# Patient Record
Sex: MALE | Race: WHITE | Employment: FULL TIME | URBAN - METROPOLITAN AREA
[De-identification: names, ages, dates, MRNs, and addresses within clinical notes are randomized per-mention and may not be internally consistent; named-entity substitution may affect disease eponyms.]

---

## 2017-03-13 RX ORDER — MONTELUKAST SODIUM 10 MG/1
TABLET ORAL
Qty: 90 TABLET | Refills: 5 | Status: SHIPPED | OUTPATIENT
Start: 2017-03-13 | End: 2018-09-06 | Stop reason: SDUPTHER

## 2017-05-15 RX ORDER — ATORVASTATIN CALCIUM 80 MG/1
TABLET, FILM COATED ORAL
Qty: 90 TABLET | Refills: 0 | Status: SHIPPED | OUTPATIENT
Start: 2017-05-15 | End: 2017-11-09 | Stop reason: SDUPTHER

## 2017-05-26 ENCOUNTER — OFFICE VISIT (OUTPATIENT)
Dept: INTERNAL MEDICINE CLINIC | Age: 47
End: 2017-05-26

## 2017-05-26 VITALS
DIASTOLIC BLOOD PRESSURE: 82 MMHG | HEART RATE: 86 BPM | HEIGHT: 72 IN | RESPIRATION RATE: 16 BRPM | WEIGHT: 208 LBS | BODY MASS INDEX: 28.17 KG/M2 | SYSTOLIC BLOOD PRESSURE: 120 MMHG

## 2017-05-26 DIAGNOSIS — Z23 NEED FOR PNEUMOCOCCAL VACCINE: ICD-10-CM

## 2017-05-26 DIAGNOSIS — I10 ESSENTIAL HYPERTENSION: Primary | ICD-10-CM

## 2017-05-26 DIAGNOSIS — J45.30 MILD PERSISTENT ASTHMA WITHOUT COMPLICATION: ICD-10-CM

## 2017-05-26 DIAGNOSIS — E78.00 PURE HYPERCHOLESTEROLEMIA: ICD-10-CM

## 2017-05-26 LAB
A/G RATIO: 2.1 (ref 1.1–2.2)
ALBUMIN SERPL-MCNC: 4.9 G/DL (ref 3.4–5)
ALP BLD-CCNC: 69 U/L (ref 40–129)
ALT SERPL-CCNC: 16 U/L (ref 10–40)
ANION GAP SERPL CALCULATED.3IONS-SCNC: 16 MMOL/L (ref 3–16)
AST SERPL-CCNC: 14 U/L (ref 15–37)
BASOPHILS ABSOLUTE: 0 K/UL (ref 0–0.2)
BASOPHILS RELATIVE PERCENT: 0.5 %
BILIRUB SERPL-MCNC: 0.5 MG/DL (ref 0–1)
BUN BLDV-MCNC: 15 MG/DL (ref 7–20)
CALCIUM SERPL-MCNC: 9.2 MG/DL (ref 8.3–10.6)
CHLORIDE BLD-SCNC: 102 MMOL/L (ref 99–110)
CHOLESTEROL, TOTAL: 147 MG/DL (ref 0–199)
CO2: 24 MMOL/L (ref 21–32)
CREAT SERPL-MCNC: 0.8 MG/DL (ref 0.9–1.3)
EOSINOPHILS ABSOLUTE: 0 K/UL (ref 0–0.6)
EOSINOPHILS RELATIVE PERCENT: 0.5 %
GFR AFRICAN AMERICAN: >60
GFR NON-AFRICAN AMERICAN: >60
GLOBULIN: 2.3 G/DL
GLUCOSE BLD-MCNC: 100 MG/DL (ref 70–99)
HCT VFR BLD CALC: 46.6 % (ref 40.5–52.5)
HDLC SERPL-MCNC: 35 MG/DL (ref 40–60)
HEMOGLOBIN: 15.3 G/DL (ref 13.5–17.5)
LDL CHOLESTEROL CALCULATED: 93 MG/DL
LYMPHOCYTES ABSOLUTE: 1.9 K/UL (ref 1–5.1)
LYMPHOCYTES RELATIVE PERCENT: 24.6 %
MCH RBC QN AUTO: 32.4 PG (ref 26–34)
MCHC RBC AUTO-ENTMCNC: 32.8 G/DL (ref 31–36)
MCV RBC AUTO: 98.8 FL (ref 80–100)
MONOCYTES ABSOLUTE: 0.3 K/UL (ref 0–1.3)
MONOCYTES RELATIVE PERCENT: 4.4 %
NEUTROPHILS ABSOLUTE: 5.4 K/UL (ref 1.7–7.7)
NEUTROPHILS RELATIVE PERCENT: 70 %
PDW BLD-RTO: 12.6 % (ref 12.4–15.4)
PLATELET # BLD: 283 K/UL (ref 135–450)
PMV BLD AUTO: 8.2 FL (ref 5–10.5)
POTASSIUM SERPL-SCNC: 4.3 MMOL/L (ref 3.5–5.1)
RBC # BLD: 4.71 M/UL (ref 4.2–5.9)
SODIUM BLD-SCNC: 142 MMOL/L (ref 136–145)
TOTAL PROTEIN: 7.2 G/DL (ref 6.4–8.2)
TRIGL SERPL-MCNC: 96 MG/DL (ref 0–150)
VLDLC SERPL CALC-MCNC: 19 MG/DL
WBC # BLD: 7.8 K/UL (ref 4–11)

## 2017-05-26 PROCEDURE — 90471 IMMUNIZATION ADMIN: CPT | Performed by: INTERNAL MEDICINE

## 2017-05-26 PROCEDURE — 90732 PPSV23 VACC 2 YRS+ SUBQ/IM: CPT | Performed by: INTERNAL MEDICINE

## 2017-05-26 PROCEDURE — 99214 OFFICE O/P EST MOD 30 MIN: CPT | Performed by: INTERNAL MEDICINE

## 2017-05-26 ASSESSMENT — ENCOUNTER SYMPTOMS
SHORTNESS OF BREATH: 0
RESPIRATORY NEGATIVE: 1
COUGH: 0
WHEEZING: 0
STRIDOR: 0

## 2017-05-26 ASSESSMENT — PATIENT HEALTH QUESTIONNAIRE - PHQ9
SUM OF ALL RESPONSES TO PHQ QUESTIONS 1-9: 0
SUM OF ALL RESPONSES TO PHQ9 QUESTIONS 1 & 2: 0
2. FEELING DOWN, DEPRESSED OR HOPELESS: 0
1. LITTLE INTEREST OR PLEASURE IN DOING THINGS: 0

## 2017-11-17 ENCOUNTER — OFFICE VISIT (OUTPATIENT)
Dept: INTERNAL MEDICINE CLINIC | Age: 47
End: 2017-11-17

## 2017-11-17 VITALS
HEART RATE: 70 BPM | SYSTOLIC BLOOD PRESSURE: 126 MMHG | HEIGHT: 72 IN | DIASTOLIC BLOOD PRESSURE: 80 MMHG | BODY MASS INDEX: 26.79 KG/M2 | WEIGHT: 197.8 LBS | RESPIRATION RATE: 16 BRPM

## 2017-11-17 DIAGNOSIS — J45.30 MILD PERSISTENT ASTHMA WITHOUT COMPLICATION: ICD-10-CM

## 2017-11-17 DIAGNOSIS — E78.00 PURE HYPERCHOLESTEROLEMIA: ICD-10-CM

## 2017-11-17 DIAGNOSIS — I10 ESSENTIAL HYPERTENSION: Primary | ICD-10-CM

## 2017-11-17 LAB
A/G RATIO: 2 (ref 1.1–2.2)
ALBUMIN SERPL-MCNC: 4.8 G/DL (ref 3.4–5)
ALP BLD-CCNC: 81 U/L (ref 40–129)
ALT SERPL-CCNC: 18 U/L (ref 10–40)
ANION GAP SERPL CALCULATED.3IONS-SCNC: 14 MMOL/L (ref 3–16)
AST SERPL-CCNC: 15 U/L (ref 15–37)
BILIRUB SERPL-MCNC: 0.4 MG/DL (ref 0–1)
BUN BLDV-MCNC: 15 MG/DL (ref 7–20)
CALCIUM SERPL-MCNC: 9.8 MG/DL (ref 8.3–10.6)
CHLORIDE BLD-SCNC: 99 MMOL/L (ref 99–110)
CHOLESTEROL, TOTAL: 154 MG/DL (ref 0–199)
CO2: 28 MMOL/L (ref 21–32)
CREAT SERPL-MCNC: 0.8 MG/DL (ref 0.9–1.3)
GFR AFRICAN AMERICAN: >60
GFR NON-AFRICAN AMERICAN: >60
GLOBULIN: 2.4 G/DL
GLUCOSE BLD-MCNC: 93 MG/DL (ref 70–99)
HDLC SERPL-MCNC: 42 MG/DL (ref 40–60)
LDL CHOLESTEROL CALCULATED: 99 MG/DL
POTASSIUM SERPL-SCNC: 4.2 MMOL/L (ref 3.5–5.1)
SODIUM BLD-SCNC: 141 MMOL/L (ref 136–145)
TOTAL PROTEIN: 7.2 G/DL (ref 6.4–8.2)
TRIGL SERPL-MCNC: 65 MG/DL (ref 0–150)
VLDLC SERPL CALC-MCNC: 13 MG/DL

## 2017-11-17 PROCEDURE — 99214 OFFICE O/P EST MOD 30 MIN: CPT | Performed by: INTERNAL MEDICINE

## 2017-11-17 NOTE — PROGRESS NOTES
Brother     COPD Maternal Grandmother          Vitals:    11/17/17 1054 11/17/17 1107   BP: 120/80 126/80   Site: Right Arm    Position: Sitting    Cuff Size: Medium Adult    Pulse: 70    Resp: 16    Weight: 197 lb 12.8 oz (89.7 kg)    Height: 6' (1.829 m)         Objective:   Physical Exam   Constitutional: He appears well-developed and well-nourished. Neck: Normal range of motion. Neck supple. No thyromegaly present. Cardiovascular: Normal rate, regular rhythm and normal heart sounds. Pulmonary/Chest: Effort normal and breath sounds normal. He has no wheezes. He has no rales. Musculoskeletal: He exhibits no edema. Lymphadenopathy:     He has no cervical adenopathy. Vitals reviewed. Assessment:      1. Essential hypertension  - well controlled, no changes needed. follow   2. Pure hypercholesterolemia  - fasting labs today, on a good statin dose, follow   3. Mild persistent asthma without complication  -continues to use albuterol, but still smokes actively.  Encouraged smoking cessation          Plan:      F/u in 6 months

## 2018-02-27 RX ORDER — BUDESONIDE AND FORMOTEROL FUMARATE DIHYDRATE 160; 4.5 UG/1; UG/1
AEROSOL RESPIRATORY (INHALATION)
Qty: 10.2 G | Refills: 5 | Status: SHIPPED | OUTPATIENT
Start: 2018-02-27 | End: 2018-12-13 | Stop reason: SDUPTHER

## 2018-05-25 ENCOUNTER — OFFICE VISIT (OUTPATIENT)
Dept: INTERNAL MEDICINE CLINIC | Age: 48
End: 2018-05-25

## 2018-05-25 VITALS
SYSTOLIC BLOOD PRESSURE: 132 MMHG | WEIGHT: 217.2 LBS | HEIGHT: 72 IN | RESPIRATION RATE: 16 BRPM | BODY MASS INDEX: 29.42 KG/M2 | HEART RATE: 74 BPM | DIASTOLIC BLOOD PRESSURE: 82 MMHG

## 2018-05-25 DIAGNOSIS — J45.30 MILD PERSISTENT ASTHMA WITHOUT COMPLICATION: ICD-10-CM

## 2018-05-25 DIAGNOSIS — E78.00 PURE HYPERCHOLESTEROLEMIA: ICD-10-CM

## 2018-05-25 DIAGNOSIS — I10 ESSENTIAL HYPERTENSION: Primary | ICD-10-CM

## 2018-05-25 LAB
A/G RATIO: 2 (ref 1.1–2.2)
ALBUMIN SERPL-MCNC: 4.6 G/DL (ref 3.4–5)
ALP BLD-CCNC: 78 U/L (ref 40–129)
ALT SERPL-CCNC: 16 U/L (ref 10–40)
ANION GAP SERPL CALCULATED.3IONS-SCNC: 13 MMOL/L (ref 3–16)
AST SERPL-CCNC: 16 U/L (ref 15–37)
BASOPHILS ABSOLUTE: 0 K/UL (ref 0–0.2)
BASOPHILS RELATIVE PERCENT: 0.4 %
BILIRUB SERPL-MCNC: 0.6 MG/DL (ref 0–1)
BUN BLDV-MCNC: 15 MG/DL (ref 7–20)
CALCIUM SERPL-MCNC: 9 MG/DL (ref 8.3–10.6)
CHLORIDE BLD-SCNC: 101 MMOL/L (ref 99–110)
CHOLESTEROL, TOTAL: 148 MG/DL (ref 0–199)
CO2: 26 MMOL/L (ref 21–32)
CREAT SERPL-MCNC: 0.8 MG/DL (ref 0.9–1.3)
EOSINOPHILS ABSOLUTE: 0.1 K/UL (ref 0–0.6)
EOSINOPHILS RELATIVE PERCENT: 1.3 %
GFR AFRICAN AMERICAN: >60
GFR NON-AFRICAN AMERICAN: >60
GLOBULIN: 2.3 G/DL
GLUCOSE BLD-MCNC: 105 MG/DL (ref 70–99)
HCT VFR BLD CALC: 42.8 % (ref 40.5–52.5)
HDLC SERPL-MCNC: 35 MG/DL (ref 40–60)
HEMOGLOBIN: 14.8 G/DL (ref 13.5–17.5)
LDL CHOLESTEROL CALCULATED: 97 MG/DL
LYMPHOCYTES ABSOLUTE: 1.8 K/UL (ref 1–5.1)
LYMPHOCYTES RELATIVE PERCENT: 28.3 %
MCH RBC QN AUTO: 33.5 PG (ref 26–34)
MCHC RBC AUTO-ENTMCNC: 34.6 G/DL (ref 31–36)
MCV RBC AUTO: 96.8 FL (ref 80–100)
MONOCYTES ABSOLUTE: 0.4 K/UL (ref 0–1.3)
MONOCYTES RELATIVE PERCENT: 5.7 %
NEUTROPHILS ABSOLUTE: 4.1 K/UL (ref 1.7–7.7)
NEUTROPHILS RELATIVE PERCENT: 64.3 %
PDW BLD-RTO: 12.8 % (ref 12.4–15.4)
PLATELET # BLD: 260 K/UL (ref 135–450)
PMV BLD AUTO: 7.8 FL (ref 5–10.5)
POTASSIUM SERPL-SCNC: 4.2 MMOL/L (ref 3.5–5.1)
RBC # BLD: 4.42 M/UL (ref 4.2–5.9)
SODIUM BLD-SCNC: 140 MMOL/L (ref 136–145)
TOTAL PROTEIN: 6.9 G/DL (ref 6.4–8.2)
TRIGL SERPL-MCNC: 80 MG/DL (ref 0–150)
TSH SERPL DL<=0.05 MIU/L-ACNC: 1.24 UIU/ML (ref 0.27–4.2)
VLDLC SERPL CALC-MCNC: 16 MG/DL
WBC # BLD: 6.3 K/UL (ref 4–11)

## 2018-05-25 PROCEDURE — 99214 OFFICE O/P EST MOD 30 MIN: CPT | Performed by: INTERNAL MEDICINE

## 2018-05-25 ASSESSMENT — ENCOUNTER SYMPTOMS
BLOOD IN STOOL: 0
WHEEZING: 0
RESPIRATORY NEGATIVE: 1
SHORTNESS OF BREATH: 0
COUGH: 0

## 2018-09-06 RX ORDER — MONTELUKAST SODIUM 10 MG/1
TABLET ORAL
Qty: 90 TABLET | Refills: 0 | Status: SHIPPED | OUTPATIENT
Start: 2018-09-06 | End: 2019-02-28 | Stop reason: SDUPTHER

## 2018-09-06 RX ORDER — AMLODIPINE BESYLATE 10 MG/1
TABLET ORAL
Qty: 90 TABLET | Refills: 0 | Status: SHIPPED | OUTPATIENT
Start: 2018-09-06 | End: 2019-02-28 | Stop reason: SDUPTHER

## 2018-12-13 RX ORDER — BUDESONIDE AND FORMOTEROL FUMARATE DIHYDRATE 160; 4.5 UG/1; UG/1
AEROSOL RESPIRATORY (INHALATION)
Qty: 10.2 G | Refills: 5 | Status: SHIPPED | OUTPATIENT
Start: 2018-12-13 | End: 2019-10-31 | Stop reason: SDUPTHER

## 2019-02-28 RX ORDER — AMLODIPINE BESYLATE 10 MG/1
TABLET ORAL
Qty: 90 TABLET | Refills: 0 | Status: SHIPPED | OUTPATIENT
Start: 2019-02-28 | End: 2019-08-29 | Stop reason: SDUPTHER

## 2019-02-28 RX ORDER — MONTELUKAST SODIUM 10 MG/1
TABLET ORAL
Qty: 90 TABLET | Refills: 0 | Status: SHIPPED | OUTPATIENT
Start: 2019-02-28 | End: 2019-08-29 | Stop reason: SDUPTHER

## 2019-04-04 RX ORDER — ALBUTEROL SULFATE 90 UG/1
AEROSOL, METERED RESPIRATORY (INHALATION)
Qty: 90 G | Refills: 5 | Status: SHIPPED | OUTPATIENT
Start: 2019-04-04 | End: 2019-12-19

## 2019-04-29 RX ORDER — ATORVASTATIN CALCIUM 80 MG/1
TABLET, FILM COATED ORAL
Qty: 90 TABLET | Refills: 3 | Status: SHIPPED | OUTPATIENT
Start: 2019-04-29 | End: 2020-10-13 | Stop reason: SDUPTHER

## 2019-12-19 RX ORDER — ALBUTEROL SULFATE 90 UG/1
AEROSOL, METERED RESPIRATORY (INHALATION)
Qty: 90 G | Refills: 3 | Status: SHIPPED | OUTPATIENT
Start: 2019-12-19 | End: 2021-02-01

## 2021-01-01 ENCOUNTER — HOSPITAL ENCOUNTER (OUTPATIENT)
Dept: CT IMAGING | Age: 51
Discharge: HOME | End: 2021-12-06
Payer: COMMERCIAL

## 2021-01-01 ENCOUNTER — HOSPITAL ENCOUNTER (OUTPATIENT)
Dept: NUCLEAR MEDICINE | Age: 51
Discharge: HOME | End: 2021-12-06
Payer: COMMERCIAL

## 2021-01-01 ENCOUNTER — TELEPHONE (OUTPATIENT)
Dept: FAMILY MEDICINE CLINIC | Age: 51
End: 2021-01-01

## 2021-01-01 ENCOUNTER — APPOINTMENT (OUTPATIENT)
Dept: CT IMAGING | Age: 51
DRG: 136 | End: 2021-01-01
Payer: MEDICAID

## 2021-01-01 ENCOUNTER — HOSPITAL ENCOUNTER (OUTPATIENT)
Age: 51
Discharge: HOME | DRG: 136 | End: 2021-09-02
Payer: MEDICAID

## 2021-01-01 ENCOUNTER — HOSPITAL ENCOUNTER (INPATIENT)
Age: 51
LOS: 20 days | Discharge: HOME | DRG: 136 | End: 2021-09-22
Attending: EMERGENCY MEDICINE | Admitting: INTERNAL MEDICINE
Payer: MEDICAID

## 2021-01-01 ENCOUNTER — APPOINTMENT (OUTPATIENT)
Dept: MRI IMAGING | Age: 51
DRG: 136 | End: 2021-01-01
Payer: MEDICAID

## 2021-01-01 ENCOUNTER — APPOINTMENT (OUTPATIENT)
Dept: NUCLEAR MEDICINE | Age: 51
DRG: 136 | End: 2021-01-01
Payer: MEDICAID

## 2021-01-01 ENCOUNTER — APPOINTMENT (OUTPATIENT)
Dept: GENERAL RADIOLOGY | Age: 51
DRG: 136 | End: 2021-01-01
Payer: MEDICAID

## 2021-01-01 ENCOUNTER — HOSPITAL ENCOUNTER (OUTPATIENT)
Dept: GENERAL RADIOLOGY | Age: 51
Discharge: HOME | DRG: 136 | End: 2021-09-02
Payer: MEDICAID

## 2021-01-01 VITALS
BODY MASS INDEX: 27.74 KG/M2 | RESPIRATION RATE: 16 BRPM | WEIGHT: 204.81 LBS | HEIGHT: 72 IN | DIASTOLIC BLOOD PRESSURE: 74 MMHG | OXYGEN SATURATION: 97 % | HEART RATE: 98 BPM | TEMPERATURE: 98 F | SYSTOLIC BLOOD PRESSURE: 119 MMHG

## 2021-01-01 DIAGNOSIS — J20.9 ACUTE BRONCHITIS, UNSPECIFIED ORGANISM: ICD-10-CM

## 2021-01-01 DIAGNOSIS — C34.01 MALIGNANT NEOPLASM OF RIGHT MAIN BRONCHUS (HCC): ICD-10-CM

## 2021-01-01 DIAGNOSIS — R06.09 DOE (DYSPNEA ON EXERTION): ICD-10-CM

## 2021-01-01 DIAGNOSIS — R05.9 COUGH: Primary | ICD-10-CM

## 2021-01-01 DIAGNOSIS — R60.0 EDEMA OF BOTH LEGS: ICD-10-CM

## 2021-01-01 DIAGNOSIS — R91.8 LUNG NODULES: ICD-10-CM

## 2021-01-01 DIAGNOSIS — K76.89 LIVER NODULE: ICD-10-CM

## 2021-01-01 DIAGNOSIS — E87.6 HYPOKALEMIA: ICD-10-CM

## 2021-01-01 DIAGNOSIS — J45.41 MODERATE PERSISTENT ASTHMA WITH EXACERBATION: ICD-10-CM

## 2021-01-01 DIAGNOSIS — I10 ELEVATED BLOOD PRESSURE READING IN OFFICE WITH DIAGNOSIS OF HYPERTENSION: ICD-10-CM

## 2021-01-01 LAB
A/G RATIO: 1.6 (ref 1.1–2.2)
ADRENOCORTICOTROPIC HORMONE: 726 PG/ML (ref 7–69)
ALBUMIN SERPL-MCNC: 2.9 G/DL (ref 3.4–5)
ALBUMIN SERPL-MCNC: 3 G/DL (ref 3.4–5)
ALBUMIN SERPL-MCNC: 3.4 G/DL (ref 3.4–5)
ALBUMIN SERPL-MCNC: 3.4 G/DL (ref 3.4–5)
ALBUMIN SERPL-MCNC: 3.5 G/DL (ref 3.4–5)
ALBUMIN SERPL-MCNC: 3.6 G/DL (ref 3.4–5)
ALBUMIN SERPL-MCNC: 3.9 G/DL (ref 3.4–5)
ALDOSTERONE: 3.5 NG/DL
ALP BLD-CCNC: 170 U/L (ref 40–129)
ALP BLD-CCNC: 177 U/L (ref 40–129)
ALP BLD-CCNC: 202 U/L (ref 40–129)
ALP BLD-CCNC: 213 U/L (ref 40–129)
ALP BLD-CCNC: 219 U/L (ref 40–129)
ALP BLD-CCNC: 223 U/L (ref 40–129)
ALT SERPL-CCNC: 193 U/L (ref 10–40)
ALT SERPL-CCNC: 193 U/L (ref 10–40)
ALT SERPL-CCNC: 209 U/L (ref 10–40)
ALT SERPL-CCNC: 218 U/L (ref 10–40)
ALT SERPL-CCNC: 240 U/L (ref 10–40)
ALT SERPL-CCNC: 266 U/L (ref 10–40)
ANION GAP SERPL CALCULATED.3IONS-SCNC: 10 MMOL/L (ref 3–16)
ANION GAP SERPL CALCULATED.3IONS-SCNC: 11 MMOL/L (ref 3–16)
ANION GAP SERPL CALCULATED.3IONS-SCNC: 12 MMOL/L (ref 3–16)
ANION GAP SERPL CALCULATED.3IONS-SCNC: 13 MMOL/L (ref 3–16)
ANION GAP SERPL CALCULATED.3IONS-SCNC: 6 MMOL/L (ref 3–16)
ANION GAP SERPL CALCULATED.3IONS-SCNC: 6 MMOL/L (ref 3–16)
ANION GAP SERPL CALCULATED.3IONS-SCNC: 7 MMOL/L (ref 3–16)
ANION GAP SERPL CALCULATED.3IONS-SCNC: 8 MMOL/L (ref 3–16)
ANION GAP SERPL CALCULATED.3IONS-SCNC: 9 MMOL/L (ref 3–16)
APTT: 25.8 SEC (ref 26.2–38.6)
AST SERPL-CCNC: 50 U/L (ref 15–37)
AST SERPL-CCNC: 53 U/L (ref 15–37)
AST SERPL-CCNC: 54 U/L (ref 15–37)
AST SERPL-CCNC: 64 U/L (ref 15–37)
AST SERPL-CCNC: 79 U/L (ref 15–37)
AST SERPL-CCNC: 80 U/L (ref 15–37)
BANDED NEUTROPHILS RELATIVE PERCENT: 22 % (ref 0–7)
BASE EXCESS ARTERIAL: 21.4 MMOL/L (ref -3–3)
BASOPHILS ABSOLUTE: 0 K/UL (ref 0–0.2)
BASOPHILS ABSOLUTE: 0.1 K/UL (ref 0–0.2)
BASOPHILS ABSOLUTE: 0.1 K/UL (ref 0–0.2)
BASOPHILS RELATIVE PERCENT: 0 %
BASOPHILS RELATIVE PERCENT: 0.1 %
BASOPHILS RELATIVE PERCENT: 0.1 %
BASOPHILS RELATIVE PERCENT: 0.2 %
BASOPHILS RELATIVE PERCENT: 0.3 %
BASOPHILS RELATIVE PERCENT: 0.6 %
BASOPHILS RELATIVE PERCENT: 0.7 %
BILIRUB SERPL-MCNC: 0.7 MG/DL (ref 0–1)
BILIRUB SERPL-MCNC: 0.7 MG/DL (ref 0–1)
BILIRUB SERPL-MCNC: 0.8 MG/DL (ref 0–1)
BILIRUB SERPL-MCNC: 0.8 MG/DL (ref 0–1)
BILIRUB SERPL-MCNC: 0.9 MG/DL (ref 0–1)
BILIRUB SERPL-MCNC: 1 MG/DL (ref 0–1)
BILIRUBIN DIRECT: 0.3 MG/DL (ref 0–0.3)
BILIRUBIN DIRECT: 0.3 MG/DL (ref 0–0.3)
BILIRUBIN DIRECT: 0.4 MG/DL (ref 0–0.3)
BILIRUBIN DIRECT: <0.2 MG/DL (ref 0–0.3)
BILIRUBIN DIRECT: <0.2 MG/DL (ref 0–0.3)
BILIRUBIN URINE: NEGATIVE
BILIRUBIN, INDIRECT: 0.5 MG/DL (ref 0–1)
BILIRUBIN, INDIRECT: 0.6 MG/DL (ref 0–1)
BILIRUBIN, INDIRECT: 0.6 MG/DL (ref 0–1)
BILIRUBIN, INDIRECT: ABNORMAL MG/DL (ref 0–1)
BILIRUBIN, INDIRECT: ABNORMAL MG/DL (ref 0–1)
BLOOD, URINE: ABNORMAL
BUN BLDV-MCNC: 12 MG/DL (ref 7–20)
BUN BLDV-MCNC: 13 MG/DL (ref 7–20)
BUN BLDV-MCNC: 14 MG/DL (ref 7–20)
BUN BLDV-MCNC: 15 MG/DL (ref 7–20)
BUN BLDV-MCNC: 16 MG/DL (ref 7–20)
BUN BLDV-MCNC: 17 MG/DL (ref 7–20)
BUN BLDV-MCNC: 17 MG/DL (ref 7–20)
BUN BLDV-MCNC: 18 MG/DL (ref 7–20)
BUN BLDV-MCNC: 18 MG/DL (ref 7–20)
BUN BLDV-MCNC: 19 MG/DL (ref 7–20)
BUN BLDV-MCNC: 21 MG/DL (ref 7–20)
BUN BLDV-MCNC: 23 MG/DL (ref 7–20)
BUN BLDV-MCNC: 23 MG/DL (ref 7–20)
BUN BLDV-MCNC: 24 MG/DL (ref 7–20)
BUN BLDV-MCNC: 25 MG/DL (ref 7–20)
BUN BLDV-MCNC: 25 MG/DL (ref 7–20)
BUN BLDV-MCNC: 26 MG/DL (ref 7–20)
BUN BLDV-MCNC: 28 MG/DL (ref 7–20)
BUN BLDV-MCNC: 30 MG/DL (ref 7–20)
BUN BLDV-MCNC: 30 MG/DL (ref 7–20)
CA 19-9: 128 U/ML (ref 0–35)
CALCIUM SERPL-MCNC: 7.9 MG/DL (ref 8.3–10.6)
CALCIUM SERPL-MCNC: 8.1 MG/DL (ref 8.3–10.6)
CALCIUM SERPL-MCNC: 8.2 MG/DL (ref 8.3–10.6)
CALCIUM SERPL-MCNC: 8.3 MG/DL (ref 8.3–10.6)
CALCIUM SERPL-MCNC: 8.4 MG/DL (ref 8.3–10.6)
CALCIUM SERPL-MCNC: 8.5 MG/DL (ref 8.3–10.6)
CALCIUM SERPL-MCNC: 8.6 MG/DL (ref 8.3–10.6)
CALCIUM SERPL-MCNC: 8.6 MG/DL (ref 8.3–10.6)
CALCIUM SERPL-MCNC: 8.7 MG/DL (ref 8.3–10.6)
CALCIUM SERPL-MCNC: 8.7 MG/DL (ref 8.3–10.6)
CALCIUM SERPL-MCNC: 8.8 MG/DL (ref 8.3–10.6)
CALCIUM SERPL-MCNC: 8.9 MG/DL (ref 8.3–10.6)
CALCIUM SERPL-MCNC: 9 MG/DL (ref 8.3–10.6)
CARBOXYHEMOGLOBIN ARTERIAL: 4.3 % (ref 0–1.5)
CEA: 278.1 NG/ML (ref 0–5)
CHLORIDE BLD-SCNC: 101 MMOL/L (ref 99–110)
CHLORIDE BLD-SCNC: 101 MMOL/L (ref 99–110)
CHLORIDE BLD-SCNC: 102 MMOL/L (ref 99–110)
CHLORIDE BLD-SCNC: 103 MMOL/L (ref 99–110)
CHLORIDE BLD-SCNC: 103 MMOL/L (ref 99–110)
CHLORIDE BLD-SCNC: 105 MMOL/L (ref 99–110)
CHLORIDE BLD-SCNC: 106 MMOL/L (ref 99–110)
CHLORIDE BLD-SCNC: 108 MMOL/L (ref 99–110)
CHLORIDE BLD-SCNC: 92 MMOL/L (ref 99–110)
CHLORIDE BLD-SCNC: 92 MMOL/L (ref 99–110)
CHLORIDE BLD-SCNC: 93 MMOL/L (ref 99–110)
CHLORIDE BLD-SCNC: 94 MMOL/L (ref 99–110)
CHLORIDE BLD-SCNC: 95 MMOL/L (ref 99–110)
CHLORIDE BLD-SCNC: 96 MMOL/L (ref 99–110)
CHLORIDE BLD-SCNC: 97 MMOL/L (ref 99–110)
CHLORIDE BLD-SCNC: 98 MMOL/L (ref 99–110)
CHLORIDE BLD-SCNC: 99 MMOL/L (ref 99–110)
CHLORIDE BLD-SCNC: 99 MMOL/L (ref 99–110)
CLARITY: CLEAR
CO2: 21 MMOL/L (ref 21–32)
CO2: 24 MMOL/L (ref 21–32)
CO2: 25 MMOL/L (ref 21–32)
CO2: 27 MMOL/L (ref 21–32)
CO2: 27 MMOL/L (ref 21–32)
CO2: 30 MMOL/L (ref 21–32)
CO2: 30 MMOL/L (ref 21–32)
CO2: 31 MMOL/L (ref 21–32)
CO2: 31 MMOL/L (ref 21–32)
CO2: 32 MMOL/L (ref 21–32)
CO2: 34 MMOL/L (ref 21–32)
CO2: 35 MMOL/L (ref 21–32)
CO2: 35 MMOL/L (ref 21–32)
CO2: 36 MMOL/L (ref 21–32)
CO2: 37 MMOL/L (ref 21–32)
CO2: 37 MMOL/L (ref 21–32)
CO2: 38 MMOL/L (ref 21–32)
CO2: 38 MMOL/L (ref 21–32)
CO2: 40 MMOL/L (ref 21–32)
CO2: 40 MMOL/L (ref 21–32)
COLOR: YELLOW
CORTISOL (UR), FREE: ABNORMAL UG/D
CORTISOL - AM: 90 UG/DL (ref 4.3–22.4)
CORTISOL SALIVARY: >15 UG/DL
CORTISOL URINE, FREE (/G CRT): 7203.7 UG/G CRT
CORTISOL,F,UG/L,U: 3890 UG/L
CREAT SERPL-MCNC: 0.5 MG/DL (ref 0.9–1.3)
CREAT SERPL-MCNC: 0.6 MG/DL (ref 0.9–1.3)
CREAT SERPL-MCNC: 0.7 MG/DL (ref 0.9–1.3)
CREAT SERPL-MCNC: 0.8 MG/DL (ref 0.9–1.3)
CREAT SERPL-MCNC: 0.9 MG/DL (ref 0.9–1.3)
CREAT SERPL-MCNC: <0.5 MG/DL (ref 0.9–1.3)
CREATININE 24 HOUR URINE: 1620 MG/D (ref 800–2100)
CREATININE URINE: 54 MG/DL
CULTURE, RESPIRATORY: NORMAL
DOHLE BODIES: PRESENT
EKG ATRIAL RATE: 59 BPM
EKG DIAGNOSIS: NORMAL
EKG P AXIS: 78 DEGREES
EKG P-R INTERVAL: 168 MS
EKG Q-T INTERVAL: 424 MS
EKG QRS DURATION: 94 MS
EKG QTC CALCULATION (BAZETT): 419 MS
EKG R AXIS: 53 DEGREES
EKG T AXIS: 53 DEGREES
EKG VENTRICULAR RATE: 59 BPM
EOSINOPHILS ABSOLUTE: 0 K/UL (ref 0–0.6)
EOSINOPHILS RELATIVE PERCENT: 0 %
EOSINOPHILS RELATIVE PERCENT: 0.1 %
EOSINOPHILS RELATIVE PERCENT: 0.2 %
EPITHELIAL CELLS, UA: 1 /HPF (ref 0–5)
GFR AFRICAN AMERICAN: >60
GFR NON-AFRICAN AMERICAN: >60
GLOBULIN: 2.4 G/DL
GLUCOSE BLD-MCNC: 101 MG/DL (ref 70–99)
GLUCOSE BLD-MCNC: 103 MG/DL (ref 70–99)
GLUCOSE BLD-MCNC: 104 MG/DL (ref 70–99)
GLUCOSE BLD-MCNC: 107 MG/DL (ref 70–99)
GLUCOSE BLD-MCNC: 109 MG/DL (ref 70–99)
GLUCOSE BLD-MCNC: 110 MG/DL (ref 70–99)
GLUCOSE BLD-MCNC: 111 MG/DL (ref 70–99)
GLUCOSE BLD-MCNC: 112 MG/DL (ref 70–99)
GLUCOSE BLD-MCNC: 115 MG/DL (ref 70–99)
GLUCOSE BLD-MCNC: 115 MG/DL (ref 70–99)
GLUCOSE BLD-MCNC: 117 MG/DL (ref 70–99)
GLUCOSE BLD-MCNC: 118 MG/DL (ref 70–99)
GLUCOSE BLD-MCNC: 120 MG/DL (ref 70–99)
GLUCOSE BLD-MCNC: 121 MG/DL (ref 70–99)
GLUCOSE BLD-MCNC: 121 MG/DL (ref 70–99)
GLUCOSE BLD-MCNC: 123 MG/DL (ref 70–99)
GLUCOSE BLD-MCNC: 123 MG/DL (ref 70–99)
GLUCOSE BLD-MCNC: 124 MG/DL (ref 70–99)
GLUCOSE BLD-MCNC: 125 MG/DL (ref 70–99)
GLUCOSE BLD-MCNC: 127 MG/DL (ref 70–99)
GLUCOSE BLD-MCNC: 128 MG/DL (ref 70–99)
GLUCOSE BLD-MCNC: 128 MG/DL (ref 70–99)
GLUCOSE BLD-MCNC: 129 MG/DL (ref 70–99)
GLUCOSE BLD-MCNC: 131 MG/DL (ref 70–99)
GLUCOSE BLD-MCNC: 135 MG/DL (ref 70–99)
GLUCOSE BLD-MCNC: 144 MG/DL (ref 70–99)
GLUCOSE BLD-MCNC: 147 MG/DL (ref 70–99)
GLUCOSE BLD-MCNC: 150 MG/DL (ref 70–99)
GLUCOSE BLD-MCNC: 153 MG/DL (ref 70–99)
GLUCOSE BLD-MCNC: 154 MG/DL (ref 70–99)
GLUCOSE BLD-MCNC: 167 MG/DL (ref 70–99)
GLUCOSE BLD-MCNC: 167 MG/DL (ref 70–99)
GLUCOSE BLD-MCNC: 171 MG/DL (ref 70–99)
GLUCOSE BLD-MCNC: 174 MG/DL (ref 70–99)
GLUCOSE BLD-MCNC: 176 MG/DL (ref 70–99)
GLUCOSE BLD-MCNC: 177 MG/DL (ref 70–99)
GLUCOSE BLD-MCNC: 183 MG/DL (ref 70–99)
GLUCOSE BLD-MCNC: 188 MG/DL (ref 70–99)
GLUCOSE BLD-MCNC: 188 MG/DL (ref 70–99)
GLUCOSE BLD-MCNC: 190 MG/DL (ref 70–99)
GLUCOSE BLD-MCNC: 190 MG/DL (ref 70–99)
GLUCOSE BLD-MCNC: 192 MG/DL (ref 70–99)
GLUCOSE BLD-MCNC: 193 MG/DL (ref 70–99)
GLUCOSE BLD-MCNC: 196 MG/DL (ref 70–99)
GLUCOSE BLD-MCNC: 199 MG/DL (ref 70–99)
GLUCOSE BLD-MCNC: 202 MG/DL (ref 70–99)
GLUCOSE BLD-MCNC: 205 MG/DL (ref 70–99)
GLUCOSE BLD-MCNC: 205 MG/DL (ref 70–99)
GLUCOSE BLD-MCNC: 207 MG/DL (ref 70–99)
GLUCOSE BLD-MCNC: 211 MG/DL (ref 70–99)
GLUCOSE BLD-MCNC: 211 MG/DL (ref 70–99)
GLUCOSE BLD-MCNC: 213 MG/DL (ref 70–99)
GLUCOSE BLD-MCNC: 221 MG/DL (ref 70–99)
GLUCOSE BLD-MCNC: 222 MG/DL (ref 70–99)
GLUCOSE BLD-MCNC: 222 MG/DL (ref 70–99)
GLUCOSE BLD-MCNC: 223 MG/DL (ref 70–99)
GLUCOSE BLD-MCNC: 224 MG/DL (ref 70–99)
GLUCOSE BLD-MCNC: 226 MG/DL (ref 70–99)
GLUCOSE BLD-MCNC: 231 MG/DL (ref 70–99)
GLUCOSE BLD-MCNC: 233 MG/DL (ref 70–99)
GLUCOSE BLD-MCNC: 234 MG/DL (ref 70–99)
GLUCOSE BLD-MCNC: 234 MG/DL (ref 70–99)
GLUCOSE BLD-MCNC: 235 MG/DL (ref 70–99)
GLUCOSE BLD-MCNC: 238 MG/DL (ref 70–99)
GLUCOSE BLD-MCNC: 238 MG/DL (ref 70–99)
GLUCOSE BLD-MCNC: 239 MG/DL (ref 70–99)
GLUCOSE BLD-MCNC: 249 MG/DL (ref 70–99)
GLUCOSE BLD-MCNC: 254 MG/DL (ref 70–99)
GLUCOSE BLD-MCNC: 256 MG/DL (ref 70–99)
GLUCOSE BLD-MCNC: 263 MG/DL (ref 70–99)
GLUCOSE BLD-MCNC: 264 MG/DL (ref 70–99)
GLUCOSE BLD-MCNC: 277 MG/DL (ref 70–99)
GLUCOSE BLD-MCNC: 278 MG/DL (ref 70–99)
GLUCOSE BLD-MCNC: 281 MG/DL (ref 70–99)
GLUCOSE BLD-MCNC: 288 MG/DL (ref 70–99)
GLUCOSE BLD-MCNC: 292 MG/DL (ref 70–99)
GLUCOSE BLD-MCNC: 294 MG/DL (ref 70–99)
GLUCOSE BLD-MCNC: 295 MG/DL (ref 70–99)
GLUCOSE BLD-MCNC: 322 MG/DL (ref 70–99)
GLUCOSE BLD-MCNC: 329 MG/DL (ref 70–99)
GLUCOSE BLD-MCNC: 345 MG/DL (ref 70–99)
GLUCOSE BLD-MCNC: 364 MG/DL (ref 70–99)
GLUCOSE BLD-MCNC: 370 MG/DL (ref 70–99)
GLUCOSE BLD-MCNC: 411 MG/DL (ref 70–99)
GLUCOSE BLD-MCNC: 75 MG/DL (ref 70–99)
GLUCOSE BLD-MCNC: 83 MG/DL (ref 70–99)
GLUCOSE BLD-MCNC: 93 MG/DL (ref 70–99)
GLUCOSE BLD-MCNC: 93 MG/DL (ref 70–99)
GLUCOSE BLD-MCNC: 97 MG/DL (ref 70–99)
GLUCOSE BLD-MCNC: 98 MG/DL (ref 70–99)
GLUCOSE BLD-MCNC: 99 MG/DL (ref 70–99)
GLUCOSE BLD-MCNC: 99 MG/DL (ref 70–99)
GLUCOSE URINE: 100 MG/DL
GRAM STAIN RESULT: NORMAL
HCO3 ARTERIAL: 47.5 MMOL/L (ref 21–29)
HCT VFR BLD CALC: 33.8 % (ref 40.5–52.5)
HCT VFR BLD CALC: 34.2 % (ref 40.5–52.5)
HCT VFR BLD CALC: 36.5 % (ref 40.5–52.5)
HCT VFR BLD CALC: 36.9 % (ref 40.5–52.5)
HCT VFR BLD CALC: 39 % (ref 40.5–52.5)
HCT VFR BLD CALC: 41.4 % (ref 40.5–52.5)
HCT VFR BLD CALC: 43.6 % (ref 40.5–52.5)
HCT VFR BLD CALC: 44.3 % (ref 40.5–52.5)
HCT VFR BLD CALC: 45.2 % (ref 40.5–52.5)
HEMATOLOGY PATH CONSULT: NORMAL
HEMATOLOGY PATH CONSULT: YES
HEMOGLOBIN, ART, EXTENDED: 15.3 G/DL (ref 13.5–17.5)
HEMOGLOBIN: 11.4 G/DL (ref 13.5–17.5)
HEMOGLOBIN: 11.7 G/DL (ref 13.5–17.5)
HEMOGLOBIN: 12.1 G/DL (ref 13.5–17.5)
HEMOGLOBIN: 12.3 G/DL (ref 13.5–17.5)
HEMOGLOBIN: 13.3 G/DL (ref 13.5–17.5)
HEMOGLOBIN: 14.1 G/DL (ref 13.5–17.5)
HEMOGLOBIN: 14.6 G/DL (ref 13.5–17.5)
HEMOGLOBIN: 15.2 G/DL (ref 13.5–17.5)
HEMOGLOBIN: 15.4 G/DL (ref 13.5–17.5)
HOURS COLLECTED: 24
HYALINE CASTS: 0 /LPF (ref 0–8)
INR BLD: 1.02 (ref 0.88–1.12)
INR BLD: 1.03 (ref 0.88–1.12)
INTERPRETATION: ABNORMAL
KETONES, URINE: NEGATIVE MG/DL
LACTATE DEHYDROGENASE: 369 U/L (ref 100–190)
LACTATE DEHYDROGENASE: 420 U/L (ref 100–190)
LACTATE DEHYDROGENASE: 517 U/L (ref 100–190)
LACTATE DEHYDROGENASE: 584 U/L (ref 100–190)
LACTATE DEHYDROGENASE: 665 U/L (ref 100–190)
LACTATE DEHYDROGENASE: 699 U/L (ref 100–190)
LACTATE DEHYDROGENASE: 701 U/L (ref 100–190)
LACTATE DEHYDROGENASE: 709 U/L (ref 100–190)
LACTATE DEHYDROGENASE: 728 U/L (ref 100–190)
LACTATE DEHYDROGENASE: 738 U/L (ref 100–190)
LACTATE DEHYDROGENASE: 777 U/L (ref 100–190)
LACTATE DEHYDROGENASE: 777 U/L (ref 100–190)
LACTATE DEHYDROGENASE: 796 U/L (ref 100–190)
LACTATE DEHYDROGENASE: 813 U/L (ref 100–190)
LEUKOCYTE ESTERASE, URINE: NEGATIVE
LV EF: 63 %
LVEF MODALITY: NORMAL
LYMPHOCYTES ABSOLUTE: 0.3 K/UL (ref 1–5.1)
LYMPHOCYTES ABSOLUTE: 0.4 K/UL (ref 1–5.1)
LYMPHOCYTES ABSOLUTE: 0.5 K/UL (ref 1–5.1)
LYMPHOCYTES ABSOLUTE: 0.5 K/UL (ref 1–5.1)
LYMPHOCYTES ABSOLUTE: 0.6 K/UL (ref 1–5.1)
LYMPHOCYTES RELATIVE PERCENT: 10 %
LYMPHOCYTES RELATIVE PERCENT: 3.3 %
LYMPHOCYTES RELATIVE PERCENT: 32 %
LYMPHOCYTES RELATIVE PERCENT: 4.1 %
LYMPHOCYTES RELATIVE PERCENT: 4.4 %
LYMPHOCYTES RELATIVE PERCENT: 4.5 %
LYMPHOCYTES RELATIVE PERCENT: 4.7 %
LYMPHOCYTES RELATIVE PERCENT: 5.3 %
LYMPHOCYTES RELATIVE PERCENT: 6 %
MAGNESIUM: 1.7 MG/DL (ref 1.8–2.4)
MAGNESIUM: 1.8 MG/DL (ref 1.8–2.4)
MAGNESIUM: 1.9 MG/DL (ref 1.8–2.4)
MAGNESIUM: 2 MG/DL (ref 1.8–2.4)
MAGNESIUM: 2.1 MG/DL (ref 1.8–2.4)
MAGNESIUM: 2.1 MG/DL (ref 1.8–2.4)
MAGNESIUM: 2.3 MG/DL (ref 1.8–2.4)
MAGNESIUM: 2.3 MG/DL (ref 1.8–2.4)
MCH RBC QN AUTO: 32.9 PG (ref 26–34)
MCH RBC QN AUTO: 33.2 PG (ref 26–34)
MCH RBC QN AUTO: 33.2 PG (ref 26–34)
MCH RBC QN AUTO: 33.3 PG (ref 26–34)
MCH RBC QN AUTO: 33.3 PG (ref 26–34)
MCH RBC QN AUTO: 33.4 PG (ref 26–34)
MCH RBC QN AUTO: 33.6 PG (ref 26–34)
MCH RBC QN AUTO: 33.7 PG (ref 26–34)
MCH RBC QN AUTO: 34 PG (ref 26–34)
MCHC RBC AUTO-ENTMCNC: 33.2 G/DL (ref 31–36)
MCHC RBC AUTO-ENTMCNC: 33.4 G/DL (ref 31–36)
MCHC RBC AUTO-ENTMCNC: 33.5 G/DL (ref 31–36)
MCHC RBC AUTO-ENTMCNC: 33.9 G/DL (ref 31–36)
MCHC RBC AUTO-ENTMCNC: 34 G/DL (ref 31–36)
MCHC RBC AUTO-ENTMCNC: 34.1 G/DL (ref 31–36)
MCHC RBC AUTO-ENTMCNC: 34.1 G/DL (ref 31–36)
MCHC RBC AUTO-ENTMCNC: 34.2 G/DL (ref 31–36)
MCHC RBC AUTO-ENTMCNC: 34.2 G/DL (ref 31–36)
MCV RBC AUTO: 100 FL (ref 80–100)
MCV RBC AUTO: 97 FL (ref 80–100)
MCV RBC AUTO: 97.5 FL (ref 80–100)
MCV RBC AUTO: 98.2 FL (ref 80–100)
MCV RBC AUTO: 98.3 FL (ref 80–100)
MCV RBC AUTO: 98.5 FL (ref 80–100)
MCV RBC AUTO: 99 FL (ref 80–100)
MCV RBC AUTO: 99.3 FL (ref 80–100)
MCV RBC AUTO: 99.7 FL (ref 80–100)
METHEMOGLOBIN ARTERIAL: 0.2 %
MICROSCOPIC EXAMINATION: YES
MONOCYTES ABSOLUTE: 0 K/UL (ref 0–1.3)
MONOCYTES ABSOLUTE: 0 K/UL (ref 0–1.3)
MONOCYTES ABSOLUTE: 0.1 K/UL (ref 0–1.3)
MONOCYTES ABSOLUTE: 0.2 K/UL (ref 0–1.3)
MONOCYTES ABSOLUTE: 0.3 K/UL (ref 0–1.3)
MONOCYTES ABSOLUTE: 0.4 K/UL (ref 0–1.3)
MONOCYTES ABSOLUTE: 0.4 K/UL (ref 0–1.3)
MONOCYTES RELATIVE PERCENT: 0.3 %
MONOCYTES RELATIVE PERCENT: 0.6 %
MONOCYTES RELATIVE PERCENT: 1 %
MONOCYTES RELATIVE PERCENT: 3.2 %
MONOCYTES RELATIVE PERCENT: 3.9 %
MONOCYTES RELATIVE PERCENT: 3.9 %
MONOCYTES RELATIVE PERCENT: 4.8 %
MONOCYTES RELATIVE PERCENT: 5.3 %
MONOCYTES RELATIVE PERCENT: 9 %
NEUTROPHILS ABSOLUTE: 1 K/UL (ref 1.7–7.7)
NEUTROPHILS ABSOLUTE: 11.1 K/UL (ref 1.7–7.7)
NEUTROPHILS ABSOLUTE: 5.2 K/UL (ref 1.7–7.7)
NEUTROPHILS ABSOLUTE: 6.2 K/UL (ref 1.7–7.7)
NEUTROPHILS ABSOLUTE: 6.7 K/UL (ref 1.7–7.7)
NEUTROPHILS ABSOLUTE: 7.1 K/UL (ref 1.7–7.7)
NEUTROPHILS ABSOLUTE: 7.3 K/UL (ref 1.7–7.7)
NEUTROPHILS ABSOLUTE: 7.3 K/UL (ref 1.7–7.7)
NEUTROPHILS ABSOLUTE: 7.9 K/UL (ref 1.7–7.7)
NEUTROPHILS RELATIVE PERCENT: 37 %
NEUTROPHILS RELATIVE PERCENT: 88.1 %
NEUTROPHILS RELATIVE PERCENT: 89.1 %
NEUTROPHILS RELATIVE PERCENT: 90.4 %
NEUTROPHILS RELATIVE PERCENT: 90.5 %
NEUTROPHILS RELATIVE PERCENT: 91.4 %
NEUTROPHILS RELATIVE PERCENT: 92.2 %
NEUTROPHILS RELATIVE PERCENT: 94.3 %
NEUTROPHILS RELATIVE PERCENT: 96.2 %
NITRITE, URINE: NEGATIVE
NUCLEATED RED BLOOD CELLS: 1 /100 WBC
O2 SAT, ARTERIAL: 93.4 %
O2 THERAPY: ABNORMAL
PCO2 ARTERIAL: 53.7 MMHG (ref 35–45)
PDW BLD-RTO: 13.6 % (ref 12.4–15.4)
PDW BLD-RTO: 13.7 % (ref 12.4–15.4)
PDW BLD-RTO: 13.8 % (ref 12.4–15.4)
PDW BLD-RTO: 13.8 % (ref 12.4–15.4)
PDW BLD-RTO: 13.9 % (ref 12.4–15.4)
PDW BLD-RTO: 13.9 % (ref 12.4–15.4)
PDW BLD-RTO: 14 % (ref 12.4–15.4)
PDW BLD-RTO: 14.1 % (ref 12.4–15.4)
PDW BLD-RTO: 14.2 % (ref 12.4–15.4)
PERFORMED ON: ABNORMAL
PERFORMED ON: NORMAL
PH ARTERIAL: 7.55 (ref 7.35–7.45)
PH UA: 7.5 (ref 5–8)
PHOSPHORUS: 2.2 MG/DL (ref 2.5–4.9)
PHOSPHORUS: 2.2 MG/DL (ref 2.5–4.9)
PHOSPHORUS: 2.3 MG/DL (ref 2.5–4.9)
PHOSPHORUS: 2.3 MG/DL (ref 2.5–4.9)
PHOSPHORUS: 2.5 MG/DL (ref 2.5–4.9)
PHOSPHORUS: 2.6 MG/DL (ref 2.5–4.9)
PHOSPHORUS: 2.7 MG/DL (ref 2.5–4.9)
PHOSPHORUS: 2.8 MG/DL (ref 2.5–4.9)
PHOSPHORUS: 3 MG/DL (ref 2.5–4.9)
PHOSPHORUS: 3.1 MG/DL (ref 2.5–4.9)
PLATELET # BLD: 101 K/UL (ref 135–450)
PLATELET # BLD: 102 K/UL (ref 135–450)
PLATELET # BLD: 104 K/UL (ref 135–450)
PLATELET # BLD: 132 K/UL (ref 135–450)
PLATELET # BLD: 148 K/UL (ref 135–450)
PLATELET # BLD: 156 K/UL (ref 135–450)
PLATELET # BLD: 62 K/UL (ref 135–450)
PLATELET # BLD: 75 K/UL (ref 135–450)
PLATELET # BLD: 98 K/UL (ref 135–450)
PLATELET SLIDE REVIEW: ABNORMAL
PMV BLD AUTO: 7.2 FL (ref 5–10.5)
PMV BLD AUTO: 7.3 FL (ref 5–10.5)
PMV BLD AUTO: 7.5 FL (ref 5–10.5)
PMV BLD AUTO: 7.7 FL (ref 5–10.5)
PMV BLD AUTO: 7.8 FL (ref 5–10.5)
PMV BLD AUTO: 7.9 FL (ref 5–10.5)
PMV BLD AUTO: 7.9 FL (ref 5–10.5)
PMV BLD AUTO: 8.2 FL (ref 5–10.5)
PMV BLD AUTO: 8.3 FL (ref 5–10.5)
PO2 ARTERIAL: 60.7 MMHG (ref 75–108)
POTASSIUM REFLEX MAGNESIUM: 2.5 MMOL/L (ref 3.5–5.1)
POTASSIUM REFLEX MAGNESIUM: 2.6 MMOL/L (ref 3.5–5.1)
POTASSIUM REFLEX MAGNESIUM: 2.8 MMOL/L (ref 3.5–5.1)
POTASSIUM REFLEX MAGNESIUM: 3.5 MMOL/L (ref 3.5–5.1)
POTASSIUM REFLEX MAGNESIUM: 3.7 MMOL/L (ref 3.5–5.1)
POTASSIUM REFLEX MAGNESIUM: 4.2 MMOL/L (ref 3.5–5.1)
POTASSIUM SERPL-SCNC: 2.2 MMOL/L (ref 3.5–5.1)
POTASSIUM SERPL-SCNC: 2.3 MMOL/L (ref 3.5–5.1)
POTASSIUM SERPL-SCNC: 2.4 MMOL/L (ref 3.5–5.1)
POTASSIUM SERPL-SCNC: 3.1 MMOL/L (ref 3.5–5.1)
POTASSIUM SERPL-SCNC: 3.3 MMOL/L (ref 3.5–5.1)
POTASSIUM SERPL-SCNC: 3.4 MMOL/L (ref 3.5–5.1)
POTASSIUM SERPL-SCNC: 3.4 MMOL/L (ref 3.5–5.1)
POTASSIUM SERPL-SCNC: 3.5 MMOL/L (ref 3.5–5.1)
POTASSIUM SERPL-SCNC: 3.6 MMOL/L (ref 3.5–5.1)
POTASSIUM SERPL-SCNC: 3.7 MMOL/L (ref 3.5–5.1)
POTASSIUM SERPL-SCNC: 3.8 MMOL/L (ref 3.5–5.1)
POTASSIUM SERPL-SCNC: 3.9 MMOL/L (ref 3.5–5.1)
POTASSIUM SERPL-SCNC: 3.9 MMOL/L (ref 3.5–5.1)
POTASSIUM SERPL-SCNC: 4 MMOL/L (ref 3.5–5.1)
POTASSIUM SERPL-SCNC: 4.2 MMOL/L (ref 3.5–5.1)
POTASSIUM SERPL-SCNC: 4.3 MMOL/L (ref 3.5–5.1)
POTASSIUM SERPL-SCNC: 4.4 MMOL/L (ref 3.5–5.1)
POTASSIUM SERPL-SCNC: 4.5 MMOL/L (ref 3.5–5.1)
PRO-BNP: 18 PG/ML (ref 0–124)
PRO-BNP: 383 PG/ML (ref 0–124)
PRO-BNP: 388 PG/ML (ref 0–124)
PROCALCITONIN: 36.48 NG/ML (ref 0–0.15)
PROTEIN UA: 30 MG/DL
PROTHROMBIN TIME: 11.5 SEC (ref 9.9–12.7)
PROTHROMBIN TIME: 11.7 SEC (ref 9.9–12.7)
RBC # BLD: 3.43 M/UL (ref 4.2–5.9)
RBC # BLD: 3.47 M/UL (ref 4.2–5.9)
RBC # BLD: 3.65 M/UL (ref 4.2–5.9)
RBC # BLD: 3.76 M/UL (ref 4.2–5.9)
RBC # BLD: 3.93 M/UL (ref 4.2–5.9)
RBC # BLD: 4.18 M/UL (ref 4.2–5.9)
RBC # BLD: 4.38 M/UL (ref 4.2–5.9)
RBC # BLD: 4.57 M/UL (ref 4.2–5.9)
RBC # BLD: 4.64 M/UL (ref 4.2–5.9)
RBC UA: 1 /HPF (ref 0–4)
SARS-COV-2, NAAT: NOT DETECTED
SLIDE REVIEW: ABNORMAL
SODIUM BLD-SCNC: 129 MMOL/L (ref 136–145)
SODIUM BLD-SCNC: 132 MMOL/L (ref 136–145)
SODIUM BLD-SCNC: 134 MMOL/L (ref 136–145)
SODIUM BLD-SCNC: 135 MMOL/L (ref 136–145)
SODIUM BLD-SCNC: 136 MMOL/L (ref 136–145)
SODIUM BLD-SCNC: 138 MMOL/L (ref 136–145)
SODIUM BLD-SCNC: 139 MMOL/L (ref 136–145)
SODIUM BLD-SCNC: 139 MMOL/L (ref 136–145)
SODIUM BLD-SCNC: 140 MMOL/L (ref 136–145)
SODIUM BLD-SCNC: 141 MMOL/L (ref 136–145)
SODIUM BLD-SCNC: 142 MMOL/L (ref 136–145)
SODIUM BLD-SCNC: 143 MMOL/L (ref 136–145)
SODIUM BLD-SCNC: 144 MMOL/L (ref 136–145)
SODIUM BLD-SCNC: 145 MMOL/L (ref 136–145)
SODIUM BLD-SCNC: 145 MMOL/L (ref 136–145)
SODIUM BLD-SCNC: 146 MMOL/L (ref 136–145)
SODIUM BLD-SCNC: 147 MMOL/L (ref 136–145)
SPECIFIC GRAVITY UA: 1.01 (ref 1–1.03)
TCO2 ARTERIAL: 49.1 MMOL/L
TOTAL PROTEIN: 4.9 G/DL (ref 6.4–8.2)
TOTAL PROTEIN: 5.4 G/DL (ref 6.4–8.2)
TOTAL PROTEIN: 5.5 G/DL (ref 6.4–8.2)
TOTAL PROTEIN: 5.7 G/DL (ref 6.4–8.2)
TOTAL PROTEIN: 5.9 G/DL (ref 6.4–8.2)
TOTAL PROTEIN: 6.3 G/DL (ref 6.4–8.2)
TOXIC GRANULATION: PRESENT
TROPONIN: <0.01 NG/ML
URIC ACID, SERUM: 1.7 MG/DL (ref 3.5–7.2)
URIC ACID, SERUM: 1.9 MG/DL (ref 3.5–7.2)
URIC ACID, SERUM: 2.1 MG/DL (ref 3.5–7.2)
URIC ACID, SERUM: 2.2 MG/DL (ref 3.5–7.2)
URIC ACID, SERUM: 2.2 MG/DL (ref 3.5–7.2)
URIC ACID, SERUM: 2.4 MG/DL (ref 3.5–7.2)
URIC ACID, SERUM: 2.6 MG/DL (ref 3.5–7.2)
URIC ACID, SERUM: 3.1 MG/DL (ref 3.5–7.2)
URINE REFLEX TO CULTURE: ABNORMAL
URINE TOTAL VOLUME: 3000
URINE TYPE: ABNORMAL
UROBILINOGEN, URINE: 1 E.U./DL
WBC # BLD: 1.7 K/UL (ref 4–11)
WBC # BLD: 11.6 K/UL (ref 4–11)
WBC # BLD: 5.9 K/UL (ref 4–11)
WBC # BLD: 6.6 K/UL (ref 4–11)
WBC # BLD: 7.3 K/UL (ref 4–11)
WBC # BLD: 7.9 K/UL (ref 4–11)
WBC # BLD: 8.1 K/UL (ref 4–11)
WBC # BLD: 8.3 K/UL (ref 4–11)
WBC # BLD: 8.5 K/UL (ref 4–11)
WBC UA: 1 /HPF (ref 0–5)

## 2021-01-01 PROCEDURE — 80048 BASIC METABOLIC PNL TOTAL CA: CPT

## 2021-01-01 PROCEDURE — 94669 MECHANICAL CHEST WALL OSCILL: CPT

## 2021-01-01 PROCEDURE — 6360000002 HC RX W HCPCS

## 2021-01-01 PROCEDURE — 6360000002 HC RX W HCPCS: Performed by: INTERNAL MEDICINE

## 2021-01-01 PROCEDURE — 36592 COLLECT BLOOD FROM PICC: CPT

## 2021-01-01 PROCEDURE — 99233 SBSQ HOSP IP/OBS HIGH 50: CPT | Performed by: INTERNAL MEDICINE

## 2021-01-01 PROCEDURE — 6370000000 HC RX 637 (ALT 250 FOR IP): Performed by: INTERNAL MEDICINE

## 2021-01-01 PROCEDURE — 2580000003 HC RX 258: Performed by: INTERNAL MEDICINE

## 2021-01-01 PROCEDURE — 36415 COLL VENOUS BLD VENIPUNCTURE: CPT

## 2021-01-01 PROCEDURE — 85610 PROTHROMBIN TIME: CPT

## 2021-01-01 PROCEDURE — 70553 MRI BRAIN STEM W/O & W/DYE: CPT

## 2021-01-01 PROCEDURE — 88342 IMHCHEM/IMCYTCHM 1ST ANTB: CPT

## 2021-01-01 PROCEDURE — 94640 AIRWAY INHALATION TREATMENT: CPT

## 2021-01-01 PROCEDURE — 88341 IMHCHEM/IMCYTCHM EA ADD ANTB: CPT

## 2021-01-01 PROCEDURE — 2500000003 HC RX 250 WO HCPCS: Performed by: RADIOLOGY

## 2021-01-01 PROCEDURE — 84550 ASSAY OF BLOOD/URIC ACID: CPT

## 2021-01-01 PROCEDURE — 84100 ASSAY OF PHOSPHORUS: CPT

## 2021-01-01 PROCEDURE — 83615 LACTATE (LD) (LDH) ENZYME: CPT

## 2021-01-01 PROCEDURE — 71046 X-RAY EXAM CHEST 2 VIEWS: CPT

## 2021-01-01 PROCEDURE — 6370000000 HC RX 637 (ALT 250 FOR IP): Performed by: STUDENT IN AN ORGANIZED HEALTH CARE EDUCATION/TRAINING PROGRAM

## 2021-01-01 PROCEDURE — 82533 TOTAL CORTISOL: CPT

## 2021-01-01 PROCEDURE — 88360 TUMOR IMMUNOHISTOCHEM/MANUAL: CPT

## 2021-01-01 PROCEDURE — 94761 N-INVAS EAR/PLS OXIMETRY MLT: CPT

## 2021-01-01 PROCEDURE — 83735 ASSAY OF MAGNESIUM: CPT

## 2021-01-01 PROCEDURE — 83880 ASSAY OF NATRIURETIC PEPTIDE: CPT

## 2021-01-01 PROCEDURE — 93005 ELECTROCARDIOGRAM TRACING: CPT | Performed by: FAMILY MEDICINE

## 2021-01-01 PROCEDURE — 99232 SBSQ HOSP IP/OBS MODERATE 35: CPT | Performed by: INTERNAL MEDICINE

## 2021-01-01 PROCEDURE — 1200000000 HC SEMI PRIVATE

## 2021-01-01 PROCEDURE — 99223 1ST HOSP IP/OBS HIGH 75: CPT | Performed by: INTERNAL MEDICINE

## 2021-01-01 PROCEDURE — 6360000004 HC RX CONTRAST MEDICATION: Performed by: INTERNAL MEDICINE

## 2021-01-01 PROCEDURE — 99232 SBSQ HOSP IP/OBS MODERATE 35: CPT | Performed by: NURSE PRACTITIONER

## 2021-01-01 PROCEDURE — 2060000000 HC ICU INTERMEDIATE R&B

## 2021-01-01 PROCEDURE — 82024 ASSAY OF ACTH: CPT

## 2021-01-01 PROCEDURE — 88333 PATH CONSLTJ SURG CYTO XM 1: CPT

## 2021-01-01 PROCEDURE — 2700000000 HC OXYGEN THERAPY PER DAY

## 2021-01-01 PROCEDURE — 76937 US GUIDE VASCULAR ACCESS: CPT

## 2021-01-01 PROCEDURE — 80076 HEPATIC FUNCTION PANEL: CPT

## 2021-01-01 PROCEDURE — 87205 SMEAR GRAM STAIN: CPT

## 2021-01-01 PROCEDURE — 99232 SBSQ HOSP IP/OBS MODERATE 35: CPT | Performed by: STUDENT IN AN ORGANIZED HEALTH CARE EDUCATION/TRAINING PROGRAM

## 2021-01-01 PROCEDURE — 71260 CT THORAX DX C+: CPT

## 2021-01-01 PROCEDURE — A9577 INJ MULTIHANCE: HCPCS | Performed by: INTERNAL MEDICINE

## 2021-01-01 PROCEDURE — 94680 O2 UPTK RST&XERS DIR SIMPLE: CPT

## 2021-01-01 PROCEDURE — 85730 THROMBOPLASTIN TIME PARTIAL: CPT

## 2021-01-01 PROCEDURE — 6370000000 HC RX 637 (ALT 250 FOR IP): Performed by: PHYSICIAN ASSISTANT

## 2021-01-01 PROCEDURE — 82530 CORTISOL FREE: CPT

## 2021-01-01 PROCEDURE — 77012 CT SCAN FOR NEEDLE BIOPSY: CPT

## 2021-01-01 PROCEDURE — 3430000000 HC RX DIAGNOSTIC RADIOPHARMACEUTICAL: Performed by: INTERNAL MEDICINE

## 2021-01-01 PROCEDURE — 81001 URINALYSIS AUTO W/SCOPE: CPT

## 2021-01-01 PROCEDURE — 99285 EMERGENCY DEPT VISIT HI MDM: CPT

## 2021-01-01 PROCEDURE — 78306 BONE IMAGING WHOLE BODY: CPT

## 2021-01-01 PROCEDURE — 82378 CARCINOEMBRYONIC ANTIGEN: CPT

## 2021-01-01 PROCEDURE — 85025 COMPLETE CBC W/AUTO DIFF WBC: CPT

## 2021-01-01 PROCEDURE — 86301 IMMUNOASSAY TUMOR CA 19-9: CPT

## 2021-01-01 PROCEDURE — 84132 ASSAY OF SERUM POTASSIUM: CPT

## 2021-01-01 PROCEDURE — 96417 CHEMO IV INFUS EACH ADDL SEQ: CPT

## 2021-01-01 PROCEDURE — 6360000002 HC RX W HCPCS: Performed by: STUDENT IN AN ORGANIZED HEALTH CARE EDUCATION/TRAINING PROGRAM

## 2021-01-01 PROCEDURE — 2500000003 HC RX 250 WO HCPCS: Performed by: INTERNAL MEDICINE

## 2021-01-01 PROCEDURE — 84145 PROCALCITONIN (PCT): CPT

## 2021-01-01 PROCEDURE — 82803 BLOOD GASES ANY COMBINATION: CPT

## 2021-01-01 PROCEDURE — 96374 THER/PROPH/DIAG INJ IV PUSH: CPT

## 2021-01-01 PROCEDURE — 6360000002 HC RX W HCPCS: Performed by: RADIOLOGY

## 2021-01-01 PROCEDURE — 02HV33Z INSERTION OF INFUSION DEVICE INTO SUPERIOR VENA CAVA, PERCUTANEOUS APPROACH: ICD-10-PCS | Performed by: INTERNAL MEDICINE

## 2021-01-01 PROCEDURE — 6360000004 HC RX CONTRAST MEDICATION: Performed by: PHYSICIAN ASSISTANT

## 2021-01-01 PROCEDURE — 87070 CULTURE OTHR SPECIMN AEROBIC: CPT

## 2021-01-01 PROCEDURE — 74177 CT ABD & PELVIS W/CONTRAST: CPT

## 2021-01-01 PROCEDURE — 71045 X-RAY EXAM CHEST 1 VIEW: CPT

## 2021-01-01 PROCEDURE — 6360000004 HC RX CONTRAST MEDICATION

## 2021-01-01 PROCEDURE — 96413 CHEMO IV INFUSION 1 HR: CPT

## 2021-01-01 PROCEDURE — 80069 RENAL FUNCTION PANEL: CPT

## 2021-01-01 PROCEDURE — 87635 SARS-COV-2 COVID-19 AMP PRB: CPT

## 2021-01-01 PROCEDURE — 93010 ELECTROCARDIOGRAM REPORT: CPT | Performed by: INTERNAL MEDICINE

## 2021-01-01 PROCEDURE — 82088 ASSAY OF ALDOSTERONE: CPT

## 2021-01-01 PROCEDURE — 93306 TTE W/DOPPLER COMPLETE: CPT

## 2021-01-01 PROCEDURE — 84484 ASSAY OF TROPONIN QUANT: CPT

## 2021-01-01 PROCEDURE — 6360000002 HC RX W HCPCS: Performed by: PHYSICIAN ASSISTANT

## 2021-01-01 PROCEDURE — 2709999900 CT NEEDLE BIOPSY LIVER PERCUTANEOUS

## 2021-01-01 PROCEDURE — A9503 TC99M MEDRONATE: HCPCS | Performed by: INTERNAL MEDICINE

## 2021-01-01 PROCEDURE — 99239 HOSP IP/OBS DSCHRG MGMT >30: CPT | Performed by: INTERNAL MEDICINE

## 2021-01-01 PROCEDURE — 80053 COMPREHEN METABOLIC PANEL: CPT

## 2021-01-01 PROCEDURE — 93970 EXTREMITY STUDY: CPT

## 2021-01-01 PROCEDURE — 88307 TISSUE EXAM BY PATHOLOGIST: CPT

## 2021-01-01 PROCEDURE — 36600 WITHDRAWAL OF ARTERIAL BLOOD: CPT

## 2021-01-01 PROCEDURE — 0FB23ZX EXCISION OF LEFT LOBE LIVER, PERCUTANEOUS APPROACH, DIAGNOSTIC: ICD-10-PCS | Performed by: RADIOLOGY

## 2021-01-01 PROCEDURE — 96415 CHEMO IV INFUSION ADDL HR: CPT

## 2021-01-01 PROCEDURE — C1751 CATH, INF, PER/CENT/MIDLINE: HCPCS

## 2021-01-01 PROCEDURE — 6370000000 HC RX 637 (ALT 250 FOR IP): Performed by: RADIOLOGY

## 2021-01-01 PROCEDURE — 36569 INSJ PICC 5 YR+ W/O IMAGING: CPT

## 2021-01-01 RX ORDER — DEXTROSE MONOHYDRATE 25 G/50ML
12.5 INJECTION, SOLUTION INTRAVENOUS PRN
Status: DISCONTINUED | OUTPATIENT
Start: 2021-01-01 | End: 2021-01-01 | Stop reason: HOSPADM

## 2021-01-01 RX ORDER — NIFEDIPINE 60 MG/1
60 TABLET, EXTENDED RELEASE ORAL 2 TIMES DAILY
Status: DISCONTINUED | OUTPATIENT
Start: 2021-01-01 | End: 2021-01-01

## 2021-01-01 RX ORDER — PROCHLORPERAZINE MALEATE 5 MG/1
10 TABLET ORAL EVERY 6 HOURS PRN
Status: DISCONTINUED | OUTPATIENT
Start: 2021-01-01 | End: 2021-01-01 | Stop reason: HOSPADM

## 2021-01-01 RX ORDER — INSULIN GLARGINE 100 [IU]/ML
5 INJECTION, SOLUTION SUBCUTANEOUS DAILY
Status: DISCONTINUED | OUTPATIENT
Start: 2021-01-01 | End: 2021-01-01

## 2021-01-01 RX ORDER — INSULIN GLARGINE 100 [IU]/ML
34 INJECTION, SOLUTION SUBCUTANEOUS DAILY
Status: DISCONTINUED | OUTPATIENT
Start: 2021-01-01 | End: 2021-01-01

## 2021-01-01 RX ORDER — SODIUM CHLORIDE 9 MG/ML
25 INJECTION, SOLUTION INTRAVENOUS PRN
Status: DISCONTINUED | OUTPATIENT
Start: 2021-01-01 | End: 2021-01-01 | Stop reason: HOSPADM

## 2021-01-01 RX ORDER — NIFEDIPINE 30 MG/1
30 TABLET, EXTENDED RELEASE ORAL 2 TIMES DAILY
Status: DISCONTINUED | OUTPATIENT
Start: 2021-01-01 | End: 2021-01-01

## 2021-01-01 RX ORDER — LORAZEPAM 2 MG/ML
INJECTION INTRAMUSCULAR DAILY PRN
Status: COMPLETED | OUTPATIENT
Start: 2021-01-01 | End: 2021-01-01

## 2021-01-01 RX ORDER — ALBUTEROL SULFATE 90 UG/1
2 AEROSOL, METERED RESPIRATORY (INHALATION) EVERY 4 HOURS PRN
Qty: 3 EACH | Refills: 3 | Status: SHIPPED | OUTPATIENT
Start: 2021-01-01

## 2021-01-01 RX ORDER — ACETAMINOPHEN 325 MG/1
650 TABLET ORAL EVERY 6 HOURS PRN
Status: DISCONTINUED | OUTPATIENT
Start: 2021-01-01 | End: 2021-01-01

## 2021-01-01 RX ORDER — NIFEDIPINE 30 MG/1
30 TABLET, FILM COATED, EXTENDED RELEASE ORAL 2 TIMES DAILY
Qty: 60 TABLET | Refills: 3 | Status: SHIPPED | OUTPATIENT
Start: 2021-01-01 | End: 2021-01-01 | Stop reason: SDUPTHER

## 2021-01-01 RX ORDER — DOXAZOSIN 2 MG/1
2 TABLET ORAL DAILY
Status: DISCONTINUED | OUTPATIENT
Start: 2021-01-01 | End: 2021-01-01

## 2021-01-01 RX ORDER — DEXTROSE MONOHYDRATE 50 MG/ML
100 INJECTION, SOLUTION INTRAVENOUS PRN
Status: DISCONTINUED | OUTPATIENT
Start: 2021-01-01 | End: 2021-01-01 | Stop reason: HOSPADM

## 2021-01-01 RX ORDER — MAGNESIUM SULFATE 1 G/100ML
1000 INJECTION INTRAVENOUS ONCE
Status: COMPLETED | OUTPATIENT
Start: 2021-01-01 | End: 2021-01-01

## 2021-01-01 RX ORDER — NICOTINE 21 MG/24HR
1 PATCH, TRANSDERMAL 24 HOURS TRANSDERMAL DAILY
Qty: 14 PATCH | Refills: 0 | Status: SHIPPED | OUTPATIENT
Start: 2021-01-01 | End: 2021-01-01

## 2021-01-01 RX ORDER — POTASSIUM CHLORIDE 20 MEQ/1
40 TABLET, EXTENDED RELEASE ORAL
Status: DISCONTINUED | OUTPATIENT
Start: 2021-01-01 | End: 2021-01-01

## 2021-01-01 RX ORDER — NICOTINE 21 MG/24HR
1 PATCH, TRANSDERMAL 24 HOURS TRANSDERMAL DAILY
Status: DISCONTINUED | OUTPATIENT
Start: 2021-01-01 | End: 2021-01-01 | Stop reason: HOSPADM

## 2021-01-01 RX ORDER — POTASSIUM CHLORIDE 750 MG/1
40 TABLET, FILM COATED, EXTENDED RELEASE ORAL 2 TIMES DAILY WITH MEALS
Status: DISCONTINUED | OUTPATIENT
Start: 2021-01-01 | End: 2021-01-01

## 2021-01-01 RX ORDER — TC 99M MEDRONATE 20 MG/10ML
25 INJECTION, POWDER, LYOPHILIZED, FOR SOLUTION INTRAVENOUS
Status: COMPLETED | OUTPATIENT
Start: 2021-01-01 | End: 2021-01-01

## 2021-01-01 RX ORDER — INSULIN GLARGINE 100 [IU]/ML
10 INJECTION, SOLUTION SUBCUTANEOUS DAILY
Status: DISCONTINUED | OUTPATIENT
Start: 2021-01-01 | End: 2021-01-01

## 2021-01-01 RX ORDER — FUROSEMIDE 10 MG/ML
20 INJECTION INTRAMUSCULAR; INTRAVENOUS 2 TIMES DAILY
Status: DISCONTINUED | OUTPATIENT
Start: 2021-01-01 | End: 2021-01-01

## 2021-01-01 RX ORDER — FUROSEMIDE 10 MG/ML
40 INJECTION INTRAMUSCULAR; INTRAVENOUS DAILY
Status: DISCONTINUED | OUTPATIENT
Start: 2021-01-01 | End: 2021-01-01

## 2021-01-01 RX ORDER — POTASSIUM CHLORIDE 7.45 MG/ML
INJECTION INTRAVENOUS
Status: COMPLETED
Start: 2021-01-01 | End: 2021-01-01

## 2021-01-01 RX ORDER — CLONIDINE HYDROCHLORIDE 0.1 MG/1
0.2 TABLET ORAL EVERY 4 HOURS PRN
Status: DISCONTINUED | OUTPATIENT
Start: 2021-01-01 | End: 2021-01-01

## 2021-01-01 RX ORDER — BUDESONIDE AND FORMOTEROL FUMARATE DIHYDRATE 160; 4.5 UG/1; UG/1
2 AEROSOL RESPIRATORY (INHALATION) 2 TIMES DAILY
Status: DISCONTINUED | OUTPATIENT
Start: 2021-01-01 | End: 2021-01-01 | Stop reason: HOSPADM

## 2021-01-01 RX ORDER — CLONIDINE HYDROCHLORIDE 0.1 MG/1
0.2 TABLET ORAL ONCE
Status: COMPLETED | OUTPATIENT
Start: 2021-01-01 | End: 2021-01-01

## 2021-01-01 RX ORDER — DOXAZOSIN 2 MG/1
2 TABLET ORAL EVERY 12 HOURS SCHEDULED
Status: DISCONTINUED | OUTPATIENT
Start: 2021-01-01 | End: 2021-01-01

## 2021-01-01 RX ORDER — POTASSIUM CHLORIDE 20 MEQ/1
40 TABLET, EXTENDED RELEASE ORAL 2 TIMES DAILY WITH MEALS
Status: DISCONTINUED | OUTPATIENT
Start: 2021-01-01 | End: 2021-01-01

## 2021-01-01 RX ORDER — POTASSIUM CHLORIDE 20 MEQ/1
20 TABLET, EXTENDED RELEASE ORAL ONCE
Status: COMPLETED | OUTPATIENT
Start: 2021-01-01 | End: 2021-01-01

## 2021-01-01 RX ORDER — POTASSIUM CHLORIDE 7.45 MG/ML
10 INJECTION INTRAVENOUS ONCE
Status: COMPLETED | OUTPATIENT
Start: 2021-01-01 | End: 2021-01-01

## 2021-01-01 RX ORDER — SODIUM CHLORIDE 0.9 % (FLUSH) 0.9 %
5-40 SYRINGE (ML) INJECTION EVERY 12 HOURS SCHEDULED
Status: DISCONTINUED | OUTPATIENT
Start: 2021-01-01 | End: 2021-01-01 | Stop reason: SDUPTHER

## 2021-01-01 RX ORDER — POTASSIUM CHLORIDE 20 MEQ/1
40 TABLET, EXTENDED RELEASE ORAL ONCE
Status: COMPLETED | OUTPATIENT
Start: 2021-01-01 | End: 2021-01-01

## 2021-01-01 RX ORDER — SODIUM CHLORIDE FOR INHALATION 3 %
15 VIAL, NEBULIZER (ML) INHALATION
Status: DISCONTINUED | OUTPATIENT
Start: 2021-01-01 | End: 2021-01-01

## 2021-01-01 RX ORDER — LISINOPRIL 10 MG/1
10 TABLET ORAL DAILY
Status: DISCONTINUED | OUTPATIENT
Start: 2021-01-01 | End: 2021-01-01

## 2021-01-01 RX ORDER — FUROSEMIDE 10 MG/ML
40 INJECTION INTRAMUSCULAR; INTRAVENOUS 2 TIMES DAILY
Status: DISCONTINUED | OUTPATIENT
Start: 2021-01-01 | End: 2021-01-01

## 2021-01-01 RX ORDER — DEXAMETHASONE SODIUM PHOSPHATE 10 MG/ML
10 INJECTION INTRAMUSCULAR; INTRAVENOUS EVERY 24 HOURS
Status: DISCONTINUED | OUTPATIENT
Start: 2021-01-01 | End: 2021-01-01

## 2021-01-01 RX ORDER — METHYLPREDNISOLONE SODIUM SUCCINATE 40 MG/ML
40 INJECTION, POWDER, LYOPHILIZED, FOR SOLUTION INTRAMUSCULAR; INTRAVENOUS EVERY 8 HOURS
Status: DISCONTINUED | OUTPATIENT
Start: 2021-01-01 | End: 2021-01-01

## 2021-01-01 RX ORDER — HYDRALAZINE HYDROCHLORIDE 20 MG/ML
10 INJECTION INTRAMUSCULAR; INTRAVENOUS ONCE
Status: COMPLETED | OUTPATIENT
Start: 2021-01-01 | End: 2021-01-01

## 2021-01-01 RX ORDER — MAGNESIUM SULFATE IN WATER 40 MG/ML
2000 INJECTION, SOLUTION INTRAVENOUS ONCE
Status: COMPLETED | OUTPATIENT
Start: 2021-01-01 | End: 2021-01-01

## 2021-01-01 RX ORDER — SPIRONOLACTONE 25 MG/1
50 TABLET ORAL 2 TIMES DAILY
Status: DISCONTINUED | OUTPATIENT
Start: 2021-01-01 | End: 2021-01-01

## 2021-01-01 RX ORDER — PREDNISONE 20 MG/1
20 TABLET ORAL 2 TIMES DAILY
Status: DISCONTINUED | OUTPATIENT
Start: 2021-01-01 | End: 2021-01-01

## 2021-01-01 RX ORDER — HYDRALAZINE HYDROCHLORIDE 20 MG/ML
INJECTION INTRAMUSCULAR; INTRAVENOUS DAILY PRN
Status: COMPLETED | OUTPATIENT
Start: 2021-01-01 | End: 2021-01-01

## 2021-01-01 RX ORDER — POTASSIUM CHLORIDE 20 MEQ/1
20 TABLET, EXTENDED RELEASE ORAL 2 TIMES DAILY WITH MEALS
Status: DISCONTINUED | OUTPATIENT
Start: 2021-01-01 | End: 2021-01-01

## 2021-01-01 RX ORDER — SODIUM CHLORIDE 9 MG/ML
INJECTION, SOLUTION INTRAVENOUS CONTINUOUS
Status: DISCONTINUED | OUTPATIENT
Start: 2021-01-01 | End: 2021-01-01

## 2021-01-01 RX ORDER — POTASSIUM CHLORIDE 20 MEQ/1
40 TABLET, EXTENDED RELEASE ORAL EVERY 4 HOURS
Status: COMPLETED | OUTPATIENT
Start: 2021-01-01 | End: 2021-01-01

## 2021-01-01 RX ORDER — PREDNISONE 10 MG/1
10 TABLET ORAL DAILY
Status: COMPLETED | OUTPATIENT
Start: 2021-01-01 | End: 2021-01-01

## 2021-01-01 RX ORDER — ALBUTEROL SULFATE 2.5 MG/3ML
2.5 SOLUTION RESPIRATORY (INHALATION) EVERY 6 HOURS PRN
Status: DISCONTINUED | OUTPATIENT
Start: 2021-01-01 | End: 2021-01-01 | Stop reason: HOSPADM

## 2021-01-01 RX ORDER — INSULIN GLARGINE 100 [IU]/ML
30 INJECTION, SOLUTION SUBCUTANEOUS DAILY
Status: DISCONTINUED | OUTPATIENT
Start: 2021-01-01 | End: 2021-01-01

## 2021-01-01 RX ORDER — INSULIN GLARGINE 100 [IU]/ML
20 INJECTION, SOLUTION SUBCUTANEOUS DAILY
Status: DISCONTINUED | OUTPATIENT
Start: 2021-01-01 | End: 2021-01-01

## 2021-01-01 RX ORDER — HYDRALAZINE HYDROCHLORIDE 20 MG/ML
10 INJECTION INTRAMUSCULAR; INTRAVENOUS EVERY 4 HOURS PRN
Status: DISCONTINUED | OUTPATIENT
Start: 2021-01-01 | End: 2021-01-01 | Stop reason: HOSPADM

## 2021-01-01 RX ORDER — SODIUM CHLORIDE FOR INHALATION 3 %
15 VIAL, NEBULIZER (ML) INHALATION EVERY 4 HOURS
Status: DISCONTINUED | OUTPATIENT
Start: 2021-01-01 | End: 2021-01-01

## 2021-01-01 RX ORDER — INSULIN GLARGINE 100 [IU]/ML
15 INJECTION, SOLUTION SUBCUTANEOUS DAILY
Status: DISCONTINUED | OUTPATIENT
Start: 2021-01-01 | End: 2021-01-01

## 2021-01-01 RX ORDER — SODIUM CHLORIDE FOR INHALATION 3 %
4 VIAL, NEBULIZER (ML) INHALATION 2 TIMES DAILY
Status: DISCONTINUED | OUTPATIENT
Start: 2021-01-01 | End: 2021-01-01

## 2021-01-01 RX ORDER — SODIUM CHLORIDE 9 MG/ML
25 INJECTION, SOLUTION INTRAVENOUS PRN
Status: DISCONTINUED | OUTPATIENT
Start: 2021-01-01 | End: 2021-01-01

## 2021-01-01 RX ORDER — DOXAZOSIN MESYLATE 4 MG/1
4 TABLET ORAL DAILY
Status: DISCONTINUED | OUTPATIENT
Start: 2021-01-01 | End: 2021-01-01

## 2021-01-01 RX ORDER — ALLOPURINOL 300 MG/1
300 TABLET ORAL DAILY
Status: DISCONTINUED | OUTPATIENT
Start: 2021-01-01 | End: 2021-01-01

## 2021-01-01 RX ORDER — DOXYCYCLINE HYCLATE 100 MG
100 TABLET ORAL EVERY 12 HOURS SCHEDULED
Status: DISCONTINUED | OUTPATIENT
Start: 2021-01-01 | End: 2021-01-01

## 2021-01-01 RX ORDER — MONTELUKAST SODIUM 10 MG/1
10 TABLET ORAL NIGHTLY
Qty: 90 TABLET | Refills: 3 | Status: SHIPPED | OUTPATIENT
Start: 2021-01-01

## 2021-01-01 RX ORDER — DOXAZOSIN MESYLATE 4 MG/1
4 TABLET ORAL EVERY 12 HOURS SCHEDULED
Status: DISCONTINUED | OUTPATIENT
Start: 2021-01-01 | End: 2021-01-01

## 2021-01-01 RX ORDER — POTASSIUM CHLORIDE 7.45 MG/ML
10 INJECTION INTRAVENOUS PRN
Status: DISCONTINUED | OUTPATIENT
Start: 2021-01-01 | End: 2021-01-01

## 2021-01-01 RX ORDER — SODIUM CHLORIDE 0.9 % (FLUSH) 0.9 %
5-40 SYRINGE (ML) INJECTION EVERY 12 HOURS SCHEDULED
Status: DISCONTINUED | OUTPATIENT
Start: 2021-01-01 | End: 2021-01-01 | Stop reason: HOSPADM

## 2021-01-01 RX ORDER — POTASSIUM CHLORIDE 750 MG/1
40 TABLET, FILM COATED, EXTENDED RELEASE ORAL DAILY
Status: DISCONTINUED | OUTPATIENT
Start: 2021-01-01 | End: 2021-01-01

## 2021-01-01 RX ORDER — SODIUM CHLORIDE 0.9 % (FLUSH) 0.9 %
5-40 SYRINGE (ML) INJECTION PRN
Status: DISCONTINUED | OUTPATIENT
Start: 2021-01-01 | End: 2021-01-01 | Stop reason: HOSPADM

## 2021-01-01 RX ORDER — FUROSEMIDE 40 MG/1
40 TABLET ORAL DAILY
Qty: 30 TABLET | Refills: 5 | Status: SHIPPED | OUTPATIENT
Start: 2021-01-01 | End: 2021-01-01 | Stop reason: SDUPTHER

## 2021-01-01 RX ORDER — ALBUTEROL SULFATE 2.5 MG/3ML
SOLUTION RESPIRATORY (INHALATION)
Status: COMPLETED
Start: 2021-01-01 | End: 2021-01-01

## 2021-01-01 RX ORDER — IPRATROPIUM BROMIDE AND ALBUTEROL SULFATE 2.5; .5 MG/3ML; MG/3ML
1 SOLUTION RESPIRATORY (INHALATION) 2 TIMES DAILY
Status: DISCONTINUED | OUTPATIENT
Start: 2021-01-01 | End: 2021-01-01 | Stop reason: HOSPADM

## 2021-01-01 RX ORDER — LISINOPRIL 40 MG/1
40 TABLET ORAL DAILY
Status: DISCONTINUED | OUTPATIENT
Start: 2021-01-01 | End: 2021-01-01

## 2021-01-01 RX ORDER — POLYETHYLENE GLYCOL 3350 17 G/17G
17 POWDER, FOR SOLUTION ORAL DAILY PRN
Status: DISCONTINUED | OUTPATIENT
Start: 2021-01-01 | End: 2021-01-01 | Stop reason: HOSPADM

## 2021-01-01 RX ORDER — FUROSEMIDE 10 MG/ML
40 INJECTION INTRAMUSCULAR; INTRAVENOUS ONCE
Status: COMPLETED | OUTPATIENT
Start: 2021-01-01 | End: 2021-01-01

## 2021-01-01 RX ORDER — ACETAMINOPHEN 325 MG/1
650 TABLET ORAL EVERY 4 HOURS PRN
Status: DISCONTINUED | OUTPATIENT
Start: 2021-01-01 | End: 2021-01-01 | Stop reason: HOSPADM

## 2021-01-01 RX ORDER — LABETALOL HYDROCHLORIDE 5 MG/ML
10 INJECTION, SOLUTION INTRAVENOUS ONCE
Status: COMPLETED | OUTPATIENT
Start: 2021-01-01 | End: 2021-01-01

## 2021-01-01 RX ORDER — MONTELUKAST SODIUM 10 MG/1
10 TABLET ORAL NIGHTLY
Status: DISCONTINUED | OUTPATIENT
Start: 2021-01-01 | End: 2021-01-01 | Stop reason: HOSPADM

## 2021-01-01 RX ORDER — GLUCOSAMINE HCL/CHONDROITIN SU 500-400 MG
CAPSULE ORAL
Qty: 100 STRIP | Refills: 2 | Status: SHIPPED | OUTPATIENT
Start: 2021-01-01

## 2021-01-01 RX ORDER — POTASSIUM CHLORIDE 750 MG/1
40 TABLET, FILM COATED, EXTENDED RELEASE ORAL 3 TIMES DAILY
Status: DISCONTINUED | OUTPATIENT
Start: 2021-01-01 | End: 2021-01-01

## 2021-01-01 RX ORDER — SPIRONOLACTONE 25 MG/1
50 TABLET ORAL DAILY
Status: DISCONTINUED | OUTPATIENT
Start: 2021-01-01 | End: 2021-01-01

## 2021-01-01 RX ORDER — SPIRONOLACTONE 100 MG/1
100 TABLET, FILM COATED ORAL 2 TIMES DAILY
Qty: 60 TABLET | Refills: 3 | Status: SHIPPED | OUTPATIENT
Start: 2021-01-01 | End: 2021-01-01 | Stop reason: SDUPTHER

## 2021-01-01 RX ORDER — CLONIDINE HYDROCHLORIDE 0.1 MG/1
0.1 TABLET ORAL EVERY 4 HOURS PRN
Status: DISCONTINUED | OUTPATIENT
Start: 2021-01-01 | End: 2021-01-01

## 2021-01-01 RX ORDER — INSULIN GLARGINE 100 [IU]/ML
25 INJECTION, SOLUTION SUBCUTANEOUS DAILY
Status: DISCONTINUED | OUTPATIENT
Start: 2021-01-01 | End: 2021-01-01

## 2021-01-01 RX ORDER — FENTANYL CITRATE 50 UG/ML
INJECTION, SOLUTION INTRAMUSCULAR; INTRAVENOUS DAILY PRN
Status: COMPLETED | OUTPATIENT
Start: 2021-01-01 | End: 2021-01-01

## 2021-01-01 RX ORDER — METHYLPREDNISOLONE SODIUM SUCCINATE 40 MG/ML
40 INJECTION, POWDER, LYOPHILIZED, FOR SOLUTION INTRAMUSCULAR; INTRAVENOUS EVERY 12 HOURS
Status: DISCONTINUED | OUTPATIENT
Start: 2021-01-01 | End: 2021-01-01

## 2021-01-01 RX ORDER — PREDNISONE 10 MG/1
10 TABLET ORAL 2 TIMES DAILY
Status: DISCONTINUED | OUTPATIENT
Start: 2021-01-01 | End: 2021-01-01

## 2021-01-01 RX ORDER — METHYLPREDNISOLONE SODIUM SUCCINATE 125 MG/2ML
125 INJECTION, POWDER, LYOPHILIZED, FOR SOLUTION INTRAMUSCULAR; INTRAVENOUS ONCE
Status: COMPLETED | OUTPATIENT
Start: 2021-01-01 | End: 2021-01-01

## 2021-01-01 RX ORDER — SPIRONOLACTONE 100 MG/1
100 TABLET, FILM COATED ORAL 2 TIMES DAILY
Status: DISCONTINUED | OUTPATIENT
Start: 2021-01-01 | End: 2021-01-01 | Stop reason: HOSPADM

## 2021-01-01 RX ORDER — POTASSIUM CHLORIDE 7.45 MG/ML
10 INJECTION INTRAVENOUS
Status: COMPLETED | OUTPATIENT
Start: 2021-01-01 | End: 2021-01-01

## 2021-01-01 RX ORDER — IPRATROPIUM BROMIDE AND ALBUTEROL SULFATE 2.5; .5 MG/3ML; MG/3ML
1 SOLUTION RESPIRATORY (INHALATION)
Status: DISCONTINUED | OUTPATIENT
Start: 2021-01-01 | End: 2021-01-01

## 2021-01-01 RX ORDER — LIDOCAINE HYDROCHLORIDE 10 MG/ML
5 INJECTION, SOLUTION EPIDURAL; INFILTRATION; INTRACAUDAL; PERINEURAL ONCE
Status: COMPLETED | OUTPATIENT
Start: 2021-01-01 | End: 2021-01-01

## 2021-01-01 RX ORDER — INSULIN GLARGINE 100 [IU]/ML
38 INJECTION, SOLUTION SUBCUTANEOUS DAILY
Status: DISCONTINUED | OUTPATIENT
Start: 2021-01-01 | End: 2021-01-01

## 2021-01-01 RX ORDER — NIFEDIPINE 30 MG/1
30 TABLET, EXTENDED RELEASE ORAL 2 TIMES DAILY
Status: DISCONTINUED | OUTPATIENT
Start: 2021-01-01 | End: 2021-01-01 | Stop reason: HOSPADM

## 2021-01-01 RX ORDER — ACETAMINOPHEN 650 MG/1
650 SUPPOSITORY RECTAL EVERY 6 HOURS PRN
Status: DISCONTINUED | OUTPATIENT
Start: 2021-01-01 | End: 2021-01-01 | Stop reason: HOSPADM

## 2021-01-01 RX ORDER — DOXYCYCLINE HYCLATE 100 MG
100 TABLET ORAL ONCE
Status: COMPLETED | OUTPATIENT
Start: 2021-01-01 | End: 2021-01-01

## 2021-01-01 RX ORDER — POTASSIUM CHLORIDE 750 MG/1
20 TABLET, FILM COATED, EXTENDED RELEASE ORAL DAILY
Status: DISCONTINUED | OUTPATIENT
Start: 2021-01-01 | End: 2021-01-01 | Stop reason: HOSPADM

## 2021-01-01 RX ORDER — SODIUM CHLORIDE 450 MG/100ML
INJECTION, SOLUTION INTRAVENOUS CONTINUOUS
Status: DISCONTINUED | OUTPATIENT
Start: 2021-01-01 | End: 2021-01-01

## 2021-01-01 RX ORDER — FUROSEMIDE 10 MG/ML
20 INJECTION INTRAMUSCULAR; INTRAVENOUS ONCE
Status: COMPLETED | OUTPATIENT
Start: 2021-01-01 | End: 2021-01-01

## 2021-01-01 RX ORDER — POTASSIUM CHLORIDE 1500 MG/1
20 TABLET, FILM COATED, EXTENDED RELEASE ORAL DAILY
Qty: 60 TABLET | Refills: 3 | Status: SHIPPED | OUTPATIENT
Start: 2021-01-01 | End: 2021-01-01 | Stop reason: SDUPTHER

## 2021-01-01 RX ORDER — ONDANSETRON 4 MG/1
4 TABLET, ORALLY DISINTEGRATING ORAL EVERY 8 HOURS PRN
Status: DISCONTINUED | OUTPATIENT
Start: 2021-01-01 | End: 2021-01-01 | Stop reason: HOSPADM

## 2021-01-01 RX ORDER — ONDANSETRON 2 MG/ML
4 INJECTION INTRAMUSCULAR; INTRAVENOUS EVERY 6 HOURS PRN
Status: DISCONTINUED | OUTPATIENT
Start: 2021-01-01 | End: 2021-01-01 | Stop reason: HOSPADM

## 2021-01-01 RX ORDER — ATORVASTATIN CALCIUM 80 MG/1
80 TABLET, FILM COATED ORAL DAILY
Status: DISCONTINUED | OUTPATIENT
Start: 2021-01-01 | End: 2021-01-01

## 2021-01-01 RX ORDER — MIDAZOLAM HYDROCHLORIDE 1 MG/ML
INJECTION INTRAMUSCULAR; INTRAVENOUS DAILY PRN
Status: COMPLETED | OUTPATIENT
Start: 2021-01-01 | End: 2021-01-01

## 2021-01-01 RX ORDER — AMLODIPINE BESYLATE 10 MG/1
10 TABLET ORAL DAILY
Status: DISCONTINUED | OUTPATIENT
Start: 2021-01-01 | End: 2021-01-01

## 2021-01-01 RX ORDER — NICOTINE POLACRILEX 4 MG
15 LOZENGE BUCCAL PRN
Status: DISCONTINUED | OUTPATIENT
Start: 2021-01-01 | End: 2021-01-01 | Stop reason: HOSPADM

## 2021-01-01 RX ORDER — SODIUM CHLORIDE 0.9 % (FLUSH) 0.9 %
5-40 SYRINGE (ML) INJECTION PRN
Status: DISCONTINUED | OUTPATIENT
Start: 2021-01-01 | End: 2021-01-01

## 2021-01-01 RX ORDER — LISINOPRIL 20 MG/1
20 TABLET ORAL DAILY
Status: DISCONTINUED | OUTPATIENT
Start: 2021-01-01 | End: 2021-01-01

## 2021-01-01 RX ORDER — PREDNISONE 20 MG/1
20 TABLET ORAL DAILY
Status: DISCONTINUED | OUTPATIENT
Start: 2021-01-01 | End: 2021-01-01

## 2021-01-01 RX ORDER — CLONIDINE HYDROCHLORIDE 0.1 MG/1
0.2 TABLET ORAL EVERY 4 HOURS PRN
Status: DISCONTINUED | OUTPATIENT
Start: 2021-01-01 | End: 2021-01-01 | Stop reason: HOSPADM

## 2021-01-01 RX ORDER — FUROSEMIDE 40 MG/1
40 TABLET ORAL DAILY
Status: DISCONTINUED | OUTPATIENT
Start: 2021-01-01 | End: 2021-01-01 | Stop reason: HOSPADM

## 2021-01-01 RX ADMIN — BUDESONIDE AND FORMOTEROL FUMARATE DIHYDRATE 2 PUFF: 160; 4.5 AEROSOL RESPIRATORY (INHALATION) at 20:18

## 2021-01-01 RX ADMIN — SODIUM CHLORIDE, PRESERVATIVE FREE 10 ML: 5 INJECTION INTRAVENOUS at 09:08

## 2021-01-01 RX ADMIN — IPRATROPIUM BROMIDE AND ALBUTEROL SULFATE 1 AMPULE: .5; 3 SOLUTION RESPIRATORY (INHALATION) at 07:57

## 2021-01-01 RX ADMIN — SPIRONOLACTONE 50 MG: 25 TABLET ORAL at 15:20

## 2021-01-01 RX ADMIN — BUDESONIDE AND FORMOTEROL FUMARATE DIHYDRATE 2 PUFF: 160; 4.5 AEROSOL RESPIRATORY (INHALATION) at 19:54

## 2021-01-01 RX ADMIN — INSULIN LISPRO 3 UNITS: 100 INJECTION, SOLUTION INTRAVENOUS; SUBCUTANEOUS at 11:09

## 2021-01-01 RX ADMIN — SPIRONOLACTONE 50 MG: 25 TABLET ORAL at 08:16

## 2021-01-01 RX ADMIN — NIFEDIPINE 30 MG: 30 TABLET, FILM COATED, EXTENDED RELEASE ORAL at 10:53

## 2021-01-01 RX ADMIN — MONTELUKAST 10 MG: 10 TABLET, FILM COATED ORAL at 20:14

## 2021-01-01 RX ADMIN — DOXYCYCLINE HYCLATE 100 MG: 100 TABLET, COATED ORAL at 21:36

## 2021-01-01 RX ADMIN — SODIUM CHLORIDE SOLN NEBU 3% 4 ML: 3 NEBU SOLN at 08:05

## 2021-01-01 RX ADMIN — SODIUM CHLORIDE SOLN NEBU 3% 4 ML: 3 NEBU SOLN at 20:46

## 2021-01-01 RX ADMIN — MIDAZOLAM 0.5 MG: 1 INJECTION INTRAMUSCULAR; INTRAVENOUS at 12:14

## 2021-01-01 RX ADMIN — LISINOPRIL 40 MG: 40 TABLET ORAL at 08:18

## 2021-01-01 RX ADMIN — INSULIN LISPRO 3 UNITS: 100 INJECTION, SOLUTION INTRAVENOUS; SUBCUTANEOUS at 17:58

## 2021-01-01 RX ADMIN — SODIUM CHLORIDE, PRESERVATIVE FREE 10 ML: 5 INJECTION INTRAVENOUS at 08:08

## 2021-01-01 RX ADMIN — NIFEDIPINE 60 MG: 60 TABLET, EXTENDED RELEASE ORAL at 18:09

## 2021-01-01 RX ADMIN — IPRATROPIUM BROMIDE AND ALBUTEROL SULFATE 1 AMPULE: .5; 3 SOLUTION RESPIRATORY (INHALATION) at 15:28

## 2021-01-01 RX ADMIN — NIFEDIPINE 60 MG: 60 TABLET, EXTENDED RELEASE ORAL at 17:35

## 2021-01-01 RX ADMIN — Medication 10 MEQ: at 17:31

## 2021-01-01 RX ADMIN — POTASSIUM CHLORIDE 40 MEQ: 20 TABLET, EXTENDED RELEASE ORAL at 08:29

## 2021-01-01 RX ADMIN — PIPERACILLIN AND TAZOBACTAM 3375 MG: 3; .375 INJECTION, POWDER, LYOPHILIZED, FOR SOLUTION INTRAVENOUS at 15:20

## 2021-01-01 RX ADMIN — FUROSEMIDE 40 MG: 10 INJECTION, SOLUTION INTRAMUSCULAR; INTRAVENOUS at 17:45

## 2021-01-01 RX ADMIN — POTASSIUM CHLORIDE 10 MEQ: 7.46 INJECTION, SOLUTION INTRAVENOUS at 06:05

## 2021-01-01 RX ADMIN — DOXYCYCLINE HYCLATE 100 MG: 100 TABLET, COATED ORAL at 20:14

## 2021-01-01 RX ADMIN — SODIUM CHLORIDE SOLN NEBU 3% 4 ML: 3 NEBU SOLN at 08:12

## 2021-01-01 RX ADMIN — DOXYCYCLINE HYCLATE 100 MG: 100 TABLET, COATED ORAL at 08:07

## 2021-01-01 RX ADMIN — MONTELUKAST 10 MG: 10 TABLET, FILM COATED ORAL at 20:54

## 2021-01-01 RX ADMIN — SODIUM CHLORIDE, PRESERVATIVE FREE 10 ML: 5 INJECTION INTRAVENOUS at 08:55

## 2021-01-01 RX ADMIN — ENOXAPARIN SODIUM 40 MG: 40 INJECTION SUBCUTANEOUS at 09:04

## 2021-01-01 RX ADMIN — BUDESONIDE AND FORMOTEROL FUMARATE DIHYDRATE 2 PUFF: 160; 4.5 AEROSOL RESPIRATORY (INHALATION) at 09:05

## 2021-01-01 RX ADMIN — ENOXAPARIN SODIUM 40 MG: 40 INJECTION SUBCUTANEOUS at 08:25

## 2021-01-01 RX ADMIN — DOXYCYCLINE HYCLATE 100 MG: 100 TABLET, COATED ORAL at 21:04

## 2021-01-01 RX ADMIN — SODIUM CHLORIDE, PRESERVATIVE FREE 10 ML: 5 INJECTION INTRAVENOUS at 20:04

## 2021-01-01 RX ADMIN — INSULIN GLARGINE 38 UNITS: 100 INJECTION, SOLUTION SUBCUTANEOUS at 08:20

## 2021-01-01 RX ADMIN — IPRATROPIUM BROMIDE AND ALBUTEROL SULFATE 1 AMPULE: .5; 3 SOLUTION RESPIRATORY (INHALATION) at 12:46

## 2021-01-01 RX ADMIN — IPRATROPIUM BROMIDE AND ALBUTEROL SULFATE 1 AMPULE: 2.5; .5 SOLUTION RESPIRATORY (INHALATION) at 20:45

## 2021-01-01 RX ADMIN — INSULIN GLARGINE 20 UNITS: 100 INJECTION, SOLUTION SUBCUTANEOUS at 09:05

## 2021-01-01 RX ADMIN — LISINOPRIL 40 MG: 40 TABLET ORAL at 08:07

## 2021-01-01 RX ADMIN — SPIRONOLACTONE 100 MG: 100 TABLET ORAL at 18:09

## 2021-01-01 RX ADMIN — CLONIDINE HYDROCHLORIDE 0.1 MG: 0.1 TABLET ORAL at 22:19

## 2021-01-01 RX ADMIN — BUDESONIDE AND FORMOTEROL FUMARATE DIHYDRATE 2 PUFF: 160; 4.5 AEROSOL RESPIRATORY (INHALATION) at 08:13

## 2021-01-01 RX ADMIN — NIFEDIPINE 60 MG: 60 TABLET, EXTENDED RELEASE ORAL at 07:57

## 2021-01-01 RX ADMIN — POTASSIUM BICARBONATE 40 MEQ: 782 TABLET, EFFERVESCENT ORAL at 13:21

## 2021-01-01 RX ADMIN — IPRATROPIUM BROMIDE AND ALBUTEROL SULFATE 1 AMPULE: .5; 3 SOLUTION RESPIRATORY (INHALATION) at 12:11

## 2021-01-01 RX ADMIN — SODIUM CHLORIDE SOLN NEBU 3% 15 ML: 3 NEBU SOLN at 15:50

## 2021-01-01 RX ADMIN — INSULIN LISPRO 5 UNITS: 100 INJECTION, SOLUTION INTRAVENOUS; SUBCUTANEOUS at 21:14

## 2021-01-01 RX ADMIN — LIDOCAINE HYDROCHLORIDE 5 ML: 10 INJECTION, SOLUTION EPIDURAL; INFILTRATION; INTRACAUDAL; PERINEURAL at 13:10

## 2021-01-01 RX ADMIN — MONTELUKAST 10 MG: 10 TABLET, FILM COATED ORAL at 20:59

## 2021-01-01 RX ADMIN — SODIUM CHLORIDE, PRESERVATIVE FREE 10 ML: 5 INJECTION INTRAVENOUS at 08:19

## 2021-01-01 RX ADMIN — SODIUM CHLORIDE: 9 INJECTION, SOLUTION INTRAVENOUS at 04:14

## 2021-01-01 RX ADMIN — SPIRONOLACTONE 100 MG: 100 TABLET ORAL at 08:13

## 2021-01-01 RX ADMIN — LISINOPRIL 10 MG: 10 TABLET ORAL at 08:03

## 2021-01-01 RX ADMIN — ENOXAPARIN SODIUM 40 MG: 40 INJECTION SUBCUTANEOUS at 08:39

## 2021-01-01 RX ADMIN — BUDESONIDE AND FORMOTEROL FUMARATE DIHYDRATE 2 PUFF: 160; 4.5 AEROSOL RESPIRATORY (INHALATION) at 07:50

## 2021-01-01 RX ADMIN — INSULIN LISPRO 4 UNITS: 100 INJECTION, SOLUTION INTRAVENOUS; SUBCUTANEOUS at 08:09

## 2021-01-01 RX ADMIN — POTASSIUM CHLORIDE 10 MEQ: 7.46 INJECTION, SOLUTION INTRAVENOUS at 08:50

## 2021-01-01 RX ADMIN — SODIUM CHLORIDE, PRESERVATIVE FREE 10 ML: 5 INJECTION INTRAVENOUS at 07:58

## 2021-01-01 RX ADMIN — INSULIN LISPRO 1 UNITS: 100 INJECTION, SOLUTION INTRAVENOUS; SUBCUTANEOUS at 20:22

## 2021-01-01 RX ADMIN — BUDESONIDE AND FORMOTEROL FUMARATE DIHYDRATE 2 PUFF: 160; 4.5 AEROSOL RESPIRATORY (INHALATION) at 20:17

## 2021-01-01 RX ADMIN — MAGNESIUM SULFATE HEPTAHYDRATE 2000 MG: 40 INJECTION, SOLUTION INTRAVENOUS at 09:17

## 2021-01-01 RX ADMIN — IPRATROPIUM BROMIDE AND ALBUTEROL SULFATE 1 AMPULE: .5; 3 SOLUTION RESPIRATORY (INHALATION) at 17:21

## 2021-01-01 RX ADMIN — IOPAMIDOL 75 ML: 755 INJECTION, SOLUTION INTRAVENOUS at 07:39

## 2021-01-01 RX ADMIN — SPIRONOLACTONE 100 MG: 100 TABLET ORAL at 08:46

## 2021-01-01 RX ADMIN — BUDESONIDE AND FORMOTEROL FUMARATE DIHYDRATE 2 PUFF: 160; 4.5 AEROSOL RESPIRATORY (INHALATION) at 20:29

## 2021-01-01 RX ADMIN — DOXAZOSIN 2 MG: 2 TABLET ORAL at 08:46

## 2021-01-01 RX ADMIN — DOXYCYCLINE HYCLATE 100 MG: 100 TABLET, COATED ORAL at 20:54

## 2021-01-01 RX ADMIN — CLONIDINE HYDROCHLORIDE 0.1 MG: 0.1 TABLET ORAL at 15:16

## 2021-01-01 RX ADMIN — MAGNESIUM SULFATE HEPTAHYDRATE 1000 MG: 1 INJECTION, SOLUTION INTRAVENOUS at 04:13

## 2021-01-01 RX ADMIN — IPRATROPIUM BROMIDE AND ALBUTEROL SULFATE 1 AMPULE: .5; 3 SOLUTION RESPIRATORY (INHALATION) at 20:27

## 2021-01-01 RX ADMIN — POTASSIUM CHLORIDE 10 MEQ: 7.46 INJECTION, SOLUTION INTRAVENOUS at 13:29

## 2021-01-01 RX ADMIN — SPIRONOLACTONE 50 MG: 25 TABLET ORAL at 17:08

## 2021-01-01 RX ADMIN — SODIUM CHLORIDE SOLN NEBU 3% 15 ML: 3 NEBU SOLN at 08:03

## 2021-01-01 RX ADMIN — ENOXAPARIN SODIUM 40 MG: 40 INJECTION SUBCUTANEOUS at 11:05

## 2021-01-01 RX ADMIN — SODIUM CHLORIDE: 9 INJECTION, SOLUTION INTRAVENOUS at 13:39

## 2021-01-01 RX ADMIN — POTASSIUM CHLORIDE 40 MEQ: 750 TABLET, FILM COATED, EXTENDED RELEASE ORAL at 08:25

## 2021-01-01 RX ADMIN — LABETALOL HYDROCHLORIDE 10 MG: 5 INJECTION INTRAVENOUS at 13:35

## 2021-01-01 RX ADMIN — TC 99M MEDRONATE 25 MILLICURIE: 20 INJECTION, POWDER, LYOPHILIZED, FOR SOLUTION INTRAVENOUS at 07:49

## 2021-01-01 RX ADMIN — POTASSIUM CHLORIDE 40 MEQ: 20 TABLET, EXTENDED RELEASE ORAL at 08:17

## 2021-01-01 RX ADMIN — SODIUM CHLORIDE, PRESERVATIVE FREE 10 ML: 5 INJECTION INTRAVENOUS at 20:17

## 2021-01-01 RX ADMIN — INSULIN GLARGINE 20 UNITS: 100 INJECTION, SOLUTION SUBCUTANEOUS at 07:59

## 2021-01-01 RX ADMIN — DOXAZOSIN 4 MG: 4 TABLET ORAL at 08:39

## 2021-01-01 RX ADMIN — IPRATROPIUM BROMIDE AND ALBUTEROL SULFATE 1 AMPULE: 2.5; .5 SOLUTION RESPIRATORY (INHALATION) at 08:09

## 2021-01-01 RX ADMIN — NIFEDIPINE 60 MG: 60 TABLET, EXTENDED RELEASE ORAL at 18:27

## 2021-01-01 RX ADMIN — PIPERACILLIN AND TAZOBACTAM 3375 MG: 3; .375 INJECTION, POWDER, LYOPHILIZED, FOR SOLUTION INTRAVENOUS at 21:53

## 2021-01-01 RX ADMIN — HYDRALAZINE HYDROCHLORIDE 10 MG: 20 INJECTION INTRAMUSCULAR; INTRAVENOUS at 11:55

## 2021-01-01 RX ADMIN — BUDESONIDE AND FORMOTEROL FUMARATE DIHYDRATE 2 PUFF: 160; 4.5 AEROSOL RESPIRATORY (INHALATION) at 20:14

## 2021-01-01 RX ADMIN — SPIRONOLACTONE 100 MG: 100 TABLET ORAL at 08:29

## 2021-01-01 RX ADMIN — FUROSEMIDE 20 MG: 10 INJECTION, SOLUTION INTRAMUSCULAR; INTRAVENOUS at 09:49

## 2021-01-01 RX ADMIN — SODIUM CHLORIDE, PRESERVATIVE FREE 10 ML: 5 INJECTION INTRAVENOUS at 21:39

## 2021-01-01 RX ADMIN — AMLODIPINE BESYLATE 10 MG: 10 TABLET ORAL at 08:02

## 2021-01-01 RX ADMIN — METHYLPREDNISOLONE SODIUM SUCCINATE 40 MG: 40 INJECTION, POWDER, FOR SOLUTION INTRAMUSCULAR; INTRAVENOUS at 22:19

## 2021-01-01 RX ADMIN — INSULIN LISPRO 2 UNITS: 100 INJECTION, SOLUTION INTRAVENOUS; SUBCUTANEOUS at 21:10

## 2021-01-01 RX ADMIN — IPRATROPIUM BROMIDE AND ALBUTEROL SULFATE 1 AMPULE: .5; 3 SOLUTION RESPIRATORY (INHALATION) at 16:05

## 2021-01-01 RX ADMIN — SODIUM CHLORIDE 25 ML: 9 INJECTION, SOLUTION INTRAVENOUS at 21:53

## 2021-01-01 RX ADMIN — SODIUM CHLORIDE, PRESERVATIVE FREE 10 ML: 5 INJECTION INTRAVENOUS at 08:40

## 2021-01-01 RX ADMIN — NIFEDIPINE 60 MG: 60 TABLET, EXTENDED RELEASE ORAL at 08:55

## 2021-01-01 RX ADMIN — MONTELUKAST 10 MG: 10 TABLET, FILM COATED ORAL at 22:15

## 2021-01-01 RX ADMIN — NIFEDIPINE 60 MG: 60 TABLET, EXTENDED RELEASE ORAL at 17:32

## 2021-01-01 RX ADMIN — AMLODIPINE BESYLATE 10 MG: 10 TABLET ORAL at 08:07

## 2021-01-01 RX ADMIN — ENOXAPARIN SODIUM 40 MG: 40 INJECTION SUBCUTANEOUS at 08:45

## 2021-01-01 RX ADMIN — Medication 10 MEQ: at 13:21

## 2021-01-01 RX ADMIN — FUROSEMIDE 40 MG: 10 INJECTION, SOLUTION INTRAMUSCULAR; INTRAVENOUS at 17:18

## 2021-01-01 RX ADMIN — BUDESONIDE AND FORMOTEROL FUMARATE DIHYDRATE 2 PUFF: 160; 4.5 AEROSOL RESPIRATORY (INHALATION) at 08:04

## 2021-01-01 RX ADMIN — SODIUM CHLORIDE SOLN NEBU 3% 15 ML: 3 NEBU SOLN at 15:39

## 2021-01-01 RX ADMIN — SODIUM CHLORIDE SOLN NEBU 3% 15 ML: 3 NEBU SOLN at 20:18

## 2021-01-01 RX ADMIN — METHYLPREDNISOLONE SODIUM SUCCINATE 40 MG: 40 INJECTION, POWDER, FOR SOLUTION INTRAMUSCULAR; INTRAVENOUS at 05:32

## 2021-01-01 RX ADMIN — IPRATROPIUM BROMIDE AND ALBUTEROL SULFATE 1 AMPULE: .5; 3 SOLUTION RESPIRATORY (INHALATION) at 20:21

## 2021-01-01 RX ADMIN — SODIUM CHLORIDE, PRESERVATIVE FREE 10 ML: 5 INJECTION INTRAVENOUS at 22:07

## 2021-01-01 RX ADMIN — AMLODIPINE BESYLATE 10 MG: 10 TABLET ORAL at 09:03

## 2021-01-01 RX ADMIN — IPRATROPIUM BROMIDE AND ALBUTEROL SULFATE 1 AMPULE: .5; 3 SOLUTION RESPIRATORY (INHALATION) at 16:10

## 2021-01-01 RX ADMIN — INSULIN LISPRO 4 UNITS: 100 INJECTION, SOLUTION INTRAVENOUS; SUBCUTANEOUS at 08:11

## 2021-01-01 RX ADMIN — INSULIN GLARGINE 10 UNITS: 100 INJECTION, SOLUTION SUBCUTANEOUS at 08:26

## 2021-01-01 RX ADMIN — HYDRALAZINE HYDROCHLORIDE 10 MG: 20 INJECTION INTRAMUSCULAR; INTRAVENOUS at 13:46

## 2021-01-01 RX ADMIN — PREDNISONE 20 MG: 20 TABLET ORAL at 08:10

## 2021-01-01 RX ADMIN — IPRATROPIUM BROMIDE AND ALBUTEROL SULFATE 1 AMPULE: .5; 3 SOLUTION RESPIRATORY (INHALATION) at 20:13

## 2021-01-01 RX ADMIN — INSULIN LISPRO 1 UNITS: 100 INJECTION, SOLUTION INTRAVENOUS; SUBCUTANEOUS at 20:13

## 2021-01-01 RX ADMIN — BUDESONIDE AND FORMOTEROL FUMARATE DIHYDRATE 2 PUFF: 160; 4.5 AEROSOL RESPIRATORY (INHALATION) at 20:22

## 2021-01-01 RX ADMIN — DOXYCYCLINE HYCLATE 100 MG: 100 TABLET, COATED ORAL at 08:40

## 2021-01-01 RX ADMIN — INSULIN LISPRO 6 UNITS: 100 INJECTION, SOLUTION INTRAVENOUS; SUBCUTANEOUS at 08:27

## 2021-01-01 RX ADMIN — POTASSIUM & SODIUM PHOSPHATES POWDER PACK 280-160-250 MG 250 MG: 280-160-250 PACK at 17:32

## 2021-01-01 RX ADMIN — INSULIN LISPRO 6 UNITS: 100 INJECTION, SOLUTION INTRAVENOUS; SUBCUTANEOUS at 17:12

## 2021-01-01 RX ADMIN — MAGNESIUM SULFATE HEPTAHYDRATE 1000 MG: 1 INJECTION, SOLUTION INTRAVENOUS at 17:58

## 2021-01-01 RX ADMIN — MONTELUKAST 10 MG: 10 TABLET, FILM COATED ORAL at 20:12

## 2021-01-01 RX ADMIN — TBO-FILGRASTIM 300 MCG: 300 INJECTION, SOLUTION SUBCUTANEOUS at 18:27

## 2021-01-01 RX ADMIN — POTASSIUM CHLORIDE 40 MEQ: 750 TABLET, FILM COATED, EXTENDED RELEASE ORAL at 15:10

## 2021-01-01 RX ADMIN — SODIUM CHLORIDE SOLN NEBU 3% 15 ML: 3 NEBU SOLN at 12:11

## 2021-01-01 RX ADMIN — NIFEDIPINE 60 MG: 60 TABLET, EXTENDED RELEASE ORAL at 11:05

## 2021-01-01 RX ADMIN — SODIUM CHLORIDE SOLN NEBU 3% 15 ML: 3 NEBU SOLN at 08:56

## 2021-01-01 RX ADMIN — IPRATROPIUM BROMIDE AND ALBUTEROL SULFATE 1 AMPULE: .5; 3 SOLUTION RESPIRATORY (INHALATION) at 13:16

## 2021-01-01 RX ADMIN — FUROSEMIDE 40 MG: 10 INJECTION, SOLUTION INTRAMUSCULAR; INTRAVENOUS at 17:12

## 2021-01-01 RX ADMIN — SODIUM CHLORIDE SOLN NEBU 3% 15 ML: 3 NEBU SOLN at 16:24

## 2021-01-01 RX ADMIN — INSULIN LISPRO 4 UNITS: 100 INJECTION, SOLUTION INTRAVENOUS; SUBCUTANEOUS at 21:37

## 2021-01-01 RX ADMIN — PIPERACILLIN AND TAZOBACTAM 3375 MG: 3; .375 INJECTION, POWDER, LYOPHILIZED, FOR SOLUTION INTRAVENOUS at 13:55

## 2021-01-01 RX ADMIN — INSULIN GLARGINE 38 UNITS: 100 INJECTION, SOLUTION SUBCUTANEOUS at 08:28

## 2021-01-01 RX ADMIN — METHYLPREDNISOLONE SODIUM SUCCINATE 40 MG: 40 INJECTION, POWDER, FOR SOLUTION INTRAMUSCULAR; INTRAVENOUS at 05:29

## 2021-01-01 RX ADMIN — INSULIN GLARGINE 15 UNITS: 100 INJECTION, SOLUTION SUBCUTANEOUS at 08:11

## 2021-01-01 RX ADMIN — IPRATROPIUM BROMIDE AND ALBUTEROL SULFATE 1 AMPULE: .5; 3 SOLUTION RESPIRATORY (INHALATION) at 08:09

## 2021-01-01 RX ADMIN — FUROSEMIDE 40 MG: 10 INJECTION, SOLUTION INTRAMUSCULAR; INTRAVENOUS at 08:10

## 2021-01-01 RX ADMIN — SODIUM CHLORIDE SOLN NEBU 3% 15 ML: 3 NEBU SOLN at 11:43

## 2021-01-01 RX ADMIN — METHYLPREDNISOLONE SODIUM SUCCINATE 40 MG: 40 INJECTION, POWDER, FOR SOLUTION INTRAMUSCULAR; INTRAVENOUS at 06:13

## 2021-01-01 RX ADMIN — SPIRONOLACTONE 100 MG: 100 TABLET ORAL at 18:06

## 2021-01-01 RX ADMIN — INSULIN LISPRO 4 UNITS: 100 INJECTION, SOLUTION INTRAVENOUS; SUBCUTANEOUS at 12:10

## 2021-01-01 RX ADMIN — INSULIN LISPRO 3 UNITS: 100 INJECTION, SOLUTION INTRAVENOUS; SUBCUTANEOUS at 21:48

## 2021-01-01 RX ADMIN — PREDNISONE 10 MG: 10 TABLET ORAL at 08:07

## 2021-01-01 RX ADMIN — LORAZEPAM 1 MG: 2 INJECTION INTRAMUSCULAR; INTRAVENOUS at 13:11

## 2021-01-01 RX ADMIN — POTASSIUM & SODIUM PHOSPHATES POWDER PACK 280-160-250 MG 250 MG: 280-160-250 PACK at 08:25

## 2021-01-01 RX ADMIN — LISINOPRIL 40 MG: 40 TABLET ORAL at 09:19

## 2021-01-01 RX ADMIN — PIPERACILLIN AND TAZOBACTAM 3375 MG: 3; .375 INJECTION, POWDER, LYOPHILIZED, FOR SOLUTION INTRAVENOUS at 05:36

## 2021-01-01 RX ADMIN — SODIUM CHLORIDE, PRESERVATIVE FREE 10 ML: 5 INJECTION INTRAVENOUS at 21:55

## 2021-01-01 RX ADMIN — INSULIN GLARGINE 34 UNITS: 100 INJECTION, SOLUTION SUBCUTANEOUS at 11:10

## 2021-01-01 RX ADMIN — INSULIN LISPRO 4 UNITS: 100 INJECTION, SOLUTION INTRAVENOUS; SUBCUTANEOUS at 17:32

## 2021-01-01 RX ADMIN — BUDESONIDE AND FORMOTEROL FUMARATE DIHYDRATE 2 PUFF: 160; 4.5 AEROSOL RESPIRATORY (INHALATION) at 08:17

## 2021-01-01 RX ADMIN — FUROSEMIDE 40 MG: 40 TABLET ORAL at 09:26

## 2021-01-01 RX ADMIN — POTASSIUM CHLORIDE 40 MEQ: 20 TABLET, EXTENDED RELEASE ORAL at 09:03

## 2021-01-01 RX ADMIN — SPIRONOLACTONE 50 MG: 25 TABLET ORAL at 17:05

## 2021-01-01 RX ADMIN — SODIUM CHLORIDE 25 ML: 9 INJECTION, SOLUTION INTRAVENOUS at 15:29

## 2021-01-01 RX ADMIN — BUDESONIDE AND FORMOTEROL FUMARATE DIHYDRATE 2 PUFF: 160; 4.5 AEROSOL RESPIRATORY (INHALATION) at 08:27

## 2021-01-01 RX ADMIN — INSULIN GLARGINE 30 UNITS: 100 INJECTION, SOLUTION SUBCUTANEOUS at 08:18

## 2021-01-01 RX ADMIN — CLONIDINE HYDROCHLORIDE 0.1 MG: 0.1 TABLET ORAL at 03:04

## 2021-01-01 RX ADMIN — SODIUM CHLORIDE 25 ML: 9 INJECTION, SOLUTION INTRAVENOUS at 05:35

## 2021-01-01 RX ADMIN — NIFEDIPINE 60 MG: 60 TABLET, EXTENDED RELEASE ORAL at 17:18

## 2021-01-01 RX ADMIN — IPRATROPIUM BROMIDE AND ALBUTEROL SULFATE 1 AMPULE: .5; 3 SOLUTION RESPIRATORY (INHALATION) at 15:50

## 2021-01-01 RX ADMIN — MONTELUKAST 10 MG: 10 TABLET, FILM COATED ORAL at 20:49

## 2021-01-01 RX ADMIN — IPRATROPIUM BROMIDE AND ALBUTEROL SULFATE 1 AMPULE: .5; 3 SOLUTION RESPIRATORY (INHALATION) at 20:26

## 2021-01-01 RX ADMIN — ALBUTEROL SULFATE 2.5 MG: 2.5 SOLUTION RESPIRATORY (INHALATION) at 16:34

## 2021-01-01 RX ADMIN — POTASSIUM CHLORIDE 40 MEQ: 20 TABLET, EXTENDED RELEASE ORAL at 17:01

## 2021-01-01 RX ADMIN — SODIUM CHLORIDE, PRESERVATIVE FREE 10 ML: 5 INJECTION INTRAVENOUS at 21:12

## 2021-01-01 RX ADMIN — DOXAZOSIN 4 MG: 4 TABLET ORAL at 09:19

## 2021-01-01 RX ADMIN — LISINOPRIL 40 MG: 40 TABLET ORAL at 08:10

## 2021-01-01 RX ADMIN — SODIUM CHLORIDE SOLN NEBU 3% 4 ML: 3 NEBU SOLN at 07:57

## 2021-01-01 RX ADMIN — SODIUM CHLORIDE SOLN NEBU 3% 15 ML: 3 NEBU SOLN at 08:27

## 2021-01-01 RX ADMIN — ENOXAPARIN SODIUM 40 MG: 40 INJECTION SUBCUTANEOUS at 08:54

## 2021-01-01 RX ADMIN — INSULIN LISPRO 2 UNITS: 100 INJECTION, SOLUTION INTRAVENOUS; SUBCUTANEOUS at 20:13

## 2021-01-01 RX ADMIN — SODIUM CHLORIDE, PRESERVATIVE FREE 10 ML: 5 INJECTION INTRAVENOUS at 08:28

## 2021-01-01 RX ADMIN — BUDESONIDE AND FORMOTEROL FUMARATE DIHYDRATE 2 PUFF: 160; 4.5 AEROSOL RESPIRATORY (INHALATION) at 20:51

## 2021-01-01 RX ADMIN — METHYLPREDNISOLONE SODIUM SUCCINATE 40 MG: 40 INJECTION, POWDER, FOR SOLUTION INTRAMUSCULAR; INTRAVENOUS at 13:06

## 2021-01-01 RX ADMIN — LORAZEPAM 1 MG: 2 INJECTION INTRAMUSCULAR; INTRAVENOUS at 11:54

## 2021-01-01 RX ADMIN — SODIUM CHLORIDE, PRESERVATIVE FREE 10 ML: 5 INJECTION INTRAVENOUS at 20:14

## 2021-01-01 RX ADMIN — SODIUM CHLORIDE SOLN NEBU 3% 4 ML: 3 NEBU SOLN at 20:29

## 2021-01-01 RX ADMIN — CLONIDINE HYDROCHLORIDE 0.1 MG: 0.1 TABLET ORAL at 03:46

## 2021-01-01 RX ADMIN — IPRATROPIUM BROMIDE AND ALBUTEROL SULFATE 1 AMPULE: .5; 3 SOLUTION RESPIRATORY (INHALATION) at 16:38

## 2021-01-01 RX ADMIN — IPRATROPIUM BROMIDE AND ALBUTEROL SULFATE 1 AMPULE: .5; 3 SOLUTION RESPIRATORY (INHALATION) at 20:17

## 2021-01-01 RX ADMIN — IPRATROPIUM BROMIDE AND ALBUTEROL SULFATE 1 AMPULE: .5; 3 SOLUTION RESPIRATORY (INHALATION) at 12:38

## 2021-01-01 RX ADMIN — SODIUM CHLORIDE, PRESERVATIVE FREE 10 ML: 5 INJECTION INTRAVENOUS at 21:48

## 2021-01-01 RX ADMIN — POTASSIUM CHLORIDE 40 MEQ: 750 TABLET, FILM COATED, EXTENDED RELEASE ORAL at 13:25

## 2021-01-01 RX ADMIN — SPIRONOLACTONE 100 MG: 100 TABLET ORAL at 09:25

## 2021-01-01 RX ADMIN — IPRATROPIUM BROMIDE AND ALBUTEROL SULFATE 1 AMPULE: .5; 3 SOLUTION RESPIRATORY (INHALATION) at 07:50

## 2021-01-01 RX ADMIN — POTASSIUM & SODIUM PHOSPHATES POWDER PACK 280-160-250 MG 250 MG: 280-160-250 PACK at 13:25

## 2021-01-01 RX ADMIN — INSULIN LISPRO 3 UNITS: 100 INJECTION, SOLUTION INTRAVENOUS; SUBCUTANEOUS at 08:18

## 2021-01-01 RX ADMIN — MONTELUKAST 10 MG: 10 TABLET, FILM COATED ORAL at 20:22

## 2021-01-01 RX ADMIN — INSULIN LISPRO 3 UNITS: 100 INJECTION, SOLUTION INTRAVENOUS; SUBCUTANEOUS at 21:09

## 2021-01-01 RX ADMIN — ENOXAPARIN SODIUM 40 MG: 40 INJECTION SUBCUTANEOUS at 09:24

## 2021-01-01 RX ADMIN — POTASSIUM CHLORIDE 40 MEQ: 20 TABLET, EXTENDED RELEASE ORAL at 17:58

## 2021-01-01 RX ADMIN — POTASSIUM CHLORIDE 40 MEQ: 750 TABLET, FILM COATED, EXTENDED RELEASE ORAL at 20:25

## 2021-01-01 RX ADMIN — SPIRONOLACTONE 100 MG: 100 TABLET ORAL at 18:27

## 2021-01-01 RX ADMIN — ENOXAPARIN SODIUM 40 MG: 40 INJECTION SUBCUTANEOUS at 08:26

## 2021-01-01 RX ADMIN — CLONIDINE HYDROCHLORIDE 0.2 MG: 0.1 TABLET ORAL at 10:51

## 2021-01-01 RX ADMIN — POTASSIUM CHLORIDE 40 MEQ: 20 TABLET, EXTENDED RELEASE ORAL at 17:46

## 2021-01-01 RX ADMIN — FUROSEMIDE 40 MG: 10 INJECTION, SOLUTION INTRAMUSCULAR; INTRAVENOUS at 08:43

## 2021-01-01 RX ADMIN — DOXAZOSIN 4 MG: 4 TABLET ORAL at 08:25

## 2021-01-01 RX ADMIN — Medication 10 MEQ: at 20:43

## 2021-01-01 RX ADMIN — INSULIN LISPRO 5 UNITS: 100 INJECTION, SOLUTION INTRAVENOUS; SUBCUTANEOUS at 08:33

## 2021-01-01 RX ADMIN — INSULIN GLARGINE 30 UNITS: 100 INJECTION, SOLUTION SUBCUTANEOUS at 09:02

## 2021-01-01 RX ADMIN — CLONIDINE HYDROCHLORIDE 0.2 MG: 0.1 TABLET ORAL at 10:34

## 2021-01-01 RX ADMIN — IPRATROPIUM BROMIDE AND ALBUTEROL SULFATE 1 AMPULE: .5; 3 SOLUTION RESPIRATORY (INHALATION) at 08:45

## 2021-01-01 RX ADMIN — IPRATROPIUM BROMIDE AND ALBUTEROL SULFATE 1 AMPULE: .5; 3 SOLUTION RESPIRATORY (INHALATION) at 12:24

## 2021-01-01 RX ADMIN — POTASSIUM CHLORIDE 40 MEQ: 20 TABLET, EXTENDED RELEASE ORAL at 08:10

## 2021-01-01 RX ADMIN — NIFEDIPINE 60 MG: 60 TABLET, EXTENDED RELEASE ORAL at 09:03

## 2021-01-01 RX ADMIN — SODIUM CHLORIDE SOLN NEBU 3% 4 ML: 3 NEBU SOLN at 20:14

## 2021-01-01 RX ADMIN — NIFEDIPINE 60 MG: 60 TABLET, EXTENDED RELEASE ORAL at 08:18

## 2021-01-01 RX ADMIN — ATEZOLIZUMAB 1200 MG: 1200 INJECTION, SOLUTION INTRAVENOUS at 15:07

## 2021-01-01 RX ADMIN — SODIUM CHLORIDE, PRESERVATIVE FREE 10 ML: 5 INJECTION INTRAVENOUS at 20:47

## 2021-01-01 RX ADMIN — SPIRONOLACTONE 100 MG: 100 TABLET ORAL at 17:31

## 2021-01-01 RX ADMIN — BUDESONIDE AND FORMOTEROL FUMARATE DIHYDRATE 2 PUFF: 160; 4.5 AEROSOL RESPIRATORY (INHALATION) at 21:05

## 2021-01-01 RX ADMIN — POTASSIUM CHLORIDE 40 MEQ: 750 TABLET, FILM COATED, EXTENDED RELEASE ORAL at 20:13

## 2021-01-01 RX ADMIN — NIFEDIPINE 60 MG: 60 TABLET, EXTENDED RELEASE ORAL at 08:12

## 2021-01-01 RX ADMIN — SODIUM CHLORIDE 25 ML: 9 INJECTION, SOLUTION INTRAVENOUS at 05:31

## 2021-01-01 RX ADMIN — DOXAZOSIN 2 MG: 2 TABLET ORAL at 21:00

## 2021-01-01 RX ADMIN — DOXAZOSIN 4 MG: 4 TABLET ORAL at 20:13

## 2021-01-01 RX ADMIN — POTASSIUM CHLORIDE 20 MEQ: 20 TABLET, EXTENDED RELEASE ORAL at 17:41

## 2021-01-01 RX ADMIN — IPRATROPIUM BROMIDE AND ALBUTEROL SULFATE 1 AMPULE: .5; 3 SOLUTION RESPIRATORY (INHALATION) at 15:19

## 2021-01-01 RX ADMIN — POTASSIUM CHLORIDE 10 MEQ: 7.46 INJECTION, SOLUTION INTRAVENOUS at 05:42

## 2021-01-01 RX ADMIN — FUROSEMIDE 40 MG: 10 INJECTION, SOLUTION INTRAMUSCULAR; INTRAVENOUS at 07:57

## 2021-01-01 RX ADMIN — IPRATROPIUM BROMIDE AND ALBUTEROL SULFATE 1 AMPULE: .5; 3 SOLUTION RESPIRATORY (INHALATION) at 08:43

## 2021-01-01 RX ADMIN — METHYLPREDNISOLONE SODIUM SUCCINATE 40 MG: 40 INJECTION, POWDER, FOR SOLUTION INTRAMUSCULAR; INTRAVENOUS at 13:35

## 2021-01-01 RX ADMIN — SODIUM CHLORIDE, PRESERVATIVE FREE 10 ML: 5 INJECTION INTRAVENOUS at 21:51

## 2021-01-01 RX ADMIN — INSULIN LISPRO 6 UNITS: 100 INJECTION, SOLUTION INTRAVENOUS; SUBCUTANEOUS at 13:48

## 2021-01-01 RX ADMIN — DOXAZOSIN 4 MG: 4 TABLET ORAL at 20:30

## 2021-01-01 RX ADMIN — IOHEXOL 50 ML: 240 INJECTION, SOLUTION INTRATHECAL; INTRAVASCULAR; INTRAVENOUS; ORAL at 07:39

## 2021-01-01 RX ADMIN — POTASSIUM CHLORIDE 10 MEQ: 7.46 INJECTION, SOLUTION INTRAVENOUS at 04:42

## 2021-01-01 RX ADMIN — INSULIN LISPRO 3 UNITS: 100 INJECTION, SOLUTION INTRAVENOUS; SUBCUTANEOUS at 21:12

## 2021-01-01 RX ADMIN — DOXAZOSIN 4 MG: 4 TABLET ORAL at 08:17

## 2021-01-01 RX ADMIN — POTASSIUM CHLORIDE 40 MEQ: 20 TABLET, EXTENDED RELEASE ORAL at 16:54

## 2021-01-01 RX ADMIN — SPIRONOLACTONE 100 MG: 100 TABLET ORAL at 09:03

## 2021-01-01 RX ADMIN — INSULIN LISPRO 2 UNITS: 100 INJECTION, SOLUTION INTRAVENOUS; SUBCUTANEOUS at 21:38

## 2021-01-01 RX ADMIN — CLONIDINE HYDROCHLORIDE 0.2 MG: 0.1 TABLET ORAL at 02:30

## 2021-01-01 RX ADMIN — PIPERACILLIN AND TAZOBACTAM 3375 MG: 3; .375 INJECTION, POWDER, LYOPHILIZED, FOR SOLUTION INTRAVENOUS at 21:51

## 2021-01-01 RX ADMIN — POTASSIUM CHLORIDE 40 MEQ: 750 TABLET, FILM COATED, EXTENDED RELEASE ORAL at 18:48

## 2021-01-01 RX ADMIN — IPRATROPIUM BROMIDE AND ALBUTEROL SULFATE 1 AMPULE: .5; 3 SOLUTION RESPIRATORY (INHALATION) at 15:55

## 2021-01-01 RX ADMIN — ALLOPURINOL 300 MG: 300 TABLET ORAL at 11:06

## 2021-01-01 RX ADMIN — FUROSEMIDE 40 MG: 10 INJECTION, SOLUTION INTRAMUSCULAR; INTRAVENOUS at 08:12

## 2021-01-01 RX ADMIN — SODIUM CHLORIDE SOLN NEBU 3% 15 ML: 3 NEBU SOLN at 07:44

## 2021-01-01 RX ADMIN — INSULIN LISPRO 1 UNITS: 100 INJECTION, SOLUTION INTRAVENOUS; SUBCUTANEOUS at 20:25

## 2021-01-01 RX ADMIN — HYDRALAZINE HYDROCHLORIDE 10 MG: 20 INJECTION INTRAMUSCULAR; INTRAVENOUS at 23:58

## 2021-01-01 RX ADMIN — NIFEDIPINE 60 MG: 60 TABLET, EXTENDED RELEASE ORAL at 08:10

## 2021-01-01 RX ADMIN — NIFEDIPINE 60 MG: 60 TABLET, EXTENDED RELEASE ORAL at 08:39

## 2021-01-01 RX ADMIN — MONTELUKAST 10 MG: 10 TABLET, FILM COATED ORAL at 21:15

## 2021-01-01 RX ADMIN — POTASSIUM & SODIUM PHOSPHATES POWDER PACK 280-160-250 MG 250 MG: 280-160-250 PACK at 18:06

## 2021-01-01 RX ADMIN — POTASSIUM CHLORIDE 40 MEQ: 750 TABLET, FILM COATED, EXTENDED RELEASE ORAL at 08:46

## 2021-01-01 RX ADMIN — BUDESONIDE AND FORMOTEROL FUMARATE DIHYDRATE 2 PUFF: 160; 4.5 AEROSOL RESPIRATORY (INHALATION) at 19:57

## 2021-01-01 RX ADMIN — INSULIN GLARGINE 5 UNITS: 100 INJECTION, SOLUTION SUBCUTANEOUS at 08:14

## 2021-01-01 RX ADMIN — Medication 10 MEQ: at 19:27

## 2021-01-01 RX ADMIN — IPRATROPIUM BROMIDE AND ALBUTEROL SULFATE 1 AMPULE: .5; 3 SOLUTION RESPIRATORY (INHALATION) at 19:47

## 2021-01-01 RX ADMIN — POTASSIUM CHLORIDE 20 MEQ: 20 TABLET, EXTENDED RELEASE ORAL at 08:28

## 2021-01-01 RX ADMIN — NIFEDIPINE 60 MG: 60 TABLET, EXTENDED RELEASE ORAL at 08:29

## 2021-01-01 RX ADMIN — AMLODIPINE BESYLATE 10 MG: 10 TABLET ORAL at 08:27

## 2021-01-01 RX ADMIN — INSULIN LISPRO 3 UNITS: 100 INJECTION, SOLUTION INTRAVENOUS; SUBCUTANEOUS at 08:41

## 2021-01-01 RX ADMIN — DOXYCYCLINE HYCLATE 100 MG: 100 TABLET, COATED ORAL at 09:03

## 2021-01-01 RX ADMIN — MAGNESIUM SULFATE HEPTAHYDRATE 2000 MG: 40 INJECTION, SOLUTION INTRAVENOUS at 10:51

## 2021-01-01 RX ADMIN — LISINOPRIL 40 MG: 40 TABLET ORAL at 08:17

## 2021-01-01 RX ADMIN — POTASSIUM CHLORIDE 40 MEQ: 20 TABLET, EXTENDED RELEASE ORAL at 07:57

## 2021-01-01 RX ADMIN — SPIRONOLACTONE 100 MG: 100 TABLET ORAL at 17:17

## 2021-01-01 RX ADMIN — SODIUM CHLORIDE, PRESERVATIVE FREE 10 ML: 5 INJECTION INTRAVENOUS at 20:22

## 2021-01-01 RX ADMIN — IPRATROPIUM BROMIDE AND ALBUTEROL SULFATE 1 AMPULE: .5; 3 SOLUTION RESPIRATORY (INHALATION) at 20:29

## 2021-01-01 RX ADMIN — METHYLPREDNISOLONE SODIUM SUCCINATE 125 MG: 125 INJECTION, POWDER, FOR SOLUTION INTRAMUSCULAR; INTRAVENOUS at 20:27

## 2021-01-01 RX ADMIN — DOXAZOSIN 4 MG: 4 TABLET ORAL at 11:05

## 2021-01-01 RX ADMIN — SPIRONOLACTONE 100 MG: 100 TABLET ORAL at 16:54

## 2021-01-01 RX ADMIN — IPRATROPIUM BROMIDE AND ALBUTEROL SULFATE 1 AMPULE: .5; 3 SOLUTION RESPIRATORY (INHALATION) at 07:44

## 2021-01-01 RX ADMIN — SPIRONOLACTONE 50 MG: 25 TABLET ORAL at 08:52

## 2021-01-01 RX ADMIN — LISINOPRIL 40 MG: 40 TABLET ORAL at 08:26

## 2021-01-01 RX ADMIN — DEXAMETHASONE SODIUM PHOSPHATE: 4 INJECTION, SOLUTION INTRA-ARTICULAR; INTRALESIONAL; INTRAMUSCULAR; INTRAVENOUS; SOFT TISSUE at 14:00

## 2021-01-01 RX ADMIN — PREDNISONE 20 MG: 20 TABLET ORAL at 07:57

## 2021-01-01 RX ADMIN — ETOPOSIDE 188 MG: 20 INJECTION INTRAVENOUS at 15:00

## 2021-01-01 RX ADMIN — FOSAPREPITANT 150 MG: 150 INJECTION, POWDER, LYOPHILIZED, FOR SOLUTION INTRAVENOUS at 14:29

## 2021-01-01 RX ADMIN — INSULIN LISPRO 5 UNITS: 100 INJECTION, SOLUTION INTRAVENOUS; SUBCUTANEOUS at 20:49

## 2021-01-01 RX ADMIN — DOXAZOSIN 2 MG: 2 TABLET ORAL at 12:10

## 2021-01-01 RX ADMIN — POTASSIUM CHLORIDE 10 MEQ: 7.46 INJECTION, SOLUTION INTRAVENOUS at 02:11

## 2021-01-01 RX ADMIN — AMLODIPINE BESYLATE 10 MG: 10 TABLET ORAL at 08:40

## 2021-01-01 RX ADMIN — POTASSIUM CHLORIDE 40 MEQ: 20 TABLET, EXTENDED RELEASE ORAL at 17:05

## 2021-01-01 RX ADMIN — MONTELUKAST 10 MG: 10 TABLET, FILM COATED ORAL at 20:05

## 2021-01-01 RX ADMIN — DOXYCYCLINE HYCLATE 100 MG: 100 TABLET, COATED ORAL at 08:10

## 2021-01-01 RX ADMIN — IPRATROPIUM BROMIDE AND ALBUTEROL SULFATE 1 AMPULE: 2.5; .5 SOLUTION RESPIRATORY (INHALATION) at 08:17

## 2021-01-01 RX ADMIN — PIPERACILLIN AND TAZOBACTAM 3375 MG: 3; .375 INJECTION, POWDER, LYOPHILIZED, FOR SOLUTION INTRAVENOUS at 05:33

## 2021-01-01 RX ADMIN — SPIRONOLACTONE 100 MG: 100 TABLET ORAL at 17:12

## 2021-01-01 RX ADMIN — TBO-FILGRASTIM 300 MCG: 300 INJECTION, SOLUTION SUBCUTANEOUS at 17:35

## 2021-01-01 RX ADMIN — BUDESONIDE AND FORMOTEROL FUMARATE DIHYDRATE 2 PUFF: 160; 4.5 AEROSOL RESPIRATORY (INHALATION) at 08:38

## 2021-01-01 RX ADMIN — CARBOPLATIN 700 MG: 10 INJECTION, SOLUTION INTRAVENOUS at 17:19

## 2021-01-01 RX ADMIN — IPRATROPIUM BROMIDE AND ALBUTEROL SULFATE 1 AMPULE: 2.5; .5 SOLUTION RESPIRATORY (INHALATION) at 21:05

## 2021-01-01 RX ADMIN — SPIRONOLACTONE 50 MG: 25 TABLET ORAL at 08:39

## 2021-01-01 RX ADMIN — IPRATROPIUM BROMIDE AND ALBUTEROL SULFATE 1 AMPULE: .5; 3 SOLUTION RESPIRATORY (INHALATION) at 19:42

## 2021-01-01 RX ADMIN — PIPERACILLIN AND TAZOBACTAM 3375 MG: 3; .375 INJECTION, POWDER, LYOPHILIZED, FOR SOLUTION INTRAVENOUS at 13:37

## 2021-01-01 RX ADMIN — FUROSEMIDE 40 MG: 10 INJECTION, SOLUTION INTRAMUSCULAR; INTRAVENOUS at 09:04

## 2021-01-01 RX ADMIN — ENOXAPARIN SODIUM 40 MG: 40 INJECTION SUBCUTANEOUS at 08:17

## 2021-01-01 RX ADMIN — SODIUM CHLORIDE, PRESERVATIVE FREE 10 ML: 5 INJECTION INTRAVENOUS at 20:31

## 2021-01-01 RX ADMIN — SPIRONOLACTONE 100 MG: 100 TABLET ORAL at 08:55

## 2021-01-01 RX ADMIN — IPRATROPIUM BROMIDE AND ALBUTEROL SULFATE 1 AMPULE: .5; 3 SOLUTION RESPIRATORY (INHALATION) at 16:22

## 2021-01-01 RX ADMIN — SODIUM CHLORIDE SOLN NEBU 3% 15 ML: 3 NEBU SOLN at 19:51

## 2021-01-01 RX ADMIN — LISINOPRIL 40 MG: 40 TABLET ORAL at 11:05

## 2021-01-01 RX ADMIN — INSULIN GLARGINE 15 UNITS: 100 INJECTION, SOLUTION SUBCUTANEOUS at 08:09

## 2021-01-01 RX ADMIN — POTASSIUM CHLORIDE 40 MEQ: 750 TABLET, FILM COATED, EXTENDED RELEASE ORAL at 15:03

## 2021-01-01 RX ADMIN — CLONIDINE HYDROCHLORIDE 0.1 MG: 0.1 TABLET ORAL at 05:39

## 2021-01-01 RX ADMIN — POTASSIUM CHLORIDE 40 MEQ: 750 TABLET, FILM COATED, EXTENDED RELEASE ORAL at 08:13

## 2021-01-01 RX ADMIN — DOXAZOSIN 4 MG: 4 TABLET ORAL at 20:12

## 2021-01-01 RX ADMIN — FUROSEMIDE 40 MG: 10 INJECTION, SOLUTION INTRAMUSCULAR; INTRAVENOUS at 18:27

## 2021-01-01 RX ADMIN — ETOPOSIDE 188 MG: 20 INJECTION INTRAVENOUS at 15:03

## 2021-01-01 RX ADMIN — NIFEDIPINE 60 MG: 60 TABLET, EXTENDED RELEASE ORAL at 09:19

## 2021-01-01 RX ADMIN — INSULIN LISPRO 9 UNITS: 100 INJECTION, SOLUTION INTRAVENOUS; SUBCUTANEOUS at 17:10

## 2021-01-01 RX ADMIN — INSULIN LISPRO 3 UNITS: 100 INJECTION, SOLUTION INTRAVENOUS; SUBCUTANEOUS at 17:05

## 2021-01-01 RX ADMIN — LISINOPRIL 40 MG: 40 TABLET ORAL at 09:03

## 2021-01-01 RX ADMIN — IPRATROPIUM BROMIDE AND ALBUTEROL SULFATE 1 AMPULE: 2.5; .5 SOLUTION RESPIRATORY (INHALATION) at 08:28

## 2021-01-01 RX ADMIN — IPRATROPIUM BROMIDE AND ALBUTEROL SULFATE 1 AMPULE: .5; 3 SOLUTION RESPIRATORY (INHALATION) at 15:38

## 2021-01-01 RX ADMIN — HYDRALAZINE HYDROCHLORIDE 10 MG: 20 INJECTION INTRAMUSCULAR; INTRAVENOUS at 13:21

## 2021-01-01 RX ADMIN — MONTELUKAST 10 MG: 10 TABLET, FILM COATED ORAL at 21:36

## 2021-01-01 RX ADMIN — SODIUM CHLORIDE SOLN NEBU 3% 4 ML: 3 NEBU SOLN at 12:46

## 2021-01-01 RX ADMIN — GADOBENATE DIMEGLUMINE 20 ML: 529 INJECTION, SOLUTION INTRAVENOUS at 11:55

## 2021-01-01 RX ADMIN — FUROSEMIDE 40 MG: 10 INJECTION, SOLUTION INTRAMUSCULAR; INTRAVENOUS at 18:12

## 2021-01-01 RX ADMIN — POTASSIUM CHLORIDE 40 MEQ: 20 TABLET, EXTENDED RELEASE ORAL at 11:05

## 2021-01-01 RX ADMIN — BUDESONIDE AND FORMOTEROL FUMARATE DIHYDRATE 2 PUFF: 160; 4.5 AEROSOL RESPIRATORY (INHALATION) at 19:51

## 2021-01-01 RX ADMIN — METHYLPREDNISOLONE SODIUM SUCCINATE 40 MG: 40 INJECTION, POWDER, FOR SOLUTION INTRAMUSCULAR; INTRAVENOUS at 21:48

## 2021-01-01 RX ADMIN — TBO-FILGRASTIM 300 MCG: 300 INJECTION, SOLUTION SUBCUTANEOUS at 18:06

## 2021-01-01 RX ADMIN — POTASSIUM CHLORIDE 40 MEQ: 20 TABLET, EXTENDED RELEASE ORAL at 17:12

## 2021-01-01 RX ADMIN — INSULIN GLARGINE 5 UNITS: 100 INJECTION, SOLUTION SUBCUTANEOUS at 08:45

## 2021-01-01 RX ADMIN — POTASSIUM CHLORIDE 40 MEQ: 20 TABLET, EXTENDED RELEASE ORAL at 08:07

## 2021-01-01 RX ADMIN — SODIUM CHLORIDE SOLN NEBU 3% 4 ML: 3 NEBU SOLN at 08:17

## 2021-01-01 RX ADMIN — SODIUM CHLORIDE SOLN NEBU 3% 4 ML: 3 NEBU SOLN at 08:09

## 2021-01-01 RX ADMIN — POTASSIUM CHLORIDE 10 MEQ: 7.46 INJECTION, SOLUTION INTRAVENOUS at 03:34

## 2021-01-01 RX ADMIN — MONTELUKAST 10 MG: 10 TABLET, FILM COATED ORAL at 20:01

## 2021-01-01 RX ADMIN — SPIRONOLACTONE 100 MG: 100 TABLET ORAL at 18:56

## 2021-01-01 RX ADMIN — BUDESONIDE AND FORMOTEROL FUMARATE DIHYDRATE 2 PUFF: 160; 4.5 AEROSOL RESPIRATORY (INHALATION) at 08:09

## 2021-01-01 RX ADMIN — NIFEDIPINE 60 MG: 60 TABLET, EXTENDED RELEASE ORAL at 17:58

## 2021-01-01 RX ADMIN — IPRATROPIUM BROMIDE AND ALBUTEROL SULFATE 1 AMPULE: .5; 3 SOLUTION RESPIRATORY (INHALATION) at 07:49

## 2021-01-01 RX ADMIN — IPRATROPIUM BROMIDE AND ALBUTEROL SULFATE 1 AMPULE: 2.5; .5 SOLUTION RESPIRATORY (INHALATION) at 21:16

## 2021-01-01 RX ADMIN — SPIRONOLACTONE 100 MG: 100 TABLET ORAL at 11:06

## 2021-01-01 RX ADMIN — INSULIN LISPRO 12 UNITS: 100 INJECTION, SOLUTION INTRAVENOUS; SUBCUTANEOUS at 17:45

## 2021-01-01 RX ADMIN — FENTANYL CITRATE 25 MCG: 50 INJECTION INTRAMUSCULAR; INTRAVENOUS at 12:14

## 2021-01-01 RX ADMIN — NIFEDIPINE 60 MG: 60 TABLET, EXTENDED RELEASE ORAL at 08:25

## 2021-01-01 RX ADMIN — POTASSIUM CHLORIDE 40 MEQ: 20 TABLET, EXTENDED RELEASE ORAL at 08:02

## 2021-01-01 RX ADMIN — INSULIN LISPRO 5 UNITS: 100 INJECTION, SOLUTION INTRAVENOUS; SUBCUTANEOUS at 11:16

## 2021-01-01 RX ADMIN — SODIUM CHLORIDE SOLN NEBU 3% 4 ML: 3 NEBU SOLN at 20:22

## 2021-01-01 RX ADMIN — MONTELUKAST 10 MG: 10 TABLET, FILM COATED ORAL at 21:38

## 2021-01-01 RX ADMIN — POTASSIUM CHLORIDE 40 MEQ: 20 TABLET, EXTENDED RELEASE ORAL at 12:25

## 2021-01-01 RX ADMIN — MONTELUKAST 10 MG: 10 TABLET, FILM COATED ORAL at 21:04

## 2021-01-01 RX ADMIN — DOXYCYCLINE HYCLATE 100 MG: 100 TABLET, COATED ORAL at 20:58

## 2021-01-01 RX ADMIN — INSULIN GLARGINE 10 UNITS: 100 INJECTION, SOLUTION SUBCUTANEOUS at 12:10

## 2021-01-01 RX ADMIN — SODIUM CHLORIDE SOLN NEBU 3% 15 ML: 3 NEBU SOLN at 19:54

## 2021-01-01 RX ADMIN — METHYLPREDNISOLONE SODIUM SUCCINATE 40 MG: 40 INJECTION, POWDER, FOR SOLUTION INTRAMUSCULAR; INTRAVENOUS at 06:08

## 2021-01-01 RX ADMIN — IPRATROPIUM BROMIDE AND ALBUTEROL SULFATE 1 AMPULE: .5; 3 SOLUTION RESPIRATORY (INHALATION) at 17:32

## 2021-01-01 RX ADMIN — DEXAMETHASONE SODIUM PHOSPHATE: 4 INJECTION, SOLUTION INTRA-ARTICULAR; INTRALESIONAL; INTRAMUSCULAR; INTRAVENOUS; SOFT TISSUE at 13:45

## 2021-01-01 RX ADMIN — SPIRONOLACTONE 100 MG: 100 TABLET ORAL at 17:01

## 2021-01-01 RX ADMIN — NIFEDIPINE 60 MG: 60 TABLET, EXTENDED RELEASE ORAL at 08:17

## 2021-01-01 RX ADMIN — BUDESONIDE AND FORMOTEROL FUMARATE DIHYDRATE 2 PUFF: 160; 4.5 AEROSOL RESPIRATORY (INHALATION) at 07:49

## 2021-01-01 RX ADMIN — IPRATROPIUM BROMIDE AND ALBUTEROL SULFATE 1 AMPULE: 2.5; .5 SOLUTION RESPIRATORY (INHALATION) at 08:52

## 2021-01-01 RX ADMIN — LISINOPRIL 10 MG: 10 TABLET ORAL at 09:03

## 2021-01-01 RX ADMIN — POTASSIUM CHLORIDE 40 MEQ: 20 TABLET, EXTENDED RELEASE ORAL at 12:38

## 2021-01-01 RX ADMIN — POTASSIUM CHLORIDE 40 MEQ: 750 TABLET, FILM COATED, EXTENDED RELEASE ORAL at 09:19

## 2021-01-01 RX ADMIN — IPRATROPIUM BROMIDE AND ALBUTEROL SULFATE 1 AMPULE: .5; 3 SOLUTION RESPIRATORY (INHALATION) at 20:18

## 2021-01-01 RX ADMIN — SODIUM CHLORIDE SOLN NEBU 3% 15 ML: 3 NEBU SOLN at 15:34

## 2021-01-01 RX ADMIN — FUROSEMIDE 40 MG: 10 INJECTION, SOLUTION INTRAMUSCULAR; INTRAVENOUS at 18:06

## 2021-01-01 RX ADMIN — PREDNISONE 20 MG: 20 TABLET ORAL at 21:04

## 2021-01-01 RX ADMIN — POTASSIUM CHLORIDE 40 MEQ: 750 TABLET, FILM COATED, EXTENDED RELEASE ORAL at 20:22

## 2021-01-01 RX ADMIN — BUDESONIDE AND FORMOTEROL FUMARATE DIHYDRATE 2 PUFF: 160; 4.5 AEROSOL RESPIRATORY (INHALATION) at 07:28

## 2021-01-01 RX ADMIN — PIPERACILLIN AND TAZOBACTAM 3375 MG: 3; .375 INJECTION, POWDER, LYOPHILIZED, FOR SOLUTION INTRAVENOUS at 20:17

## 2021-01-01 RX ADMIN — SODIUM CHLORIDE, PRESERVATIVE FREE 10 ML: 5 INJECTION INTRAVENOUS at 08:10

## 2021-01-01 RX ADMIN — BUDESONIDE AND FORMOTEROL FUMARATE DIHYDRATE 2 PUFF: 160; 4.5 AEROSOL RESPIRATORY (INHALATION) at 20:41

## 2021-01-01 RX ADMIN — INSULIN LISPRO 3 UNITS: 100 INJECTION, SOLUTION INTRAVENOUS; SUBCUTANEOUS at 13:44

## 2021-01-01 RX ADMIN — SODIUM CHLORIDE, PRESERVATIVE FREE 10 ML: 5 INJECTION INTRAVENOUS at 08:33

## 2021-01-01 RX ADMIN — SODIUM CHLORIDE SOLN NEBU 3% 15 ML: 3 NEBU SOLN at 16:10

## 2021-01-01 RX ADMIN — PIPERACILLIN AND TAZOBACTAM 3375 MG: 3; .375 INJECTION, POWDER, LYOPHILIZED, FOR SOLUTION INTRAVENOUS at 06:15

## 2021-01-01 RX ADMIN — SODIUM CHLORIDE 25 ML: 9 INJECTION, SOLUTION INTRAVENOUS at 21:52

## 2021-01-01 RX ADMIN — POTASSIUM CHLORIDE 40 MEQ: 20 TABLET, EXTENDED RELEASE ORAL at 08:18

## 2021-01-01 RX ADMIN — SODIUM CHLORIDE SOLN NEBU 3% 15 ML: 3 NEBU SOLN at 00:09

## 2021-01-01 RX ADMIN — LISINOPRIL 40 MG: 40 TABLET ORAL at 08:29

## 2021-01-01 RX ADMIN — IPRATROPIUM BROMIDE AND ALBUTEROL SULFATE 1 AMPULE: .5; 3 SOLUTION RESPIRATORY (INHALATION) at 08:16

## 2021-01-01 RX ADMIN — IPRATROPIUM BROMIDE AND ALBUTEROL SULFATE 1 AMPULE: .5; 3 SOLUTION RESPIRATORY (INHALATION) at 08:56

## 2021-01-01 RX ADMIN — DOXAZOSIN 4 MG: 4 TABLET ORAL at 20:34

## 2021-01-01 RX ADMIN — FUROSEMIDE 40 MG: 10 INJECTION, SOLUTION INTRAMUSCULAR; INTRAVENOUS at 08:53

## 2021-01-01 RX ADMIN — SPIRONOLACTONE 50 MG: 25 TABLET ORAL at 17:12

## 2021-01-01 RX ADMIN — BUDESONIDE AND FORMOTEROL FUMARATE DIHYDRATE 2 PUFF: 160; 4.5 AEROSOL RESPIRATORY (INHALATION) at 07:45

## 2021-01-01 RX ADMIN — IPRATROPIUM BROMIDE AND ALBUTEROL SULFATE 1 AMPULE: .5; 3 SOLUTION RESPIRATORY (INHALATION) at 11:39

## 2021-01-01 RX ADMIN — IPRATROPIUM BROMIDE AND ALBUTEROL SULFATE 1 AMPULE: .5; 3 SOLUTION RESPIRATORY (INHALATION) at 07:28

## 2021-01-01 RX ADMIN — DOXAZOSIN 4 MG: 4 TABLET ORAL at 08:52

## 2021-01-01 RX ADMIN — FUROSEMIDE 40 MG: 10 INJECTION, SOLUTION INTRAMUSCULAR; INTRAVENOUS at 08:26

## 2021-01-01 RX ADMIN — FUROSEMIDE 40 MG: 10 INJECTION, SOLUTION INTRAMUSCULAR; INTRAVENOUS at 13:38

## 2021-01-01 RX ADMIN — ENOXAPARIN SODIUM 40 MG: 40 INJECTION SUBCUTANEOUS at 08:13

## 2021-01-01 RX ADMIN — NIFEDIPINE 60 MG: 60 TABLET, EXTENDED RELEASE ORAL at 08:46

## 2021-01-01 RX ADMIN — POTASSIUM CHLORIDE 20 MEQ: 750 TABLET, FILM COATED, EXTENDED RELEASE ORAL at 09:26

## 2021-01-01 RX ADMIN — DOXAZOSIN 4 MG: 4 TABLET ORAL at 08:10

## 2021-01-01 RX ADMIN — FUROSEMIDE 40 MG: 10 INJECTION, SOLUTION INTRAMUSCULAR; INTRAVENOUS at 08:07

## 2021-01-01 RX ADMIN — TBO-FILGRASTIM 300 MCG: 300 INJECTION, SOLUTION SUBCUTANEOUS at 18:31

## 2021-01-01 RX ADMIN — NIFEDIPINE 60 MG: 60 TABLET, EXTENDED RELEASE ORAL at 17:08

## 2021-01-01 RX ADMIN — BUDESONIDE AND FORMOTEROL FUMARATE DIHYDRATE 2 PUFF: 160; 4.5 AEROSOL RESPIRATORY (INHALATION) at 08:56

## 2021-01-01 RX ADMIN — CLONIDINE HYDROCHLORIDE 0.1 MG: 0.1 TABLET ORAL at 01:10

## 2021-01-01 RX ADMIN — PREDNISONE 20 MG: 20 TABLET ORAL at 12:10

## 2021-01-01 RX ADMIN — DOXAZOSIN 4 MG: 4 TABLET ORAL at 07:57

## 2021-01-01 RX ADMIN — SODIUM CHLORIDE SOLN NEBU 3% 15 ML: 3 NEBU SOLN at 16:38

## 2021-01-01 RX ADMIN — NIFEDIPINE 60 MG: 60 TABLET, EXTENDED RELEASE ORAL at 08:52

## 2021-01-01 RX ADMIN — POTASSIUM CHLORIDE 20 MEQ: 750 TABLET, FILM COATED, EXTENDED RELEASE ORAL at 08:55

## 2021-01-01 RX ADMIN — IPRATROPIUM BROMIDE AND ALBUTEROL SULFATE 1 AMPULE: .5; 3 SOLUTION RESPIRATORY (INHALATION) at 11:43

## 2021-01-01 RX ADMIN — SODIUM CHLORIDE, PRESERVATIVE FREE 10 ML: 5 INJECTION INTRAVENOUS at 09:20

## 2021-01-01 RX ADMIN — INSULIN LISPRO 5 UNITS: 100 INJECTION, SOLUTION INTRAVENOUS; SUBCUTANEOUS at 17:24

## 2021-01-01 RX ADMIN — SODIUM CHLORIDE, PRESERVATIVE FREE 10 ML: 5 INJECTION INTRAVENOUS at 21:06

## 2021-01-01 RX ADMIN — INSULIN LISPRO 6 UNITS: 100 INJECTION, SOLUTION INTRAVENOUS; SUBCUTANEOUS at 13:33

## 2021-01-01 RX ADMIN — SODIUM CHLORIDE 25 ML: 9 INJECTION, SOLUTION INTRAVENOUS at 15:19

## 2021-01-01 RX ADMIN — IPRATROPIUM BROMIDE AND ALBUTEROL SULFATE 1 AMPULE: .5; 3 SOLUTION RESPIRATORY (INHALATION) at 11:56

## 2021-01-01 RX ADMIN — BUDESONIDE AND FORMOTEROL FUMARATE DIHYDRATE 2 PUFF: 160; 4.5 AEROSOL RESPIRATORY (INHALATION) at 08:43

## 2021-01-01 RX ADMIN — INSULIN LISPRO 5 UNITS: 100 INJECTION, SOLUTION INTRAVENOUS; SUBCUTANEOUS at 12:52

## 2021-01-01 RX ADMIN — DOXYCYCLINE HYCLATE 100 MG: 100 TABLET, COATED ORAL at 08:27

## 2021-01-01 RX ADMIN — INSULIN LISPRO 5 UNITS: 100 INJECTION, SOLUTION INTRAVENOUS; SUBCUTANEOUS at 12:25

## 2021-01-01 RX ADMIN — CLONIDINE HYDROCHLORIDE 0.1 MG: 0.1 TABLET ORAL at 11:15

## 2021-01-01 RX ADMIN — POTASSIUM CHLORIDE 40 MEQ: 20 TABLET, EXTENDED RELEASE ORAL at 13:33

## 2021-01-01 RX ADMIN — INSULIN LISPRO 1 UNITS: 100 INJECTION, SOLUTION INTRAVENOUS; SUBCUTANEOUS at 08:44

## 2021-01-01 RX ADMIN — BUDESONIDE AND FORMOTEROL FUMARATE DIHYDRATE 2 PUFF: 160; 4.5 AEROSOL RESPIRATORY (INHALATION) at 08:16

## 2021-01-01 RX ADMIN — SODIUM CHLORIDE, PRESERVATIVE FREE 10 ML: 5 INJECTION INTRAVENOUS at 20:55

## 2021-01-01 RX ADMIN — IPRATROPIUM BROMIDE AND ALBUTEROL SULFATE 1 AMPULE: 2.5; .5 SOLUTION RESPIRATORY (INHALATION) at 08:04

## 2021-01-01 RX ADMIN — METHYLPREDNISOLONE SODIUM SUCCINATE 40 MG: 40 INJECTION, POWDER, FOR SOLUTION INTRAMUSCULAR; INTRAVENOUS at 17:41

## 2021-01-01 RX ADMIN — BUDESONIDE AND FORMOTEROL FUMARATE DIHYDRATE 2 PUFF: 160; 4.5 AEROSOL RESPIRATORY (INHALATION) at 08:19

## 2021-01-01 RX ADMIN — INSULIN LISPRO 5 UNITS: 100 INJECTION, SOLUTION INTRAVENOUS; SUBCUTANEOUS at 16:55

## 2021-01-01 RX ADMIN — DOXAZOSIN 4 MG: 4 TABLET ORAL at 20:25

## 2021-01-01 RX ADMIN — POTASSIUM CHLORIDE 10 MEQ: 7.46 INJECTION, SOLUTION INTRAVENOUS at 07:00

## 2021-01-01 RX ADMIN — BUDESONIDE AND FORMOTEROL FUMARATE DIHYDRATE 2 PUFF: 160; 4.5 AEROSOL RESPIRATORY (INHALATION) at 19:47

## 2021-01-01 RX ADMIN — ENOXAPARIN SODIUM 40 MG: 40 INJECTION SUBCUTANEOUS at 09:19

## 2021-01-01 RX ADMIN — SODIUM CHLORIDE SOLN NEBU 3% 15 ML: 3 NEBU SOLN at 08:16

## 2021-01-01 RX ADMIN — BUDESONIDE AND FORMOTEROL FUMARATE DIHYDRATE 2 PUFF: 160; 4.5 AEROSOL RESPIRATORY (INHALATION) at 08:03

## 2021-01-01 RX ADMIN — SODIUM CHLORIDE SOLN NEBU 3% 15 ML: 3 NEBU SOLN at 07:50

## 2021-01-01 RX ADMIN — SPIRONOLACTONE 50 MG: 25 TABLET ORAL at 08:10

## 2021-01-01 RX ADMIN — MONTELUKAST 10 MG: 10 TABLET, FILM COATED ORAL at 21:50

## 2021-01-01 RX ADMIN — INSULIN LISPRO 3 UNITS: 100 INJECTION, SOLUTION INTRAVENOUS; SUBCUTANEOUS at 17:42

## 2021-01-01 RX ADMIN — NIFEDIPINE 60 MG: 60 TABLET, EXTENDED RELEASE ORAL at 17:01

## 2021-01-01 RX ADMIN — SODIUM CHLORIDE 25 ML: 9 INJECTION, SOLUTION INTRAVENOUS at 11:02

## 2021-01-01 RX ADMIN — ENOXAPARIN SODIUM 40 MG: 40 INJECTION SUBCUTANEOUS at 08:18

## 2021-01-01 RX ADMIN — FUROSEMIDE 20 MG: 10 INJECTION, SOLUTION INTRAMUSCULAR; INTRAVENOUS at 08:26

## 2021-01-01 RX ADMIN — POTASSIUM CHLORIDE 10 MEQ: 7.46 INJECTION, SOLUTION INTRAVENOUS at 15:07

## 2021-01-01 RX ADMIN — MONTELUKAST 10 MG: 10 TABLET, FILM COATED ORAL at 20:30

## 2021-01-01 RX ADMIN — DOXYCYCLINE HYCLATE 100 MG: 100 TABLET, COATED ORAL at 21:50

## 2021-01-01 RX ADMIN — INSULIN LISPRO 3 UNITS: 100 INJECTION, SOLUTION INTRAVENOUS; SUBCUTANEOUS at 09:01

## 2021-01-01 RX ADMIN — IPRATROPIUM BROMIDE AND ALBUTEROL SULFATE 1 AMPULE: 2.5; .5 SOLUTION RESPIRATORY (INHALATION) at 20:51

## 2021-01-01 RX ADMIN — FUROSEMIDE 40 MG: 10 INJECTION, SOLUTION INTRAMUSCULAR; INTRAVENOUS at 08:31

## 2021-01-01 RX ADMIN — NIFEDIPINE 60 MG: 60 TABLET, EXTENDED RELEASE ORAL at 17:12

## 2021-01-01 RX ADMIN — INSULIN LISPRO 9 UNITS: 100 INJECTION, SOLUTION INTRAVENOUS; SUBCUTANEOUS at 12:18

## 2021-01-01 RX ADMIN — TBO-FILGRASTIM 300 MCG: 300 INJECTION, SOLUTION SUBCUTANEOUS at 18:10

## 2021-01-01 RX ADMIN — POTASSIUM CHLORIDE 40 MEQ: 20 TABLET, EXTENDED RELEASE ORAL at 17:32

## 2021-01-01 RX ADMIN — POTASSIUM CHLORIDE 10 MEQ: 7.46 INJECTION, SOLUTION INTRAVENOUS at 12:12

## 2021-01-01 RX ADMIN — POTASSIUM CHLORIDE 10 MEQ: 7.46 INJECTION, SOLUTION INTRAVENOUS at 11:03

## 2021-01-01 RX ADMIN — FUROSEMIDE 40 MG: 10 INJECTION, SOLUTION INTRAMUSCULAR; INTRAVENOUS at 17:32

## 2021-01-01 RX ADMIN — IPRATROPIUM BROMIDE AND ALBUTEROL SULFATE 1 AMPULE: .5; 3 SOLUTION RESPIRATORY (INHALATION) at 08:13

## 2021-01-01 RX ADMIN — INSULIN LISPRO 6 UNITS: 100 INJECTION, SOLUTION INTRAVENOUS; SUBCUTANEOUS at 17:01

## 2021-01-01 RX ADMIN — LISINOPRIL 40 MG: 40 TABLET ORAL at 07:57

## 2021-01-01 RX ADMIN — SODIUM CHLORIDE SOLN NEBU 3% 15 ML: 3 NEBU SOLN at 07:49

## 2021-01-01 RX ADMIN — IPRATROPIUM BROMIDE AND ALBUTEROL SULFATE 1 AMPULE: .5; 3 SOLUTION RESPIRATORY (INHALATION) at 11:52

## 2021-01-01 RX ADMIN — SODIUM CHLORIDE SOLN NEBU 3% 4 ML: 3 NEBU SOLN at 21:04

## 2021-01-01 RX ADMIN — SODIUM CHLORIDE SOLN NEBU 3% 15 ML: 3 NEBU SOLN at 19:42

## 2021-01-01 RX ADMIN — TBO-FILGRASTIM 300 MCG: 300 INJECTION, SOLUTION SUBCUTANEOUS at 17:12

## 2021-01-01 RX ADMIN — PIPERACILLIN AND TAZOBACTAM 3375 MG: 3; .375 INJECTION, POWDER, LYOPHILIZED, FOR SOLUTION INTRAVENOUS at 22:28

## 2021-01-01 RX ADMIN — PIPERACILLIN AND TAZOBACTAM 3375 MG: 3; .375 INJECTION, POWDER, LYOPHILIZED, FOR SOLUTION INTRAVENOUS at 22:55

## 2021-01-01 RX ADMIN — DOXYCYCLINE HYCLATE 100 MG: 100 TABLET, COATED ORAL at 08:02

## 2021-01-01 RX ADMIN — SODIUM CHLORIDE SOLN NEBU 3% 15 ML: 3 NEBU SOLN at 11:57

## 2021-01-01 RX ADMIN — POTASSIUM CHLORIDE 20 MEQ: 20 TABLET, EXTENDED RELEASE ORAL at 09:49

## 2021-01-01 RX ADMIN — MONTELUKAST 10 MG: 10 TABLET, FILM COATED ORAL at 20:58

## 2021-01-01 RX ADMIN — BUDESONIDE AND FORMOTEROL FUMARATE DIHYDRATE 2 PUFF: 160; 4.5 AEROSOL RESPIRATORY (INHALATION) at 20:46

## 2021-01-01 RX ADMIN — DOXYCYCLINE HYCLATE 100 MG: 100 TABLET, COATED ORAL at 20:01

## 2021-01-01 RX ADMIN — DOXAZOSIN 4 MG: 4 TABLET ORAL at 08:12

## 2021-01-01 RX ADMIN — SODIUM CHLORIDE SOLN NEBU 3% 4 ML: 3 NEBU SOLN at 19:47

## 2021-01-01 RX ADMIN — PIPERACILLIN AND TAZOBACTAM 3375 MG: 3; .375 INJECTION, POWDER, LYOPHILIZED, FOR SOLUTION INTRAVENOUS at 15:30

## 2021-01-01 RX ADMIN — BUDESONIDE AND FORMOTEROL FUMARATE DIHYDRATE 2 PUFF: 160; 4.5 AEROSOL RESPIRATORY (INHALATION) at 07:57

## 2021-01-01 RX ADMIN — IPRATROPIUM BROMIDE AND ALBUTEROL SULFATE 1 AMPULE: .5; 3 SOLUTION RESPIRATORY (INHALATION) at 08:27

## 2021-01-01 RX ADMIN — BUDESONIDE AND FORMOTEROL FUMARATE DIHYDRATE 2 PUFF: 160; 4.5 AEROSOL RESPIRATORY (INHALATION) at 19:42

## 2021-01-01 RX ADMIN — INSULIN LISPRO 6 UNITS: 100 INJECTION, SOLUTION INTRAVENOUS; SUBCUTANEOUS at 07:58

## 2021-01-01 RX ADMIN — MAGNESIUM SULFATE HEPTAHYDRATE 2000 MG: 40 INJECTION, SOLUTION INTRAVENOUS at 09:24

## 2021-01-01 RX ADMIN — IOHEXOL 50 ML: 240 INJECTION, SOLUTION INTRATHECAL; INTRAVASCULAR; INTRAVENOUS; ORAL at 15:12

## 2021-01-01 RX ADMIN — PIPERACILLIN AND TAZOBACTAM 3375 MG: 3; .375 INJECTION, POWDER, LYOPHILIZED, FOR SOLUTION INTRAVENOUS at 21:54

## 2021-01-01 RX ADMIN — HYDRALAZINE HYDROCHLORIDE 10 MG: 20 INJECTION INTRAMUSCULAR; INTRAVENOUS at 21:15

## 2021-01-01 RX ADMIN — POTASSIUM CHLORIDE 40 MEQ: 20 TABLET, EXTENDED RELEASE ORAL at 17:08

## 2021-01-01 RX ADMIN — SPIRONOLACTONE 50 MG: 25 TABLET ORAL at 07:57

## 2021-01-01 RX ADMIN — BUDESONIDE AND FORMOTEROL FUMARATE DIHYDRATE 2 PUFF: 160; 4.5 AEROSOL RESPIRATORY (INHALATION) at 08:05

## 2021-01-01 RX ADMIN — SODIUM CHLORIDE SOLN NEBU 3% 4 ML: 3 NEBU SOLN at 20:28

## 2021-01-01 RX ADMIN — LISINOPRIL 40 MG: 40 TABLET ORAL at 08:39

## 2021-01-01 RX ADMIN — FUROSEMIDE 20 MG: 10 INJECTION, SOLUTION INTRAMUSCULAR; INTRAVENOUS at 17:41

## 2021-01-01 RX ADMIN — INSULIN GLARGINE 10 UNITS: 100 INJECTION, SOLUTION SUBCUTANEOUS at 09:20

## 2021-01-01 RX ADMIN — IPRATROPIUM BROMIDE AND ALBUTEROL SULFATE 1 AMPULE: .5; 3 SOLUTION RESPIRATORY (INHALATION) at 08:03

## 2021-01-01 RX ADMIN — IPRATROPIUM BROMIDE AND ALBUTEROL SULFATE 1 AMPULE: .5; 3 SOLUTION RESPIRATORY (INHALATION) at 19:51

## 2021-01-01 RX ADMIN — IPRATROPIUM BROMIDE AND ALBUTEROL SULFATE 1 AMPULE: .5; 3 SOLUTION RESPIRATORY (INHALATION) at 19:54

## 2021-01-01 RX ADMIN — INSULIN LISPRO 6 UNITS: 100 INJECTION, SOLUTION INTRAVENOUS; SUBCUTANEOUS at 12:38

## 2021-01-01 RX ADMIN — SODIUM CHLORIDE SOLN NEBU 3% 15 ML: 3 NEBU SOLN at 11:39

## 2021-01-01 RX ADMIN — TC 99M MEDRONATE 25 MILLICURIE: 20 INJECTION, POWDER, LYOPHILIZED, FOR SOLUTION INTRAVENOUS at 07:31

## 2021-01-01 RX ADMIN — LISINOPRIL 40 MG: 40 TABLET ORAL at 08:12

## 2021-01-01 RX ADMIN — ALLOPURINOL 300 MG: 300 TABLET ORAL at 08:17

## 2021-01-01 RX ADMIN — ALLOPURINOL 300 MG: 300 TABLET ORAL at 08:39

## 2021-01-01 RX ADMIN — SODIUM CHLORIDE 25 ML: 9 INJECTION, SOLUTION INTRAVENOUS at 13:36

## 2021-01-01 RX ADMIN — PIPERACILLIN AND TAZOBACTAM 3375 MG: 3; .375 INJECTION, POWDER, LYOPHILIZED, FOR SOLUTION INTRAVENOUS at 13:44

## 2021-01-01 RX ADMIN — SODIUM CHLORIDE, PRESERVATIVE FREE 10 ML: 5 INJECTION INTRAVENOUS at 20:06

## 2021-01-01 RX ADMIN — DOXAZOSIN 4 MG: 4 TABLET ORAL at 09:03

## 2021-01-01 RX ADMIN — SODIUM CHLORIDE, PRESERVATIVE FREE 10 ML: 5 INJECTION INTRAVENOUS at 20:49

## 2021-01-01 RX ADMIN — SODIUM CHLORIDE, PRESERVATIVE FREE 10 ML: 5 INJECTION INTRAVENOUS at 21:00

## 2021-01-01 RX ADMIN — DOXAZOSIN 4 MG: 4 TABLET ORAL at 21:38

## 2021-01-01 RX ADMIN — SODIUM CHLORIDE 25 ML: 9 INJECTION, SOLUTION INTRAVENOUS at 20:16

## 2021-01-01 RX ADMIN — SODIUM CHLORIDE SOLN NEBU 3% 15 ML: 3 NEBU SOLN at 12:46

## 2021-01-01 RX ADMIN — PIPERACILLIN AND TAZOBACTAM 3375 MG: 3; .375 INJECTION, POWDER, LYOPHILIZED, FOR SOLUTION INTRAVENOUS at 06:50

## 2021-01-01 RX ADMIN — DOXYCYCLINE HYCLATE 100 MG: 100 TABLET, COATED ORAL at 07:57

## 2021-01-01 RX ADMIN — SODIUM CHLORIDE SOLN NEBU 3% 15 ML: 3 NEBU SOLN at 03:59

## 2021-01-01 RX ADMIN — NIFEDIPINE 60 MG: 60 TABLET, EXTENDED RELEASE ORAL at 17:05

## 2021-01-01 RX ADMIN — ENOXAPARIN SODIUM 40 MG: 40 INJECTION SUBCUTANEOUS at 09:25

## 2021-01-01 RX ADMIN — INSULIN LISPRO 9 UNITS: 100 INJECTION, SOLUTION INTRAVENOUS; SUBCUTANEOUS at 11:56

## 2021-01-01 RX ADMIN — LISINOPRIL 40 MG: 40 TABLET ORAL at 08:59

## 2021-01-01 RX ADMIN — INSULIN LISPRO 2 UNITS: 100 INJECTION, SOLUTION INTRAVENOUS; SUBCUTANEOUS at 08:30

## 2021-01-01 RX ADMIN — IPRATROPIUM BROMIDE AND ALBUTEROL SULFATE 1 AMPULE: .5; 3 SOLUTION RESPIRATORY (INHALATION) at 16:56

## 2021-01-01 RX ADMIN — POTASSIUM CHLORIDE 40 MEQ: 20 TABLET, EXTENDED RELEASE ORAL at 04:13

## 2021-01-01 RX ADMIN — SODIUM CHLORIDE: 4.5 INJECTION, SOLUTION INTRAVENOUS at 11:14

## 2021-01-01 RX ADMIN — SODIUM CHLORIDE SOLN NEBU 3% 4 ML: 3 NEBU SOLN at 16:55

## 2021-01-01 RX ADMIN — BUDESONIDE AND FORMOTEROL FUMARATE DIHYDRATE 2 PUFF: 160; 4.5 AEROSOL RESPIRATORY (INHALATION) at 08:52

## 2021-01-01 RX ADMIN — FUROSEMIDE 40 MG: 40 TABLET ORAL at 09:08

## 2021-01-01 RX ADMIN — POTASSIUM CHLORIDE 20 MEQ: 20 TABLET, EXTENDED RELEASE ORAL at 21:49

## 2021-01-01 RX ADMIN — IPRATROPIUM BROMIDE AND ALBUTEROL SULFATE 1 AMPULE: 2.5; .5 SOLUTION RESPIRATORY (INHALATION) at 20:17

## 2021-01-01 RX ADMIN — MONTELUKAST 10 MG: 10 TABLET, FILM COATED ORAL at 21:12

## 2021-01-01 RX ADMIN — FUROSEMIDE 20 MG: 10 INJECTION, SOLUTION INTRAMUSCULAR; INTRAVENOUS at 13:34

## 2021-01-01 RX ADMIN — SODIUM CHLORIDE, PRESERVATIVE FREE 10 ML: 5 INJECTION INTRAVENOUS at 08:42

## 2021-01-01 RX ADMIN — INSULIN LISPRO 3 UNITS: 100 INJECTION, SOLUTION INTRAVENOUS; SUBCUTANEOUS at 16:55

## 2021-01-01 RX ADMIN — PIPERACILLIN AND TAZOBACTAM 3375 MG: 3; .375 INJECTION, POWDER, LYOPHILIZED, FOR SOLUTION INTRAVENOUS at 15:07

## 2021-01-01 RX ADMIN — POTASSIUM CHLORIDE 40 MEQ: 750 TABLET, FILM COATED, EXTENDED RELEASE ORAL at 09:04

## 2021-01-01 RX ADMIN — SODIUM CHLORIDE: 4.5 INJECTION, SOLUTION INTRAVENOUS at 19:52

## 2021-01-01 RX ADMIN — PIPERACILLIN AND TAZOBACTAM 3375 MG: 3; .375 INJECTION, POWDER, LYOPHILIZED, FOR SOLUTION INTRAVENOUS at 06:51

## 2021-01-01 RX ADMIN — PIPERACILLIN AND TAZOBACTAM 3375 MG: 3; .375 INJECTION, POWDER, LYOPHILIZED, FOR SOLUTION INTRAVENOUS at 22:25

## 2021-01-01 RX ADMIN — SPIRONOLACTONE 100 MG: 100 TABLET ORAL at 08:18

## 2021-01-01 RX ADMIN — MONTELUKAST 10 MG: 10 TABLET, FILM COATED ORAL at 20:34

## 2021-01-01 RX ADMIN — DOXAZOSIN 4 MG: 4 TABLET ORAL at 08:29

## 2021-01-01 RX ADMIN — FUROSEMIDE 40 MG: 10 INJECTION, SOLUTION INTRAMUSCULAR; INTRAVENOUS at 17:08

## 2021-01-01 RX ADMIN — POTASSIUM CHLORIDE 40 MEQ: 20 TABLET, EXTENDED RELEASE ORAL at 08:39

## 2021-01-01 RX ADMIN — FUROSEMIDE 40 MG: 10 INJECTION, SOLUTION INTRAMUSCULAR; INTRAVENOUS at 08:18

## 2021-01-01 RX ADMIN — SODIUM CHLORIDE SOLN NEBU 3% 15 ML: 3 NEBU SOLN at 16:05

## 2021-01-01 RX ADMIN — ENOXAPARIN SODIUM 40 MG: 40 INJECTION SUBCUTANEOUS at 08:40

## 2021-01-01 RX ADMIN — SODIUM CHLORIDE SOLN NEBU 3% 4 ML: 3 NEBU SOLN at 20:27

## 2021-01-01 RX ADMIN — IPRATROPIUM BROMIDE AND ALBUTEROL SULFATE 1 AMPULE: .5; 3 SOLUTION RESPIRATORY (INHALATION) at 21:03

## 2021-01-01 RX ADMIN — SODIUM CHLORIDE, PRESERVATIVE FREE 10 ML: 5 INJECTION INTRAVENOUS at 09:03

## 2021-01-01 RX ADMIN — SPIRONOLACTONE 100 MG: 100 TABLET ORAL at 09:19

## 2021-01-01 RX ADMIN — INSULIN LISPRO 5 UNITS: 100 INJECTION, SOLUTION INTRAVENOUS; SUBCUTANEOUS at 13:49

## 2021-01-01 RX ADMIN — SPIRONOLACTONE 50 MG: 25 TABLET ORAL at 17:58

## 2021-01-01 RX ADMIN — DOXAZOSIN 4 MG: 4 TABLET ORAL at 08:18

## 2021-01-01 RX ADMIN — INSULIN LISPRO 4 UNITS: 100 INJECTION, SOLUTION INTRAVENOUS; SUBCUTANEOUS at 21:04

## 2021-01-01 RX ADMIN — NIFEDIPINE 60 MG: 60 TABLET, EXTENDED RELEASE ORAL at 17:02

## 2021-01-01 RX ADMIN — IOPAMIDOL 75 ML: 755 INJECTION, SOLUTION INTRAVENOUS at 08:41

## 2021-01-01 RX ADMIN — IPRATROPIUM BROMIDE AND ALBUTEROL SULFATE 1 AMPULE: .5; 3 SOLUTION RESPIRATORY (INHALATION) at 11:57

## 2021-01-01 RX ADMIN — NIFEDIPINE 60 MG: 60 TABLET, EXTENDED RELEASE ORAL at 16:54

## 2021-01-01 RX ADMIN — SODIUM CHLORIDE: 9 INJECTION, SOLUTION INTRAVENOUS at 08:40

## 2021-01-01 RX ADMIN — MONTELUKAST 10 MG: 10 TABLET, FILM COATED ORAL at 20:25

## 2021-01-01 RX ADMIN — DOXAZOSIN 4 MG: 4 TABLET ORAL at 20:22

## 2021-01-01 RX ADMIN — MAGNESIUM SULFATE HEPTAHYDRATE 1000 MG: 1 INJECTION, SOLUTION INTRAVENOUS at 09:06

## 2021-01-01 RX ADMIN — SODIUM CHLORIDE, PRESERVATIVE FREE 10 ML: 5 INJECTION INTRAVENOUS at 09:35

## 2021-01-01 RX ADMIN — LISINOPRIL 40 MG: 40 TABLET ORAL at 08:52

## 2021-01-01 RX ADMIN — BUDESONIDE AND FORMOTEROL FUMARATE DIHYDRATE 2 PUFF: 160; 4.5 AEROSOL RESPIRATORY (INHALATION) at 21:04

## 2021-01-01 RX ADMIN — NIFEDIPINE 60 MG: 60 TABLET, EXTENDED RELEASE ORAL at 18:56

## 2021-01-01 RX ADMIN — SODIUM CHLORIDE, PRESERVATIVE FREE 10 ML: 5 INJECTION INTRAVENOUS at 08:15

## 2021-01-01 RX ADMIN — SPIRONOLACTONE 100 MG: 100 TABLET ORAL at 17:35

## 2021-01-01 RX ADMIN — IPRATROPIUM BROMIDE AND ALBUTEROL SULFATE 1 AMPULE: .5; 3 SOLUTION RESPIRATORY (INHALATION) at 08:12

## 2021-01-01 RX ADMIN — SODIUM CHLORIDE 25 ML: 9 INJECTION, SOLUTION INTRAVENOUS at 21:50

## 2021-01-01 RX ADMIN — POTASSIUM CHLORIDE 40 MEQ: 750 TABLET, FILM COATED, EXTENDED RELEASE ORAL at 20:34

## 2021-01-01 RX ADMIN — HYDRALAZINE HYDROCHLORIDE 10 MG: 20 INJECTION INTRAMUSCULAR; INTRAVENOUS at 10:51

## 2021-01-01 RX ADMIN — POTASSIUM CHLORIDE 40 MEQ: 750 TABLET, FILM COATED, EXTENDED RELEASE ORAL at 14:48

## 2021-01-01 RX ADMIN — INSULIN LISPRO 5 UNITS: 100 INJECTION, SOLUTION INTRAVENOUS; SUBCUTANEOUS at 08:21

## 2021-01-01 RX ADMIN — ENOXAPARIN SODIUM 40 MG: 40 INJECTION SUBCUTANEOUS at 08:53

## 2021-01-01 RX ADMIN — SODIUM CHLORIDE, PRESERVATIVE FREE 10 ML: 5 INJECTION INTRAVENOUS at 11:13

## 2021-01-01 RX ADMIN — IPRATROPIUM BROMIDE AND ALBUTEROL SULFATE 1 AMPULE: .5; 3 SOLUTION RESPIRATORY (INHALATION) at 08:05

## 2021-01-01 RX ADMIN — LISINOPRIL 40 MG: 40 TABLET ORAL at 08:46

## 2021-01-01 RX ADMIN — FUROSEMIDE 40 MG: 10 INJECTION, SOLUTION INTRAMUSCULAR; INTRAVENOUS at 17:58

## 2021-01-01 RX ADMIN — IOPAMIDOL 75 ML: 755 INJECTION, SOLUTION INTRAVENOUS at 13:53

## 2021-01-01 RX ADMIN — IPRATROPIUM BROMIDE AND ALBUTEROL SULFATE 1 AMPULE: .5; 3 SOLUTION RESPIRATORY (INHALATION) at 19:57

## 2021-01-01 RX ADMIN — CLONIDINE HYDROCHLORIDE 0.2 MG: 0.1 TABLET ORAL at 07:57

## 2021-01-01 RX ADMIN — PREDNISONE 10 MG: 10 TABLET ORAL at 08:52

## 2021-01-01 RX ADMIN — SODIUM CHLORIDE SOLN NEBU 3% 15 ML: 3 NEBU SOLN at 12:24

## 2021-01-01 RX ADMIN — SODIUM CHLORIDE SOLN NEBU 3% 15 ML: 3 NEBU SOLN at 19:57

## 2021-01-01 RX ADMIN — SPIRONOLACTONE 100 MG: 100 TABLET ORAL at 08:25

## 2021-01-01 RX ADMIN — SODIUM CHLORIDE SOLN NEBU 3% 4 ML: 3 NEBU SOLN at 07:28

## 2021-01-01 RX ADMIN — SODIUM CHLORIDE SOLN NEBU 3% 4 ML: 3 NEBU SOLN at 08:55

## 2021-01-01 RX ADMIN — DOXYCYCLINE HYCLATE 100 MG: 100 TABLET, COATED ORAL at 20:29

## 2021-01-01 RX ADMIN — SODIUM CHLORIDE SOLN NEBU 3% 4 ML: 3 NEBU SOLN at 15:19

## 2021-01-01 RX ADMIN — ENOXAPARIN SODIUM 40 MG: 40 INJECTION SUBCUTANEOUS at 08:27

## 2021-01-01 RX ADMIN — IPRATROPIUM BROMIDE AND ALBUTEROL SULFATE 1 AMPULE: .5; 3 SOLUTION RESPIRATORY (INHALATION) at 12:03

## 2021-01-01 RX ADMIN — INSULIN LISPRO 5 UNITS: 100 INJECTION, SOLUTION INTRAVENOUS; SUBCUTANEOUS at 20:53

## 2021-01-01 RX ADMIN — DOXAZOSIN 4 MG: 4 TABLET ORAL at 08:07

## 2021-01-01 RX ADMIN — ETOPOSIDE 188 MG: 20 INJECTION INTRAVENOUS at 17:00

## 2021-01-01 RX ADMIN — DEXAMETHASONE SODIUM PHOSPHATE: 4 INJECTION, SOLUTION INTRA-ARTICULAR; INTRALESIONAL; INTRAMUSCULAR; INTRAVENOUS; SOFT TISSUE at 13:54

## 2021-01-01 RX ADMIN — IPRATROPIUM BROMIDE AND ALBUTEROL SULFATE 1 AMPULE: .5; 3 SOLUTION RESPIRATORY (INHALATION) at 20:28

## 2021-01-01 RX ADMIN — INSULIN GLARGINE 30 UNITS: 100 INJECTION, SOLUTION SUBCUTANEOUS at 08:41

## 2021-01-01 RX ADMIN — FUROSEMIDE 40 MG: 10 INJECTION, SOLUTION INTRAMUSCULAR; INTRAVENOUS at 09:19

## 2021-01-01 RX ADMIN — POTASSIUM CHLORIDE 40 MEQ: 20 TABLET, EXTENDED RELEASE ORAL at 17:18

## 2021-01-01 ASSESSMENT — PAIN SCALES - GENERAL
PAINLEVEL_OUTOF10: 0

## 2021-01-01 ASSESSMENT — ENCOUNTER SYMPTOMS
ABDOMINAL PAIN: 0
APNEA: 0
EYE DISCHARGE: 0
WHEEZING: 0
CHEST TIGHTNESS: 0
NAUSEA: 0
FACIAL SWELLING: 0
GASTROINTESTINAL NEGATIVE: 1
BACK PAIN: 0
SHORTNESS OF BREATH: 1
SHORTNESS OF BREATH: 0
CHOKING: 0
EYE REDNESS: 0
VOMITING: 0
COUGH: 0

## 2021-03-02 RX ORDER — AMLODIPINE BESYLATE 10 MG/1
TABLET ORAL
Qty: 90 TABLET | Refills: 3 | Status: ON HOLD | OUTPATIENT
Start: 2021-03-02 | End: 2021-01-01 | Stop reason: HOSPADM

## 2021-03-02 RX ORDER — MONTELUKAST SODIUM 10 MG/1
TABLET ORAL
Qty: 90 TABLET | Refills: 3 | Status: SHIPPED | OUTPATIENT
Start: 2021-03-02 | End: 2021-01-01 | Stop reason: SDUPTHER

## 2021-09-02 PROBLEM — E87.6 HYPOKALEMIA: Status: ACTIVE | Noted: 2021-01-01

## 2021-09-02 PROBLEM — C79.9 METASTATIC CANCER (HCC): Status: ACTIVE | Noted: 2021-01-01

## 2021-09-02 NOTE — TELEPHONE ENCOUNTER
Pt saw Dr. Dar Mendes at the [facility] yesterday.   Arun Paniagua from [facility] called with a critical potassim of 2.3    FYI

## 2021-09-02 NOTE — ED PROVIDER NOTES
Date of evaluation: 9/2/2021    ED Attending Attestation Note     [address] [facility]     My primary care asked me to come in because of abnormal test from this morning  HISTORY OF PRESENT ILLNESS  (Location/Symptom, Timing/Onset,Context/Setting, Quality, Duration, Modifying Factors, Severity). Note limiting factors. This patient was seen by the advance practice provider. I have seen and examined the patient, agree with the workup, evaluation, management and diagnosis. The care plan has been discussed. Chief Complaint   Patient presents with    Abnormal Lab     sent by Dr. Jumana Allen for low potassium levels       Selma Abebe is a 46 y.o. male who presents to the emergency department secondary to concern for cough, increased feeding, not feeling well in general.  I did receive a phone call from his primary care, Dr. Jumana Allen, prior to patient arrival.  On arrival patient endorses similar symptoms as described. Past medical history significant for anxiety, asthma, HLD, HTN.     Social History     Socioeconomic History    Marital status:      Spouse name: Not on file    Number of children: Not on file    Years of education: Not on file    Highest education level: Not on file   Occupational History    Not on file   Tobacco Use    Smoking status: Current Every Day Smoker     Packs/day: 1.00    Smokeless tobacco: Never Used   Vaping Use    Vaping Use: Never used   Substance and Sexual Activity    Alcohol use: No     Alcohol/week: 0.0 standard drinks    Drug use: Yes     Types: Marijuana    Sexual activity: Not on file   Other Topics Concern    Not on file   Social History Narrative    Not on file     Social Determinants of Health     Financial Resource Strain: Unknown    Difficulty of Paying Living Expenses: Patient refused   Food Insecurity: Unknown    Worried About Running Out of Food in the Last Year: Patient refused    Ran Out of Food in the Last Year: Patient refused   Transportation the absence of a cardiologist.  EKG Indication: low potassium  EKG Interpretation: Rate bradycardic 59, rhythm sinus, axis normal.  DE/QRS/QTc within normal limits. T wave abnormality noted likely consistent with low potassium. No prior EKG is available for comparison. RADIOLOGY:   Interpretation per Radiologist below, if available at the time of this note:  CT CHEST PULMONARY EMBOLISM W CONTRAST   Preliminary Result   1. No acute pulmonary embolus. 2. Extensive mediastinal and hilar lymphadenopathy compatible with metastatic   neoplasm. 3. Multiple new indeterminate 3 mm nodules in the right middle lobe and left   lower lobe. These could be infectious or neoplastic. 4. Right lower lobe 17 mm and 11 mm nodules are nonspecific. 5. Multiple liver metastases measuring up to 48 x 40 mm. Patient was given the following medications:  Orders Placed This Encounter   Medications    potassium chloride 10 mEq/100 mL IVPB (Peripheral Line)    potassium bicarb-citric acid (EFFER-K) effervescent tablet 40 mEq    iopamidol (ISOVUE-370) 76 % injection 75 mL       INITIAL VITALS: BP: (!) 203/83, Temp: 98.2 °F (36.8 °C), Pulse: 67, Resp: 20, SpO2: (!) 87 %   RECENT VITALS:  BP: (!) 188/117,Temp: 98.2 °F (36.8 °C), Pulse: 65, Resp: 21, SpO2: 93 %     My assessment reveals a white male who has bilateral lower extremity edema, lungs with bilateral mild wheezing, decreased air movement though he is moving air throughout all lobes. Bilateral lower extremities with significant edema that is pitting. Work-up here is notable for multiple abnormalities including potassium of 2.2 and a CT scan which shows extensive mediastinal and hilar lymphadenopathy compatible with a metastatic neoplasm along with multiple indeterminate nodules in the right middle, lower, and left lower lobe which could be infectious or neoplastic along with multiple liver mets. Discussed results with patient and family at bedside.   They expressed understanding of the results and of admission for his abnormal labs as well as further work-up. Placement of the consult to his primary care, Dr. Ruben Pittman, and notified him of the results. He will plan to admit patient to the floor in stable condition. Critical Care:  Due to the immediate potential for life-threatening deterioration due to new cancer with oxygen requirement and abnormal physical exam and labs, I spent 25 minutes providing critical care. This time excludes time spent performing procedures but includes time spent on direct patient care, history retrieval, review of the chart, and discussions with patient, family, and consultant(s). FINAL IMPRESSION      1. Cough    2. Elevated blood pressure reading in office with diagnosis of hypertension    3. Hypokalemia    4. Lung nodules    5.  Liver nodule        DISPOSITION/PLAN   DISPOSITION  Admission          (Please note that portions of this note were completed with a voice recognition program. Efforts were made to edit the dictations but occasionally words are mis-transcribed.)    Colt Zhao MD (electronically signed)  Attending Emergency Physician        Colt Zhao MD  09/02/21 5614

## 2021-09-02 NOTE — ED PROVIDER NOTES
[address] [facility]      Pt Name: Robyn Goel  OQR:[id number]  [place] 1970  Date of evaluation: 9/2/2021  Provider: Tonya Billings PA-C     This patient was seen and evaluated by attending physician Dr. Ca Reardon MD      Chief Complaint:    Chief Complaint   Patient presents with    Abnormal Lab     sent by Dr. Tami Maldonado for low potassium levels          Nursing Notes, Past Medical Hx, Past Surgical Hx, Social Hx, Allergies, and Family Hx were all reviewed and agreed with or any disagreements were addressed in the HPI.    HPI: (Location, Duration, Timing, Severity, Quality, Assoc Sx, Context, Modifying factors)  This is a  46 y.o. male who presents to the emergency room with complaint of abnormal lab. His primary care physician did call the emergency room to inform us that he has a low potassium. Patient complain of shortness of breath. Leg swelling. Weight gain. No history of congestive heart failure. He does have a history of asthma. Been using his inhaler the bit more than usual in the heat he said. Denies chest pain, no abdominal pain, he has noticed his legs being swollen in the last week he says. Denies history of heart attack or stroke. Does have a history of hypertension. No other complaints. PastMedical/Surgical History:      Diagnosis Date    Anxiety     Asthma     Hyperlipidemia     Hypertension      No past surgical history on file. Medications:  Previous Medications    ALBUTEROL SULFATE  (90 BASE) MCG/ACT INHALER    INHALE 2 PUFFS BY MOUTH EVERY 4 HOURS AS NEEDED    AMLODIPINE (NORVASC) 10 MG TABLET    TAKE 1 TABLET BY MOUTH EVERY DAY    ASPIRIN 81 MG EC TABLET    Take 81 mg by mouth daily.       ATORVASTATIN (LIPITOR) 80 MG TABLET    Take 1 tablet by mouth daily    AZITHROMYCIN (ZITHROMAX Z-QUE) 250 MG TABLET    See admin instructions    BUDESONIDE-FORMOTEROL (SYMBICORT) 160-4.5 MCG/ACT AERO INHALE 2 PUFFS BY MOUTH TWICE DAILY    FUROSEMIDE (LASIX) 40 MG TABLET    TAKE 1 TABLET BY MOUTH DAILY    METHYLPREDNISOLONE (MEDROL, QUE,) 4 MG TABLET    Take by mouth. MONTELUKAST (SINGULAIR) 10 MG TABLET    TAKE 1 TABLET BY MOUTH EVERY NIGHT AT BEDTIME         Review of Systems:  (2-9 systems needed)  Review of Systems   Constitutional: Negative for chills and fever. HENT: Negative for congestion, facial swelling and sneezing. Eyes: Negative for discharge and redness. Respiratory: Positive for shortness of breath. Negative for apnea and choking. Cardiovascular: Positive for leg swelling. Negative for chest pain. Gastrointestinal: Negative for abdominal pain, nausea and vomiting. Genitourinary: Negative for dysuria. Musculoskeletal: Negative for back pain, neck pain and neck stiffness. Neurological: Negative for dizziness, tremors, seizures and headaches. All other systems reviewed and are negative. \"Positives and Pertinent negatives as per HPI\"    Physical Exam:  Physical Exam  Vitals and nursing note reviewed. Constitutional:       Appearance: He is well-developed. He is not diaphoretic. HENT:      Head: Normocephalic and atraumatic. Nose: Nose normal.      Mouth/Throat:      Mouth: Mucous membranes are moist.      Pharynx: Oropharynx is clear. No oropharyngeal exudate or posterior oropharyngeal erythema. Eyes:      General:         Right eye: No discharge. Left eye: No discharge. Extraocular Movements: Extraocular movements intact. Pupils: Pupils are equal, round, and reactive to light. Cardiovascular:      Rate and Rhythm: Normal rate and regular rhythm. Heart sounds: Normal heart sounds. No murmur heard. No friction rub. No gallop. Pulmonary:      Effort: Pulmonary effort is normal. No respiratory distress. Breath sounds: Normal breath sounds. No wheezing or rales. Chest:      Chest wall: No tenderness.    Abdominal:      General: Abdomen is flat. Bowel sounds are normal. There is no distension. Palpations: Abdomen is soft. There is no mass. Tenderness: There is no abdominal tenderness. There is no guarding or rebound. Musculoskeletal:         General: Normal range of motion. Cervical back: Normal range of motion and neck supple. Right lower leg: 3+ Edema present. Left lower leg: 3+ Edema present. Skin:     General: Skin is warm and dry. Neurological:      General: No focal deficit present. Mental Status: He is alert and oriented to person, place, and time. Psychiatric:         Behavior: Behavior normal.         MEDICAL DECISION MAKING    Vitals:    Vitals:    09/02/21 1245 09/02/21 1300 09/02/21 1315 09/02/21 1330   BP: (!) 184/91 (!) 170/91 (!) 189/95 (!) 188/117   Pulse: 57 58 56 65   Resp: 21 15 20 21   Temp:       TempSrc:       SpO2: 95% 93% 94% 93%   Weight:       Height:           LABS:  Labs Reviewed   CBC WITH AUTO DIFFERENTIAL - Abnormal; Notable for the following components:       Result Value    Lymphocytes Absolute 0.5 (*)     All other components within normal limits    Narrative:     Performed at:  [address] [facility] [address]   Phone [phone]   BASIC METABOLIC PANEL - Abnormal; Notable for the following components:    Potassium 2.2 (*)     Chloride 92 (*)     CO2 40 (*)     Glucose 199 (*)     CREATININE 0.7 (*)     All other components within normal limits    Narrative:     Ana M Altamirano tel. [phone],  Chemistry results called to and read back by Jill Fishman RN, 09/02/2021  12:06, by [facility]  Performed at:  [address] [facility] [address]   Phone [phone]   BRAIN NATRIURETIC PEPTIDE - Abnormal; Notable for the following components:    Pro- (*)     All other components within normal limits    Narrative:     CALL  Dela Cruz  SKERK tel. [phone],  Chemistry results called to and read back by Rajat Hylton RN, 09/02/2021  12:06, by [facility]  Performed at:  [facility]  [address] [place], [place] [address]   Phone [phone]   TROPONIN    Narrative:     Pool Rubio tel. [phone],  Chemistry results called to and read back by Rajat Hylton RN, 09/02/2021  12:06, by [facility]  Performed at:  [facility]  [address] [place], [place] [address]   Phone [phone]   URINE RT REFLEX TO CULTURE        Remainder of labs reviewed and were negative at this time or not returned at the time of this note. RADIOLOGY:   Non-plain film images such as CT, Ultrasound and MRI are read by the radiologist. Jody Dias PA-C have directly visualized the radiologic plain film image(s) with the below findings:      Interpretation per the Radiologist below, if available at the time of this note:    CT CHEST PULMONARY EMBOLISM W CONTRAST    (Results Pending)        No results found. MEDICAL DECISION MAKING / ED COURSE:      PROCEDURES:   Procedures    None    Patient was given:  Medications   potassium chloride 10 mEq/100 mL IVPB (Peripheral Line) (10 mEq IntraVENous New Bag 9/2/21 1321)   potassium bicarb-citric acid (EFFER-K) effervescent tablet 40 mEq (40 mEq Oral Given 9/2/21 1321)   iopamidol (ISOVUE-370) 76 % injection 75 mL (75 mLs IntraVENous Given 9/2/21 1353)     Emergency room course: Patient on exam pupils are equal round and reactive to light extraocular movement is intact. Throat is clear. Neck is supple full range of motion without tenderness. No midline tender cervical, thoracic or lumbar spine. Cardiovascular regular rate rhythm. No rub murmur or gallops noted. Lungs are clear. No wheeze rales or rhonchi noted. Slight decreased breath sounds bilaterally. No chest wall tenderness. Abdomen is soft nontender. Normal bowel sounds all 4 quadrant.   No CVA or flank tenderness. Nondistended. Bilateral lower extremities show 3+ pitting edema. Patient full range of motion all extremity. Alert oriented x4. Does not appear to be in acute distress. Lab result today shows[de-identified]  CBC within normal limits with a white count of 8.3. BMP shows sodium 141, potassium 2.2, chloride 92 BUN 12 creatinine 0.7. . Troponin less than 0.01    Chest x-ray that was taken this morning from his primary care physician office as an outpatient x-ray shows clear lungs. Discussed patient lab results and x-ray results from today with him. Did order outpatient CT scan. I did discuss admission plan with this patient. He is given fusion potassium 10 mEq every hour he was given 40 mEq p.o. as well. The patient tolerated their visit well. I evaluated the patient. The physician was available for consultation as needed. The patient and / or the family were informed of the results of any tests, a time was given to answer questions, a plan was proposed and they agreed with plan. CLINICAL IMPRESSION:  1. Cough    2. Elevated blood pressure reading in office with diagnosis of hypertension    3. Hypokalemia        DISPOSITION  DISPOSITION Decision To Admit 09/02/2021 01:58:56 PM      PATIENT REFERRED TO:  No follow-up provider specified.     DISCHARGE MEDICATIONS:  New Prescriptions    No medications on file       DISCONTINUED MEDICATIONS:  Discontinued Medications    No medications on file              (Please note the MDM and HPI sections of this note were completed with a voice recognition program.  Efforts were made to edit the dictations but occasionally words are mis-transcribed.)    Electronically signed, Dariel Taylor PA-C,          Dariel Taylor PA-C  09/02/21 [phone]

## 2021-09-02 NOTE — ED TRIAGE NOTES
Patient arrived ambulatory, c/o SOB and low potassium. He reports he has been placed on antibiotics for his chest congestion. He has also had BLE edema x 2 days, no hx of CHF or BLE edema before. He is alert and oriented x 4. No respiratory distress. Will continue to monitor.

## 2021-09-02 NOTE — Clinical Note
Patient Class: Inpatient [101]   REQUIRED: Diagnosis: Hypokalemia [905908]   Estimated Length of Stay: Estimated stay of more than 2 midnights   Admitting Provider: Artis Peabody [[id number]]   Telemetry/Cardiac Monitoring Required?: Yes

## 2021-09-02 NOTE — TELEPHONE ENCOUNTER
Reviewed chart. Seeking to contact his PCP to discuss next steps. DR Doni Huynh will contact patient and direct to ED for immediate eval/tx of electrolyte and liver enzyme abnormalities.

## 2021-09-02 NOTE — ED NOTES
Bed: -11  Expected date: 21  Expected time: 10:06 AM  Means of arrival:   Comments:  Call in from ned yuen for 47 yo male with  history of asthma smoking htn hld who was seen in [facility] with cough, sputum, feeling bad. Cxr ekg and labs ordered yesterday and done this morning. Potassium 2.3, lfts off, cxr looks ok, ekg not able to be seen yet through computer.  Patient infor: Gila Echevarria  [phone]. call  back alpa with follow up please     Xiomara Campos MD  21 [phone]

## 2021-09-02 NOTE — PROGRESS NOTES
Medication Reconciliation    List of medications for Selma Abebe is currently taking is complete. Source of Information:   Epic records  Conversation with patient at bedside     Allergies  Allergy list not thoroughly reviewed with patient at this time  Allergies listed in Epic as follows: Patient has no known allergies. Current Medications:  No current facility-administered medications for this encounter. Current Outpatient Medications:     azithromycin (ZITHROMAX Z-QUE) 250 MG tablet, See admin instructions, Disp: 1 packet, Rfl: 0    methylPREDNISolone (MEDROL, QUE,) 4 MG tablet, Take by mouth., Disp: 1 kit, Rfl: 0    furosemide (LASIX) 40 MG tablet, TAKE 1 TABLET BY MOUTH DAILY, Disp: 90 tablet, Rfl: 0    budesonide-formoterol (SYMBICORT) 160-4.5 MCG/ACT AERO, INHALE 2 PUFFS BY MOUTH TWICE DAILY (Patient taking differently: Inhale 2 puffs into the lungs 2 times daily INHALE 2 PUFFS BY MOUTH TWICE DAILY), Disp: 10.2 g, Rfl: 5    amLODIPine (NORVASC) 10 MG tablet, TAKE 1 TABLET BY MOUTH EVERY DAY (Patient taking differently: Take 10 mg by mouth daily ), Disp: 90 tablet, Rfl: 3    montelukast (SINGULAIR) 10 MG tablet, TAKE 1 TABLET BY MOUTH EVERY NIGHT AT BEDTIME (Patient taking differently: Take 10 mg by mouth nightly ), Disp: 90 tablet, Rfl: 3    albuterol sulfate  (90 Base) MCG/ACT inhaler, INHALE 2 PUFFS BY MOUTH EVERY 4 HOURS AS NEEDED, Disp: 3 Inhaler, Rfl: 3    atorvastatin (LIPITOR) 80 MG tablet, Take 1 tablet by mouth daily, Disp: 90 tablet, Rfl: 3    aspirin 81 MG EC tablet, Take 81 mg by mouth daily.   , Disp: , Rfl:     Notes Regarding Home Medications:   Patient received all of their AM home medications prior to arrival to the emergency department  Was able to  furosemide, azithromycin, and methylprednisolone yesterday; took doses yesterday and today      Cristel Carlson [facility], PharmD   9/2/2021 2:13 PM

## 2021-09-02 NOTE — ED NOTES
Dr. Tami Maldonado at bedside. Ok per Dr. Tami Maldonado to go to [facility]. Supervisor made aware.      Elisa Gray RN  09/02/21 0418

## 2021-09-03 NOTE — PROGRESS NOTES
Transportation here for take patient to CT. Remaining amount of contrast given to patient to drink. Tolerated well.

## 2021-09-03 NOTE — PROGRESS NOTES
4 Eyes Skin Assessment     NAME:  Shamar Johnson  YOB: 1970  MEDICAL RECORD NUMBER:  [id number]    The patient is being assess for  Admission    I agree that 2 RN's have performed a thorough Head to Toe Skin Assessment on the patient. ALL assessment sites listed below have been assessed. Areas assessed by both nurses:    Head, Face, Ears, Shoulders, Back, Chest, Arms, Elbows, Hands, Sacrum. Buttock, Coccyx, Ischium and Legs. Feet and Heels        Does the Patient have a Wound?  No noted wound(s)       Chad Prevention initiated:  NA   Wound Care Orders initiated:  NA    Pressure Injury (Stage 3,4, Unstageable, DTI, NWPT, and Complex wounds) if present place consult order under [de-identified] NA    New and Established Ostomies if present place consult order under : NA      Nurse 1 eSignature: Electronically signed by Felipe Girard RN on 9/3/21 at 4:35 AM EDT    **SHARE this note so that the co-signing nurse is able to place an eSignature**    Nurse 2 eSignature: {Esignature:[id number]}

## 2021-09-03 NOTE — CONSULTS
PATIENT IS SEEN AT THE REQUEST OF DR. Tami Maldonado for bronchospasms    CONSULTING PHYSICIAN: Jose Guadalupe     HISTORY OF PRESENT ILLNESS:  This is a 46 y.o. male who presented to the ED on 9/2 with a CC of abnormal labs. Per ED notes he was sent in for low potassium levels. Per ED notes he also has SOB, leg swelling and weight gain. He was admitted for cough, SOB. CT showed diffuse adenopathy. He says he has had about 2 weeks of SOB, difficulty in breathing and LE swelling. He has chronic lung issues due to tobacco abuse but no formal testing to confirm level of COPD>  He is an active smoker for about 35 years at 1.5 ppd. He is feeling SOB today but better than the other days. He does have cough and congestion. He is aware of CT findings and would like to have an answer before he leaves. Established Pulmonologist:  None    PAST MEDICAL HISTORY:  Past Medical History:   Diagnosis Date    Anxiety     Asthma     Hyperlipidemia     Hypertension        PAST SURGICAL HISTORY:  History reviewed. No pertinent surgical history. FAMILY HISTORY:  family history includes COPD in his maternal grandmother; Heart Attack in his father; Heart Attack (age of onset: 39) in his sister; Georgana Gearing in his mother; Stomach Cancer in his brother. SOCIAL HISTORY:   reports that he has been smoking. He has been smoking about 1.00 pack per day. He has never used smokeless tobacco.   [employer] for parking. Regular marijuana use.       Scheduled Meds:   lisinopril  10 mg Oral Daily    amLODIPine  10 mg Oral Daily    budesonide-formoterol  2 puff Inhalation BID    montelukast  10 mg Oral Nightly    sodium chloride flush  5-40 mL IntraVENous 2 times per day    [Held by provider] enoxaparin  40 mg SubCUTAneous Daily    nicotine  1 patch TransDERmal Daily    ipratropium-albuterol  1 ampule Inhalation Q4H WA    methylPREDNISolone  40 mg IntraVENous Q8H    [Held by provider] furosemide  20 mg IntraVENous BID    doxycycline hyclate  100 mg Oral 2 times per day       Continuous Infusions:   sodium chloride         PRN Meds:  cloNIDine, sodium chloride flush, sodium chloride, ondansetron **OR** ondansetron, polyethylene glycol, acetaminophen **OR** acetaminophen, potassium chloride    ALLERGIES:  Patient has No Known Allergies. REVIEW OF SYSTEMS:  Constitutional: Weight gain  HENT: Negative for sore throat  Eyes: Negative for redness   Respiratory: 2 weeks of cough, SOB and congestion but with chronic respiratory issues   Cardiovascular: Negative for chest pain  Gastrointestinal: Negative for vomiting, diarrhea   Genitourinary: Negative for hematuria, negative for dysuria  Musculoskeletal: Negative for arthralgias   Skin: Negative for rash  Neurological: Negative for syncope  Hematological: Negative for adenopathy  Extremities: more swelling     PHYSICAL EXAM:  Blood pressure (!) 191/97, pulse 72, temperature 97.7 °F (36.5 °C), temperature source Oral, resp. rate 20, height 6' (1.829 m), weight 247 lb 5.7 oz (112.2 kg), SpO2 98 %.'  Gen: No distress. Tan, unkempt   Eyes: PERRL. No sclera icterus. No conjunctival injection. ENT: No discharge. Pharynx clear. Neck: Trachea midline. No obvious mass. Resp: Diffuse wheezing   CV: Regular rate. Regular rhythm. No murmur or rub. GI: Questionable hepatomegaly   Skin: Warm and dry. No nodule on exposed extremities. Lymph: No cervical LAD. No supraclavicular LAD. M/S: No cyanosis. No joint deformity. Neuro: Awake. Alert. Moves all four extremities.    EXT:   ++ edema, no clubbing    LABS:  CBC:   Recent Labs     09/02/21  0839 09/02/21  1120   WBC 8.1 8.3   HGB 15.4 15.2   HCT 45.2 44.3   MCV 97.5 97.0    148     BMP:   Recent Labs     09/02/21  0839 09/02/21  0839 09/02/21  1120 09/02/21  1625 09/02/21  2344 09/03/21  0417     --  141  --   --  142   K 2.3*   < > 2.2* 2.6* 2.4* 2.5*   CL 92*  --  92*  --   --  94*   CO2 38*  --  40*  --   --  40*   BUN 12  --  12  -- --  12   CREATININE 0.8*  --  0.7*  --   --  0.6*    < > = values in this interval not displayed. LIVER PROFILE:   Recent Labs     21  0839 21  0417   AST 80* 79*   * 218*   BILIDIR  --  <0.2   BILITOT 0.8 0.7   ALKPHOS 223* 202*     PT/INR:   Recent Labs     21  0418   PROTIME 11.7   INR 1.03     APTT:   Recent Labs     21   APTT 25.8*     UA:  Recent Labs     21  1425   COLORU YELLOW   PHUR 7.5   WBCUA 1   RBCUA 1   CLARITYU Clear   SPECGRAV 1.015   LEUKOCYTESUR Negative   UROBILINOGEN 1.0   BILIRUBINUR Negative   BLOODU TRACE*   GLUCOSEU 100*     Recent Labs     21  1638   PHART 7.554*   TZE5NEP 53.7*   PO2ART 60.7*       Cultures:  None     PFTs:  None       ECHO:  None    AB.55/      Chest CT:  Chest imaging was reviewed by me and showed patchy airspace disease with bronchial wall thickening, nodular density in the RLL, diffuse adenopathy with airway narrowing, emphysema     I reviewed all the above labs and studies pertaining to this visit. ASSESSMENT/PLAN:  · Acute Hypoxic Respiratory Failure with saturations less than 90% on room air due to airspace disease, bronchospasms and emphysema  Titrate oxygen for saturations greater than or equal to 90%  · Abnormal CT Chest:  Adenopathy, nodules, emphysema, liver nodules   · Findings highly suggestive of malignancy but may have metastasized to his liver already. Diffuse wheezing is not a candidate for EBUS at this time.   Recommend pursuing liver biopsy after CT AB is completed  · COPD exacerbation  · Continue with duonebs q 4 hours and symbicort q 12 hours  · IV steroids as is  · Doxycycline is reasonable  · Add saline nebs   · Check procal  · Sputum cultures   · Hypokalemia  · Aggressive replacement prior to any biopsy   · LE Edema, likely due to liver involvement of cancer  · ECHO to rule out RV failure   · Tobacco Abuse   · 1.5 ppd up to this point  · Obviously cessation is high

## 2021-09-03 NOTE — CONSULTS
[facility]   Consult Note    Admission Date/Time: 9/2/2021 10:52 AM     Today's Date: 09/03/21    Reason for Consult:      Requesting Physician:  Maira Quintana    CHIEF COMPLAINT:  SOB    History Obtained From: Patient/Chart Review    HISTORY OF PRESENT ILLNESS:    This is a 46year old male we are asked to see due to recent CT showing evidence of widespread metastatic disease. In terms of his recent history he started with increasing SOB about 3 weeks ago and wheezing. He was seen in sick clinic yesterday and labs revealed hypokalemia with K of 2.2 so he was directed to the ER. In the ER a rapid COVID test was negative and a CT of the chest was done showing widespread metastatic lesions in the chest and liver. He states he has noted more of a weight gain than loss due to swelling. Appetite and energy level were good. Occasional right lower quadrant pain---feels more like a sore muslce not a true pain---stretching helps it go away. No change in his bowels prior to hospitalization maybe a \"tad thinner\" when asked. No hematochezia or melena. No prior colonoscopy. He normally sees  every 6 months. Past Medical History:     has a past medical history of Anxiety, Asthma, Hyperlipidemia, and Hypertension. COPD    Past Surgical History:    History reviewed. No pertinent surgical history. No prior surgeries.      Current Medications:     lisinopril  10 mg Oral Daily    amLODIPine  10 mg Oral Daily    budesonide-formoterol  2 puff Inhalation BID    montelukast  10 mg Oral Nightly    sodium chloride flush  5-40 mL IntraVENous 2 times per day    [Held by provider] enoxaparin  40 mg SubCUTAneous Daily    nicotine  1 patch TransDERmal Daily    ipratropium-albuterol  1 ampule Inhalation Q4H WA    methylPREDNISolone  40 mg IntraVENous Q8H    [Held by provider] furosemide  20 mg IntraVENous BID    doxycycline hyclate  100 mg Oral 2 times per day     Allergies:    No Known Allergies     Social History:    reports that he has been smoking. He has been smoking about 1.00 pack per day. He has never used smokeless tobacco. He reports current drug use. Drug: Marijuana. He reports that he does not drink alcohol. Smokes 1.5PPD since his teens, Daily marijuana use, Occasional use of alcohol---used to be on a regular basis and now it's only social. Single. No children. He lives in [place] alone. He states his siblings all live near him and would be a good support. 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 Family History:     family history includes COPD in his maternal grandmother; Heart Attack in his father; Heart Attack (age of onset: 39) in his sister; Renaee Rinne in his mother; Stomach Cancer in his brother. Mother: Lung Cancer (heavy smoker) diagnosed and  in her 63's   Brother: Stomach Cancer (heavy drinker) Diagnosed and  in his 42's. No known history of colon polyps. No known bleeding or clotting disorders. REVIEW OF SYSTEMS:    CONSTITUTIONAL: Negative for changes in weight, weakness, or fevers  EYES:  Negative for  eye discharge, changes in vision, redness and icterus  HEENT:  Negative for  nasal congestion, sore mouth and sore throat  RESPIRATORY: see HPI  CARDIOVASCULAR:  Negative for  chest pain, palpitations, or heaviness  GASTROINTESTINAL: see HPI  GENITOURINARY:  Negative for dysuria, hematuria, frequency, or hesitency  INTEGUMENT/BREAST:  Negative for rash, dryness, pruritis, or skin lesions   HEMATOLOGIC:  Negative for abnormal bleeding or bruising. LYMPHATIC: Negative for lymphadenopathy  ENDOCRINE:  Negative for excessive thirst/hunger/urination.    MUSCULOSKELETAL:  Negative for muscle or joint pain, joint stiffness, or weakness  NEUROLOGICAL:  Negative for syncope, seizures, numbness/tingling, or tremors    PHYSICAL EXAM:    Vitals:  Vitals:    21 0707   BP: (!) 191/97   Pulse: 72   Resp: 20   Temp: 97.7 °F (36.5 °C)   SpO2:       CONSTITUTIONAL:  Awake, alert, cooperative, no apparent distress. EYES:  Pupils equal, round and reactive to light, sclera clear and conjunctiva normal.  ENT:  Normocepalic, without obvious abnormality, atraumatic. Poor dentition   NECK:  Supple, symmetrical, no jugular venous distension, no thyromegaly. HEMATOLOGIC/LYMPHATICS:  No cervical,supraclavicular or axillary lymphadenopathy. LUNGS: Scattered wheezing noted throughout  CARDIOVASCULAR: Regular rate and rhythm, normal S1 and S2, no murmur noted. ABDOMEN:  Normal bowel sounds x 4, soft, non-distended, non-tender, no masses, ascites, or organomegaly noted. MUSCULOSKELETAL:  Full range of motion noted, tone is normal  EXTREMITIES: +3 pitting edema bilaterally. NEUROLOGIC:  Awake, alert, oriented to name, place and time. Motor skills grossly intact. SKIN:  Normal skin color, texture, turgor and no jaundice. Appears intact. DATA:  General Labs:    CBC:   Recent Labs     09/02/21  0839 09/02/21  1120   WBC 8.1 8.3   HGB 15.4 15.2   HCT 45.2 44.3   MCV 97.5 97.0    148     BMP:   Recent Labs     09/02/21  0839 09/02/21  0839 09/02/21  1120 09/02/21  1625 09/02/21  2344 09/03/21  0417     --  141  --   --  142   K 2.3*   < > 2.2* 2.6* 2.4* 2.5*   CL 92*  --  92*  --   --  94*   CO2 38*  --  40*  --   --  40*   BUN 12  --  12  --   --  12   CREATININE 0.8*  --  0.7*  --   --  0.6*    < > = values in this interval not displayed.      LIVER PROFILE:   Recent Labs     09/02/21  0839 09/03/21  0417   AST 80* 79*   * 218*   BILIDIR  --  <0.2   BILITOT 0.8 0.7   ALKPHOS 223* 202*     PT/INR:    Lab Results   Component Value Date    PROTIME 11.7 09/03/2021    INR 1.03 09/03/2021     PTT:    Lab Results   Component Value Date    APTT 25.8 09/03/2021     Magnesium:    Lab Results   Component Value Date    MG 2.30 09/03/2021    MG 1.90 09/02/2021    MG 2.10 09/02/2021       Radiology Review:    XR CHEST (2 VW)    Result Date: 9/2/2021  EXAMINATION: TWO XRAY VIEWS OF THE CHEST 9/2/2021 8:48 am COMPARISON: None. HISTORY: ORDERING SYSTEM PROVIDED HISTORY: WEEMS (dyspnea on exertion) TECHNOLOGIST PROVIDED HISTORY: Reason for Exam: WEEMS, asthma Acuity: Unknown Type of Exam: Initial FINDINGS: Clear lungs. No pleural effusion or pneumothorax. Cardiomediastinal silhouette is unremarkable. Degenerative changes of the visualized osseous structures. Clear lungs. CT CHEST PULMONARY EMBOLISM W CONTRAST    Result Date: 9/2/2021  EXAMINATION: CTA OF THE CHEST 9/2/2021 12:47 pm TECHNIQUE: CTA of the chest was performed after the administration of intravenous contrast.  Multiplanar reformatted images are provided for review. MIP images are provided for review. Dose modulation, iterative reconstruction, and/or weight based adjustment of the mA/kV was utilized to reduce the radiation dose to as low as reasonably achievable. COMPARISON: None. HISTORY: ORDERING SYSTEM PROVIDED HISTORY: leg swelling sob new o2 requirement TECHNOLOGIST PROVIDED HISTORY: Reason for exam:->leg swelling sob new o2 requirement Decision Support Exception - unselect if not a suspected or confirmed emergency medical condition->Emergency Medical Condition (MA) Reason for Exam: leg swelling sob new o2 requirement Acuity: Acute Type of Exam: Initial FINDINGS: Pulmonary Arteries:  No central, lobar, or segmental pulmonary embolus. Narrowing of the right lower lobe pulmonary artery by the adenopathy. Mediastinum: Heart size is normal.  No pericardial effusion. Thoracic aorta is normal caliber. Extensive mediastinal and hilar lymphadenopathy is present. Subcarinal node measures 32 x 37 mm. Right hilar node measures 30 x 29 mm. Lungs/pleura: Moderate emphysema. Multiple 3 mm nodules are present in the right middle lobe and left lower lobe. Superimposed in the posterior right lower lobe, there is a dominant nodule measuring 17 x 17 mm.   An additional right lower lobe nodule measures 11 mm. No pleural effusion or pneumothorax. Narrowing of bronchus intermedius due to the right hilar adenopathy. Upper Abdomen: Multiple low-attenuation liver lesions are present measuring up to 48 x 40 mm and 26 x 28 mm. At least 30 lesions are noted in the visualized portions of the liver. Soft Tissues/Bones: No lytic or blastic osseous lesion. 1. No acute pulmonary embolus. 2. Extensive mediastinal and hilar lymphadenopathy compatible with metastatic neoplasm. 3. Multiple new indeterminate 3 mm nodules in the right middle lobe and left lower lobe. These could be infectious or neoplastic. 4. Right lower lobe 17 mm and 11 mm nodules are nonspecific. 5. Multiple liver metastases measuring up to 48 x 40 mm. Assessment & Plan:    1. Abnormal CT. CEA elevated. Has never had colonoscopy. Pulmonary unable to safely due EBUS at this time. Perhaps we will be able to biopsy one of the liver lesions--order placed with IR. CT A/P to be done today. Await testing to further develop plan. 2. Hypokalemia. Per Primary team.     3. LE edema. Equal bilat. CT A/P to evaluate IVC compression vs thrombosis and further looks for primary tumor. 4. Dispo. Social work consult placed d/t not having insurance to try to figure out plan. I have discussed the above stated plan with the patient and they verbalized understanding and agreed with the plan. Thank you for allowing us to participate in this patient's care.       NINFA Jason Cha - [facility],  [address] [place] [address] [phone]

## 2021-09-03 NOTE — PROGRESS NOTES
Patient returned from CT scan. Tolerated well. Requesting something to eat. Will notify Dr Bozena Tineo for advance diet order.

## 2021-09-03 NOTE — RT PROTOCOL NOTE
RT Nebulizer Bronchodilator Protocol Note    There is a bronchodilator order in the chart from a provider indicating to follow the RT Bronchodilator Protocol and there is an Initiate RT Bronchodilator Protocol order as well (see protocol at bottom of note). The findings from the last RT Protocol Assessment were as follows:  Smoking: <15 Pack years  Surgical Status: No surgery  Xray: Clear  Respiratory Pattern: Dyspnea at rest  Mental Status: Alert and Oriented  Breath Sounds: Wheezing and/or rhonchi  Cough: Strong, spontaneous, non-productive  Activity Level: Walking unassisted  Oxygen Requirement: 29% or 3LNC - 5LNC/40%  Indication for Bronchodilator Therapy: On home bronchodilators  Bronchodilator Assessment Score: 7. Pt takes Albuterol and symbicort at home    Aerosolized bronchodilator medication orders have been revised according to the RT Bronchodilator Protocol. RT Bronchodilator Protocol:    Respiratory Therapist to perform RT Therapy Protocol Assessment then follow the protocol. No Indications - adjust the frequency to every 6 hours PRN wheezing or bronchospasm, if no treatments needed after 48 hours then discontinue using Per Protocol order mode. If indication present, adjust the RT bronchodilator orders based on the Bronchodilator Assessment Score as follows:    0-6 - enter or revise RT Bronchodilator order to Albuterol Nebulizer order with frequency of every 2 hours PRN for wheezing or increased work of breathing using Per Protocol order mode. Repeat RT Therapy Protocol Assessment as needed. 7-10 - discontinue any other Inpatient aerosolized bronchodilator medication orders and enter or revise two Albuterol Nebulizer orders, one with BID frequency and one with frequency of every 2 hours PRN wheezing or increased work of breathing using Per Protocol order mode. Repeat RT Therapy Protocol Assessment with second treatment then BID and as needed.       11-13 - discontinue any other Inpatient aerosolized bronchodilator medication orders and enter DuoNeb Nebulizer order with QID frequency and an Albuterol Nebulizer order with frequency of every 2 hours PRN wheezing or increased work of breathing using Per Protocol order mode. Repeat RT Therapy Protocol Assessment with second treatment then QID and as needed. Greater than 13 - discontinue any other Inpatient aerosolized bronchodilator medication orders and enter a DuoNeb Nebulizer order with every 4 hours frequency and an Albuterol Nebulizer order with frequency of every 2 hours PRN wheezing or increased work of breathing using Per Protocol order mode. Repeat RT Therapy Protocol Assessment with second treatment then every 4 hours and as needed. RT to enter RT Home Evaluation for COPD & MDI Assessment order using Per Protocol order mode.     Electronically signed by Lindsay Carmona RCP on 9/2/2021 at 8:42 PM

## 2021-09-03 NOTE — PROGRESS NOTES
Pt's potassium level 2.4. PRN replacement initiated, see MAR. Dr. Ailin Mahan office notified per protocol, see new orders.

## 2021-09-03 NOTE — PROGRESS NOTES
[address] [facility] Internal Medicine Note      Chief Complaint: I feel ok    Subjective/Interval History: This morning the patient is resting comfortably in bed. He feels like his breathing is a little better today, although he is still actively wheezing. He denies any new problems overnight. Minimal right upper quadrant discomfort at times, otherwise okay. Continues to get IV potassium. Discussed with nursing. No new problems overnight. No chest pain or shortness breath. No cough or sputum. No nausea, vomiting, diarrhea. No abdominal pain. No dysuria. The remainder of the review of systems is negative. PMH, PSH, FH/SH reviewed and unchanged as documented in the H&P personally documented at admission 21    Medication list reviewed    Objective:    BP (!) 199/98   Pulse 59   Temp 97.7 °F (36.5 °C) (Oral)   Resp 18   Ht 6' (1.829 m)   Wt 247 lb 5.7 oz (112.2 kg)   SpO2 95%   BMI 33.55 kg/m²   Temp  Av.9 °F (36.6 °C)  Min: 97.5 °F (36.4 °C)  Max: 98.2 °F (36.8 °C)    RRR  Chest-respirations easy. Continued expiratory wheezing that is audible, and throughout all lung fields. Abd- BS+, soft, NTND  Ext-continued 3-4+ edema bilateral lower extremities to the thighs.     The Following Labs Were Reviewed Today:    Recent Results (from the past 24 hour(s))   Brain Natriuretic Peptide    Collection Time: 21  8:39 AM   Result Value Ref Range    Pro- (H) 0 - 124 pg/mL   CBC Auto Differential    Collection Time: 21  8:39 AM   Result Value Ref Range    WBC 8.1 4.0 - 11.0 K/uL    RBC 4.64 4.20 - 5.90 M/uL    Hemoglobin 15.4 13.5 - 17.5 g/dL    Hematocrit 45.2 40.5 - 52.5 %    MCV 97.5 80.0 - 100.0 fL    MCH 33.3 26.0 - 34.0 pg    MCHC 34.1 31.0 - 36.0 g/dL    RDW 13.8 12.4 - 15.4 %    Platelets 602 577 - 067 K/uL    MPV 7.3 5.0 - 10.5 fL    Neutrophils % 90.4 %    Lymphocytes % 4.1 %    Monocytes % 4.8 %    Eosinophils % 0.0 %    Basophils % 0.7 %    Neutrophils Absolute 7.3 1.7 - 7.7 K/uL    Lymphocytes Absolute 0.3 (L) 1.0 - 5.1 K/uL    Monocytes Absolute 0.4 0.0 - 1.3 K/uL    Eosinophils Absolute 0.0 0.0 - 0.6 K/uL    Basophils Absolute 0.1 0.0 - 0.2 K/uL   Comprehensive Metabolic Panel    Collection Time: 09/02/21  8:39 AM   Result Value Ref Range    Sodium 142 136 - 145 mmol/L    Potassium 2.3 (LL) 3.5 - 5.1 mmol/L    Chloride 92 (L) 99 - 110 mmol/L    CO2 38 (H) 21 - 32 mmol/L    Anion Gap 12 3 - 16    Glucose 278 (H) 70 - 99 mg/dL    BUN 12 7 - 20 mg/dL    CREATININE 0.8 (L) 0.9 - 1.3 mg/dL    GFR Non-African American >60 >60    GFR African American >60 >60    Calcium 9.0 8.3 - 10.6 mg/dL    Total Protein 6.3 (L) 6.4 - 8.2 g/dL    Albumin 3.9 3.4 - 5.0 g/dL    Albumin/Globulin Ratio 1.6 1.1 - 2.2    Total Bilirubin 0.8 0.0 - 1.0 mg/dL    Alkaline Phosphatase 223 (H) 40 - 129 U/L     (H) 10 - 40 U/L    AST 80 (H) 15 - 37 U/L    Globulin 2.4 g/dL   EKG 12 Lead    Collection Time: 09/02/21 10:58 AM   Result Value Ref Range    Ventricular Rate 59 BPM    Atrial Rate 59 BPM    P-R Interval 168 ms    QRS Duration 94 ms    Q-T Interval 424 ms    QTc Calculation (Bazett) 419 ms    P Axis 78 degrees    R Axis 53 degrees    T Axis 53 degrees    Diagnosis       Sinus bradycardia with sinus arrhythmiaPossible Left atrial enlargementNonspecific ST abnormalityConfirmed by ERIC GONZALEZ, Apollo Quinonez ([id number]) on 9/2/2021 1:07:48 PM   CBC Auto Differential    Collection Time: 09/02/21 11:20 AM   Result Value Ref Range    WBC 8.3 4.0 - 11.0 K/uL    RBC 4.57 4.20 - 5.90 M/uL    Hemoglobin 15.2 13.5 - 17.5 g/dL    Hematocrit 44.3 40.5 - 52.5 %    MCV 97.0 80.0 - 100.0 fL    MCH 33.2 26.0 - 34.0 pg    MCHC 34.2 31.0 - 36.0 g/dL    RDW 13.8 12.4 - 15.4 %    Platelets 235 047 - 642 K/uL    MPV 7.2 5.0 - 10.5 fL    Neutrophils % 88.1 %    Lymphocytes % 6.0 %    Monocytes % 5.3 %    Eosinophils % 0.0 %    Basophils % 0.6 %    Neutrophils Absolute 7.3 1.7 - 7.7 K/uL    Lymphocytes Absolute 0.5 (L) 1.0 - 5.1 K/uL Monocytes Absolute 0.4 0.0 - 1.3 K/uL    Eosinophils Absolute 0.0 0.0 - 0.6 K/uL    Basophils Absolute 0.1 0.0 - 0.2 K/uL   Basic Metabolic Panel    Collection Time: 09/02/21 11:20 AM   Result Value Ref Range    Sodium 141 136 - 145 mmol/L    Potassium 2.2 (LL) 3.5 - 5.1 mmol/L    Chloride 92 (L) 99 - 110 mmol/L    CO2 40 (H) 21 - 32 mmol/L    Anion Gap 9 3 - 16    Glucose 199 (H) 70 - 99 mg/dL    BUN 12 7 - 20 mg/dL    CREATININE 0.7 (L) 0.9 - 1.3 mg/dL    GFR Non-African American >60 >60    GFR African American >60 >60    Calcium 8.9 8.3 - 10.6 mg/dL   Brain Natriuretic Peptide    Collection Time: 09/02/21 11:20 AM   Result Value Ref Range    Pro- (H) 0 - 124 pg/mL   Troponin    Collection Time: 09/02/21 11:20 AM   Result Value Ref Range    Troponin <0.01 <0.01 ng/mL   Magnesium    Collection Time: 09/02/21 11:20 AM   Result Value Ref Range    Magnesium 2.10 1.80 - 2.40 mg/dL   COVID-19, Rapid    Collection Time: 09/02/21  2:21 PM    Specimen: Nasopharyngeal Swab   Result Value Ref Range    SARS-CoV-2, NAAT Not Detected Not Detected   Urinalysis Reflex to Culture    Collection Time: 09/02/21  2:25 PM    Specimen: Urine, clean catch   Result Value Ref Range    Color, UA YELLOW Straw/Yellow    Clarity, UA Clear Clear    Glucose, Ur 100 (A) Negative mg/dL    Bilirubin Urine Negative Negative    Ketones, Urine Negative Negative mg/dL    Specific Gravity, UA 1.015 1.005 - 1.030    Blood, Urine TRACE (A) Negative    pH, UA 7.5 5.0 - 8.0    Protein, UA 30 (A) Negative mg/dL    Urobilinogen, Urine 1.0 <2.0 E.U./dL    Nitrite, Urine Negative Negative    Leukocyte Esterase, Urine Negative Negative    Microscopic Examination YES     Urine Type NotGiven     Urine Reflex to Culture Not Indicated    Microscopic Urinalysis    Collection Time: 09/02/21  2:25 PM   Result Value Ref Range    Hyaline Casts, UA 0 0 - 8 /LPF    WBC, UA 1 0 - 5 /HPF    RBC, UA 1 0 - 4 /HPF    Epithelial Cells, UA 1 0 - 5 /HPF   Potassium w/ Reflex to Magnesium    Collection Time: 09/02/21  4:25 PM   Result Value Ref Range    Potassium reflex Magnesium 2.6 (LL) 3.5 - 5.1 mmol/L   Magnesium    Collection Time: 09/02/21  4:25 PM   Result Value Ref Range    Magnesium 1.90 1.80 - 2.40 mg/dL   Blood Gas, Arterial    Collection Time: 09/02/21  4:38 PM   Result Value Ref Range    pH, Arterial 7.554 (H) 7.350 - 7.450    pCO2, Arterial 53.7 (H) 35.0 - 45.0 mmHg    pO2, Arterial 60.7 (L) 75.0 - 108.0 mmHg    HCO3, Arterial 47.5 (H) 21.0 - 29.0 mmol/L    Base Excess, Arterial 21.4 (H) -3.0 - 3.0 mmol/L    Hemoglobin, Art, Extended 15.3 13.5 - 17.5 g/dL    O2 Sat, Arterial 93.4 >92 %    Carboxyhgb, Arterial 4.3 (H) 0.0 - 1.5 %    Methemoglobin, Arterial 0.2 <1.5 %    TCO2, Arterial 49.1 Not Established mmol/L    O2 Therapy Unknown    Potassium    Collection Time: 09/02/21 11:44 PM   Result Value Ref Range    Potassium 2.4 (LL) 3.5 - 5.1 mmol/L   Hepatic function panel    Collection Time: 09/03/21  4:17 AM   Result Value Ref Range    Total Protein 5.9 (L) 6.4 - 8.2 g/dL    Albumin 3.6 3.4 - 5.0 g/dL    Alkaline Phosphatase 202 (H) 40 - 129 U/L     (H) 10 - 40 U/L    AST 79 (H) 15 - 37 U/L    Total Bilirubin 0.7 0.0 - 1.0 mg/dL    Bilirubin, Direct <0.2 0.0 - 0.3 mg/dL    Bilirubin, Indirect see below 0.0 - 1.0 mg/dL   Basic Metabolic Panel w/ Reflex to MG    Collection Time: 09/03/21  4:17 AM   Result Value Ref Range    Sodium 142 136 - 145 mmol/L    Potassium reflex Magnesium 2.5 (LL) 3.5 - 5.1 mmol/L    Chloride 94 (L) 99 - 110 mmol/L    CO2 40 (H) 21 - 32 mmol/L    Anion Gap 8 3 - 16    Glucose 188 (H) 70 - 99 mg/dL    BUN 12 7 - 20 mg/dL    CREATININE 0.6 (L) 0.9 - 1.3 mg/dL    GFR Non-African American >60 >60    GFR African American >60 >60    Calcium 8.6 8.3 - 10.6 mg/dL   Magnesium    Collection Time: 09/03/21  4:17 AM   Result Value Ref Range    Magnesium 2.30 1.80 - 2.40 mg/dL   Protime-INR    Collection Time: 09/03/21  4:18 AM   Result Value Ref Range    Protime 11.7 9.9 - 12.7 sec    INR 1.03 0.88 - 1.12   APTT    Collection Time: 09/03/21  4:18 AM   Result Value Ref Range    aPTT 25.8 (L) 26.2 - 38.6 sec       ASSESSMENT/PLAN:      Principal Problem:    Hypokalemia-continued hypokalemia. Continue with potassium supplement protocol. Give additional 40 mEq by mouth this morning as well. Hold off on Lasix for bilateral leg edema until potassium is repleted. Active Problems:    Metastatic cancer-obtain CT scan of the abdomen and pelvis today. Consult Dr. Arun Obando for evaluation. Moderate persistent asthma with acute exacerbation-continue with steroids and nebulizers. Consult pulmonary. Pure hypercholesterolemia-hold Lipitor given abnormal LFTs. Essential hypertension-continue with amlodipine. Add lisinopril 10 mg and add clonidine 0.1 mg every 4 hours as needed. Continue adjust meds as needed. Bilateral leg edema-CT scan of the abdomen to evaluate for IVC compression versus thrombosis. Hold IV Lasix until potassium is repleted.     Time > 35 minutes reviewing chart and patient data, examining and interviewing patient, and discussing with nursing staff, family, etc.     Andriy Gayle MD, FACP  6:53 AM  9/3/2021

## 2021-09-03 NOTE — ED NOTES
Report called to [facility] RN, all questions answered.      Torey Dumont RN  09/02/21 [id number]

## 2021-09-03 NOTE — PROGRESS NOTES
Received consult. Hold lovenox and make NPO until I can see him.   Need to evaluate candidacy for bronchoscopy today however might want to biopsy liver masses if feasible as his low potassium might be a risk for anesthesia along with breathing issues    DO Yulisa Peres Pul[facility], [facility]  [phone]

## 2021-09-03 NOTE — H&P
[address] [facility] Internal Medicine  History and Physical      CHIEF COMPLAINT:  I feel short of breath    History of Present Illness: This is a 57-year-old white male with a history of hypertension, asthma, hyperlipidemia and anxiety who presented to the emergency room because of hyperkalemia. Yesterday the patient was seen in the outpatient red [facility] with an acute respiratory illness. He was seen late in the day yesterday and was asked to get an EKG, chest x-ray, and labs. His labs came back this morning showing severe hypokalemia with potassium of 2.2. He was directed to the emergency room for evaluation. In the ER his work-up revealed hypokalemia as described, with a repeat potassium being 2.3. Additionally a rapid Covid test was negative and a CT scan of the chest was performed to look for pulmonary embolus, and this revealed widespread metastatic lesions through his chest and liver. He is being admitted for treatment of his shortness of breath, hyperkalemia, and evaluation of his metastatic disease. Patient reports that his symptoms started approximately 3 weeks ago with increasing shortness of breath and wheezing. Patient was trying to push through and get better because he figured it was just because he was working outside in the heat. The patient subsequently had continued shortness of breath and eventually presented to the outpatient office for evaluation yesterday. He reports cough with yellow sputum. He reports no fever. He reports persistent wheezing. He has had no GI complaints of diarrhea. He has had no nausea or vomiting. He reports no unusual weight loss. He has had no abdominal symptoms. He has had no blood in his stool or dysuria. Past Medical History:   Diagnosis Date    Anxiety     Asthma     Hyperlipidemia     Hypertension          History reviewed. No pertinent surgical history.     Medications Prior to Admission:    Medications Prior to Admission: azithromycin [facility] Z-QUE) 250 MG tablet, See admin instructions  methylPREDNISolone (MEDROL, QUE,) 4 MG tablet, Take by mouth. furosemide (LASIX) 40 MG tablet, TAKE 1 TABLET BY MOUTH DAILY  budesonide-formoterol (SYMBICORT) 160-4.5 MCG/ACT AERO, INHALE 2 PUFFS BY MOUTH TWICE DAILY (Patient taking differently: Inhale 2 puffs into the lungs 2 times daily INHALE 2 PUFFS BY MOUTH TWICE DAILY)  amLODIPine (NORVASC) 10 MG tablet, TAKE 1 TABLET BY MOUTH EVERY DAY (Patient taking differently: Take 10 mg by mouth daily )  montelukast (SINGULAIR) 10 MG tablet, TAKE 1 TABLET BY MOUTH EVERY NIGHT AT BEDTIME (Patient taking differently: Take 10 mg by mouth nightly )  albuterol sulfate  (90 Base) MCG/ACT inhaler, INHALE 2 PUFFS BY MOUTH EVERY 4 HOURS AS NEEDED  atorvastatin (LIPITOR) 80 MG tablet, Take 1 tablet by mouth daily  aspirin 81 MG EC tablet, Take 81 mg by mouth daily. Allergies:    Patient has no known allergies. Social History:    reports that he has been smoking. He has been smoking about 1.00 pack per day. He has never used smokeless tobacco. He reports current drug use. Drug: Marijuana. He reports that he does not drink alcohol. Family History:   family history includes COPD in his maternal grandmother; Heart Attack in his father; Heart Attack (age of onset: 39) in his sister; Duanne Janna in his mother; Stomach Cancer in his brother. REVIEW OF SYSTEMS:  As above in the HPI, otherwise negative    PHYSICAL EXAM:    Vitals:  BP (!) 199/96   Pulse 70   Temp 97.5 °F (36.4 °C) (Oral)   Resp 20   Ht 6' (1.829 m)   Wt 245 lb 2.4 oz (111.2 kg)   SpO2 94%   BMI 33.25 kg/m²   Temp  Av.9 °F (36.6 °C)  Min: 97.5 °F (36.4 °C)  Max: 98.2 °F (36.8 °C)    General:  Awake, alert, oriented X 3. Well developed, well nourished. HEENT:  Normocephalic, atraumatic. Pupils equal, round, reactive to light. No scleral icterus. No conjunctival injection. Normal lips, teeth, and gums. No nasal discharge. Neck:  Supple. No carotid bruit, carotid upstroke normal.  Heart:  RRR, no murmurs, gallops, or rubs. PMI non-displaced  Lungs: Patient with equal breath sounds bilaterally, but diffuse expiratory wheezes bilaterally. He does have a persistent cough but his respirations are easy. No crackles or rhonchi are noted, just wheezing throughout. Abdomen: Bowel sounds present, normoactive. Soft, nontender/nondistended. No masses, no peritoneal signs. No apparent hepatosplenomegaly. Extremities:  No clubbing or cyanosis. He has 3-4+ edema in his lower extremities from his thighs down to his feet. Skin:  Warm and dry, no open lesions or rash. Neuro:  Cranial nerves 2-12 intact, no focal deficits. Moves all extremities without difficulty.     LABS:    Recent Results (from the past 24 hour(s))   Brain Natriuretic Peptide    Collection Time: 09/02/21  8:39 AM   Result Value Ref Range    Pro- (H) 0 - 124 pg/mL   CBC Auto Differential    Collection Time: 09/02/21  8:39 AM   Result Value Ref Range    WBC 8.1 4.0 - 11.0 K/uL    RBC 4.64 4.20 - 5.90 M/uL    Hemoglobin 15.4 13.5 - 17.5 g/dL    Hematocrit 45.2 40.5 - 52.5 %    MCV 97.5 80.0 - 100.0 fL    MCH 33.3 26.0 - 34.0 pg    MCHC 34.1 31.0 - 36.0 g/dL    RDW 13.8 12.4 - 15.4 %    Platelets 514 087 - 381 K/uL    MPV 7.3 5.0 - 10.5 fL    Neutrophils % 90.4 %    Lymphocytes % 4.1 %    Monocytes % 4.8 %    Eosinophils % 0.0 %    Basophils % 0.7 %    Neutrophils Absolute 7.3 1.7 - 7.7 K/uL    Lymphocytes Absolute 0.3 (L) 1.0 - 5.1 K/uL    Monocytes Absolute 0.4 0.0 - 1.3 K/uL    Eosinophils Absolute 0.0 0.0 - 0.6 K/uL    Basophils Absolute 0.1 0.0 - 0.2 K/uL   Comprehensive Metabolic Panel    Collection Time: 09/02/21  8:39 AM   Result Value Ref Range    Sodium 142 136 - 145 mmol/L    Potassium 2.3 (LL) 3.5 - 5.1 mmol/L    Chloride 92 (L) 99 - 110 mmol/L    CO2 38 (H) 21 - 32 mmol/L    Anion Gap 12 3 - 16    Glucose 278 (H) 70 - 99 mg/dL    BUN 12 7 - 20 mg/dL    CREATININE 0.8 (L) 0.9 - 1.3 mg/dL    GFR Non-African American >60 >60    GFR African American >60 >60    Calcium 9.0 8.3 - 10.6 mg/dL    Total Protein 6.3 (L) 6.4 - 8.2 g/dL    Albumin 3.9 3.4 - 5.0 g/dL    Albumin/Globulin Ratio 1.6 1.1 - 2.2    Total Bilirubin 0.8 0.0 - 1.0 mg/dL    Alkaline Phosphatase 223 (H) 40 - 129 U/L     (H) 10 - 40 U/L    AST 80 (H) 15 - 37 U/L    Globulin 2.4 g/dL   EKG 12 Lead    Collection Time: 09/02/21 10:58 AM   Result Value Ref Range    Ventricular Rate 59 BPM    Atrial Rate 59 BPM    P-R Interval 168 ms    QRS Duration 94 ms    Q-T Interval 424 ms    QTc Calculation (Bazett) 419 ms    P Axis 78 degrees    R Axis 53 degrees    T Axis 53 degrees    Diagnosis       Sinus bradycardia with sinus arrhythmiaPossible Left atrial enlargementNonspecific ST abnormalityConfirmed by ERIC GONZALEZ, Aly Tavera ([id number]) on 9/2/2021 1:07:48 PM   CBC Auto Differential    Collection Time: 09/02/21 11:20 AM   Result Value Ref Range    WBC 8.3 4.0 - 11.0 K/uL    RBC 4.57 4.20 - 5.90 M/uL    Hemoglobin 15.2 13.5 - 17.5 g/dL    Hematocrit 44.3 40.5 - 52.5 %    MCV 97.0 80.0 - 100.0 fL    MCH 33.2 26.0 - 34.0 pg    MCHC 34.2 31.0 - 36.0 g/dL    RDW 13.8 12.4 - 15.4 %    Platelets 812 978 - 106 K/uL    MPV 7.2 5.0 - 10.5 fL    Neutrophils % 88.1 %    Lymphocytes % 6.0 %    Monocytes % 5.3 %    Eosinophils % 0.0 %    Basophils % 0.6 %    Neutrophils Absolute 7.3 1.7 - 7.7 K/uL    Lymphocytes Absolute 0.5 (L) 1.0 - 5.1 K/uL    Monocytes Absolute 0.4 0.0 - 1.3 K/uL    Eosinophils Absolute 0.0 0.0 - 0.6 K/uL    Basophils Absolute 0.1 0.0 - 0.2 K/uL   Basic Metabolic Panel    Collection Time: 09/02/21 11:20 AM   Result Value Ref Range    Sodium 141 136 - 145 mmol/L    Potassium 2.2 (LL) 3.5 - 5.1 mmol/L    Chloride 92 (L) 99 - 110 mmol/L    CO2 40 (H) 21 - 32 mmol/L    Anion Gap 9 3 - 16    Glucose 199 (H) 70 - 99 mg/dL    BUN 12 7 - 20 mg/dL    CREATININE 0.7 (L) 0.9 - 1.3 mg/dL    GFR Non-African American >60 >60    GFR  American >60 >60    Calcium 8.9 8.3 - 10.6 mg/dL   Brain Natriuretic Peptide    Collection Time: 09/02/21 11:20 AM   Result Value Ref Range    Pro- (H) 0 - 124 pg/mL   Troponin    Collection Time: 09/02/21 11:20 AM   Result Value Ref Range    Troponin <0.01 <0.01 ng/mL   Magnesium    Collection Time: 09/02/21 11:20 AM   Result Value Ref Range    Magnesium 2.10 1.80 - 2.40 mg/dL   COVID-19, Rapid    Collection Time: 09/02/21  2:21 PM    Specimen: Nasopharyngeal Swab   Result Value Ref Range    SARS-CoV-2, NAAT Not Detected Not Detected   Urinalysis Reflex to Culture    Collection Time: 09/02/21  2:25 PM    Specimen: Urine, clean catch   Result Value Ref Range    Color, UA YELLOW Straw/Yellow    Clarity, UA Clear Clear    Glucose, Ur 100 (A) Negative mg/dL    Bilirubin Urine Negative Negative    Ketones, Urine Negative Negative mg/dL    Specific Gravity, UA 1.015 1.005 - 1.030    Blood, Urine TRACE (A) Negative    pH, UA 7.5 5.0 - 8.0    Protein, UA 30 (A) Negative mg/dL    Urobilinogen, Urine 1.0 <2.0 E.U./dL    Nitrite, Urine Negative Negative    Leukocyte Esterase, Urine Negative Negative    Microscopic Examination YES     Urine Type NotGiven     Urine Reflex to Culture Not Indicated    Microscopic Urinalysis    Collection Time: 09/02/21  2:25 PM   Result Value Ref Range    Hyaline Casts, UA 0 0 - 8 /LPF    WBC, UA 1 0 - 5 /HPF    RBC, UA 1 0 - 4 /HPF    Epithelial Cells, UA 1 0 - 5 /HPF   Potassium w/ Reflex to Magnesium    Collection Time: 09/02/21  4:25 PM   Result Value Ref Range    Potassium reflex Magnesium 2.6 (LL) 3.5 - 5.1 mmol/L   Magnesium    Collection Time: 09/02/21  4:25 PM   Result Value Ref Range    Magnesium 1.90 1.80 - 2.40 mg/dL   Blood Gas, Arterial    Collection Time: 09/02/21  4:38 PM   Result Value Ref Range    pH, Arterial 7.554 (H) 7.350 - 7.450    pCO2, Arterial 53.7 (H) 35.0 - 45.0 mmHg    pO2, Arterial 60.7 (L) 75.0 - 108.0 mmHg    HCO3, Arterial 47.5 (H) 21.0 - 29.0 mmol/L

## 2021-09-03 NOTE — PROGRESS NOTES
Pt arrived to floor via stretcher from ED and ambulated to bed. Telemetry activated and confirmed with [facility]. Patient oriented to room and use of call light. Call light and personal items within reach. Admission and assessment initiated. POC and education initiated and reviewed with patient. Telemetry-[id number]. Denied further needs or questions at this time. Will continue to monitor.

## 2021-09-04 NOTE — PROGRESS NOTES
Pulmonary Progress Note     Patient's name:  Jose Jackson  Medical Record Number: [id number]  Patient's account/billing number: [de-identified]  Patient's YOB: 1970  Age: 46 y.o. Date of Admission: 9/2/2021 10:52 AM  Date of Consult: 9/4/2021      Primary Care Physician: Jace Khan MD      Code Status: Full Code    Chief complaint: Mediastinal LAP     Assessment and Plan     1. Acute hypoxic respiratory failure. 2. Lung nodule with associated mediastinal hilar lymphadenopathy as well as liver lesions consistent with metastatic disease primary source is likely lung. 3. COPD with acute exacerbation. 4. Hypokalemia. 5. Tobacco abuse      Plan:  Continue with systemic steroids, antibiotics, bronchodilators. Planned to get CT-guided liver biopsy which I believe would be sufficient to establish the diagnosis, if it was not conclusive endobronchial ultrasound can be arranged next week. Potassium replacement. Wean oxygen as tolerated to keep sats more than 90%. Overnight:  Still short of breath but slowly improving. REVIEW OF SYSTEMS:  Review of Systems -   General ROS: negative  Psychological ROS: negative  Ophthalmic ROS: negative  ENT ROS: negative  Allergy and Immunology ROS: negative  Hematological and Lymphatic ROS: negative  Endocrine ROS: negative  Breast ROS: negative  Respiratory ROS: Cough, shortness of breath, wheezing.   Cardiovascular ROS: no chest pain or dyspnea on exertion  Gastrointestinal ROS:negative  Genito-Urinary ROS: negative  Musculoskeletal ROS: negative  Neurological ROS: negative  Dermatological ROS: negative        Physical Exam:    Vitals: BP (!) 187/97   Pulse 91   Temp 97.2 °F (36.2 °C) (Oral)   Resp 19   Ht 6' (1.829 m)   Wt 247 lb 5.7 oz (112.2 kg)   SpO2 90%   BMI 33.55 kg/m²     Last Body weight:   Wt Readings from Last 3 Encounters:   09/03/21 247 lb 5.7 oz (112.2 kg)   09/01/21 247 lb 12.8 oz (112.4 kg)   04/26/21 232 lb 12.8 Hepatic functions:   Recent Labs     09/04/21  0421   ALKPHOS 177*   *   AST 54*   PROT 5.5*   BILITOT 0.7   BILIDIR <0.2   LABALBU 3.5     Pancreatic functions:No results for input(s): LACTA, AMYLASE in the last 72 hours. S. Lactic Acid: No results for input(s): LACTA in the last 72 hours. Cardiac enzymes:  Recent Labs     09/02/21  1120   TROPONINI <0.01     BNP:No results for input(s): BNP in the last 72 hours. Lipid profile: No results for input(s): CHOL, TRIG, HDL, LDLCALC in the last 72 hours. Invalid input(s): LDL  Blood Gases: No results found for: PH, PCO2, PO2, HCO3, O2SAT  Thyroid functions:   Lab Results   Component Value Date    TSH 1.80 11/19/2019          Radiology Review:  Pertinent images / reports were reviewed as a part of this visit. CT Chest w/ contrast: No results found for this or any previous visit. CT Chest w/o contrast: No results found for this or any previous visit. CTPA: Results for orders placed during the hospital encounter of 09/02/21    CT CHEST PULMONARY EMBOLISM W CONTRAST    Narrative  EXAMINATION:  CTA OF THE CHEST 9/2/2021 12:47 pm    TECHNIQUE:  CTA of the chest was performed after the administration of intravenous  contrast.  Multiplanar reformatted images are provided for review. MIP  images are provided for review. Dose modulation, iterative reconstruction,  and/or weight based adjustment of the mA/kV was utilized to reduce the  radiation dose to as low as reasonably achievable. COMPARISON:  None.     HISTORY:  ORDERING SYSTEM PROVIDED HISTORY: leg swelling sob new o2 requirement  TECHNOLOGIST PROVIDED HISTORY:  Reason for exam:->leg swelling sob new o2 requirement  Decision Support Exception - unselect if not a suspected or confirmed  emergency medical condition->Emergency Medical Condition (MA)  Reason for Exam: leg swelling sob new o2 requirement  Acuity: Acute  Type of Exam: Initial    FINDINGS:  Pulmonary Arteries:  No central, lobar, or segmental pulmonary embolus. Narrowing of the right lower lobe pulmonary artery by the adenopathy. Mediastinum: Heart size is normal.  No pericardial effusion. Thoracic aorta  is normal caliber. Extensive mediastinal and hilar lymphadenopathy is  present. Subcarinal node measures 32 x 37 mm. Right hilar node measures 30  x 29 mm. Lungs/pleura: Moderate emphysema. Multiple 3 mm nodules are present in the  right middle lobe and left lower lobe. Superimposed in the posterior right  lower lobe, there is a dominant nodule measuring 17 x 17 mm. An additional  right lower lobe nodule measures 11 mm. No pleural effusion or pneumothorax. Narrowing of bronchus intermedius due to the right hilar adenopathy. Upper Abdomen: Multiple low-attenuation liver lesions are present measuring  up to 48 x 40 mm and 26 x 28 mm. At least 30 lesions are noted in the  visualized portions of the liver. Soft Tissues/Bones: No lytic or blastic osseous lesion. Impression  1. No acute pulmonary embolus. 2. Extensive mediastinal and hilar lymphadenopathy compatible with metastatic  neoplasm. 3. Multiple new indeterminate 3 mm nodules in the right middle lobe and left  lower lobe. These could be infectious or neoplastic. 4. Right lower lobe 17 mm and 11 mm nodules are nonspecific. 5. Multiple liver metastases measuring up to 48 x 40 mm. CXR PA/LAT: Results for orders placed during the hospital encounter of 09/02/21    XR CHEST (2 VW)    Narrative  EXAMINATION:  TWO XRAY VIEWS OF THE CHEST    9/2/2021 8:48 am    COMPARISON:  None. HISTORY:  ORDERING SYSTEM PROVIDED HISTORY: WEEMS (dyspnea on exertion)  TECHNOLOGIST PROVIDED HISTORY:  Reason for Exam: WEEMS, asthma  Acuity: Unknown  Type of Exam: Initial    FINDINGS:  Clear lungs. No pleural effusion or pneumothorax. Cardiomediastinal  silhouette is unremarkable. Degenerative changes of the visualized osseous  structures. Impression  Clear lungs.       CXR portable: No results found for this or any previous visit.                          Bert Moore MD, M.D.            9/4/2021, 12:27 PM

## 2021-09-04 NOTE — PLAN OF CARE
Problem: Infection:  Goal: Will remain free from infection  Description: Will remain free from infection  9/4/2021 0335 by Marianne Duverney, RN  Outcome: Ongoing  Note: Patient does not show any signs of infection at this time. Patient is afebrile, and WBC lab counts are within normal limits. Will continue to monitor. Problem: Daily Care:  Goal: Daily care needs are met  Description: Daily care needs are met  9/4/2021 0335 by Marianne Duverney, RN  Outcome: Ongoing  Note: Patient able to provide all daily needs for himself. Problem: Pain:  Goal: Patient's pain/discomfort is manageable  Description: Patient's pain/discomfort is manageable  9/4/2021 0335 by Marianne Duverney, RN  Outcome: Ongoing  Note: Patient denies any pain at this time. Will continue to monitor. Problem: Skin Integrity:  Goal: Skin integrity will stabilize  Description: Skin integrity will stabilize  9/4/2021 0335 by Marianne Duverney, RN  Outcome: Ongoing  Note: Patient is a low risk for impaired skin integrity. Educated patient on shifting weight of self every 15 minutes. Skin is assessed every shift and prn. Barrier cream applied for incontinence as needed.

## 2021-09-04 NOTE — PROGRESS NOTES
Hematology Oncology Daily Progress Note    Admit Date: 9/2/2021  Hospital day several    Subjective:     Patient has complaints of mild to mod fatigue--denies sob/cp. Medication side effects: none    Scheduled Meds:   [START ON 9/5/2021] lisinopril  20 mg Oral Daily    furosemide  20 mg IntraVENous Once    sodium chloride (Inhalant)  15 mL Nebulization Q4H    piperacillin-tazobactam  3,375 mg IntraVENous Q8H    amLODIPine  10 mg Oral Daily    budesonide-formoterol  2 puff Inhalation BID    montelukast  10 mg Oral Nightly    sodium chloride flush  5-40 mL IntraVENous 2 times per day    enoxaparin  40 mg SubCUTAneous Daily    nicotine  1 patch TransDERmal Daily    ipratropium-albuterol  1 ampule Inhalation Q4H WA    methylPREDNISolone  40 mg IntraVENous Q8H    doxycycline hyclate  100 mg Oral 2 times per day     Continuous Infusions:   sodium chloride Stopped (09/03/21 1418)     PRN Meds:cloNIDine, iopamidol, sodium chloride flush, sodium chloride, ondansetron **OR** ondansetron, polyethylene glycol, acetaminophen **OR** acetaminophen    Review of Systems  Pertinent items are noted in HPI. REVIEW OF SYSTEMS:         · Constitutional: Denies fever, sweats, weight loss     · Eyes: No visual changes or diplopia. No scleral icterus. · ENT: No Headaches, hearing loss or vertigo. No mouth sores or sore throat. · Cardiovascular: No chest pain, dyspnea on exertion, palpitations or loss of consciousness. · Respiratory: No cough or wheezing, no sputum production. No hemoptysis. .    · Gastrointestinal: No abdominal pain, appetite loss, blood in stools. No change in bowel habits. · Genitourinary: No dysuria, trouble voiding, or hematuria. · Musculoskeletal:  Generalized weakness. No joint complaints. · Integumentary: No rash or pruritis. · Neurological: No headache, diplopia. No change in gait, balance, or coordination. No paresthesias. · Endocrine: No temperature intolerance.  No excessive thirst, fluid intake, or urination. · Hematologic/Lymphatic: No abnormal bruising or ecchymoses, blood clots or swollen lymph nodes. · Allergic/Immunologic: No nasal congestion or hives. ·     Objective:     Patient Vitals for the past 8 hrs:   BP Temp Temp src Pulse Resp SpO2   09/04/21 0914 -- -- -- -- -- 90 %   09/04/21 0911 -- -- -- -- -- (!) 83 %   09/04/21 0856 (!) 187/97 97.2 °F (36.2 °C) Oral 91 19 90 %   09/04/21 0845 -- -- -- -- -- (!) 72 %   09/04/21 0809 -- -- -- -- -- 92 %   09/04/21 0806 -- -- -- -- -- 95 %   09/04/21 0530 (!) 182/98 -- -- -- -- --     I/O last 3 completed shifts: In: 1388.7 [P.O.:60; I.V.:412.3; IV Piggyback:916.5]  Out: 3044 [Urine:3650]  No intake/output data recorded.     BP (!) 187/97   Pulse 91   Temp 97.2 °F (36.2 °C) (Oral)   Resp 19   Ht 6' (1.829 m)   Wt 247 lb 5.7 oz (112.2 kg)   SpO2 90%   BMI 33.55 kg/m²     General Appearance:    Alert, cooperative, no distress, appears stated age   Head:    Normocephalic, without obvious abnormality, atraumatic   Eyes:    PERRL, conjunctiva/corneas clear, EOM's intact, fundi     benign, both eyes        Ears:    Normal TM's and external ear canals, both ears   Nose:   Nares normal, septum midline, mucosa normal, no drainage    or sinus tenderness   Throat:   Lips, mucosa, and tongue normal; teeth and gums normal   Neck:   Supple, symmetrical, trachea midline, no adenopathy;        thyroid:  No enlargement/tenderness/nodules; no carotid    bruit or JVD   Back:     Symmetric, no curvature, ROM normal, no CVA tenderness   Lungs:     Clear to auscultation bilaterally, respirations unlabored   Chest wall:    No tenderness or deformity   Heart:    Regular rate and rhythm, S1 and S2 normal, no murmur, rub   or gallop   Abdomen:     Soft, non-tender, bowel sounds active all four quadrants,     no masses, no organomegaly           Extremities:   2+ LE edema bilaterally   Pulses:   2+ and symmetric all extremities   Skin:   Skin color, texture, turgor normal, no rashes or lesions   Lymph nodes:   Cervical, supraclavicular, and axillary nodes normal   Neurologic:   CNII-XII intact. Normal strength, sensation and reflexes       throughout         Data Review  CBC:   Lab Results   Component Value Date    WBC 7.9 09/04/2021    RBC 4.38 09/04/2021       Assessment:     Principal Problem:    Hypokalemia  Active Problems:    Pure hypercholesterolemia    Essential hypertension    Moderate persistent asthma with acute exacerbation    Metastatic cancer (HCC)    Acute respiratory failure with hypoxia (HCC)    Abnormal CT of the chest    COPD exacerbation (HCC)  Resolved Problems:    * No resolved hospital problems. *      Plan:     1. Lung/liver lesions--unknown primary. CEA elevated. Will check CA 19-9. CT guided liver biopsy ordered for Tuesday. He may need brain MRI Tuesday as well.     2. Hypokalemia--per primary team.        Electronically signed by Matty Garg MD on 9/4/2021 at 12:36 PM

## 2021-09-04 NOTE — PROGRESS NOTES
[facility] Internal Medicine Note      Chief Complaint: I feel better    Subjective/Interval History: This morning the patient is sitting at the side of the bed. He states he feels better. Less wheezing. Less short of breath. Still on 5 L of oxygen. Potassium levels were improved last night, but once again low today. Edema continues. No chest pain. No nausea, vomiting, diarrhea. No abdominal pain. No dysuria. The remainder of the review of systems is negative. PMH, PSH, FH/SH reviewed and unchanged as documented in the H&P personally documented at admission 21    Medication list reviewed    Objective:    BP (!) 187/97   Pulse 91   Temp 97.2 °F (36.2 °C) (Oral)   Resp 19   Ht 6' (1.829 m)   Wt 247 lb 5.7 oz (112.2 kg)   SpO2 90%   BMI 33.55 kg/m²   Temp  Av.8 °F (36.6 °C)  Min: 97.2 °F (36.2 °C)  Max: 98.1 °F (36.7 °C)    RRR  Chest-respirations easy. Continues with wheezing and rhonchi, but far less pronounced. Abd- BS+, soft, NTND  Ext-continued 3-4+ edema bilateral lower extremities to the thighs.     The Following Labs Were Reviewed Today:    Recent Results (from the past 24 hour(s))   Culture, Respiratory    Collection Time: 21 12:46 PM    Specimen: Sputum Expectorated   Result Value Ref Range    CULTURE, RESPIRATORY Further report to follow    Potassium    Collection Time: 21  4:08 PM   Result Value Ref Range    Potassium 3.5 3.5 - 5.1 mmol/L   Basic Metabolic Panel w/ Reflex to MG    Collection Time: 21  4:21 AM   Result Value Ref Range    Sodium 138 136 - 145 mmol/L    Potassium reflex Magnesium 2.8 (LL) 3.5 - 5.1 mmol/L    Chloride 93 (L) 99 - 110 mmol/L    CO2 36 (H) 21 - 32 mmol/L    Anion Gap 9 3 - 16    Glucose 205 (H) 70 - 99 mg/dL    BUN 13 7 - 20 mg/dL    CREATININE 0.7 (L) 0.9 - 1.3 mg/dL    GFR Non-African American >60 >60    GFR African American >60 >60    Calcium 8.1 (L) 8.3 - 10.6 mg/dL   CBC Auto Differential    Collection Time: 21  4:21 AM Result Value Ref Range    WBC 7.9 4.0 - 11.0 K/uL    RBC 4.38 4.20 - 5.90 M/uL    Hemoglobin 14.6 13.5 - 17.5 g/dL    Hematocrit 43.6 40.5 - 52.5 %    MCV 99.7 80.0 - 100.0 fL    MCH 33.4 26.0 - 34.0 pg    MCHC 33.5 31.0 - 36.0 g/dL    RDW 13.9 12.4 - 15.4 %    Platelets 730 (L) 954 - 450 K/uL    MPV 7.5 5.0 - 10.5 fL    Neutrophils % 90.5 %    Lymphocytes % 5.3 %    Monocytes % 3.9 %    Eosinophils % 0.1 %    Basophils % 0.2 %    Neutrophils Absolute 7.1 1.7 - 7.7 K/uL    Lymphocytes Absolute 0.4 (L) 1.0 - 5.1 K/uL    Monocytes Absolute 0.3 0.0 - 1.3 K/uL    Eosinophils Absolute 0.0 0.0 - 0.6 K/uL    Basophils Absolute 0.0 0.0 - 0.2 K/uL   Hepatic Function Panel    Collection Time: 09/04/21  4:21 AM   Result Value Ref Range    Total Protein 5.5 (L) 6.4 - 8.2 g/dL    Albumin 3.5 3.4 - 5.0 g/dL    Alkaline Phosphatase 177 (H) 40 - 129 U/L     (H) 10 - 40 U/L    AST 54 (H) 15 - 37 U/L    Total Bilirubin 0.7 0.0 - 1.0 mg/dL    Bilirubin, Direct <0.2 0.0 - 0.3 mg/dL    Bilirubin, Indirect see below 0.0 - 1.0 mg/dL   Cortisol AM    Collection Time: 09/04/21  4:21 AM   Result Value Ref Range    Cortisol - AM 90.0 (HH) 4.3 - 22.4 ug/dL   Magnesium    Collection Time: 09/04/21  4:21 AM   Result Value Ref Range    Magnesium 2.30 1.80 - 2.40 mg/dL       ASSESSMENT/PLAN:      Principal Problem:    Hypokalemia-continued hypokalemia. He was improved last evening, but low again this morning. Replace with oral potassium 40 mEq x 2 doses and repeat potassium this afternoon. Starting Lasix so we will need to continue to monitor potassium closely. Active Problems:    Metastatic cancer-CT reviewed. Restart Lovenox for DVT prophylaxis since biopsy will not be obtained till Tuesday. Moderate persistent asthma with acute exacerbation-continue with steroids and nebulizers. Pulmonary consult appreciated. Zosyn added to doxycycline given very high procalcitonin level.     Pure hypercholesterolemia-hold Lipitor given abnormal LFTs. Essential hypertension-blood pressure remains high. Continue amlodipine and increase lisinopril to 20 mg. Continue with as needed clonidine. Bilateral leg edema-start Lasix. No evidence of IVC compression or thrombosis. Monitor renal function closely and daily weights. Family at bedside, case discussed at length. Discussed with nursing as well.     Time > 35 minutes reviewing chart and patient data, examining and interviewing patient, and discussing with nursing staff, family, etc.     Siomara Brito MD, Reema Jackson  11:04 AM  9/4/2021

## 2021-09-04 NOTE — PROGRESS NOTES
Clonidine given x1 for a BP of 201/110,  /98 on recheck. Potassium replacement started for a K level of 2.8.

## 2021-09-05 NOTE — PROGRESS NOTES
[facility] Internal Medicine Note      Chief Complaint: I feel better    Subjective/Interval History: This morning the patient remains on 5 L of oxygen. Good diuresis with Lasix yesterday. Weight is down some since admission. Potassium improved today. Less wheezing. States he is feeling okay. Eating and drinking well. Discussed with nursing at the bedside. No new problems noted overnight. No chest pain. No nausea, vomiting, diarrhea. No abdominal pain. No dysuria. The remainder of the review of systems is negative. PMH, PSH, FH/SH reviewed and unchanged as documented in the H&P personally documented at admission 21    Medication list reviewed    Objective:    BP (!) 184/100   Pulse 75   Temp 98.4 °F (36.9 °C) (Oral)   Resp 15   Ht 6' (1.829 m)   Wt 243 lb 9.7 oz (110.5 kg)   SpO2 97%   BMI 33.04 kg/m²   Temp  Av.8 °F (36.6 °C)  Min: 97.4 °F (36.3 °C)  Max: 98.4 °F (36.9 °C)    RRR  Chest-respirations easy. Few fine wheezes, overall much improved and much clearer lung sounds. Abd- BS+, soft, NTND  Ext-continued 3-4+ edema bilateral lower extremities to the thighs. The Following Labs Were Reviewed Today:    Recent Results (from the past 24 hour(s))   Potassium    Collection Time: 21  2:36 PM   Result Value Ref Range    Potassium 3.4 (L) 3.5 - 5.1 mmol/L   Basic Metabolic Panel w/ Reflex to MG    Collection Time: 21  5:05 AM   Result Value Ref Range    Sodium 142 136 - 145 mmol/L    Potassium reflex Magnesium 4.2 3.5 - 5.1 mmol/L    Chloride 96 (L) 99 - 110 mmol/L    CO2 36 (H) 21 - 32 mmol/L    Anion Gap 10 3 - 16    Glucose 223 (H) 70 - 99 mg/dL    BUN 18 7 - 20 mg/dL    CREATININE 0.7 (L) 0.9 - 1.3 mg/dL    GFR Non-African American >60 >60    GFR African American >60 >60    Calcium 8.5 8.3 - 10.6 mg/dL       ASSESSMENT/PLAN:      Principal Problem:    Hypokalemia-potassium is better today. Repeat potassium at 4 PM this afternoon.   Start twice daily potassium dosing since he will get more Lasix today. Active Problems:    Metastatic cancer-CT reviewed. Restarted Lovenox for DVT prophylaxis since biopsy will not be obtained till Tuesday. Plan to discontinue Lovenox after tomorrow morning's dose. Moderate persistent asthma with acute exacerbation-improving. Change Solu-Medrol to 40 mg twice daily. Continue with antibiotics and nebulizers. Pure hypercholesterolemia-continue to hold Lipitor given abnormal LFTs. Essential hypertension-blood pressures remain quite high. Increase lisinopril to 40 mg. Continue as needed clonidine. May need to consider lower amlodipine dose in the future given his edema, but his edema certainly is being driven by his metastatic disease and liver involvement rather than the amlodipine. Bilateral leg edema-reasonable diuresis yesterday. Continue Lasix 20 mg IV twice daily. Add twice daily oral Lasix. Steroid-induced hyperglycemia-add sliding scale insulin. Consider Lantus if remains high. I expect sugar to improve with tapering of the steroids. Change to carb control diet.       Deirdre Resendiz MD, [facility]  9:06 AM  9/5/2021

## 2021-09-05 NOTE — PROGRESS NOTES
Pt in with copd, htn and rule out cancer. Has been on 5 L nasal cannula to maintain a saturation above 90%. Was dropping his saturations prior to the increase to the low to mid 80's, but was sustained after the increase. His blood pressure has been elevated. Was medicated twice with prn clonidine, which was not very effective. Blood pressure remained somewhat elevated. Pt was symptomatic.

## 2021-09-06 NOTE — PROGRESS NOTES
09/06/21  4:41 AM   Result Value Ref Range    Sodium 143 136 - 145 mmol/L    Potassium reflex Magnesium 3.7 3.5 - 5.1 mmol/L    Chloride 97 (L) 99 - 110 mmol/L    CO2 36 (H) 21 - 32 mmol/L    Anion Gap 10 3 - 16    Glucose 234 (H) 70 - 99 mg/dL    BUN 21 (H) 7 - 20 mg/dL    CREATININE 0.7 (L) 0.9 - 1.3 mg/dL    GFR Non-African American >60 >60    GFR African American >60 >60    Calcium 8.5 8.3 - 10.6 mg/dL   POCT Glucose    Collection Time: 09/06/21  7:29 AM   Result Value Ref Range    POC Glucose 238 (H) 70 - 99 mg/dl    Performed on ACCU-CHEK        ASSESSMENT/PLAN:      Principal Problem:    Hypokalemia-potassium is back to normal.  Etiology of the hypokalemia is unclear. Ask nephrology to evaluate. Active Problems:    Metastatic cancer-CT reviewed. Lovenox on hold for biopsy tomorrow. Moderate persistent asthma with acute exacerbation-continues to improve. Change to p.o. prednisone. Pure hypercholesterolemia-continue to hold Lipitor given abnormal LFTs. Essential hypertension-blood pressures remain quite high. No significant change with lisinopril 40 mg. Amlodipine may be contributing to his edema to some degree, but given his severe hypertension, unwilling to wean this at this point. Given active wheezing will avoid beta-blocker therapy. Add doxazosin 2 mg this morning to see what kind of response he gets. Ask nephrology to evaluate given his resistant hypertension and the profound hypokalemia at admission. Increase clonidine to 0.2 mg every 4 hours as needed. Bilateral leg edema-increase Lasix to 40 mg twice daily since weight is up today. Increase supplemental potassium as well. Steroid-induced hyperglycemia-add sliding scale insulin. Add Lantus 10 units daily to the sliding scale. Low-carb diet. Hopefully this improves with weaning of his prednisone.     Time > 35 minutes reviewing chart and patient data, examining and interviewing patient, and discussing with nursing staff, family, etc.       Miranda Evans MD, [address]  10:08 AM  9/6/2021

## 2021-09-06 NOTE — PLAN OF CARE
Problem: Infection:  Goal: Will remain free from infection  Description: Will remain free from infection  9/6/2021 0951 by Yves Da Silva RN  Outcome: Ongoing     Problem: Safety:  Goal: Free from accidental physical injury  Description: Free from accidental physical injury  9/6/2021 0951 by Yves Da Silva RN  Outcome: Ongoing     Problem: Safety:  Goal: Free from intentional harm  Description: Free from intentional harm  9/6/2021 0951 by Yves Da Silva RN  Outcome: Ongoing     Problem: Daily Care:  Goal: Daily care needs are met  Description: Daily care needs are met  9/6/2021 0951 by Yves Da Silva RN  Outcome: Ongoing     Problem: Pain:  Goal: Patient's pain/discomfort is manageable  Description: Patient's pain/discomfort is manageable  9/6/2021 0951 by Yves Da Silva RN  Outcome: Ongoing     Problem: Skin Integrity:  Goal: Skin integrity will stabilize  Description: Skin integrity will stabilize  9/6/2021 0951 by Yves Da Silva RN  Outcome: Ongoing     Problem: Discharge Planning:  Goal: Patients continuum of care needs are met  Description: Patients continuum of care needs are met  9/6/2021 0951 by Yves Da Silva RN  Outcome: Ongoing     Problem:  Activity Intolerance:  Goal: Ability to tolerate increased activity will improve  Description: Ability to tolerate increased activity will improve  9/6/2021 0951 by Yves Da Silva RN  Outcome: Ongoing     Problem: Airway Clearance - Ineffective:  Goal: Ability to maintain a clear airway will improve  Description: Ability to maintain a clear airway will improve  9/6/2021 0951 by Yves Da Silva RN  Outcome: Ongoing     Problem: Breathing Pattern - Ineffective:  Goal: Ability to achieve and maintain a regular respiratory rate will improve  Description: Ability to achieve and maintain a regular respiratory rate will improve  9/6/2021 0951 by Yves Da Silva RN  Outcome: Ongoing     Problem: Gas Exchange - Impaired:  Goal: Levels of oxygenation will improve  Description: Levels of oxygenation will improve  9/6/2021 0951 by Starr Hubbard RN  Outcome: Ongoing

## 2021-09-06 NOTE — PROGRESS NOTES
Hematology Oncology Daily Progress Note    Admit Date: 9/2/2021  Hospital day several    Subjective:     Patient has complaints of mild to mod fatigue--denies sob/cp. Medication side effects: none    Scheduled Meds:   doxazosin  2 mg Oral Daily    insulin glargine  10 Units SubCUTAneous Daily    predniSONE  20 mg Oral BID    furosemide  40 mg IntraVENous BID    potassium chloride  40 mEq Oral BID WC    sodium chloride (Inhalant)  15 mL Nebulization Q4H WA    lisinopril  40 mg Oral Daily    insulin lispro  0-6 Units SubCUTAneous TID WC    insulin lispro  0-3 Units SubCUTAneous Nightly    piperacillin-tazobactam  3,375 mg IntraVENous Q8H    amLODIPine  10 mg Oral Daily    budesonide-formoterol  2 puff Inhalation BID    montelukast  10 mg Oral Nightly    sodium chloride flush  5-40 mL IntraVENous 2 times per day    enoxaparin  40 mg SubCUTAneous Daily    nicotine  1 patch TransDERmal Daily    ipratropium-albuterol  1 ampule Inhalation Q4H WA    doxycycline hyclate  100 mg Oral 2 times per day     Continuous Infusions:   dextrose      sodium chloride 25 mL (09/06/21 0535)     PRN Meds:cloNIDine, glucose, dextrose, glucagon (rDNA), dextrose, iopamidol, sodium chloride flush, sodium chloride, ondansetron **OR** ondansetron, polyethylene glycol, acetaminophen **OR** acetaminophen    Review of Systems  Pertinent items are noted in HPI. REVIEW OF SYSTEMS:         · Constitutional: Denies fever, sweats, weight loss     · Eyes: No visual changes or diplopia. No scleral icterus. · ENT: No Headaches, hearing loss or vertigo. No mouth sores or sore throat. · Cardiovascular: No chest pain, dyspnea on exertion, palpitations or loss of consciousness. · Respiratory: No cough or wheezing, no sputum production. No hemoptysis. .    · Gastrointestinal: No abdominal pain, appetite loss, blood in stools. No change in bowel habits. · Genitourinary: No dysuria, trouble voiding, or hematuria.   · Musculoskeletal: Generalized weakness. No joint complaints. · Integumentary: No rash or pruritis. · Neurological: No headache, diplopia. No change in gait, balance, or coordination. No paresthesias. · Endocrine: No temperature intolerance. No excessive thirst, fluid intake, or urination. · Hematologic/Lymphatic: No abnormal bruising or ecchymoses, blood clots or swollen lymph nodes. · Allergic/Immunologic: No nasal congestion or hives. ·     Objective:     Patient Vitals for the past 8 hrs:   BP Temp Temp src Pulse Resp SpO2   09/06/21 0826 (!) 172/96 97.8 °F (36.6 °C) Oral 73 19 93 %   09/06/21 0745 -- -- -- -- -- 90 %   09/06/21 0600 -- -- -- 75 -- --   09/06/21 0515 (!) 192/95 -- -- -- -- --     I/O last 3 completed shifts: In: 2838.3 [P.O.:2520; IV Piggyback:318.3]  Out: 3825 [Urine:3825]  No intake/output data recorded.     BP (!) 172/96   Pulse 73   Temp 97.8 °F (36.6 °C) (Oral)   Resp 19   Ht 6' (1.829 m)   Wt 246 lb 0.5 oz (111.6 kg)   SpO2 93%   BMI 33.37 kg/m²     General Appearance:    Alert, cooperative, no distress, appears stated age   Head:    Normocephalic, without obvious abnormality, atraumatic   Eyes:    PERRL, conjunctiva/corneas clear, EOM's intact, fundi     benign, both eyes        Ears:    Normal TM's and external ear canals, both ears   Nose:   Nares normal, septum midline, mucosa normal, no drainage    or sinus tenderness   Throat:   Lips, mucosa, and tongue normal; teeth and gums normal   Neck:   Supple, symmetrical, trachea midline, no adenopathy;        thyroid:  No enlargement/tenderness/nodules; no carotid    bruit or JVD   Back:     Symmetric, no curvature, ROM normal, no CVA tenderness   Lungs:     Clear to auscultation bilaterally, respirations unlabored   Chest wall:    No tenderness or deformity   Heart:    Regular rate and rhythm, S1 and S2 normal, no murmur, rub   or gallop   Abdomen:     Soft, non-tender, bowel sounds active all four quadrants,     no masses, no organomegaly

## 2021-09-06 NOTE — PROGRESS NOTES
Pulmonary Progress Note     Patient's name:  Dheeraj Olvera  Medical Record Number: [id number]  Patient's account/billing number: [de-identified]  Patient's YOB: 1970  Age: 46 y.o. Date of Admission: 9/2/2021 10:52 AM  Date of Consult: 9/6/2021      Primary Care Physician: Александр Alvarado MD      Code Status: Full Code    Chief complaint: Mediastinal LAP     Assessment and Plan     1. Acute hypoxic respiratory failure. 2. Lung nodule with associated mediastinal hilar lymphadenopathy as well as liver lesions consistent with metastatic disease primary source is likely lung. 3. COPD with acute exacerbation. 4. Hypokalemia. 5. Tobacco abuse      Plan:  Continue with systemic steroids, antibiotics, bronchodilators. Planned to get CT-guided liver biopsy which I believe would be sufficient to establish the diagnosis, if it was not conclusive endobronchial ultrasound can be arranged next week. Wean oxygen as tolerated to keep sats more than 90%. Overnight:  Still short of breath but slowly improving. REVIEW OF SYSTEMS:  Review of Systems -   General ROS: negative  Psychological ROS: negative  Ophthalmic ROS: negative  ENT ROS: negative  Allergy and Immunology ROS: negative  Hematological and Lymphatic ROS: negative  Endocrine ROS: negative  Breast ROS: negative  Respiratory ROS: Cough, shortness of breath, wheezing.   Cardiovascular ROS: no chest pain or dyspnea on exertion  Gastrointestinal ROS:negative  Genito-Urinary ROS: negative  Musculoskeletal ROS: negative  Neurological ROS: negative  Dermatological ROS: negative        Physical Exam:    Vitals: BP (!) 172/96   Pulse 73   Temp 97.8 °F (36.6 °C) (Oral)   Resp 19   Ht 6' (1.829 m)   Wt 246 lb 0.5 oz (111.6 kg)   SpO2 93%   BMI 33.37 kg/m²     Last Body weight:   Wt Readings from Last 3 Encounters:   09/06/21 246 lb 0.5 oz (111.6 kg)   09/01/21 247 lb 12.8 oz (112.4 kg)   04/26/21 232 lb 12.8 oz (105.6 kg)       Body Mass Index : Body mass index is 33.37 kg/m². Intake and Output summary:     Intake/Output Summary (Last 24 hours) at 9/6/2021 1134  Last data filed at 9/6/2021 0656  Gross per 24 hour   Intake 2478.3 ml   Output 3375 ml   Net -896.7 ml       Physical Examination:     Gen: Mild to moderate acute distress. Eyes: PERRL. Anicteric sclera. No conjunctival injection. ENT: No discharge. Posterior oropharynx clear. External appearance of ears and nose normal.  Neck: Trachea midline. No mass   Resp: Diminished breath sounds bilaterally with scattered rhonchi and prolonged expiratory phase  CV: Regular rate. Regular rhythm. No murmur or rub. GI: Soft, Non-tender. Non-distended. +BS  Skin: Warm, dry, w/o erythema, positive edema. Lymph: No cervical or supraclavicular LAD. M/S: No cyanosis. No clubbing. Neuro:  CN 2-12 tested, no focal neurologic deficit. Moves all extremities  Psych: Awake and alert, Oriented x 3. Judgement and insight appropriate. Mood stable. Laboratory findings:-    CBC:   Recent Labs     09/04/21 0421   WBC 7.9   HGB 14.6   *     BMP:    Recent Labs     09/04/21  0421 09/04/21  1436 09/05/21  0505 09/05/21  1535 09/06/21  0441     --  142  --  143   K 2.8*   < > 4.2   < > 3.7   CL 93*  --  96*  --  97*   CO2 36*  --  36*  --  36*   BUN 13  --  18  --  21*   CREATININE 0.7*  --  0.7*  --  0.7*   GLUCOSE 205*  --  223*  --  234*    < > = values in this interval not displayed. S. Calcium:  Recent Labs     09/06/21 0441   CALCIUM 8.5     S. Ionized Calcium:No results for input(s): IONCA in the last 72 hours. S. Magnesium:  Recent Labs     09/04/21 0421   MG 2.30     S. Phosphorus:No results for input(s): PHOS in the last 72 hours. S. Glucose:  Recent Labs     09/05/21  1537 09/05/21  2040 09/06/21  0729   POCGLU 294* 364* 238*     Glycosylated hemoglobin A1C: No results for input(s): LABA1C in the last 72 hours.   INR:   No results for input(s): INR in the last 72 Initial    FINDINGS:  Pulmonary Arteries:  No central, lobar, or segmental pulmonary embolus. Narrowing of the right lower lobe pulmonary artery by the adenopathy. Mediastinum: Heart size is normal.  No pericardial effusion. Thoracic aorta  is normal caliber. Extensive mediastinal and hilar lymphadenopathy is  present. Subcarinal node measures 32 x 37 mm. Right hilar node measures 30  x 29 mm. Lungs/pleura: Moderate emphysema. Multiple 3 mm nodules are present in the  right middle lobe and left lower lobe. Superimposed in the posterior right  lower lobe, there is a dominant nodule measuring 17 x 17 mm. An additional  right lower lobe nodule measures 11 mm. No pleural effusion or pneumothorax. Narrowing of bronchus intermedius due to the right hilar adenopathy. Upper Abdomen: Multiple low-attenuation liver lesions are present measuring  up to 48 x 40 mm and 26 x 28 mm. At least 30 lesions are noted in the  visualized portions of the liver. Soft Tissues/Bones: No lytic or blastic osseous lesion. Impression  1. No acute pulmonary embolus. 2. Extensive mediastinal and hilar lymphadenopathy compatible with metastatic  neoplasm. 3. Multiple new indeterminate 3 mm nodules in the right middle lobe and left  lower lobe. These could be infectious or neoplastic. 4. Right lower lobe 17 mm and 11 mm nodules are nonspecific. 5. Multiple liver metastases measuring up to 48 x 40 mm. CXR PA/LAT: Results for orders placed during the hospital encounter of 09/02/21    XR CHEST (2 VW)    Narrative  EXAMINATION:  TWO XRAY VIEWS OF THE CHEST    9/2/2021 8:48 am    COMPARISON:  None. HISTORY:  ORDERING SYSTEM PROVIDED HISTORY: WEEMS (dyspnea on exertion)  TECHNOLOGIST PROVIDED HISTORY:  Reason for Exam: WEEMS, asthma  Acuity: Unknown  Type of Exam: Initial    FINDINGS:  Clear lungs. No pleural effusion or pneumothorax. Cardiomediastinal  silhouette is unremarkable.   Degenerative changes of the visualized osseous  structures. Impression  Clear lungs. CXR portable: No results found for this or any previous visit.                          Ngoc Roberts MD, M.D.            9/6/2021, 11:34 AM

## 2021-09-06 NOTE — PROGRESS NOTES
Pt has been more awake and active tonight. Has tolerated activity well, with very little shortness of breath. Has even stated he was feeling better. Has denied any need.

## 2021-09-06 NOTE — PLAN OF CARE
Problem: Infection:  Goal: Will remain free from infection  Description: Will remain free from infection  Outcome: Ongoing     Problem: Safety:  Goal: Free from accidental physical injury  Description: Free from accidental physical injury  Outcome: Ongoing  Goal: Free from intentional harm  Description: Free from intentional harm  Outcome: Ongoing     Problem: Daily Care:  Goal: Daily care needs are met  Description: Daily care needs are met  Outcome: Ongoing     Problem: Pain:  Goal: Patient's pain/discomfort is manageable  Description: Patient's pain/discomfort is manageable  Outcome: Ongoing     Problem: Skin Integrity:  Goal: Skin integrity will stabilize  Description: Skin integrity will stabilize  Outcome: Ongoing     Problem: Discharge Planning:  Goal: Patients continuum of care needs are met  Description: Patients continuum of care needs are met  Outcome: Ongoing     Problem:  Activity Intolerance:  Goal: Ability to tolerate increased activity will improve  Description: Ability to tolerate increased activity will improve  Outcome: Ongoing     Problem: Airway Clearance - Ineffective:  Goal: Ability to maintain a clear airway will improve  Description: Ability to maintain a clear airway will improve  Outcome: Ongoing     Problem: Breathing Pattern - Ineffective:  Goal: Ability to achieve and maintain a regular respiratory rate will improve  Description: Ability to achieve and maintain a regular respiratory rate will improve  Outcome: Ongoing     Problem: Gas Exchange - Impaired:  Goal: Levels of oxygenation will improve  Description: Levels of oxygenation will improve  Outcome: Ongoing

## 2021-09-07 NOTE — PROGRESS NOTES
Pulmonary Progress Note    Date of Admission: 9/2/2021   LOS: 5 days     Chief Complaint   Patient presents with    Abnormal Lab     sent by Dr. Rickey Dotson for low potassium levels        Assessment/Plan:       Acute hypoxemic respiratory failure  -Wean supplemental oxygen goal SPO2 90%    Acute exacerbation of COPD  -Prednisone 20 mg daily  -Symbicort, scheduled duo nebs, Singulair daily  -Doxycycline x5 days    Lung nodule  -Concern for metastatic lung cancer, plan for liver biopsy for staging purposes today    Tobacco abuse        24 Hour Events/Subjective  No acute complaints today. Feels good. Planning for CT-guided liver biopsy today    ROS:   No nausea  No Vomiting  No chest pain      Intake/Output Summary (Last 24 hours) at 9/7/2021 0927  Last data filed at 9/7/2021 0653  Gross per 24 hour   Intake 2135.69 ml   Output 3075 ml   Net -939.31 ml         PHYSICAL EXAM:   Blood pressure (!) 185/99, pulse 88, temperature 97.6 °F (36.4 °C), temperature source Oral, resp. rate 20, height 6' (1.829 m), weight 245 lb 13 oz (111.5 kg), SpO2 92 %.'  Gen:  No acute distress. Eyes: PERRL. Anicteric sclera. No conjunctival injection. ENT: No discharge. Posterior oropharynx clear. External appearance of ears and nose normal.  Neck: Trachea midline. No mass   Resp:  No crackles. No wheezes. No rhonchi. No dullness on percussion. CV: Regular rate. Regular rhythm. No murmur or rub. 2+ lower extremity bilateral edema. GI: Soft, Non-tender. Non-distended. +BS  Skin: Warm, dry, w/o erythema. Lymph: No cervical or supraclavicular LAD. M/S: No cyanosis. No clubbing. Neuro:  no focal neurologic deficit. Moves all extremities  Psych: Awake and alert, Oriented x 3. Judgement and insight appropriate. Mood stable.       Medications:    Scheduled Meds:   insulin glargine  15 Units SubCUTAneous Daily    doxazosin  4 mg Oral Daily    predniSONE  10 mg Oral BID    furosemide  40 mg IntraVENous BID    potassium chloride reports were reviewed as a part of this visit. VL Extremity Venous Bilateral  Vascular Lower Extremities DVT Study Procedure    -- PRELIMINARY SONOGRAPHER REPORT --      Demographics      Patient Name       Darius Avalos      Date of Study      09/07/2021         Gender              Male      Patient Number     [id number]         Date of Birth       1970      Visit Number       [id number]          Age                 46 year(s)      Accession Number   [id number]         Room Number         9880      Corporate ID       [id number]            Ashia Ramirez T      Ordering Physician Helga Arceo MD         Physician           MD     Procedure    Type of Study:      Veins:Lower Extremities DVT Study, VASC EXTREMITY VENOUS DUPLEX BILATERAL. Tech Comments  Right  No evidence of deep vein or superficial vein thrombosis involving the right  lower extremity in vessels clearly visualized. Proximal and Mid Posterior Tibial veins and Peroneal veins were not visualized  due to swelling. Calf and ankle edema noted. Left  No evidence of deep vein or superficial vein thrombosis involving the left  lower extremity in vessels clearly visualized. Proximal Posterior Tibial veins and Soleal vein were not visualized due to  swelling. Calf and ankle edema noted. This note was transcribed using [id number]. Please disregard any translational errors.     Thank you for this consult,    Elizabeth Pittman MD  [facility] Pulmonary, Critical Care, and Sleep Medicine

## 2021-09-07 NOTE — PROGRESS NOTES
Patient assessed and vitals reviewed, remains HTN, am meds given as ordered at 0807. Reassessed BP at 5814 systolic pressure decreased from 185 to 161. Call from IR requesting prn  Clonidine be administered, reviewed current prn parameters not met, IR MD placed one time order for clonidine. Med given at 9850 1142, will review BP after one hour for effectiveness. Blank consent on chart to go with patient to biopsy as ordered in nursing communication. Follow up called to nephrology consult.

## 2021-09-07 NOTE — CARE COORDINATION
INITIAL CASE MANAGEMENT ASSESSMENT    Reviewed chart, met with patient to assess possible discharge needs. Explained Case Management role/services. Living Situation:  Lives in a two story home alone but lives on main floor second floor used for storage, there is no basement. There are two steps to enter the home and he has no issues using the steps. Two sisters lives close by and assist as needed. ADLs:  Independent in all aspects of care. DME:  N/A     PT/OT Recs: Not ordered      Active Services: N/a      Transportation:  Active       Medications:  Uses [facility] on [address] and pays cash for all his medication. Discussed [address] and patient declined referral, he tells cm his sisters are looking into options and they will discuss as family to determine what he needs. Per financial counselor's note patient is over income to qualify for medicaid. PCP:  Dr. Everett Underwood      HD/PD:  N/A     PLAN/COMMENTS:  Goal:  Return home with no anticipated needs. Patient tells cm his sisters will help him if he needs it. SW/CM provided contact information for patient or family to call with any questions. SW/CM will follow and assist as needed.     Electronically signed by Valente Lemus on 9/7/2021 at 12:58 PM

## 2021-09-07 NOTE — ACP (ADVANCE CARE PLANNING)
Advance Care Planning     Advance Care Planning Activator (Inpatient)  Conversation Note      Date of ACP Conversation: 9/7/2021     Conversation Conducted with: Patient with Decision Making Capacity    ACP Activator: Debbiyannickroger 140 Decision Maker:     Current Designated Health Care Decision Maker:   Primary Decision Maker: Arlene Lynn [facility] - [phone]          Care Preferences    Ventilation: \"If you were in your present state of health and suddenly became very ill and were unable to breathe on your own, what would your preference be about the use of a ventilator (breathing machine) if it were available to you? \"      Would the patient desire the use of ventilator (breathing machine)?: yes    \"If your health worsens and it becomes clear that your chance of recovery is unlikely, what would your preference be about the use of a ventilator (breathing machine) if it were available to you? \"     Would the patient desire the use of ventilator (breathing machine)?:  Up to decision maker      Resuscitation  \"CPR works best to restart the heart when there is a sudden event, like a heart attack, in someone who is otherwise healthy. Unfortunately, CPR does not typically restart the heart for people who have serious health conditions or who are very sick. \"    \"In the event your heart stopped as a result of an underlying serious health condition, would you want attempts to be made to restart your heart (answer \"yes\" for attempt to resuscitate) or would you prefer a natural death (answer \"no\" for do not attempt to resuscitate)? \" yes       [] Yes   [x] No   Educated Patient / [employer] regarding differences between Advance Directives and portable DNR orders.     Length of ACP Conversation in minutes:  10  Conversation Outcomes:  [x] ACP discussion completed  [] Existing advance directive reviewed with patient; no changes to patient's previously recorded wishes  [] New Advance Directive completed (patient working with family to complete POA paperwork, declined assistance)  [] Portable Do Not Rescitate prepared for Provider review and signature  [] POLST/POST/MOLST/MOST prepared for Provider review and signature      Follow-up plan:    [] Schedule follow-up conversation to continue planning  [] Referred individual to Provider for additional questions/concerns   [] Advised patient/agent/surrogate to review completed ACP document and update if needed with changes in condition, patient preferences or care setting    [x] This note routed to one or more involved healthcare providers   Electronically signed by Parmjit Cyr on 9/7/2021 at 1:02 PM

## 2021-09-07 NOTE — PROGRESS NOTES
[facility] Internal Medicine Note      Chief Complaint: Patient sleeping, biopsy pending for today    Subjective/Interval History: This morning the patient remains on 3 L of oxygen. He is lying in bed, sleeping soundly, respirations easy  No audible wheezing. Biopsy planned for today. Discussed with nursing, no acute problems overnight. Blood pressure is improving slightly. Review of systems not obtained today because the patient is sleeping. PMH, PSH, FH/SH reviewed and unchanged as documented in the H&P personally documented at admission 21    Medication list reviewed    Objective:    BP (!) 171/91   Pulse 82   Temp 97.7 °F (36.5 °C) (Oral)   Resp 18   Ht 6' (1.829 m)   Wt 245 lb 13 oz (111.5 kg)   SpO2 93%   BMI 33.34 kg/m²   Temp  Av.7 °F (36.5 °C)  Min: 97.3 °F (36.3 °C)  Max: 98 °F (36.7 °C)    RRR  Chest-respirations easy. Anterior breath sounds clear. No wheezing or rhonchi noted. Abd- BS+, soft, nondistended  Ext-continued 3-4+ edema bilateral lower extremities to the thighs. Unchanged.     The Following Labs Were Reviewed Today:    Recent Results (from the past 24 hour(s))   POCT Glucose    Collection Time: 21  7:29 AM   Result Value Ref Range    POC Glucose 238 (H) 70 - 99 mg/dl    Performed on ACCU-CHEK    POCT Glucose    Collection Time: 21 11:37 AM   Result Value Ref Range    POC Glucose 329 (H) 70 - 99 mg/dl    Performed on ACCU-CHEK    POCT Glucose    Collection Time: 21  4:45 PM   Result Value Ref Range    POC Glucose 411 (H) 70 - 99 mg/dl    Performed on ACCU-CHEK    POCT Glucose    Collection Time: 21  8:37 PM   Result Value Ref Range    POC Glucose 345 (H) 70 - 99 mg/dl    Performed on ACCU-CHEK    Basic Metabolic Panel w/ Reflex to MG    Collection Time: 21  5:04 AM   Result Value Ref Range    Sodium 143 136 - 145 mmol/L    Potassium reflex Magnesium 3.5 3.5 - 5.1 mmol/L    Chloride 94 (L) 99 - 110 mmol/L    CO2 37 (H) 21 - 32 mmol/L

## 2021-09-07 NOTE — CONSULTS
[facility]  Consult Note           Reason for Consult:  Uncontrolled HTN   Requesting Physician:  Dr. Alejandro Bautista      History Obtained From:  patient, electronic medical record    History of Present Il[place]:  [address] y/o WM, gray and heavy smoker admitted with severe Hypokalemia  He has h/o HTN for ~ 20 years that has been well controlled on Amlodipine  About 3 weeks ago he started to have leg swelling, weight gain fatigue and SOB  Out patient labs showed severe Hypokalemia  On admission K+ noted to be 2.2 meq CO2 40   Noted to have uncontrolled HTN with /108 mm  BP has remained resistant despite addition of multiple meds  CT scan on admission showed pulmonary nodules mediastinal adenopathy,  metastatic lesions in liver 1.6 cm Nodule and nodule in doudenum  He is evaluated by oncology and is awaiting liver biopsy    Past Medical History:        Diagnosis Date    Anxiety     Asthma     Hyperlipidemia     Hypertension        Past Surgical History:    History reviewed. No pertinent surgical history. Home Medications:    No current facility-administered medications on file prior to encounter. Current Outpatient Medications on File Prior to Encounter   Medication Sig Dispense Refill    methylPREDNISolone (MEDROL, QUE,) 4 MG tablet Take by mouth.  1 kit 0    furosemide (LASIX) 40 MG tablet TAKE 1 TABLET BY MOUTH DAILY 90 tablet 0    budesonide-formoterol (SYMBICORT) 160-4.5 MCG/ACT AERO INHALE 2 PUFFS BY MOUTH TWICE DAILY (Patient taking differently: Inhale 2 puffs into the lungs 2 times daily INHALE 2 PUFFS BY MOUTH TWICE DAILY) 10.2 g 5    amLODIPine (NORVASC) 10 MG tablet TAKE 1 TABLET BY MOUTH EVERY DAY (Patient taking differently: Take 10 mg by mouth daily ) 90 tablet 3    montelukast (SINGULAIR) 10 MG tablet TAKE 1 TABLET BY MOUTH EVERY NIGHT AT BEDTIME (Patient taking differently: Take 10 mg by mouth nightly ) 90 tablet 3    albuterol sulfate  (90 Base) MCG/ACT inhaler INHALE 2 PUFFS BY MOUTH EVERY 4 HOURS AS NEEDED 3 Inhaler 3    atorvastatin (LIPITOR) 80 MG tablet Take 1 tablet by mouth daily 90 tablet 3    aspirin 81 MG EC tablet Take 81 mg by mouth daily. Allergies:  Patient has no known allergies.     Social History:    Social History     Socioeconomic History    Marital status:      Spouse name: Not on file    Number of children: Not on file    Years of education: Not on file    Highest education level: Not on file   Occupational History    Not on file   Tobacco Use    Smoking status: Current Every Day Smoker     Packs/day: 1.00    Smokeless tobacco: Never Used   Vaping Use    Vaping Use: Never used   Substance and Sexual Activity    Alcohol use: No     Alcohol/week: 0.0 standard drinks    Drug use: Yes     Types: Marijuana    Sexual activity: Not on file   Other Topics Concern    Not on file   Social History Narrative    Not on file     Social Determinants of Health     Financial Resource Strain: Unknown    Difficulty of Paying Living Expenses: Patient refused   Food Insecurity: Unknown    Worried About Running Out of Food in the Last Year: Patient refused    Ran Out of Food in the Last Year: Patient refused   Transportation Needs: Unknown    Lack of Transportation (Medical): Patient refused    Lack of Transportation (Non-Medical): Patient refused   Physical Activity:     Days of Exercise per Week:     Minutes of Exercise per Session:    Stress:     Feeling of Stress :    Social Connections:     Frequency of Communication with Friends and Family:     Frequency of Social Gatherings with Friends and Family:     Attends Temple Services:     Active Member of Clubs or Organizations:     Attends Club or Organization Meetings:     Marital Status:    Intimate Partner Violence:     Fear of Current or Ex-Partner:     Emotionally Abused:     Physically Abused:     Sexually Abused:        Family History:   Family History   Problem Relation Age of Onset    Lung Cancer Mother     Heart Attack Father         Myocardial Infarction    Stomach Cancer Brother     Heart Attack Sister 39        Myocardial Infarction    COPD Maternal Grandmother        Review of Systems:   Pertinent positives stated above in HPI. All other systems were reviewed and were negative.     Physical exam:   Constitutional:  VITALS:  BP (!) 149/84   Pulse 97   Temp 97.7 °F (36.5 °C) (Oral)   Resp 18   Ht 6' (1.829 m)   Wt 245 lb 13 oz (111.5 kg)   SpO2 92%   BMI 33.34 kg/m²   Gen: alert, awake, nad  Skin: no rash, turgor wnl  Heent:  eomi, mmm  Neck: no bruits or jvd noted, thyroid normal  Cardiovascular:  S1, S2 without m/r/g  Respiratory: diminished BS bilaterally   Abdomen:  +bs, soft, nt, nd  Ext: 2-3+ lower extremity edema  Psychiatric: mood and affect appropriate; judgement and insight intact  Musculoskeletal:  Rom, muscular strength intact; digits, nails normal    Data/  CBC:   Lab Results   Component Value Date    WBC 7.9 09/04/2021    RBC 4.38 09/04/2021    HGB 14.6 09/04/2021    HCT 43.6 09/04/2021    MCV 99.7 09/04/2021    MCH 33.4 09/04/2021    MCHC 33.5 09/04/2021    RDW 13.9 09/04/2021     09/04/2021    MPV 7.5 09/04/2021     BMP:    Lab Results   Component Value Date     09/07/2021    K 3.5 09/07/2021    CL 94 09/07/2021    CO2 37 09/07/2021    BUN 23 09/07/2021    LABALBU 3.5 09/04/2021    CREATININE 0.7 09/07/2021    CALCIUM 8.7 09/07/2021    GFRAA >60 09/07/2021    GFRAA >60 02/19/2013    LABGLOM >60 09/07/2021    GLUCOSE 233 09/07/2021         Assessment/  1-Hypertension resistant to treatment with multiple meds Concern for secondary HTN including Hypercortisolism ( Cortisol level 90 with adrenal nodule Also Neuroendocrine tumor a possibility)   2-Hypokalemia profound possibly related to Hypercortisolism  3-Met Alkalosis secondary to above  4 Edema Doppler negative for DVT U/A with + protein serum Albumin 3.5 gm Suspect fluid retention due to hypercortisolism. 5 Metastatic tumor primary ? Neuroendocrine tumor, Primary adrenal carcinoma  6 COPD exacerbation  7 Nicotine dependence  8 Elevated LFT's    Plan/  1-Add Aldactone  2-Change Amlodipine to Nifedipine  3-Avoiding B Blocker due to COPD  4 Continue IV Lasix  5 Check salivary cortisol, ACTH and 24 hour urine free cortisol level  6 Continue prn Clonidine    Thank you for the consultation. Please do not hesitate to call with questions.     Massiel Yip MD, [facility]

## 2021-09-08 NOTE — PRE SEDATION
Sedation Pre-Procedure Note    Patient Name: Dheeraj Olvera   YOB: 1970  Room/Bed: S2I-9533/0373-15  Medical Record Number: [id number]  Date: 9/8/2021   Time: 12:37 PM       Indication:  Liver masses here for biopsy. Consent: I have discussed with the patient and/or the patient representative the indication, alternatives, and the possible risks and/or complications of the planned procedure and the anesthesia methods. The patient and/or patient representative appear to understand and agree to proceed. Vital Signs:   Vitals:    09/08/21 1231   BP: (!) 140/75   Pulse: 92   Resp: 18   Temp:    SpO2: 95%       Past Medical History:   has a past medical history of Anxiety, Asthma, Hyperlipidemia, and Hypertension. Past Surgical History:   has no past surgical history on file.     Medications:   Scheduled Meds:    magnesium sulfate  2,000 mg IntraVENous Once    [START ON 9/9/2021] insulin glargine  20 Units SubCUTAneous Daily    insulin lispro  0-18 Units SubCUTAneous TID WC    insulin lispro  0-9 Units SubCUTAneous Nightly    spironolactone  50 mg Oral BID    doxazosin  4 mg Oral Daily    predniSONE  20 mg Oral Daily    NIFEdipine  60 mg Oral BID    furosemide  40 mg IntraVENous BID    potassium chloride  40 mEq Oral BID WC    sodium chloride (Inhalant)  15 mL Nebulization Q4H WA    lisinopril  40 mg Oral Daily    piperacillin-tazobactam  3,375 mg IntraVENous Q8H    budesonide-formoterol  2 puff Inhalation BID    montelukast  10 mg Oral Nightly    sodium chloride flush  5-40 mL IntraVENous 2 times per day    nicotine  1 patch TransDERmal Daily    ipratropium-albuterol  1 ampule Inhalation Q4H WA    doxycycline hyclate  100 mg Oral 2 times per day     Continuous Infusions:    dextrose      sodium chloride 25 mL (09/07/21 1519)     PRN Meds: cloNIDine, hydrALAZINE, glucose, dextrose, glucagon (rDNA), dextrose, iopamidol, sodium chloride flush, sodium chloride, ondansetron **OR** ondansetron, polyethylene glycol, acetaminophen **OR** acetaminophen  Home Meds:   Prior to Admission medications    Medication Sig Start Date End Date Taking? Authorizing Provider   methylPREDNISolone (MEDROL, QUE,) 4 MG tablet Take by mouth. 9/1/21 9/11/21 Yes Danita Villalobos MD   furosemide (LASIX) 40 MG tablet TAKE 1 TABLET BY MOUTH DAILY 9/1/21  Yes Ras Farooq APRN - CNP   budesonide-formoterol (SYMBICORT) 160-4.5 MCG/ACT AERO INHALE 2 PUFFS BY MOUTH TWICE DAILY  Patient taking differently: Inhale 2 puffs into the lungs 2 times daily INHALE 2 PUFFS BY MOUTH TWICE DAILY 8/20/21  Yes Sonido Bailey MD   amLODIPine (NORVASC) 10 MG tablet TAKE 1 TABLET BY MOUTH EVERY DAY  Patient taking differently: Take 10 mg by mouth daily  3/2/21  Yes Sonido Bailey MD   montelukast (SINGULAIR) 10 MG tablet TAKE 1 TABLET BY MOUTH EVERY NIGHT AT BEDTIME  Patient taking differently: Take 10 mg by mouth nightly  3/2/21  Yes Sonido Bailey MD   albuterol sulfate  (90 Base) MCG/ACT inhaler INHALE 2 PUFFS BY MOUTH EVERY 4 HOURS AS NEEDED 2/1/21  Yes Sonido Bailey MD   atorvastatin (LIPITOR) 80 MG tablet Take 1 tablet by mouth daily 10/13/20  Yes Sonido Bailey MD   aspirin 81 MG EC tablet Take 81 mg by mouth daily. Yes Historical Provider, MD     Coumadin Use Last 7 Days:  no  Antiplatelet drug therapy use last 7 days: no  Other anticoagulant use last 7 days: no  Additional Medication Information:  n/a      Pre-Sedation Documentation and Exam:   I have reviewed the patient's history and review of systems.     Mallampati Airway Assessment:  Mallampati Class II - (soft palate, fauces & uvula are visible)    Prior History of Anesthesia Complications:   none    ASA Classification:  Class 2 - A normal healthy patient with mild systemic disease    Sedation/ Anesthesia Plan:   intravenous sedation    Medications Planned:   midazolam (Versed) intravenously and fentanyl intravenously    Patient is an appropriate candidate for plan of sedation: yes    Electronically signed by Cindy Dubon MD on 9/8/2021 at 12:37 PM

## 2021-09-08 NOTE — PROGRESS NOTES
Pulmonary Progress Note    Date of Admission: 9/2/2021   LOS: 6 days     Chief Complaint   Patient presents with    Abnormal Lab     sent by Dr. Zachery Pineda for low potassium levels        Assessment/Plan:       Acute hypoxemic respiratory failure  -Wean supplemental oxygen goal SPO2 90%    Acute exacerbation of COPD  -Prednisone 20 mg daily  -Symbicort, scheduled duo nebs, Singulair daily  -Doxycycline x5 days    Lung nodule  -Concern for metastatic lung cancer, plan for liver biopsy for staging purposes  -Procedure aborted yesterday due to severe hypertension, plan redo today    Tobacco abuse        24 Hour Events/Subjective  Could not get biopsy performed yesterday due to hypotension. Remains on 3 L of oxygen    ROS:   No nausea  No Vomiting  No chest pain      Intake/Output Summary (Last 24 hours) at 9/8/2021 0901  Last data filed at 9/8/2021 3589  Gross per 24 hour   Intake 1014.34 ml   Output 3029 ml   Net -2014.66 ml         PHYSICAL EXAM:   Blood pressure (!) 148/84, pulse 94, temperature 97.5 °F (36.4 °C), temperature source Oral, resp. rate 18, height 6' (1.829 m), weight 242 lb 1 oz (109.8 kg), SpO2 92 %.'  Gen:  No acute distress. Eyes: PERRL. Anicteric sclera. No conjunctival injection. ENT: No discharge. Posterior oropharynx clear. External appearance of ears and nose normal.  Neck: Trachea midline. No mass   Resp:  No crackles. No wheezes. No rhonchi. No dullness on percussion. CV: Regular rate. Regular rhythm. No murmur or rub. 2+ lower extremity bilateral edema. GI: Soft, Non-tender. Non-distended. +BS  Skin: Warm, dry, w/o erythema. Lymph: No cervical or supraclavicular LAD. M/S: No cyanosis. No clubbing. Neuro:  no focal neurologic deficit. Moves all extremities  Psych: Awake and alert, Oriented x 3. Judgement and insight appropriate. Mood stable.       Medications:    Scheduled Meds:   insulin glargine  15 Units SubCUTAneous Daily    doxazosin  4 mg Oral Daily    predniSONE  20 mg Oral Daily    spironolactone  50 mg Oral Daily    NIFEdipine  60 mg Oral BID    furosemide  40 mg IntraVENous BID    potassium chloride  40 mEq Oral BID     insulin lispro  0-12 Units SubCUTAneous TID     insulin lispro  0-6 Units SubCUTAneous Nightly    sodium chloride (Inhalant)  15 mL Nebulization Q4H WA    lisinopril  40 mg Oral Daily    piperacillin-tazobactam  3,375 mg IntraVENous Q8H    budesonide-formoterol  2 puff Inhalation BID    montelukast  10 mg Oral Nightly    sodium chloride flush  5-40 mL IntraVENous 2 times per day    nicotine  1 patch TransDERmal Daily    ipratropium-albuterol  1 ampule Inhalation Q4H WA    doxycycline hyclate  100 mg Oral 2 times per day       Continuous Infusions:   dextrose      sodium chloride 25 mL (09/07/21 1519)       PRN Meds:  cloNIDine, glucose, dextrose, glucagon (rDNA), dextrose, iopamidol, sodium chloride flush, sodium chloride, ondansetron **OR** ondansetron, polyethylene glycol, acetaminophen **OR** acetaminophen    Labs reviewed:  CBC:   Recent Labs     09/08/21  0511   WBC 7.3   HGB 14.1   HCT 41.4   MCV 99.0   *     BMP:   Recent Labs     09/06/21  0441 09/07/21  0504 09/08/21  0511    143 142   K 3.7 3.5 3.3*   CL 97* 94* 96*   CO2 36* 37* 34*   BUN 21* 23* 26*   CREATININE 0.7* 0.7* 0.7*     LIVER PROFILE:   Recent Labs     09/08/21  0511   AST 64*   *   BILIDIR 0.4*   BILITOT 1.0   ALKPHOS 170*     PT/INR: No results for input(s): PROTIME, INR in the last 72 hours. APTT: No results for input(s): APTT in the last 72 hours. UA:No results for input(s): NITRITE, COLORU, PHUR, LABCAST, WBCUA, RBCUA, MUCUS, TRICHOMONAS, YEAST, BACTERIA, CLARITYU, SPECGRAV, LEUKOCYTESUR, UROBILINOGEN, BILIRUBINUR, BLOODU, GLUCOSEU, AMORPHOUS in the last 72 hours. Invalid input(s): Helen Vazquez  No results for input(s): PH, PCO2, PO2 in the last 72 hours.       Films:  Radiology Review:  Pertinent images / reports were reviewed as a part of this visit. VL Extremity Venous Bilateral  Lower Extremities DVT Study     Demographics      Patient Name       Andrez Miles      Date of Study      09/07/2021         Gender              Male      Patient Number     [id number]         Date of Birth       1970      Visit Number       [id number]          Age                 46 year(s)      Accession Number   [id number]         Room Number         3860      Corporate ID       [id number]            Will , T      Ordering Physician Bobby Stringer MD         Physician           MD     Procedure    Type of Study:      Veins:Lower Extremities DVT Study, VASC EXTREMITY VENOUS DUPLEX BILATERAL. Vascular Sonographer Report     Indications for Study:Edema. Impressions  Right Impression  No evidence of deep vein or superficial vein thrombosis involving the right  lower extremity in vessels clearly visualized. Proximal and Mid Posterior Tibial veins and Peroneal veins were not visualized  due to swelling. Calf and ankle edema noted. Left Impression  No evidence of deep vein or superficial vein thrombosis involving the left  lower extremity in vessels clearly visualized. Proximal Posterior Tibial veins and Soleal vein were not visualized due to  swelling. Calf and ankle edema noted. Conclusions      Summary      No evidence of deep vein or superficial vein thrombosis involving the   bilateral lower extremities in vessels clearly visualized as detailed above. Signature      ------------------------------------------------------------------   Electronically signed by Lizbeth Logan MD (Interpreting   physician) on 09/08/2021 at 08:09 AM   ------------------------------------------------------------------     Patient Status:Routine. Study [address] [facility]. Technical Quality:Limited visualization due to swelling.     Risk Factors      - The patient's risk factor(s) include: dyslipidemia and arterial      hypertension.      - The patient has a current/recent (within 1 year) tobacco history. Velocities are measured in cm/s ; Diameters are measured in mm    Right Lower Extremities DVT Study Measurements  Right 2D Measurements  +------------------------+----------+---------------+----------+  ! Location                ! Visualized! Compressibility! Thrombosis! +------------------------+----------+---------------+----------+  ! Sapheno Femoral Junction! Yes       ! Yes            ! None      !  +------------------------+----------+---------------+----------+  ! GSV Thigh               ! Yes       ! Yes            ! None      !  +------------------------+----------+---------------+----------+  ! Common Femoral          !Yes       ! Yes            ! None      !  +------------------------+----------+---------------+----------+  ! Prox Femoral            !Yes       ! Yes            ! None      !  +------------------------+----------+---------------+----------+  ! Mid Femoral             !Yes       ! Yes            ! None      !  +------------------------+----------+---------------+----------+  ! Dist Femoral            !Yes       ! Yes            ! None      !  +------------------------+----------+---------------+----------+  ! Deep Femoral            !Yes       ! Yes            ! None      !  +------------------------+----------+---------------+----------+  ! Popliteal               !Yes       ! Yes            ! None      !  +------------------------+----------+---------------+----------+  ! Gastroc                 ! Yes       ! Yes            ! None      !  +------------------------+----------+---------------+----------+  ! Soleal                  !Yes       ! Yes            ! None      !  +------------------------+----------+---------------+----------+  ! PTV                     ! Partial   !Yes            ! None !  +------------------------+----------+---------------+----------+  ! ATV                     ! Yes       ! Yes            ! None      !  +------------------------+----------+---------------+----------+  ! Peroneal                !Partial   !Yes            ! None      !  +------------------------+----------+---------------+----------+  ! GSV Calf                ! Yes       ! Yes            ! None      !  +------------------------+----------+---------------+----------+  ! SSV                     ! Yes       ! Yes            ! None      !  +------------------------+----------+---------------+----------+    Right Doppler Measurements  +--------------+------+------+------------+  ! Location      ! Signal!Reflux! Reflux (sec)! +--------------+------+------+------------+  ! Common Femoral!Phasic! No    !            !  +--------------+------+------+------------+  ! Popliteal     !Phasic! No    !            !  +--------------+------+------+------------+    Left Lower Extremities DVT Study Measurements  Left 2D Measurements  +------------------------+----------+---------------+----------+  ! Location                ! Visualized! Compressibility! Thrombosis! +------------------------+----------+---------------+----------+  ! Sapheno Femoral Junction! Yes       ! Yes            ! None      !  +------------------------+----------+---------------+----------+  ! GSV Thigh               ! Yes       ! Yes            ! None      !  +------------------------+----------+---------------+----------+  ! Common Femoral          !Yes       ! Yes            ! None      !  +------------------------+----------+---------------+----------+  ! Prox Femoral            !Yes       ! Yes            ! None      !  +------------------------+----------+---------------+----------+  ! Mid Femoral             !Yes       ! Yes            ! None      !  +------------------------+----------+---------------+----------+  ! Dist Femoral            !Yes       ! Yes            ! None !  +------------------------+----------+---------------+----------+  ! Deep Femoral            !Yes       ! Yes            ! None      !  +------------------------+----------+---------------+----------+  ! Popliteal               !Yes       ! Yes            ! None      !  +------------------------+----------+---------------+----------+  ! Gastroc                 ! Yes       ! Yes            ! None      !  +------------------------+----------+---------------+----------+  ! Soleal                  !No        !               !          !  +------------------------+----------+---------------+----------+  ! PTV                     ! Partial   !Yes            ! None      !  +------------------------+----------+---------------+----------+  ! ATV                     ! Yes       ! Yes            ! None      !  +------------------------+----------+---------------+----------+  ! Peroneal                !Yes       ! Yes            ! None      !  +------------------------+----------+---------------+----------+  ! GSV Calf                ! Yes       ! Yes            ! None      !  +------------------------+----------+---------------+----------+  ! SSV                     ! Yes       ! Yes            ! None      !  +------------------------+----------+---------------+----------+    Left Doppler Measurements  +--------------+------+------+------------+  ! Location      ! Signal!Reflux! Reflux (sec)! +--------------+------+------+------------+  ! Common Femoral!Phasic! No    !            !  +--------------+------+------+------------+  ! Popliteal     !Phasic! No    !            !  +--------------+------+------+------------+              This note was transcribed using [id number] [facility]. Please disregard any translational errors.     Thank you for this consult,    Miracle Jaimes MD  [facility] Pulmonary, Critical Care, and Sleep Medicine

## 2021-09-08 NOTE — BRIEF OP NOTE
Brief Postoperative Note    Kimberley Jolley  YOB: 1970  [id number]    Pre-operative Diagnosis:  Liver Mass    Post-operative Diagnosis: Same    Procedure: CT Guided Biopsy of Liver Mass    Anesthesia: Moderate Sedation    Surgeons/Assistants: Lowell Hubbard MD    Estimated Blood Loss: less than 5mL    Complications: None    Specimens: Was Obtained: 1 18g core. Findings: Technically successful biopsy of Liver Mass.      Electronically signed by Lowell Hubbard MD on 9/8/2021 at 12:38 PM

## 2021-09-08 NOTE — PROGRESS NOTES
Pt transported off of unit for liver biopsy. No complications.   Electronically signed by Cindy Saleh RN on 9/8/2021 at 11:52 AM

## 2021-09-08 NOTE — PLAN OF CARE
Problem: Infection:  Goal: Will remain free from infection  Description: Will remain free from infection  Outcome: Ongoing     Problem: Safety:  Goal: Free from accidental physical injury  Description: Free from accidental physical injury  Outcome: Ongoing     Problem: Daily Care:  Goal: Daily care needs are met  Description: Daily care needs are met  Outcome: Ongoing     Problem: Pain:  Goal: Patient's pain/discomfort is manageable  Description: Patient's pain/discomfort is manageable  Outcome: Ongoing     Problem: Skin Integrity:  Goal: Skin integrity will stabilize  Description: Skin integrity will stabilize  Outcome: Ongoing     Problem:  Activity Intolerance:  Goal: Ability to tolerate increased activity will improve  Description: Ability to tolerate increased activity will improve  Outcome: Ongoing     Problem: Breathing Pattern - Ineffective:  Goal: Ability to achieve and maintain a regular respiratory rate will improve  Description: Ability to achieve and maintain a regular respiratory rate will improve  Outcome: Ongoing

## 2021-09-08 NOTE — PROGRESS NOTES
[facility]  Oncology Hematology Care  Progress Note      SUBJECTIVE:   Pt sp liver bx  Told him its usually 48 hours for results     ROS: No fever chills sweats, no nausea, vomiting, diarrhea  shortness of breath chest pain or other pain  OBJECTIVE      Medications    Current Facility-Administered Medications: [START ON 9/9/2021] insulin glargine (LANTUS) injection vial 20 Units, 20 Units, SubCUTAneous, Daily  insulin lispro (HUMALOG) injection vial 0-18 Units, 0-18 Units, SubCUTAneous, TID WC  insulin lispro (HUMALOG) injection vial 0-9 Units, 0-9 Units, SubCUTAneous, Nightly  spironolactone (ALDACTONE) tablet 50 mg, 50 mg, Oral, BID  cloNIDine (CATAPRES) tablet 0.2 mg, 0.2 mg, Oral, Q4H PRN  hydrALAZINE (APRESOLINE) injection 10 mg, 10 mg, IntraVENous, Q4H PRN  acetaminophen (TYLENOL) tablet 650 mg, 650 mg, Oral, Q4H PRN  doxazosin (CARDURA) tablet 4 mg, 4 mg, Oral, Daily  predniSONE (DELTASONE) tablet 20 mg, 20 mg, Oral, Daily  NIFEdipine (PROCARDIA XL) extended release tablet 60 mg, 60 mg, Oral, BID  furosemide (LASIX) injection 40 mg, 40 mg, IntraVENous, BID  potassium chloride (KLOR-CON M) extended release tablet 40 mEq, 40 mEq, Oral, BID WC  sodium chloride (Inhalant) 3 % nebulizer solution 15 mL, 15 mL, Nebulization, Q4H WA  lisinopril (PRINIVIL;ZESTRIL) tablet 40 mg, 40 mg, Oral, Daily  glucose (GLUTOSE) 40 % oral gel 15 g, 15 g, Oral, PRN  dextrose 50 % IV solution, 12.5 g, IntraVENous, PRN  glucagon (rDNA) injection 1 mg, 1 mg, IntraMUSCular, PRN  dextrose 5 % solution, 100 mL/hr, IntraVENous, PRN  piperacillin-tazobactam (ZOSYN) 3,375 mg in dextrose 5 % 100 mL IVPB extended infusion (mini-bag), 3,375 mg, IntraVENous, Q8H  iopamidol (ISOVUE-370) 76 % injection 75 mL, 75 mL, IntraVENous, ONCE PRN  budesonide-formoterol (SYMBICORT) 160-4.5 MCG/ACT inhaler 2 puff, 2 puff, Inhalation, BID  montelukast (SINGULAIR) tablet 10 mg, 10 mg, Oral, Nightly  sodium chloride flush 0.9 % injection 5-40 mL, 5-40 mL, IntraVENous, 2 times per day  sodium chloride flush 0.9 % injection 5-40 mL, 5-40 mL, IntraVENous, PRN  0.9 % sodium chloride infusion, 25 mL, IntraVENous, PRN  ondansetron (ZOFRAN-ODT) disintegrating tablet 4 mg, 4 mg, Oral, Q8H PRN **OR** ondansetron (ZOFRAN) injection 4 mg, 4 mg, IntraVENous, Q6H PRN  polyethylene glycol (GLYCOLAX) packet 17 g, 17 g, Oral, Daily PRN  [DISCONTINUED] acetaminophen (TYLENOL) tablet 650 mg, 650 mg, Oral, Q6H PRN **OR** acetaminophen (TYLENOL) suppository 650 mg, 650 mg, Rectal, Q6H PRN  nicotine (NICODERM CQ) 21 MG/24HR 1 patch, 1 patch, TransDERmal, Daily  ipratropium-albuterol (DUONEB) nebulizer solution 1 ampule, 1 ampule, Inhalation, Q4H WA  doxycycline hyclate (VIBRA-TABS) tablet 100 mg, 100 mg, Oral, 2 times per day  Physical    VITALS:  /72   Pulse 101   Temp 97.4 °F (36.3 °C) (Oral)   Resp 16   Ht 6' (1.829 m)   Wt 242 lb 1 oz (109.8 kg)   SpO2 94%   BMI 32.83 kg/m²   TEMPERATURE:  Current - Temp: 97.4 °F (36.3 °C); Max - Temp  Av.7 °F (36.5 °C)  Min: 97.4 °F (36.3 °C)  Max: 97.9 °F (36.6 °C)  PULSE OXIMETRY RANGE: SpO2  Av.2 %  Min: 91 %  Max: 99 %  24HR INTAKE/OUTPUT:    Intake/Output Summary (Last 24 hours) at 2021 1330  Last data filed at 2021 1564  Gross per 24 hour   Intake 1314.34 ml   Output 1400 ml   Net -85.66 ml       CONSTITUTIONAL:  awake, alert, cooperative, no apparent distress, HEENT oral pharynx , no scleral icterus  HEMATOLOGIC/LYMPHATICS:  no cervical lymphadenopathy, no supraclavicular lymphadenopathy,LUNGS:  No increased work of breathing, good air exchange, clear to auscultation bilaterally, no crackles or wheezing  CARDIOVASCULAR:  , regular rate and rhythm, normal S1 and S2, no S3 or S4, and no murmur noted  ABDOMEN:  No scars, normal bowel sounds, soft, non-distended, non-tender, no masses palpated, no hepatosplenomegally  MUSCULOSKELETAL:  There is no redness, warmth, or swelling of the joints.   EXTREMETIES: No clubbing cynosis or edema  NEUROLOGIC:  Awake, alert, oriented to name, place and time. Cranial nerves II-XII are grossly intact. SKIN:  no bruising or bleeding      Data      Recent Labs     09/08/21  0511   WBC 7.3   HGB 14.1   HCT 41.4   *   MCV 99.0        Recent Labs     09/06/21  0441 09/07/21  0504 09/08/21  0511    143 142   K 3.7 3.5 3.3*   CL 97* 94* 96*   CO2 36* 37* 34*   BUN 21* 23* 26*   CREATININE 0.7* 0.7* 0.7*     Recent Labs     09/08/21  0511   AST 64*   *   BILIDIR 0.4*   BILITOT 1.0   ALKPHOS 170*       Magnesium:    Lab Results   Component Value Date    MG 1.70 09/08/2021    MG 1.90 09/07/2021    MG 2.30 09/04/2021       Imaging ECHO Complete 2D W Doppler W Color    Result Date: 9/3/2021  Transthoracic Echocardiography Report (TTE)  Demographics   Patient Name      Meredith Carson   Date of Study     09/03/2021          Gender              Male   Patient Number    [id number]          Date of Birth       1970   Visit Number      [id number]           Age                 46 year(s)   Accession Number  [id number]          Room Number         4226   Corporate ID      [id number]             Sonographer         Angel Paredes RVT,                                                            RD   Ordering          [facility],             [address] [place] [address]     Physician           Rosanna Scherer, Marin Emery MD  Procedure Type of Study   TTE procedure:ECHOCARDIOGRAM COMPLETE 2D W DOPPLER W COLOR. Procedure Date Date: 09/03/2021 Start: 02:20 PM Study Location: [facility] - Echo Lab Technical Quality: Limited visualization due to poor acoustical window. Additional Indications:Lower extremity swelling, evaluate the right heart and LV; HTN, HLD, COPD.  Patient Status: Routine Height: 72 inches Weight: 247.01 pounds BSA: 2.33 m2 BMI: 33.5 kg/m2 Rhythm: Within normal limits HR: 70 bpm BP: 183/87 mmHg  Conclusions   Summary  Normal left ventricle size, wall thickness, and systolic function with an  estimated ejection fraction of 60-65%. No regional wall motion abnormalities  are seen. Normal diastolic function. Normal right ventricular size and function. No significant valve abnormalities noted. Signature   ------------------------------------------------------------------  Electronically signed by Kimberly Mckay MD  (Interpreting physician) on 09/03/2021 at 03:46 PM  ------------------------------------------------------------------   Findings   Left Ventricle  Normal left ventricle size, wall thickness, and systolic function with an  estimated ejection fraction of 60-65%. No regional wall motion abnormalities  are seen. Normal diastolic function. Mitral Valve  Mitral valve leaflets appear mildly thickened. No evidence of mitral regurgitation or stenosis. Left Atrium  The left atrium is normal in size. Aortic Valve  The aortic valve leaflets are not well visualized. No evidence of aortic valve regurgitation or stenosis. Aorta  The aortic root is normal in size. Right Ventricle  Normal right ventricular size and function. TAPSE 2.8 cm   Tricuspid Valve  The tricuspid valve was not well visualized. No evidence of tricuspid stenosis. Right Atrium  The right atrial size is normal.   Pulmonic Valve  The pulmonic valve is not well visualized. No evidence of pulmonic valve regurgitation. No evidence of pulmonic valve stenosis. Pericardial Effusion  No pericardial effusion noted. Pleural Effusion  No pleural effusion. Miscellaneous  IVC not well visualized.   M-Mode/2D Measurements (cm)   LV Diastolic Dimension: 8.30 cm LV Systolic Dimension: 7.17 cm  LV Septum Diastolic: 0.9 cm  LV PW Diastolic: 9.90 cm        AO Root Dimension: 3.4 cm                                   LA Area: 23.6 cm2  LVOT: 2.1 cm                    LA volume/Index: 70.5 ml /30 ml/m2  Doppler Measurements   AV Peak Velocity: 184 cm/s     MV Peak E-Wave: 106 cm/s  AV Peak Gradient: 13.54 mmHg   MV Peak A-Wave: 87.5 cm/s  AV Mean Gradient: 6 mmHg       MV E/A Ratio: 1.21  LVOT Peak Velocity: 160 cm/s   MV Mean Gradient: 2 mmHg  AV Area (Continuity):3.58 cm2  MV Max P mmHg                                 MV Vmax:114 cm/s                                 MV VTI:32.9 cm/s   E' Septal Velocity: 10.9 cm/s  MV Area (continuity): 3.61 cm2  E' Lateral Velocity: 15.5 cm/s MV Deceleration Time: 259 msec  PV Peak Velocity: 131 cm/s  PV Peak Gradient: 6.86 mmHg   Aortic Valve   Peak Velocity: 184 cm/s     Mean Velocity: 114 cm/s  Peak Gradient: 13.54 mmHg   Mean Gradient: 6 mmHg  Area (continuity): 3.58 cm2  AV VTI: 33.2 cm  Aorta   Aortic Root: 3.4 cm  Ascending Aorta: 3.3 cm  LVOT Diameter: 2.1 cm      XR CHEST (2 VW)    Result Date: 2021  EXAMINATION: TWO XRAY VIEWS OF THE CHEST 2021 8:48 am COMPARISON: None. HISTORY: ORDERING SYSTEM PROVIDED HISTORY: WEEMS (dyspnea on exertion) TECHNOLOGIST PROVIDED HISTORY: Reason for Exam: WEEMS, asthma Acuity: Unknown Type of Exam: Initial FINDINGS: Clear lungs. No pleural effusion or pneumothorax. Cardiomediastinal silhouette is unremarkable. Degenerative changes of the visualized osseous structures. Clear lungs.      CT GUIDED NEEDLE PLACEMENT    Result Date: 2021  PROCEDURE: CT GUIDED CORE BIOPSY OF LEFT HEPATIC LOBE MASS MODERATE CONSCIOUS SEDATION 2021 HISTORY: ORDERING SYSTEM PROVIDED HISTORY: lesion TECHNOLOGIST PROVIDED HISTORY: Reason for exam:->lesion Reason for Exam: lesion Acuity: Acute Type of Exam: Initial; ORDERING SYSTEM PROVIDED HISTORY: bx TECHNOLOGIST PROVIDED HISTORY: Reason for exam:->bx Reason for Exam: lesion Acuity: Acute Type of Exam: Initial PHYSICIANS: Amilcar Brianne SEDATION: 0.5 mgversed and 25 mcg fentanyl were titrated intravenously for moderate sedation monitored under my direction. Total intraservice time of sedation was 15 minutes. The patient's vital signs were monitored throughout the procedure and recorded in the patient's medical record by the nurse. TECHNIQUE: Informed consent was obtained after a detailed discussion about the procedure including the risk, benefits, and alternatives. Universal protocol was followed. Axial images were obtained through the liver and a suitable skin site was prepped and draped in sterile fashion. Local anesthesia was achieved with lidocaine. Under intermittent CT guidance, a 17 gauge coaxial needle was inserted into the a left hepatic lobe mass. Through this, 1 18 gauge core biopsy specimens were obtained using coaxial technique and submitted to pathology. Gel-Foam slurry was administered as the needle was removed. A sterile bandage was then placed. The patient tolerated the procedure well. Estimated blood loss: Less than 5 cc Dose modulation, iterative reconstruction, and/or weight based adjustment of the mA/kV was utilized to reduce the radiation dose to as low as reasonably achievable. DLP: 5890.95 mGy-cm     Successful CT guided core biopsy of a left hepatic lobe mass. CT ABDOMEN PELVIS W IV CONTRAST Additional Contrast? Oral    Result Date: 9/5/2021  EXAMINATION: CT OF THE ABDOMEN AND PELVIS WITH CONTRAST 9/3/2021 5:13 pm TECHNIQUE: CT of the abdomen and pelvis was performed with the administration of intravenous contrast. Multiplanar reformatted images are provided for review. Dose modulation, iterative reconstruction, and/or weight based adjustment of the mA/kV was utilized to reduce the radiation dose to as low as reasonably achievable. COMPARISON: CT pulmonary angiogram performed September 2, 2021. HISTORY: ORDERING SYSTEM PROVIDED HISTORY: Metastatic disease, evaluate for primary.  Bilateral lower ext edema, eval for IVC thrombosis/compression TECHNOLOGIST PROVIDED HISTORY: Reason for exam:->Metastatic disease, evaluate for primary. Bilateral lower ext edema, eval for IVC thrombosis/compression Additional Contrast?->Oral FINDINGS: Images are degraded by motion artifact. Lower Chest: Respiratory motion artifact. Visualized pulmonary nodules and enlarged mediastinal lymph node, unchanged from prior CT chest. Organs: There are multifocal hypoattenuating lesions throughout the liver. Lesion within the left hepatic lobe measures 6.1 x 5.7 cm (axial image 35). Lesion within the right hepatic lobe measures 3.6 x 2.6 cm (axial image 51). The gallbladder is normally distended with layering hyperintensity suggestive of sludge. No calcified stones. No gallbladder wall thickening or pericholecystic fluid. The spleen and pancreas are grossly unremarkable. Right adrenal gland nodule measures up to 1.6 cm. There is mild thickening of the left adrenal gland without definite nodularity. There is symmetric renal cortical enhancement. Simple appearing right renal cysts demonstrated. No hydronephrosis. GI/Bowel: There is a hypoattenuating nodule in the region of the 2/3 portion of the duodenum in the region of the ampulla which measures up to 1.3 cm (axial image 83). There is diffuse fold thickening involving the majority of the jejunum. No evidence of obstruction. Sigmoid diverticulosis without evidence of acute diverticulitis. The appendix is normal. Pelvis: Prostate and seminal vesicles are grossly unremarkable. The urinary bladder is within normal limits. Peritoneum/Retroperitoneum: No significant lymphadenopathy. The abdominal aorta is normal in course and caliber with scattered atherosclerotic disease. No definite filling defects within the IVC, the iliac veins, or visualized femoral veins to suggest thrombus. Bones/Soft Tissues: There is asymmetric thickening of the inferior right rectus abdominus musculature with mild surrounding stranding.   Visualization is partially obscured by streak artifact from dense oral contrast in the adjacent small bowel. Body wall edema demonstrated. No focal drainable collections. Multilevel moderate degenerative changes in the visualized spine. No destructive osseous lesions. 1. Multiple hypoattenuating liver lesions which are most suggestive of metastatic disease. 2. Right adrenal gland nodule measuring up to 1.6 cm, indeterminate. 3. Hypoattenuating nodule in the region of the 2/3 portion of the duodenum in the region of the ampulla measuring up to 1.3 cm. Findings could be artifactual or related to fold thickening; however, a small ampullary or possibly pancreatic lesion cannot be entirely excluded. 4. Diffuse fold thickening involving the majority of the jejunum, nonspecific but can be related to an infectious etiology or lymphatic obstruction. 5. No definite filling defects in the IVC, iliac veins, or visualized femoral veins to suggest thrombus. 6. Asymmetric thickening of the inferior right rectus abdominus musculature with surrounding stranding which is partially obscured by adjacent streak artifact from dense oral contrast in the small bowel. No definite focal lesion is demonstrated; however, an underlying lesion cannot be entirely excluded. Hematoma is also a consideration. CT CHEST PULMONARY EMBOLISM W CONTRAST    Result Date: 9/5/2021  EXAMINATION: CTA OF THE CHEST 9/2/2021 12:47 pm TECHNIQUE: CTA of the chest was performed after the administration of intravenous contrast.  Multiplanar reformatted images are provided for review. MIP images are provided for review. Dose modulation, iterative reconstruction, and/or weight based adjustment of the mA/kV was utilized to reduce the radiation dose to as low as reasonably achievable. COMPARISON: None.  HISTORY: ORDERING SYSTEM PROVIDED HISTORY: leg swelling sob new o2 requirement TECHNOLOGIST PROVIDED HISTORY: Reason for exam:->leg swelling sob new o2 requirement Decision Support Exception - unselect if not a suspected or confirmed emergency medical condition->Emergency Medical Condition (MA) Reason for Exam: leg swelling sob new o2 requirement Acuity: Acute Type of Exam: Initial FINDINGS: Pulmonary Arteries:  No central, lobar, or segmental pulmonary embolus. Narrowing of the right lower lobe pulmonary artery by the adenopathy. Mediastinum: Heart size is normal.  No pericardial effusion. Thoracic aorta is normal caliber. Extensive mediastinal and hilar lymphadenopathy is present. Subcarinal node measures 32 x 37 mm. Right hilar node measures 30 x 29 mm. Lungs/pleura: Moderate emphysema. Multiple 3 mm nodules are present in the right middle lobe and left lower lobe. Superimposed in the posterior right lower lobe, there is a dominant nodule measuring 17 x 17 mm. An additional right lower lobe nodule measures 11 mm. No pleural effusion or pneumothorax. Narrowing of bronchus intermedius due to the right hilar adenopathy. Upper Abdomen: Multiple low-attenuation liver lesions are present measuring up to 48 x 40 mm and 26 x 28 mm. At least 30 lesions are noted in the visualized portions of the liver. Soft Tissues/Bones: No lytic or blastic osseous lesion. 1. No acute pulmonary embolus. 2. Extensive mediastinal and hilar lymphadenopathy compatible with metastatic neoplasm. 3. Multiple new indeterminate 3 mm nodules in the right middle lobe and left lower lobe. These could be infectious or neoplastic. 4. Right lower lobe 17 mm and 11 mm nodules are nonspecific, but metastatic disease not excluded. 5. Multiple liver metastases measuring up to 48 x 40 mm.  RECOMMENDATIONS:     CT NEEDLE BIOPSY LIVER PERCUTANEOUS    Result Date: 9/8/2021  PROCEDURE: CT GUIDED CORE BIOPSY OF LEFT HEPATIC LOBE MASS MODERATE CONSCIOUS SEDATION 9/7/2021 HISTORY: ORDERING SYSTEM PROVIDED HISTORY: lesion TECHNOLOGIST PROVIDED HISTORY: Reason for exam:->lesion Reason for Exam: lesion Acuity: Acute Type of Exam: Initial; ORDERING SYSTEM PROVIDED HISTORY: bx TECHNOLOGIST PROVIDED HISTORY: Reason for exam:->bx Reason for Exam: lesion Acuity: Acute Type of Exam: Initial PHYSICIANS: Amilcar Decker SEDATION: 0.5 mgversed and 25 mcg fentanyl were titrated intravenously for moderate sedation monitored under my direction. Total intraservice time of sedation was 15 minutes. The patient's vital signs were monitored throughout the procedure and recorded in the patient's medical record by the nurse. TECHNIQUE: Informed consent was obtained after a detailed discussion about the procedure including the risk, benefits, and alternatives. Universal protocol was followed. Axial images were obtained through the liver and a suitable skin site was prepped and draped in sterile fashion. Local anesthesia was achieved with lidocaine. Under intermittent CT guidance, a 17 gauge coaxial needle was inserted into the a left hepatic lobe mass. Through this, 1 18 gauge core biopsy specimens were obtained using coaxial technique and submitted to pathology. Gel-Foam slurry was administered as the needle was removed. A sterile bandage was then placed. The patient tolerated the procedure well. Estimated blood loss: Less than 5 cc Dose modulation, iterative reconstruction, and/or weight based adjustment of the mA/kV was utilized to reduce the radiation dose to as low as reasonably achievable. DLP: 8993.31 mGy-cm     Successful CT guided core biopsy of a left hepatic lobe mass.      VL Extremity Venous Bilateral    Result Date: 9/8/2021  Lower Extremities DVT Study  Demographics   Patient Name       Tricia Salgado   Date of Study      09/07/2021         Gender              Male   Patient Number     [id number]         Date of Birth       1970   Visit Number       [id number]          Age                 46 year(s)   Accession Number   [id number]         Room Number         7859   Corporate ID       [id number]            Sonographer         Inge Alberto RVT   Ordering Physician Chantal Perez Interpreting        Latosha Aquino MD         Physician           MD  Procedure Type of Study:   Veins:Lower Extremities DVT Study, VASC EXTREMITY VENOUS DUPLEX BILATERAL. Vascular Sonographer Report  Indications for Study:Edema. Impressions Right Impression No evidence of deep vein or superficial vein thrombosis involving the right lower extremity in vessels clearly visualized. Proximal and Mid Posterior Tibial veins and Peroneal veins were not visualized due to swelling. Calf and ankle edema noted. Left Impression No evidence of deep vein or superficial vein thrombosis involving the left lower extremity in vessels clearly visualized. Proximal Posterior Tibial veins and Soleal vein were not visualized due to swelling. Calf and ankle edema noted. Conclusions   Summary   No evidence of deep vein or superficial vein thrombosis involving the  bilateral lower extremities in vessels clearly visualized as detailed above. Signature   ------------------------------------------------------------------  Electronically signed by Marjan Dasilva MD (Interpreting  physician) on 09/08/2021 at 08:09 AM  ------------------------------------------------------------------  Patient Status:Routine. Study [address] [facility]. Technical Quality:Limited visualization due to swelling. Risk Factors   - The patient's risk factor(s) include: dyslipidemia and arterial     hypertension.   - The patient has a current/recent (within 1 year) tobacco history. Velocities are measured in cm/s ; Diameters are measured in mm Right Lower Extremities DVT Study Measurements Right 2D Measurements +------------------------+----------+---------------+----------+ ! Location                ! Visualized! Compressibility! Thrombosis! +------------------------+----------+---------------+----------+ ! Sapheno Femoral Junction! Yes       ! Yes            ! None      ! +------------------------+----------+---------------+----------+ ! GSV Thigh               ! Yes       ! Yes            ! None      ! +------------------------+----------+---------------+----------+ ! Common Femoral          !Yes       ! Yes            ! None      ! +------------------------+----------+---------------+----------+ ! Prox Femoral            !Yes       ! Yes            ! None      ! +------------------------+----------+---------------+----------+ ! Mid Femoral             !Yes       ! Yes            ! None      ! +------------------------+----------+---------------+----------+ ! Dist Femoral            !Yes       ! Yes            ! None      ! +------------------------+----------+---------------+----------+ ! Deep Femoral            !Yes       ! Yes            ! None      ! +------------------------+----------+---------------+----------+ ! Popliteal               !Yes       ! Yes            ! None      ! +------------------------+----------+---------------+----------+ ! Gastroc                 ! Yes       ! Yes            ! None      ! +------------------------+----------+---------------+----------+ ! Soleal                  !Yes       ! Yes            ! None      ! +------------------------+----------+---------------+----------+ ! PTV                     ! Partial   !Yes            ! None      ! +------------------------+----------+---------------+----------+ ! ATV                     ! Yes       ! Yes            ! None      ! +------------------------+----------+---------------+----------+ ! Peroneal                !Partial   !Yes            ! None      ! +------------------------+----------+---------------+----------+ ! GSV Calf                ! Yes       ! Yes            ! None      ! +------------------------+----------+---------------+----------+ ! SSV                     ! Yes       ! Yes            ! None      ! +------------------------+----------+---------------+----------+ Right Doppler Measurements +--------------+------+------+------------+ ! Location      ! Signal!Reflux! Reflux (sec)! +--------------+------+------+------------+ ! Common Femoral!Phasic! No    !            ! +--------------+------+------+------------+ ! Popliteal     !Phasic! No    !            ! +--------------+------+------+------------+ Left Lower Extremities DVT Study Measurements Left 2D Measurements +------------------------+----------+---------------+----------+ ! Location                ! Visualized! Compressibility! Thrombosis! +------------------------+----------+---------------+----------+ ! Sapheno Femoral Junction! Yes       ! Yes            ! None      ! +------------------------+----------+---------------+----------+ ! GSV Thigh               ! Yes       ! Yes            ! None      ! +------------------------+----------+---------------+----------+ ! Common Femoral          !Yes       ! Yes            ! None      ! +------------------------+----------+---------------+----------+ ! Prox Femoral            !Yes       ! Yes            ! None      ! +------------------------+----------+---------------+----------+ ! Mid Femoral             !Yes       ! Yes            ! None      ! +------------------------+----------+---------------+----------+ ! Dist Femoral            !Yes       ! Yes            ! None      ! +------------------------+----------+---------------+----------+ ! Deep Femoral            !Yes       ! Yes            ! None      ! +------------------------+----------+---------------+----------+ ! Popliteal               !Yes       ! Yes            ! None      ! +------------------------+----------+---------------+----------+ ! Gastroc                 ! Yes       ! Yes            ! None      ! +------------------------+----------+---------------+----------+ ! Soleal                  !No        !               !          ! +------------------------+----------+---------------+----------+ ! PTV                     ! Partial   !Yes            ! None      ! +------------------------+----------+---------------+----------+ ! ATV                     ! Yes       ! Yes            ! None      ! +------------------------+----------+---------------+----------+ ! Peroneal                !Yes       ! Yes            ! None      ! +------------------------+----------+---------------+----------+ ! GSV Calf                ! Yes       ! Yes            ! None      ! +------------------------+----------+---------------+----------+ ! SSV                     ! Yes       ! Yes            ! None      ! +------------------------+----------+---------------+----------+ Left Doppler Measurements +--------------+------+------+------------+ ! Location      ! Signal!Reflux! Reflux (sec)! +--------------+------+------+------------+ ! Common Femoral!Phasic! No    !            ! +--------------+------+------+------------+ ! Popliteal     !Phasic! No    !            ! +--------------+------+------+------------+      Problem List  Patient Active Problem List   Diagnosis    Pure hypercholesterolemia    Anxiety    Essential hypertension    Moderate persistent asthma with acute exacerbation    Metastatic cancer ([facility] [address]    Hypokalemia    Acute respiratory failure with hypoxia (HCC)    Abnormal CT of the chest    COPD exacerbation (HCC)       ASSESSMENT AND PLAN    Liver masses  Sp biopsy path is pending   Social work to assist with insurance issues  More to say once I have path   Will moniter for [employer] etc  If he comes to our office without insurance he can come for an out of pocket visit but to take tx he will need insurance to cover costs of tx     Christin Ortega MD, MD

## 2021-09-08 NOTE — PROGRESS NOTES
[facility]  Nephrology   Progress Note        We are following the patient for uncontrolled HTN Severe Hypokalemia  47 y/o WM, gray and heavy smoker admitted with severe Hypokalemia  He has h/o HTN for ~ 20 years that has been well controlled on Amlodipine  About 3 weeks ago he started to have leg swelling, weight gain fatigue and SOB  Out patient labs showed severe Hypokalemia  On admission K+ noted to be 2.2 meq CO2 40   Noted to have uncontrolled HTN with /108 mm  BP has remained resistant despite addition of multiple meds    SUBJECTIVE:  Liver Biopsy could not be performed yesterday as MAP remains > 100  Hoping to get biopsy this AM  Denies new complaints Notes edema is improving  Good diuresis in progress UOP 3 L/24  BP still high for biopsy this AM      OBJECTIVE:     PHYSICAL:    TEMPERATURE:  Current - Temp: 97.5 °F (36.4 °C);  Max - Temp  Av.7 °F (36.5 °C)  Min: 97.5 °F (36.4 °C)  Max: 97.9 °F (36.6 °C)  RESPIRATIONS RANGE: Resp  Av.3  Min: 16  Max: 18  PULSE RANGE: Pulse  Av  Min: 78  Max: 104  BLOOD PRESSURE RANGE:  Systolic (85OCO), MJ , Min:142 , WFR:671   ; Diastolic (07VUL), OROZCO:30, Min:74, Max:96    PULSE OXIMETRY RANGE: SpO2  Av.9 %  Min: 91 %  Max: 99 %  24HR INTAKE/OUTPUT:    Intake/Output Summary (Last 24 hours) at 2021 1201  Last data filed at 2021 2793  Gross per 24 hour   Intake 1314.34 ml   Output 1400 ml   Net -85.66 ml       CONSTITUTIONAL:  awake, alert, cooperative, no apparent distress, and appears stated age  HEENT:  Lids and lashes normal, pupils equal, round and reactive to light, extra ocular muscles intact, sclera clear, conjunctiva normal  NECK:  Supple, symmetrical, trachea midline, no adenopathy, thyroid symmetric, not enlarged and no tenderness, skin normal  LUNGS:  diminished breath sounds throughout lungs few exp wheezing  CARDIOVASCULAR:  regular rate and rhythm  ABDOMEN:  No scars, normal bowel sounds, soft, non-distended, non-tender, no masses palpated, no hepatosplenomegally  NEUROLOGIC:  alert, oriented, normal speech, no focal findings or movement disorder noted  SKIN: no bruising or bleeding  EXTREMITIES: 2+ bilateral pedal edema    Medications     dextrose      sodium chloride 25 mL (09/07/21 1519)        Data      CBC:   Recent Labs     09/08/21  0511   WBC 7.3   RBC 4.18*   HGB 14.1   HCT 41.4   *     BMP:    Recent Labs     09/06/21  0441 09/07/21  0504 09/08/21  0511    143 142   K 3.7 3.5 3.3*   CL 97* 94* 96*   CO2 36* 37* 34*   BUN 21* 23* 26*   CREATININE 0.7* 0.7* 0.7*   CALCIUM 8.5 8.7 8.4   GLUCOSE 234* 233* 221*     Phosphorus:  No results for input(s): PHOS in the last 72 hours. Magnesium:    Recent Labs     09/07/21  0504 09/08/21  0511   MG 1.90 1.70*         ASSESSMENT     Patient Active Problem List   Diagnosis    Pure hypercholesterolemia    Anxiety    Essential hypertension    Moderate persistent asthma with acute exacerbation    Metastatic cancer (HCC)    Hypokalemia    Acute respiratory failure with hypoxia (HCC)    Abnormal CT of the chest    COPD exacerbation (HCC)       PLAN    1-Hypertension resistant to treatment with multiple meds Concern for secondary HTN including Hypercortisolism ( Cortisol level 90 with adrenal nodule  ? Ectopic ACTH production, Also Neuroendocrine tumor a possibility) 24 hour urine for cortisol and salivary cortisol levels pending ACTH level pending.   Switched to Nifedipine from Amlodipine and started Aldactone BP remains elevated for biopsy Will increase Aldactone to 100 mg Additional clonidine and IV Hydralazine now to get MAP < 100 mm for biopsy D/W RN , radiology  2-Hypokalemia profound possibly related to Hypercortisolism and renal K+ wasting , started on Aldactone K+ 3.5 meq Will increase dose to 100 mg daily Continue K= supplement Mg++ 1.7 mg replace  3-Met Alkalosis secondary to above   4 Edema Doppler negative for DVT U/A with + protein serum Albumin 3.5 gm Suspect fluid retention due to hypercortisolism. Improving with diuresis Net negative 7 L so far Continue Lasix IV BID   5 Metastatic tumor primary ?  Neuroendocrine tumor, Primary adrenal carcinoma Liver biopsy could not be done yesterday due to elevated BP Hoping to have it done this AM Will give Hydralazine and Clonidine now and recheck BP   6 COPD exacerbation on prednisone  7 Nicotine dependence  8 Elevated LFT's    Malorie Coronado MD, Natalie Alicea

## 2021-09-09 NOTE — PROGRESS NOTES
Nutrition Assessment     Type and Reason for Visit: Initial    Nutrition Recommendations/Plan:   Carb Control (5)/1500 ml fl res diet   Will monitor nutritional adequacy, nutrition-related labs, weights, BMs, and clinical progress     Nutrition Assessment:  LOS. Pt with hx of hypertension, asthma, hyperlipidemia. Pt presented with SOB, hypokalemia. Also found to have lesions and lungs, liver. Biopsy pending. Pt on Carb Control (5)/1500 ml fl res diet, pt eating most of meals at this time. Denied any nutrition-related issues PTA. Malnutrition Assessment:  Malnutrition Status: No malnutrition    Nutrition Related Findings: Last BM 9/6      Current Nutrition Therapies:    ADULT DIET;  Regular; 5 carb choices (75 gm/meal); 1500 ml    Anthropometric Measures:  · Height: 6' (182.9 cm)  · Current Body Wt: 241 lb (109.3 kg)   · BMI: 32.7    Nutrition Diagnosis:   No nutrition diagnosis at this time     Nutrition Interventions:   Food and/or Nutrient Delivery:  Continue Current Diet  Nutrition Education/Counseling:  No recommendation at this time   Coordination of Nutrition Care:  Continue to monitor while inpatient    Goals:  Consume greater than 50% of meals and supplements this admission       Nutrition Monitoring and Evaluation:   Behavioral-Environmental Outcomes:  None Identified   Food/Nutrient Intake Outcomes:  Food and Nutrient Intake  Physical Signs/Symptoms Outcomes:  Biochemical Data, Nutrition Focused Physical Findings, Skin, Weight     Discharge Planning:    No discharge needs at this time     Electronically signed by Alicia Alvarado RD, LD on 9/9/21 at 12:25 PM EDT    Contact: [phone]

## 2021-09-09 NOTE — PROGRESS NOTES
Briefly stopped in and told pt bx results pending   It may be back by tomorrow   He expressed understanding

## 2021-09-09 NOTE — PROGRESS NOTES
Reassessment of pt completed. Pt remains on 4L via NC. CXR repeated today. RN will continue to wean oxygen as pt tolerates. IS placed in pt room and RN educated on important. Pt was able to teach back use of IS. Diuretics continue to be given in hopes to diurese pt and wean oxygen. Pt able to ambulate indepedently without complications. BLE ace wraps applied d/t pitting edema. Pt able to express needs and is currently sitting at bedside with family. RN will continue to monitor.  Electronically signed by Agnieszka Mosquera RN on 9/9/2021 at 3:00 PM

## 2021-09-09 NOTE — RT PROTOCOL NOTE
RT Inhaler-Nebulizer Bronchodilator Protocol Note    There is a bronchodilator order in the chart from a provider indicating to follow the RT Bronchodilator Protocol and there is an Initiate RT Bronchodilator Protocol order as well (see protocol at bottom of note). The findings from the last RT Protocol Assessment were as follows:  Smoking: >15 Pack years  Surgical Status: No surgery  Xray: Clear  Respiratory Pattern: Dyspnea with exertion  Mental Status: Alert and Oriented  Breath Sounds: Diminished and/or crackles  Cough: Strong, spontaneous, non-productive  Activity Level: Walking with assistance  Oxygen Requirement: 29% or 3LNC - 5LNC/40%  Indication for Bronchodilator Therapy: Decreased or absent breath sounds  Bronchodilator Assessment Score: 5    Aerosolized bronchodilator medication orders have been revised according to the RT Bronchodilator Protocol. RT Inhaler-Nebulizer Bronchodilator Protocol:    Respiratory Therapist to perform RT Therapy Protocol Assessment then follow the protocol. No Indications - adjust the frequency to every 6 hours PRN wheezing or bronchospasm, if no treatments needed after 48 hours then discontinue using Per Protocol order mode. If indication present, adjust the RT bronchodilator orders based on the Bronchodilator Assessment Score as follows:    0-6 - enter or revise RT bronchodilator order to Albuterol Inhaler order with frequency of every 2 hours PRN for wheezing or increased work of breathing using Per Protocol order mode. If Albuterol Inhaler not tolerated or not effective, then discontinue the Albuterol Inhaler order and enter Albuterol Nebulizer order with same frequency and PRN reasons. Repeat RT Therapy Protocol Assessment as needed.     7-10 - discontinue any other Inpatient aerosolized bronchodilator medication orders and enter or revise two Albuterol Inhaler orders, one with BID frequency and one with frequency of every 2 hours PRN wheezing or increased work of breathing using Per Protocol order mode. Repeat RT Therapy Protocol Assessment with second treatment then BID and as needed. If Albuterol Inhaler not tolerated or not effective, then discontinue the Albuterol Inhaler orders and enter two Albuterol Nebulizer orders with same frequencies and PRN reasons. 11-13 - discontinue any other Inpatient aerosolized bronchodilator medication orders and enter DuoNeb Nebulizer orders QID frequency and an Albuterol Nebulizer order every 2 hours PRN wheezing or increased work of breathing using Per Protocol order mode. Repeat RT Therapy Protocol Assessment with second treatment then QID and as needed. Greater than 13 - discontinue any other Inpatient bronchodilator aerosolized medication orders and enter DuoNeb Nebulizer order every 4 hours frequency and Albuterol Nebulizer every 2 hours PRN wheezing or increased work of breathing using Per Protocol order mode. Repeat RT Therapy Protocol Assessment with second treatment then every 4 hours and as needed. RT to enter RT Home Evaluation for COPD & MDI Assessment order using Per Protocol order mode.     Electronically signed by Aye Acosta RCP on 9/9/2021 at 8:21 AM

## 2021-09-09 NOTE — PROGRESS NOTES
[facility]  Nephrology   Progress Note        We are following the patient for uncontrolled HTN Severe Hypokalemia  45 y/o WM, gray and heavy smoker admitted with severe Hypokalemia  He has h/o HTN for ~ 20 years that has been well controlled on Amlodipine  About 3 weeks ago he started to have leg swelling, weight gain fatigue and SOB  Out patient labs showed severe Hypokalemia  On admission K+ noted to be 2.2 meq CO2 40   Noted to have uncontrolled HTN with /108 mm  BP has remained resistant despite addition of multiple meds    SUBJECTIVE:  S/P Liver biopsy 21, await results  Denies new complaints   UOP 3400 ml/24   Notes edema is improving  BP more reasonable today   K+ improved      OBJECTIVE:     PHYSICAL:    TEMPERATURE:  Current - Temp: 98.2 °F (36.8 °C);  Max - Temp  Av.7 °F (36.5 °C)  Min: 97.4 °F (36.3 °C)  Max: 98.2 °F (36.8 °C)  RESPIRATIONS RANGE: Resp  Av  Min: 16  Max: 18  PULSE RANGE: Pulse  Av.6  Min: 82  Max: 98  BLOOD PRESSURE RANGE:  Systolic (29DFV), ZTL:956 , Min:143 , LRU:595   ; Diastolic (03RNT), CECILIO:79, Min:74, Max:87    PULSE OXIMETRY RANGE: SpO2  Av.9 %  Min: 88 %  Max: 94 %  24HR INTAKE/OUTPUT:      Intake/Output Summary (Last 24 hours) at 2021 1519  Last data filed at 2021 1400  Gross per 24 hour   Intake 1777.09 ml   Output 2950 ml   Net -1172.91 ml       CONSTITUTIONAL:  awake, alert, cooperative, no apparent distress, and appears stated age  HEENT:  Lids and lashes normal, pupils equal, round and reactive to light  NECK:  Supple, symmetrical, trachea midline, no adenopathy  LUNGS:fairly clear today no wheezes noted  ABDOMEN:  No scars, normal bowel sounds, soft, non-distended  NEUROLOGIC:  alert, oriented, normal speech, no focal findings or movement disorder noted  SKIN: no bruising or bleeding  EXTREMITIES: ace wraps LE's    Medications     dextrose      sodium chloride 100 mL/hr at 21 3888        Data      CBC: Recent Labs     09/08/21  0511   WBC 7.3   RBC 4.18*   HGB 14.1   HCT 41.4   *     BMP:    Recent Labs     09/07/21  0504 09/08/21  0511 09/09/21  0445    142 140   K 3.5 3.3* 3.6   CL 94* 96* 94*   CO2 37* 34* 36*   BUN 23* 26* 23*   CREATININE 0.7* 0.7* 0.7*   CALCIUM 8.7 8.4 8.4   GLUCOSE 233* 221* 207*     Phosphorus:  No results for input(s): PHOS in the last 72 hours. Magnesium:    Recent Labs     09/07/21  0504 09/08/21  0511 09/09/21  0445   MG 1.90 1.70* 2.00         ASSESSMENT     Patient Active Problem List   Diagnosis    Pure hypercholesterolemia    Anxiety    Essential hypertension    Moderate persistent asthma with acute exacerbation    [facility] ([facility] [address]    Hypokalemia    Acute respiratory failure with hypoxia (HCC)    Abnormal CT of the chest    COPD exacerbation (HCC)    Lung nodules    Tobacco abuse       PLAN    1-Hypertension resistant to treatment with multiple meds Concern for secondary HTN including Hypercortisolism ( Cortisol level 90 with adrenal nodule  ? 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 Improving with diuresis Net negative 9 L so far Continue Lasix IV BID     5 Metastatic tumor primary ?  Liver biopsy done Await results    6 COPD exacerbation on prednisone    7 Nicotine dependence    8 Elevated LFT's    Saul Case,

## 2021-09-09 NOTE — PLAN OF CARE
Problem: Infection:  Goal: Will remain free from infection  Description: Will remain free from infection  9/9/2021 0855 by Maykel Mishra RN  Outcome: Ongoing  9/9/2021 0141 by Rashid Goins RN  Outcome: Ongoing     Problem: Safety:  Goal: Free from accidental physical injury  Description: Free from accidental physical injury  9/9/2021 0855 by Maykel Mishra RN  Outcome: Ongoing  9/9/2021 0141 by Rashid Goins RN  Outcome: Ongoing   Fall risk assessment completed every shift. All precautions in place. Pt has call light within reach at all times. Room clear of clutter. Pt aware to call for assistance when getting up. Problem: Daily Care:  Goal: Daily care needs are met  Description: Daily care needs are met  9/9/2021 0855 by Maykel Mishra RN  Outcome: Ongoing  9/9/2021 0141 by Rashid Goins RN  Outcome: Ongoing   Patient's ability assessed to perform self care and independent activity encouraged according to that ability. Assisted with ADL's as needed. Risk for skin breakdown assessed. Potential discharge needs assessed. Patient and family included in daily care decisions. Problem: Pain:  Goal: Patient's pain/discomfort is manageable  Description: Patient's pain/discomfort is manageable  9/9/2021 0855 by Maykel Mishra RN  Outcome: Ongoing  9/9/2021 0141 by Rashid Goins RN  Outcome: Ongoing   Pain/discomfort being managed with PRN analgesics per MD orders. Pt able to express presence and absence of pain and rate pain appropriately using numerical scale. Problem: Skin Integrity:  Goal: Skin integrity will stabilize  Description: Skin integrity will stabilize  9/9/2021 0855 by Maykel Mishra RN  Outcome: Ongoing  9/9/2021 0141 by Rashid Goins RN  Outcome: Ongoing   Skin assessment completed every shift. Pt assessed for incontinence, appropriate barrier cream applied prn. Pt encouraged to turn/rotate every 2 hours.  Assistance provided if pt unable to do so themselves. Problem:  Activity Intolerance:  Goal: Ability to tolerate increased activity will improve  Description: Ability to tolerate increased activity will improve  9/9/2021 0855 by Esthela Rodríguez RN  Outcome: Ongoing  9/9/2021 0141 by Diego Miles RN  Outcome: Ongoing

## 2021-09-09 NOTE — PROGRESS NOTES
[facility] Internal Medicine Note      Chief Complaint: I am feeling ok    Subjective/Interval History: This morning the patient feels okay. Oxygen requirement up to 4 L. No other new problems overnight. Biopsy was obtained yesterday. Denies pain. Denies shortness of breath. 9.5 L negative fluid balance since admission. Weight down 6 pounds. Case discussed with nursing and Dr. Linda Olsen this morning. No chest pain or shortness breath. No cough or sputum. No nausea, vomiting, diarrhea. No abdominal pain. No dysuria. The remainder of the review of systems is negative. PMH, PSH, FH/SH reviewed and unchanged as documented in the H&P personally documented at admission 21    Medication list reviewed    Objective:    BP (!) 161/76   Pulse 85   Temp 97.5 °F (36.4 °C) (Oral)   Resp 18   Ht 6' (1.829 m)   Wt 241 lb 2.9 oz (109.4 kg)   SpO2 92%   BMI 32.71 kg/m²   Temp  Av.5 °F (36.4 °C)  Min: 97.4 °F (36.3 °C)  Max: 98 °F (36.7 °C)    RRR  Chest-respirations easy. The chest is clear today. Abd- BS+, soft, nondistended  Ext- edema looking better. 2+ below the knees.     The Following Labs Were Reviewed Today:    Recent Results (from the past 24 hour(s))   POCT Glucose    Collection Time: 21  1:33 PM   Result Value Ref Range    POC Glucose 174 (H) 70 - 99 mg/dl    Performed on ACCU-CHEK    Cortisol, Urine, Free    Collection Time: 21  3:20 PM   Result Value Ref Range    Urine Total Volume 3000     Hours Collected 24    POCT Glucose    Collection Time: 21  4:56 PM   Result Value Ref Range    POC Glucose 222 (H) 70 - 99 mg/dl    Performed on ACCU-CHEK    POCT Glucose    Collection Time: 21  8:46 PM   Result Value Ref Range    POC Glucose 256 (H) 70 - 99 mg/dl    Performed on ACCU-CHEK    Basic Metabolic Panel    Collection Time: 21  4:45 AM   Result Value Ref Range    Sodium 140 136 - 145 mmol/L    Potassium 3.6 3.5 - 5.1 mmol/L    Chloride 94 (L) 99 - 110 mmol/L    CO2 36 (H) 21 - 32 mmol/L    Anion Gap 10 3 - 16    Glucose 207 (H) 70 - 99 mg/dL    BUN 23 (H) 7 - 20 mg/dL    CREATININE 0.7 (L) 0.9 - 1.3 mg/dL    GFR Non-African American >60 >60    GFR African American >60 >60    Calcium 8.4 8.3 - 10.6 mg/dL   Magnesium    Collection Time: 09/09/21  4:45 AM   Result Value Ref Range    Magnesium 2.00 1.80 - 2.40 mg/dL   POCT Glucose    Collection Time: 09/09/21  7:45 AM   Result Value Ref Range    POC Glucose 205 (H) 70 - 99 mg/dl    Performed on ACCU-CHEK        ASSESSMENT/PLAN:      Principal Problem:    Hypokalemia-potassium low normal today. Increase potassium to 40 meq TID. Potassium normal today. Continue current supplementation. Active Problems:    Metastatic cancer-biopsy pending. Lovenox restarted. Oxygen requirement up slightly. Repeat chest x-ray today. Moderate persistent asthma with acute exacerbation-lungs are clear today. Taper prednisone to 10 mg and hopefully stop soon. Today is day 7 of antibiotics. Will discontinue Zosyn and doxy after today. Pure hypercholesterolemia-continue to hold Lipitor given abnormal LFTs. Essential hypertension-blood pressure improving. ?  Increase Cardura. Bilateral leg edema-Continue lasix 40 mg twice daily. Weight down today. Continue to monitor. Steroid-induced hyperglycemia-increase lantus and change to high sliding scale. Hypomagnesemia- better today. Continue to monitor. Saira Kelly MD, FACP  9:20 AM  9/9/2021

## 2021-09-09 NOTE — RT PROTOCOL NOTE
RT Inhaler-Nebulizer Bronchodilator Protocol Note    There is a bronchodilator order in the chart from a provider indicating to follow the RT Bronchodilator Protocol and there is an Initiate RT Bronchodilator Protocol order as well (see protocol at bottom of note). The findings from the last RT Protocol Assessment were as follows:  Smoking: >15 Pack years  Surgical Status: No surgery  Xray: Clear  Respiratory Pattern: Dyspnea with exertion  Mental Status: Alert and Oriented  Breath Sounds: Wheezing and/or rhonchi  Cough: Strong, spontaneous, non-productive  Activity Level: Walking unassisted  Oxygen Requirement: 29% or 3LNC - 5LNC/40%  Indication for Bronchodilator Therapy: Wheezing associated with pulm disorder  Bronchodilator Assessment Score: 7    Aerosolized bronchodilator medication orders have been revised according to the RT Bronchodilator Protocol. RT Inhaler-Nebulizer Bronchodilator Protocol:    Respiratory Therapist to perform RT Therapy Protocol Assessment then follow the protocol. No Indications - adjust the frequency to every 6 hours PRN wheezing or bronchospasm, if no treatments needed after 48 hours then discontinue using Per Protocol order mode. If indication present, adjust the RT bronchodilator orders based on the Bronchodilator Assessment Score as follows:    0-6 - enter or revise RT bronchodilator order to Albuterol Inhaler order with frequency of every 2 hours PRN for wheezing or increased work of breathing using Per Protocol order mode. If Albuterol Inhaler not tolerated or not effective, then discontinue the Albuterol Inhaler order and enter Albuterol Nebulizer order with same frequency and PRN reasons. Repeat RT Therapy Protocol Assessment as needed.     7-10 - discontinue any other Inpatient aerosolized bronchodilator medication orders and enter or revise two Albuterol Inhaler orders, one with BID frequency and one with frequency of every 2 hours PRN wheezing or increased work of breathing using Per Protocol order mode. Repeat RT Therapy Protocol Assessment with second treatment then BID and as needed. If Albuterol Inhaler not tolerated or not effective, then discontinue the Albuterol Inhaler orders and enter two Albuterol Nebulizer orders with same frequencies and PRN reasons. 11-13 - discontinue any other Inpatient aerosolized bronchodilator medication orders and enter DuoNeb Nebulizer orders QID frequency and an Albuterol Nebulizer order every 2 hours PRN wheezing or increased work of breathing using Per Protocol order mode. Repeat RT Therapy Protocol Assessment with second treatment then QID and as needed. Greater than 13 - discontinue any other Inpatient bronchodilator aerosolized medication orders and enter DuoNeb Nebulizer order every 4 hours frequency and Albuterol Nebulizer every 2 hours PRN wheezing or increased work of breathing using Per Protocol order mode. Repeat RT Therapy Protocol Assessment with second treatment then every 4 hours and as needed. RT to enter RT Home Evaluation for COPD & MDI Assessment order using Per Protocol order mode.     Electronically signed by Tal John RCP on 9/9/2021 at 12:15 PM

## 2021-09-09 NOTE — PROGRESS NOTES
Pt in with cough, liver nodules and elevated blood pressure. Had a liver biopsy yesterday, with no complaints of pain at that site, awaiting results. Blood pressure has been elevated,was medicated with 2 different medications, with barely no affect. .  His oxygen was increased to 4 L from 3 L for saturations staying at 88%. Maintained 90 to 93% at 4 L., both asleep and awake.

## 2021-09-09 NOTE — PROGRESS NOTES
Pulmonary/Critical Care Progress Note    CC:  Follow-up:  Acute hypoxic respiratory failure  Acute exacerbation of COPD  Lung nodule  Tobacco abuse    Subjective:  Remains on 4L NC    ROS      Intake/Output Summary (Last 24 hours) at 9/9/2021 1402  Last data filed at 9/9/2021 1353  Gross per 24 hour   Intake 1675.95 ml   Output 2950 ml   Net -1274.05 ml         PHYSICAL EXAM:  Blood pressure (!) 143/78, pulse 85, temperature 98.2 °F (36.8 °C), temperature source Oral, resp. rate 16, height 6' (1.829 m), weight 241 lb 2.9 oz (109.4 kg), SpO2 91 %.'  Gen: No distress. Well developed, well-nourished  Eyes: No scleral icterus. No conjunctival injection. ENT: External appearance of ears and nose normal.  Neck: Trachea midline. No obvious mass. No visible thyroid enlargement     Respiratory: No crackles. Faint bibasilar wheeze No rhonchi. No accessory muscle use  Cardiovascular: Regular rate. Regular rhythm. No murmur or rub. No edema  GI: Non-tender. Non-distended. No hernia. Bowel sounds present  Skin: Warm, dry, normal texture and turgor. No abnormalities on exposed extremities. Musculoskeletal: No cyanosis. No clubbing. No joint deformity. Neuro: Moves all four extremities.    Psychiatric:  Normal mood and affect; exhibits normal insight and judgement     Medications:    Scheduled Meds:   enoxaparin  40 mg SubCUTAneous Daily    [START ON 9/10/2021] insulin glargine  25 Units SubCUTAneous Daily    [START ON 9/10/2021] predniSONE  10 mg Oral Daily    insulin lispro  0-18 Units SubCUTAneous TID     insulin lispro  0-9 Units SubCUTAneous Nightly    spironolactone  50 mg Oral BID    doxazosin  4 mg Oral Daily    NIFEdipine  60 mg Oral BID    furosemide  40 mg IntraVENous BID    potassium chloride  40 mEq Oral BID     sodium chloride (Inhalant)  15 mL Nebulization Q4H WA    lisinopril  40 mg Oral Daily    piperacillin-tazobactam  3,375 mg IntraVENous Q8H    budesonide-formoterol  2 puff Inhalation BID    montelukast  10 mg Oral Nightly    sodium chloride flush  5-40 mL IntraVENous 2 times per day    nicotine  1 patch TransDERmal Daily    ipratropium-albuterol  1 ampule Inhalation Q4H WA    doxycycline hyclate  100 mg Oral 2 times per day       Continuous Infusions:   dextrose      sodium chloride 100 mL/hr at 09/09/21 0613       PRN Meds:  cloNIDine, hydrALAZINE, acetaminophen, glucose, dextrose, glucagon (rDNA), dextrose, iopamidol, sodium chloride flush, sodium chloride, ondansetron **OR** ondansetron, polyethylene glycol, [DISCONTINUED] acetaminophen **OR** acetaminophen    Labs:  CBC:   Recent Labs     09/08/21  0511   WBC 7.3   HGB 14.1   HCT 41.4   MCV 99.0   *     BMP:   Recent Labs     09/07/21  0504 09/08/21  0511 09/09/21  0445    142 140   K 3.5 3.3* 3.6   CL 94* 96* 94*   CO2 37* 34* 36*   BUN 23* 26* 23*   CREATININE 0.7* 0.7* 0.7*     LIVER PROFILE:   Recent Labs     09/08/21  0511   AST 64*   *   BILIDIR 0.4*   BILITOT 1.0   ALKPHOS 170*     PT/INR: No results for input(s): PROTIME, INR in the last 72 hours. APTT: No results for input(s): APTT in the last 72 hours. UA:No results for input(s): NITRITE, COLORU, PHUR, LABCAST, WBCUA, RBCUA, MUCUS, TRICHOMONAS, YEAST, BACTERIA, CLARITYU, SPECGRAV, LEUKOCYTESUR, UROBILINOGEN, BILIRUBINUR, BLOODU, GLUCOSEU, AMORPHOUS in the last 72 hours. Invalid input(s): Jacquelyn Dirk  No results for input(s): PH, PCO2, PO2 in the last 72 hours. Films:  Chest imaging and associated reports were personally reviewed and showed CXR 9/9:     Increased bibasilar atelectasis versus infiltrates.  No pneumothorax. ABG:  No new study    Cultures:  Blood:  Urine:  Sputum: no growth  Strep/Legionella Antigens:  MRSA probe:  Respiratory Viral Panel/Influenza: C. Diff:   COVID19: negative    ECHO  2021      Summary   Normal left ventricle size, wall thickness, and systolic function with an   estimated ejection fraction of 60-65%.  No regional wall motion abnormalities   are seen. Normal diastolic function. Normal right ventricular size and function. No significant valve abnormalities noted. PFTs  None    Assessment/Plan:     Acute hypoxic respiratory failure  Maintain oxygen saturations >90% with supplemental oxygen and wean as tolerated  Bronchodilators  Acapella and IS    Acute exacerbation of COPD  Prednisone  Symbicort  Duonebs  Doxycycline    Lung nodule  Liver biopsy yesterday, await results to determine staging    Tobacco abuse  NRT with patch     DVT prophylaxis with lovenox    Thank you for allowing me to participate in the care of this patient. Will follow.     Florencio Carreno, BLADEP  [facility] Pulmonary, Sleep, and Critical Care

## 2021-09-10 NOTE — PROGRESS NOTES
Dr William Louise in room to discuss plan with pt, sisters at bedside. Dr Mikie Cordoba updated also and plan for pt to be transferred to  tomorrow to start chemotherapy.  Supervisor updated  Electronically signed by Dwayne Abbott RN on 9/10/2021 at 7:56 PM

## 2021-09-10 NOTE — PLAN OF CARE
Problem: Daily Care:  Goal: Daily care needs are met  Description: Daily care needs are met  Outcome: Ongoing  Note: Patient's ability assessed to perform self care and independent activity encouraged according to that ability. Assisted with ADL's as needed. Risk for skin breakdown assessed. Potential discharge needs assessed. Patient and family included in daily care decisions.       Problem: Discharge Planning:  Goal: Patients continuum of care needs are met  Description: Patients continuum of care needs are met  Outcome: Ongoing  Note:       Problem: Airway Clearance - Ineffective:  Goal: Ability to maintain a clear airway will improve  Description: Ability to maintain a clear airway will improve  Outcome: Ongoing  Note:       Problem: Gas Exchange - Impaired:  Goal: Levels of oxygenation will improve  Description: Levels of oxygenation will improve  Outcome: Ongoing  Note:

## 2021-09-10 NOTE — PROGRESS NOTES
[facility]  Nephrology   Progress Note        We are following the patient for uncontrolled HTN Severe Hypokalemia  47 y/o WM, gray and heavy smoker admitted with severe Hypokalemia  He has h/o HTN for ~ 20 years that has been well controlled on Amlodipine  About 3 weeks ago he started to have leg swelling, weight gain fatigue and SOB  Out patient labs showed severe Hypokalemia  On admission K+ noted to be 2.2 meq CO2 40   Noted to have uncontrolled HTN with /108 mm  BP has remained resistant despite addition of multiple meds    SUBJECTIVE:  S/P Liver biopsy 21  Denies new complaints   UOP 2300 ml/24   Notes edema is improving, remains in thighs  BP has been reasonable overnight    Sister at bedside        OBJECTIVE:     PHYSICAL:    TEMPERATURE:  Current - Temp: 97.7 °F (36.5 °C);  Max - Temp  Av °F (36.7 °C)  Min: 97.7 °F (36.5 °C)  Max: 98.3 °F (36.8 °C)  RESPIRATIONS RANGE: Resp  Av  Min: 18  Max: 20  PULSE RANGE: Pulse  Av.3  Min: 80  Max: 99  BLOOD PRESSURE RANGE:  Systolic (12WBQ), AAR:119 , Min:138 , BT   ; Diastolic (13ZUW), IZP:80, Min:74, Max:87    PULSE OXIMETRY RANGE: SpO2  Av.4 %  Min: 87 %  Max: 96 %  24HR INTAKE/OUTPUT:      Intake/Output Summary (Last 24 hours) at 9/10/2021 1411  Last data filed at 9/10/2021 1241  Gross per 24 hour   Intake 4682.46 ml   Output  ml   Net 2657.46 ml       CONSTITUTIONAL:  awake, alert, cooperative, no apparent distress, and appears stated age  HEENT:  Lids and lashes normal, pupils equal, round and reactive to light  NECK:  Supple, symmetrical, trachea midline, no adenopathy  LUNGS:fairly clear today no wheezes noted  ABDOMEN:  No scars, normal bowel sounds, soft, non-distended  NEUROLOGIC:  alert, oriented, normal speech, no focal findings or movement disorder noted  SKIN: no bruising or bleeding  EXTREMITIES: ace wraps LE's + edema thighs    Medications     dextrose      sodium chloride Stopped (09/10/21 0349)        Data      CBC:   Recent Labs     09/08/21  0511 09/10/21  0510   WBC 7.3 8.5   RBC 4.18* 3.93*   HGB 14.1 13.3*   HCT 41.4 39.0*   * 101*     BMP:    Recent Labs     09/08/21  0511 09/09/21  0445 09/10/21  0510    140 139   K 3.3* 3.6 3.1*   CL 96* 94* 94*   CO2 34* 36* 36*   BUN 26* 23* 28*   CREATININE 0.7* 0.7* 0.7*   CALCIUM 8.4 8.4 8.7   GLUCOSE 221* 207* 211*     Phosphorus:  No results for input(s): PHOS in the last 72 hours. Magnesium:    Recent Labs     09/08/21  0511 09/09/21  0445 09/10/21  0510   MG 1.70* 2.00 1.80         ASSESSMENT     Patient Active Problem List   Diagnosis    Pure hypercholesterolemia    Anxiety    Essential hypertension    Moderate persistent asthma with acute exacerbation    [facility] cancer ([facility] [address]    Hypokalemia    Acute respiratory failure with hypoxia (HCC)    Abnormal CT of the chest    COPD exacerbation (HCC)    Lung nodules    Tobacco abuse       PLAN    1-Hypertension resistant to treatment with multiple meds Concern for secondary HTN including Hypercortisolism ( Cortisol level 90 with adrenal nodule  ?  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  Biopsy shows poorly differentiated neuroendocrine carcinoma   Will continue current regimen and monitor  Ketoconazole can be used to interrupt Cortisol synthesis but elevated LFT's preclude it Rx of tumor will help with ACTH reduction     2-Hypokalemia profound possibly related to Hypercortisolism and renal K+ wasting , started on Aldactone K+ 3.6 meq on supplement Aldactone dose  Increased  to 100 mg daily K= low at 3.1 meq  K+ supplement increased to TID Mg++ 1.8  mg today    3-Met Alkalosis secondary to above     4 Edema Doppler negative for DVT U/A with + protein serum Albumin 3.5 gm Suspect fluid retention due to hypercortisolism. Improving with diuresis Net negative 7 L so far Continue Lasix IV BID     5 Metastatic tumor primary Poorly differentiated neuroendocrine carcinoma Oncology on board    6 COPD exacerbation on prednisone    7 Nicotine dependence    8 Elevated LFT's    Jef Garcia MD, [address]

## 2021-09-10 NOTE — PROGRESS NOTES
Pulmonary Progress Note    Date of Admission: 9/2/2021   LOS: 8 days     Chief Complaint   Patient presents with    Abnormal Lab     sent by Dr. 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 rate 20, height 6' (1.829 m), weight 240 lb 11.9 oz (109.2 kg), SpO2 91 %.'  Gen:  No acute distress. Eyes: PERRL. Anicteric sclera. No conjunctival injection. ENT: No discharge. Posterior oropharynx clear. External appearance of ears and nose normal.  Neck: Trachea midline. No mass   Resp:  No crackles. No wheezes. No rhonchi. No dullness on percussion. CV: Regular rate. Regular rhythm. No murmur or rub. 2+ lower extremity bilateral edema. GI: Soft, Non-tender. Non-distended. +BS  Skin: Warm, dry, w/o erythema. Lymph: No cervical or supraclavicular LAD. M/S: No cyanosis. No clubbing. Neuro:  no focal neurologic deficit.   Moves all extremities  Psych: Awake and alert, Oriented x 3. Judgement and insight appropriate. Mood stable. Medications:    Scheduled Meds:   potassium chloride  40 mEq Oral TID     insulin glargine  30 Units SubCUTAneous Daily    magnesium sulfate  1,000 mg IntraVENous Once    enoxaparin  40 mg SubCUTAneous Daily    insulin lispro  0-18 Units SubCUTAneous TID     insulin lispro  0-9 Units SubCUTAneous Nightly    spironolactone  50 mg Oral BID    doxazosin  4 mg Oral Daily    NIFEdipine  60 mg Oral BID    furosemide  40 mg IntraVENous BID    sodium chloride (Inhalant)  15 mL Nebulization Q4H WA    lisinopril  40 mg Oral Daily    budesonide-formoterol  2 puff Inhalation BID    montelukast  10 mg Oral Nightly    sodium chloride flush  5-40 mL IntraVENous 2 times per day    nicotine  1 patch TransDERmal Daily    ipratropium-albuterol  1 ampule Inhalation Q4H WA       Continuous Infusions:   dextrose      sodium chloride Stopped (09/10/21 0349)       PRN Meds:  cloNIDine, hydrALAZINE, acetaminophen, glucose, dextrose, glucagon (rDNA), dextrose, iopamidol, sodium chloride flush, sodium chloride, ondansetron **OR** ondansetron, polyethylene glycol, [DISCONTINUED] acetaminophen **OR** acetaminophen    Labs reviewed:  CBC:   Recent Labs     09/08/21  0511 09/10/21  0510   WBC 7.3 8.5   HGB 14.1 13.3*   HCT 41.4 39.0*   MCV 99.0 99.3   * 101*     BMP:   Recent Labs     09/08/21  0511 09/09/21  0445 09/10/21  0510    140 139   K 3.3* 3.6 3.1*   CL 96* 94* 94*   CO2 34* 36* 36*   BUN 26* 23* 28*   CREATININE 0.7* 0.7* 0.7*     LIVER PROFILE:   Recent Labs     09/08/21  0511   AST 64*   *   BILIDIR 0.4*   BILITOT 1.0   ALKPHOS 170*     PT/INR: No results for input(s): PROTIME, INR in the last 72 hours. APTT: No results for input(s): APTT in the last 72 hours.   UA:No results for input(s): NITRITE, COLORU, PHUR, LABCAST, WBCUA, RBCUA, MUCUS, TRICHOMONAS, YEAST, BACTERIA, CLARITYU, Kita Vu, UROBILINOGEN, BILIRUBINUR, BLOODU, GLUCOSEU, AMORPHOUS in the last 72 hours. Invalid input(s): Michele Youngblood  No results for input(s): PH, PCO2, PO2 in the last 72 hours. Films:  Radiology Review:  Pertinent images / reports were reviewed as a part of this visit. XR CHEST PORTABLE  Narrative: EXAMINATION:  ONE XRAY VIEW OF THE CHEST    9/9/2021 1:52 pm    COMPARISON:  September 2, 2021    HISTORY:  ORDERING SYSTEM PROVIDED HISTORY: Metastatic cancer. Increased oxygen  requirement. TECHNOLOGIST PROVIDED HISTORY:  Reason for exam:->Metastatic cancer. Increased oxygen requirement. Reason for Exam: metastatic ca  Acuity: Unknown  Type of Exam: Ongoing    FINDINGS:  Increased bibasilar atelectasis versus infiltrates. Normal cardiac size. No  pneumothorax. Mild osteopenic changes and degenerative changes identified in  the bony structures. .  Impression: Increased bibasilar atelectasis versus infiltrates. No pneumothorax. This note was transcribed using [id number] [employer]. Please disregard any translational errors.     Thank you for this consult,    Aniceto Sorenson MD  [facility] Pulmonary, Critical Care, and Sleep Medicine

## 2021-09-10 NOTE — PROGRESS NOTES
[address] [facility] Internal Medicine Note      Chief Complaint: I am nervous    Subjective/Interval History: This morning the patient is anxious about biopsy results today. He states his breathing is okay. He remains on 4 L of oxygen. Using incentive spirometer. He denies pain. Minimal sputum, if any. Continues to diurese. Weight down 3 pounds from yesterday. I's/O's show 3.4 L IV intake overnight, unclear what this represents. Nursing is investigating. Case discussed with nursing this morning. No chest pain or shortness breath. No cough or sputum. No nausea, vomiting, diarrhea. No abdominal pain. No dysuria. The remainder of the review of systems is negative. Chest x-ray from yesterday reviewed and showed increased basilar atelectasis versus infiltrate. Incentive spirometer instituted. PMH, PSH, FH/SH reviewed and unchanged as documented in the H&P personally documented at admission 21    Medication list reviewed    Objective:    BP (!) 152/86   Pulse 80   Temp 97.9 °F (36.6 °C) (Oral)   Resp 20   Ht 6' (1.829 m)   Wt 240 lb 11.9 oz (109.2 kg)   SpO2 90%   BMI 32.65 kg/m²   Temp  Av.1 °F (36.7 °C)  Min: 97.9 °F (36.6 °C)  Max: 98.3 °F (36.8 °C)    RRR  Chest-respirations easy. The chest remains clear today. Abd- BS+, soft, nondistended  Ext- edema looking better. Continued reduction in lower extremity edema.     The Following Labs Were Reviewed Today:    Recent Results (from the past 24 hour(s))   POCT Glucose    Collection Time: 21 11:40 AM   Result Value Ref Range    POC Glucose 263 (H) 70 - 99 mg/dl    Performed on ACCU-CHEK    POCT Glucose    Collection Time: 21  4:41 PM   Result Value Ref Range    POC Glucose 288 (H) 70 - 99 mg/dl    Performed on ACCU-CHEK    POCT Glucose    Collection Time: 21  8:37 PM   Result Value Ref Range    POC Glucose 222 (H) 70 - 99 mg/dl    Performed on ACCU-CHEK    Basic Metabolic Panel    Collection Time: 09/10/21  5:10 AM Result Value Ref Range    Sodium 139 136 - 145 mmol/L    Potassium 3.1 (L) 3.5 - 5.1 mmol/L    Chloride 94 (L) 99 - 110 mmol/L    CO2 36 (H) 21 - 32 mmol/L    Anion Gap 9 3 - 16    Glucose 211 (H) 70 - 99 mg/dL    BUN 28 (H) 7 - 20 mg/dL    CREATININE 0.7 (L) 0.9 - 1.3 mg/dL    GFR Non-African American >60 >60    GFR African American >60 >60    Calcium 8.7 8.3 - 10.6 mg/dL   Magnesium    Collection Time: 09/10/21  5:10 AM   Result Value Ref Range    Magnesium 1.80 1.80 - 2.40 mg/dL   CBC Auto Differential    Collection Time: 09/10/21  5:10 AM   Result Value Ref Range    WBC 8.5 4.0 - 11.0 K/uL    RBC 3.93 (L) 4.20 - 5.90 M/uL    Hemoglobin 13.3 (L) 13.5 - 17.5 g/dL    Hematocrit 39.0 (L) 40.5 - 52.5 %    MCV 99.3 80.0 - 100.0 fL    MCH 34.0 26.0 - 34.0 pg    MCHC 34.2 31.0 - 36.0 g/dL    RDW 14.0 12.4 - 15.4 %    Platelets 774 (L) 071 - 450 K/uL    MPV 7.9 5.0 - 10.5 fL    Neutrophils % 92.2 %    Lymphocytes % 4.5 %    Monocytes % 3.2 %    Eosinophils % 0.1 %    Basophils % 0.0 %    Neutrophils Absolute 7.9 (H) 1.7 - 7.7 K/uL    Lymphocytes Absolute 0.4 (L) 1.0 - 5.1 K/uL    Monocytes Absolute 0.3 0.0 - 1.3 K/uL    Eosinophils Absolute 0.0 0.0 - 0.6 K/uL    Basophils Absolute 0.0 0.0 - 0.2 K/uL   POCT Glucose    Collection Time: 09/10/21  7:24 AM   Result Value Ref Range    POC Glucose 190 (H) 70 - 99 mg/dl    Performed on ACCU-CHEK        ASSESSMENT/PLAN:      Principal Problem:    Hypokalemia-potassium low today. Had intended to increase potassium to 3 times daily yesterday, but inadvertently did not. This order has been placed today. ACTH markedly elevated. Urine and salivary cortisol pending. Active Problems:    Metastatic cancer-biopsy pending. Hopefully results back today. CEA and CA 19-9 elevated. Moderate persistent asthma with acute exacerbation-lungs remain clear today. Discontinue prednisone and stop doxycycline and Zosyn, has completed 7 days of antibiotics.     Pure hypercholesterolemia-continue to hold Lipitor given abnormal LFTs. Essential hypertension-blood pressure improving. ?  Increase Cardura. Bilateral leg edema-Continue lasix 40 mg twice daily. Weight down today. Continue to monitor. Monitor I/os. Steroid-induced hyperglycemia-increase lantus to 30 units and continue high sliding scale. Hypomagnesemia-low normal today. Supplement with 1 g IV. Time > 35 minutes reviewing chart and patient data, examining and interviewing patient, and discussing with nursing staff, family, etc.     .   Renae Lambert MD, [address]  8:03 AM  9/10/2021

## 2021-09-10 NOTE — PROGRESS NOTES
Daily  glucose (GLUTOSE) 40 % oral gel 15 g, 15 g, Oral, PRN  dextrose 50 % IV solution, 12.5 g, IntraVENous, PRN  glucagon (rDNA) injection 1 mg, 1 mg, IntraMUSCular, PRN  dextrose 5 % solution, 100 mL/hr, IntraVENous, PRN  iopamidol (ISOVUE-370) 76 % injection 75 mL, 75 mL, IntraVENous, ONCE PRN  budesonide-formoterol (SYMBICORT) 160-4.5 MCG/ACT inhaler 2 puff, 2 puff, Inhalation, BID  montelukast (SINGULAIR) tablet 10 mg, 10 mg, Oral, Nightly  sodium chloride flush 0.9 % injection 5-40 mL, 5-40 mL, IntraVENous, 2 times per day  sodium chloride flush 0.9 % injection 5-40 mL, 5-40 mL, IntraVENous, PRN  0.9 % sodium chloride infusion, 25 mL, IntraVENous, PRN  ondansetron (ZOFRAN-ODT) disintegrating tablet 4 mg, 4 mg, Oral, Q8H PRN **OR** ondansetron (ZOFRAN) injection 4 mg, 4 mg, IntraVENous, Q6H PRN  polyethylene glycol (GLYCOLAX) packet 17 g, 17 g, Oral, Daily PRN  [DISCONTINUED] acetaminophen (TYLENOL) tablet 650 mg, 650 mg, Oral, Q6H PRN **OR** acetaminophen (TYLENOL) suppository 650 mg, 650 mg, Rectal, Q6H PRN  nicotine (NICODERM CQ) 21 MG/24HR 1 patch, 1 patch, TransDERmal, Daily  ipratropium-albuterol (DUONEB) nebulizer solution 1 ampule, 1 ampule, Inhalation, Q4H WA  Physical    VITALS:  BP (!) 153/83   Pulse 91   Temp 98.2 °F (36.8 °C) (Oral)   Resp 18   Ht 6' (1.829 m)   Wt 240 lb 11.9 oz (109.2 kg)   SpO2 92%   BMI 32.65 kg/m²   TEMPERATURE:  Current - Temp: 98.2 °F (36.8 °C);  Max - Temp  Av.1 °F (36.7 °C)  Min: 97.7 °F (36.5 °C)  Max: 98.3 °F (36.8 °C)  PULSE OXIMETRY RANGE: SpO2  Av %  Min: 87 %  Max: 93 %  24HR INTAKE/OUTPUT:      Intake/Output Summary (Last 24 hours) at 9/10/2021 1620  Last data filed at 9/10/2021 1554  Gross per 24 hour   Intake 5042.46 ml   Output 2400 ml   Net 2642.46 ml       CONSTITUTIONAL:  awake, alert, cooperative, no apparent distress, HEENT oral pharynx , no scleral icterus  HEMATOLOGIC/LYMPHATICS:  no cervical lymphadenopathy, no supraclavicular lymphadenopathy,LUNGS:  No increased work of breathing, good air exchange, clear to auscultation bilaterally, no crackles or wheezing  CARDIOVASCULAR:  , regular rate and rhythm, normal S1 and S2, no S3 or S4, and no murmur noted  ABDOMEN:  No scars, normal bowel sounds, soft, non-distended, non-tender, no masses palpated, no hepatosplenomegally  MUSCULOSKELETAL:  There is no redness, warmth, or swelling of the joints. EXTREMETIES: No clubbing cynosis or edema  NEUROLOGIC:  Awake, alert, oriented to name, place and time. Cranial nerves II-XII are grossly intact.   SKIN:  no bruising or bleeding      Data      Recent Labs     09/08/21  0511 09/10/21  0510   WBC 7.3 8.5   HGB 14.1 13.3*   HCT 41.4 39.0*   * 101*   MCV 99.0 99.3        Recent Labs     09/08/21  0511 09/09/21  0445 09/10/21  0510    140 139   K 3.3* 3.6 3.1*   CL 96* 94* 94*   CO2 34* 36* 36*   BUN 26* 23* 28*   CREATININE 0.7* 0.7* 0.7*     Recent Labs     09/08/21  0511   AST 64*   *   BILIDIR 0.4*   BILITOT 1.0   ALKPHOS 170*       Magnesium:    Lab Results   Component Value Date    MG 1.80 09/10/2021    MG 2.00 09/09/2021    MG 1.70 09/08/2021       Imaging ECHO Complete 2D W Doppler W Color    Result Date: 9/3/2021  Transthoracic Echocardiography Report (TTE)  Demographics   Patient Name      Jerry Talley   Date of Study     09/03/2021          Gender              Male   Patient Number    [id number]          Date of Birth       1970   Visit Number      [id number]           Age                 46 year(s)   Accession Number  [id number]          Room Number         4377   Corporate ID      [id number]             [address],                                                            [facility]   Ordering          Corinna Ennis DO     Physician           Jolanta Garland, Karole Lefort, MD  Procedure Type of Study   TTE procedure:ECHOCARDIOGRAM COMPLETE 2D W DOPPLER W COLOR. Procedure Date Date: 09/03/2021 Start: 02:20 PM Study Location: [facility] Echo Lab Technical Quality: Limited visualization due to poor acoustical window. Additional Indications:Lower extremity swelling, evaluate the right heart and LV; HTN, HLD, COPD. Patient Status: Routine Height: 72 inches Weight: 247.01 pounds BSA: 2.33 m2 BMI: 33.5 kg/m2 Rhythm: Within normal limits HR: 70 bpm BP: 183/87 mmHg  Conclusions   Summary  Normal left ventricle size, wall thickness, and systolic function with an  estimated ejection fraction of 60-65%. No regional wall motion abnormalities  are seen. Normal diastolic function. Normal right ventricular size and function. No significant valve abnormalities noted. Signature   ------------------------------------------------------------------  Electronically signed by Trudi Roberts MD  (Interpreting physician) on 09/03/2021 at 03:46 PM  ------------------------------------------------------------------   Findings   Left Ventricle  Normal left ventricle size, wall thickness, and systolic function with an  estimated ejection fraction of 60-65%. No regional wall motion abnormalities  are seen. Normal diastolic function. Mitral Valve  Mitral valve leaflets appear mildly thickened. No evidence of mitral regurgitation or stenosis. Left Atrium  The left atrium is normal in size. Aortic Valve  The aortic valve leaflets are not well visualized. No evidence of aortic valve regurgitation or stenosis. Aorta  The aortic root is normal in size. Right Ventricle  Normal right ventricular size and function. TAPSE 2.8 cm   Tricuspid Valve  The tricuspid valve was not well visualized. No evidence of tricuspid stenosis.    Right Atrium  The right atrial size is normal.   Pulmonic Valve  The pulmonic valve is not well visualized. No evidence of pulmonic valve regurgitation. No evidence of pulmonic valve stenosis. Pericardial Effusion  No pericardial effusion noted. Pleural Effusion  No pleural effusion. Miscellaneous  IVC not well visualized. M-Mode/2D Measurements (cm)   LV Diastolic Dimension: 9.13 cm LV Systolic Dimension: 9.64 cm  LV Septum Diastolic: 0.9 cm  LV PW Diastolic: 9.19 cm        AO Root Dimension: 3.4 cm                                   LA Area: 23.6 cm2  LVOT: 2.1 cm                    LA volume/Index: 70.5 ml /30 ml/m2  Doppler Measurements   AV Peak Velocity: 184 cm/s     MV Peak E-Wave: 106 cm/s  AV Peak Gradient: 13.54 mmHg   MV Peak A-Wave: 87.5 cm/s  AV Mean Gradient: 6 mmHg       MV E/A Ratio: 1.21  LVOT Peak Velocity: 160 cm/s   MV Mean Gradient: 2 mmHg  AV Area (Continuity):3.58 cm2  MV Max P mmHg                                 MV Vmax:114 cm/s                                 MV VTI:32.9 cm/s   E' Septal Velocity: 10.9 cm/s  MV Area (continuity): 3.61 cm2  E' Lateral Velocity: 15.5 cm/s MV Deceleration Time: 259 msec  PV Peak Velocity: 131 cm/s  PV Peak Gradient: 6.86 mmHg   Aortic Valve   Peak Velocity: 184 cm/s     Mean Velocity: 114 cm/s  Peak Gradient: 13.54 mmHg   Mean Gradient: 6 mmHg  Area (continuity): 3.58 cm2  AV VTI: 33.2 cm  Aorta   Aortic Root: 3.4 cm  Ascending Aorta: 3.3 cm  LVOT Diameter: 2.1 cm      XR CHEST (2 VW)    Result Date: 2021  EXAMINATION: TWO XRAY VIEWS OF THE CHEST 2021 8:48 am COMPARISON: None. HISTORY: ORDERING SYSTEM PROVIDED HISTORY: WEEMS (dyspnea on exertion) TECHNOLOGIST PROVIDED HISTORY: Reason for Exam: WEEMS, asthma Acuity: Unknown Type of Exam: Initial FINDINGS: Clear lungs. No pleural effusion or pneumothorax. Cardiomediastinal silhouette is unremarkable. Degenerative changes of the visualized osseous structures. Clear lungs.      CT GUIDED NEEDLE PLACEMENT    Result Date: 2021  PROCEDURE: CT GUIDED CORE BIOPSY OF LEFT HEPATIC LOBE MASS MODERATE CONSCIOUS SEDATION 9/7/2021 HISTORY: ORDERING SYSTEM PROVIDED HISTORY: lesion TECHNOLOGIST PROVIDED HISTORY: Reason for exam:->lesion Reason for Exam: lesion Acuity: Acute Type of Exam: Initial; ORDERING SYSTEM PROVIDED HISTORY: bx TECHNOLOGIST PROVIDED HISTORY: Reason for exam:->bx Reason for Exam: lesion Acuity: Acute Type of Exam: Initial PHYSICIANS: Amilcar Simpsontal SEDATION: 0.5 mgversed and 25 mcg fentanyl were titrated intravenously for moderate sedation monitored under my direction. Total intraservice time of sedation was 15 minutes. The patient's vital signs were monitored throughout the procedure and recorded in the patient's medical record by the nurse. TECHNIQUE: Informed consent was obtained after a detailed discussion about the procedure including the risk, benefits, and alternatives. Universal protocol was followed. Axial images were obtained through the liver and a suitable skin site was prepped and draped in sterile fashion. Local anesthesia was achieved with lidocaine. Under intermittent CT guidance, a 17 gauge coaxial needle was inserted into the a left hepatic lobe mass. Through this, 1 18 gauge core biopsy specimens were obtained using coaxial technique and submitted to pathology. Gel-Foam slurry was administered as the needle was removed. A sterile bandage was then placed. The patient tolerated the procedure well. Estimated blood loss: Less than 5 cc Dose modulation, iterative reconstruction, and/or weight based adjustment of the mA/kV was utilized to reduce the radiation dose to as low as reasonably achievable. DLP: 5107.04 mGy-cm     Successful CT guided core biopsy of a left hepatic lobe mass.      CT ABDOMEN PELVIS W IV CONTRAST Additional Contrast? Oral    Result Date: 9/5/2021  EXAMINATION: CT OF THE ABDOMEN AND PELVIS WITH CONTRAST 9/3/2021 5:13 pm TECHNIQUE: CT of the abdomen and pelvis was performed with the administration of intravenous contrast. Multiplanar reformatted images are provided for review. Dose modulation, iterative reconstruction, and/or weight based adjustment of the mA/kV was utilized to reduce the radiation dose to as low as reasonably achievable. COMPARISON: CT pulmonary angiogram performed September 2, 2021. HISTORY: ORDERING SYSTEM PROVIDED HISTORY: Metastatic disease, evaluate for primary. Bilateral lower ext edema, eval for IVC thrombosis/compression TECHNOLOGIST PROVIDED HISTORY: Reason for exam:->Metastatic disease, evaluate for primary. Bilateral lower ext edema, eval for IVC thrombosis/compression Additional Contrast?->Oral FINDINGS: Images are degraded by motion artifact. Lower Chest: Respiratory motion artifact. Visualized pulmonary nodules and enlarged mediastinal lymph node, unchanged from prior CT chest. Organs: There are multifocal hypoattenuating lesions throughout the liver. Lesion within the left hepatic lobe measures 6.1 x 5.7 cm (axial image 35). Lesion within the right hepatic lobe measures 3.6 x 2.6 cm (axial image 51). The gallbladder is normally distended with layering hyperintensity suggestive of sludge. No calcified stones. No gallbladder wall thickening or pericholecystic fluid. The spleen and pancreas are grossly unremarkable. Right adrenal gland nodule measures up to 1.6 cm. There is mild thickening of the left adrenal gland without definite nodularity. There is symmetric renal cortical enhancement. Simple appearing right renal cysts demonstrated. No hydronephrosis. GI/Bowel: There is a hypoattenuating nodule in the region of the 2/3 portion of the duodenum in the region of the ampulla which measures up to 1.3 cm (axial image 83). There is diffuse fold thickening involving the majority of the jejunum. No evidence of obstruction. Sigmoid diverticulosis without evidence of acute diverticulitis. The appendix is normal. Pelvis: Prostate and seminal vesicles are grossly unremarkable.   The urinary bladder is within normal limits. Peritoneum/Retroperitoneum: No significant lymphadenopathy. The abdominal aorta is normal in course and caliber with scattered atherosclerotic disease. No definite filling defects within the IVC, the iliac veins, or visualized femoral veins to suggest thrombus. Bones/Soft Tissues: There is asymmetric thickening of the inferior right rectus abdominus musculature with mild surrounding stranding. Visualization is partially obscured by streak artifact from dense oral contrast in the adjacent small bowel. Body wall edema demonstrated. No focal drainable collections. Multilevel moderate degenerative changes in the visualized spine. No destructive osseous lesions. 1. Multiple hypoattenuating liver lesions which are most suggestive of metastatic disease. 2. Right adrenal gland nodule measuring up to 1.6 cm, indeterminate. 3. Hypoattenuating nodule in the region of the 2/3 portion of the duodenum in the region of the ampulla measuring up to 1.3 cm. Findings could be artifactual or related to fold thickening; however, a small ampullary or possibly pancreatic lesion cannot be entirely excluded. 4. Diffuse fold thickening involving the majority of the jejunum, nonspecific but can be related to an infectious etiology or lymphatic obstruction. 5. No definite filling defects in the IVC, iliac veins, or visualized femoral veins to suggest thrombus. 6. Asymmetric thickening of the inferior right rectus abdominus musculature with surrounding stranding which is partially obscured by adjacent streak artifact from dense oral contrast in the small bowel. No definite focal lesion is demonstrated; however, an underlying lesion cannot be entirely excluded. Hematoma is also a consideration.      CT CHEST PULMONARY EMBOLISM W CONTRAST    Result Date: 9/5/2021  EXAMINATION: CTA OF THE CHEST 9/2/2021 12:47 pm TECHNIQUE: CTA of the chest was performed after the administration of intravenous contrast.  Multiplanar reformatted images are provided for review. MIP images are provided for review. Dose modulation, iterative reconstruction, and/or weight based adjustment of the mA/kV was utilized to reduce the radiation dose to as low as reasonably achievable. COMPARISON: None. HISTORY: ORDERING SYSTEM PROVIDED HISTORY: leg swelling sob new o2 requirement TECHNOLOGIST PROVIDED HISTORY: Reason for exam:->leg swelling sob new o2 requirement Decision Support Exception - unselect if not a suspected or confirmed emergency medical condition->Emergency Medical Condition (MA) Reason for Exam: leg swelling sob new o2 requirement Acuity: Acute Type of Exam: Initial FINDINGS: Pulmonary Arteries:  No central, lobar, or segmental pulmonary embolus. Narrowing of the right lower lobe pulmonary artery by the adenopathy. Mediastinum: Heart size is normal.  No pericardial effusion. Thoracic aorta is normal caliber. Extensive mediastinal and hilar lymphadenopathy is present. Subcarinal node measures 32 x 37 mm. Right hilar node measures 30 x 29 mm. Lungs/pleura: Moderate emphysema. Multiple 3 mm nodules are present in the right middle lobe and left lower lobe. Superimposed in the posterior right lower lobe, there is a dominant nodule measuring 17 x 17 mm. An additional right lower lobe nodule measures 11 mm. No pleural effusion or pneumothorax. Narrowing of bronchus intermedius due to the right hilar adenopathy. Upper Abdomen: Multiple low-attenuation liver lesions are present measuring up to 48 x 40 mm and 26 x 28 mm. At least 30 lesions are noted in the visualized portions of the liver. Soft Tissues/Bones: No lytic or blastic osseous lesion. 1. No acute pulmonary embolus. 2. Extensive mediastinal and hilar lymphadenopathy compatible with metastatic neoplasm. 3. Multiple new indeterminate 3 mm nodules in the right middle lobe and left lower lobe. These could be infectious or neoplastic.  4. Right lower lobe 17 mm and 11 mm nodules are nonspecific, but metastatic disease not excluded. 5. Multiple liver metastases measuring up to 48 x 40 mm. RECOMMENDATIONS:     CT NEEDLE BIOPSY LIVER PERCUTANEOUS    Result Date: 9/8/2021  PROCEDURE: CT GUIDED CORE BIOPSY OF LEFT HEPATIC LOBE MASS MODERATE CONSCIOUS SEDATION 9/7/2021 HISTORY: ORDERING SYSTEM PROVIDED HISTORY: lesion TECHNOLOGIST PROVIDED HISTORY: Reason for exam:->lesion Reason for Exam: lesion Acuity: Acute Type of Exam: Initial; ORDERING SYSTEM PROVIDED HISTORY: bx TECHNOLOGIST PROVIDED HISTORY: Reason for exam:->bx Reason for Exam: lesion Acuity: Acute Type of Exam: Initial PHYSICIANS: Amilcar Decker SEDATION: 0.5 mgversed and 25 mcg fentanyl were titrated intravenously for moderate sedation monitored under my direction. Total intraservice time of sedation was 15 minutes. The patient's vital signs were monitored throughout the procedure and recorded in the patient's medical record by the nurse. TECHNIQUE: Informed consent was obtained after a detailed discussion about the procedure including the risk, benefits, and alternatives. Universal protocol was followed. Axial images were obtained through the liver and a suitable skin site was prepped and draped in sterile fashion. Local anesthesia was achieved with lidocaine. Under intermittent CT guidance, a 17 gauge coaxial needle was inserted into the a left hepatic lobe mass. Through this, 1 18 gauge core biopsy specimens were obtained using coaxial technique and submitted to pathology. Gel-Foam slurry was administered as the needle was removed. A sterile bandage was then placed. The patient tolerated the procedure well. Estimated blood loss: Less than 5 cc Dose modulation, iterative reconstruction, and/or weight based adjustment of the mA/kV was utilized to reduce the radiation dose to as low as reasonably achievable. DLP: 4213.90 mGy-cm     Successful CT guided core biopsy of a left hepatic lobe mass.      VL Extremity Venous Bilateral    Result Date: 9/8/2021  Lower Extremities DVT Study  Demographics   Patient Name       Stacy Valenzuela   Date of Study      09/07/2021         Gender              Male   Patient Number     [id number]         Date of Birth       1970   Visit Number       [id number]          Age                 46 year(s)   Accession Number   [id number]         Room Number         8964   Corporate ID       [id number]            rByce Eduardo CHARLY   Ordering Physician Janny Jimenez MD         Physician           MD  Procedure Type of Study:   Veins:Lower Extremities DVT Study, VASC EXTREMITY VENOUS DUPLEX BILATERAL. Vascular Sonographer Report  Indications for Study:Edema. Impressions Right Impression No evidence of deep vein or superficial vein thrombosis involving the right lower extremity in vessels clearly visualized. Proximal and Mid Posterior Tibial veins and Peroneal veins were not visualized due to swelling. Calf and ankle edema noted. Left Impression No evidence of deep vein or superficial vein thrombosis involving the left lower extremity in vessels clearly visualized. Proximal Posterior Tibial veins and Soleal vein were not visualized due to swelling. Calf and ankle edema noted. Conclusions   Summary   No evidence of deep vein or superficial vein thrombosis involving the  bilateral lower extremities in vessels clearly visualized as detailed above. Signature   ------------------------------------------------------------------  Electronically signed by Nerissa Cifuentes MD (Interpreting  physician) on 09/08/2021 at 08:09 AM  ------------------------------------------------------------------  Patient Status:Routine. Study [address] [facility]. Technical Quality:Limited visualization due to swelling.  Risk Factors   - The patient's risk factor(s) include: dyslipidemia and arterial     hypertension.   - The patient has a current/recent (within 1 year) tobacco history. Velocities are measured in cm/s ; Diameters are measured in mm Right Lower Extremities DVT Study Measurements Right 2D Measurements +------------------------+----------+---------------+----------+ ! Location                ! Visualized! Compressibility! Thrombosis! +------------------------+----------+---------------+----------+ ! Sapheno Femoral Junction! Yes       ! Yes            ! None      ! +------------------------+----------+---------------+----------+ ! GSV Thigh               ! Yes       ! Yes            ! None      ! +------------------------+----------+---------------+----------+ ! Common Femoral          !Yes       ! Yes            ! None      ! +------------------------+----------+---------------+----------+ ! Prox Femoral            !Yes       ! Yes            ! None      ! +------------------------+----------+---------------+----------+ ! Mid Femoral             !Yes       ! Yes            ! None      ! +------------------------+----------+---------------+----------+ ! Dist Femoral            !Yes       ! Yes            ! None      ! +------------------------+----------+---------------+----------+ ! Deep Femoral            !Yes       ! Yes            ! None      ! +------------------------+----------+---------------+----------+ ! Popliteal               !Yes       ! Yes            ! None      ! +------------------------+----------+---------------+----------+ ! Gastroc                 ! Yes       ! Yes            ! None      ! +------------------------+----------+---------------+----------+ ! Soleal                  !Yes       ! Yes            ! None      ! +------------------------+----------+---------------+----------+ ! PTV                     ! Partial   !Yes            ! None      ! +------------------------+----------+---------------+----------+ ! ATV                     ! Yes       ! Yes            ! None      ! +------------------------+----------+---------------+----------+ ! Peroneal                !Partial   !Yes            ! None      ! +------------------------+----------+---------------+----------+ ! GSV Calf                ! Yes       ! Yes            ! None      ! +------------------------+----------+---------------+----------+ ! SSV                     ! Yes       ! Yes            ! None      ! +------------------------+----------+---------------+----------+ Right Doppler Measurements +--------------+------+------+------------+ ! Location      ! Signal!Reflux! Reflux (sec)! +--------------+------+------+------------+ ! Common Femoral!Phasic! No    !            ! +--------------+------+------+------------+ ! Popliteal     !Phasic! No    !            ! +--------------+------+------+------------+ Left Lower Extremities DVT Study Measurements Left 2D Measurements +------------------------+----------+---------------+----------+ ! Location                ! Visualized! Compressibility! Thrombosis! +------------------------+----------+---------------+----------+ ! Sapheno Femoral Junction! Yes       ! Yes            ! None      ! +------------------------+----------+---------------+----------+ ! GSV Thigh               ! Yes       ! Yes            ! None      ! +------------------------+----------+---------------+----------+ ! Common Femoral          !Yes       ! Yes            ! None      ! +------------------------+----------+---------------+----------+ ! Prox Femoral            !Yes       ! Yes            ! None      ! +------------------------+----------+---------------+----------+ ! Mid Femoral             !Yes       ! Yes            ! None      ! +------------------------+----------+---------------+----------+ ! Dist Femoral            !Yes       ! Yes            ! None      ! +------------------------+----------+---------------+----------+ ! Deep Femoral            !Yes       ! Yes            ! None      ! +------------------------+----------+---------------+----------+ ! Popliteal               !Yes       ! Yes            ! None      ! +------------------------+----------+---------------+----------+ ! Gastroc                 ! Yes       ! Yes            ! None      ! +------------------------+----------+---------------+----------+ ! Soleal                  !No        !               !          ! +------------------------+----------+---------------+----------+ ! PTV                     ! Partial   !Yes            ! None      ! +------------------------+----------+---------------+----------+ ! ATV                     ! Yes       ! Yes            ! None      ! +------------------------+----------+---------------+----------+ ! Peroneal                !Yes       ! Yes            ! None      ! +------------------------+----------+---------------+----------+ ! GSV Calf                ! Yes       ! Yes            ! None      ! +------------------------+----------+---------------+----------+ ! SSV                     ! Yes       ! Yes            ! None      ! +------------------------+----------+---------------+----------+ Left Doppler Measurements +--------------+------+------+------------+ ! Location      ! Signal!Reflux! Reflux (sec)! +--------------+------+------+------------+ ! Common Femoral!Phasic! No    !            ! +--------------+------+------+------------+ ! Popliteal     !Phasic! No    !            ! +--------------+------+------+------------+      Problem List  Patient Active Problem List   Diagnosis    Pure hypercholesterolemia    Anxiety    Essential hypertension    Moderate persistent asthma with acute exacerbation    Metastatic cancer ([facility] [address]    Hypokalemia    Acute respiratory failure with hypoxia (HCC)    Abnormal CT of the chest    COPD exacerbation (HCC)    Lung nodules    Tobacco abuse       ASSESSMENT AND PLAN    Liver masses  Biopsy -small cell  Had a family meeting   Plan carbo etoposide tecentriq   Will discuss side effects in am -as he is upset right now as is family -they are sad thus I think I best deliver side effect info tomorrow   I recommend inpt treatment due to large tumor burden , risk of tumor lysis and we should start tx asap  Potential side effects nausea vomiting alopecia myelosupression transfusions sepsis rarely death , tumor lysis that could lead to renal failure   Will need to be on allopurinol a few days , moniter tumor lysis labs q12 tomorrow   Goal of care is prolongation of life but not curable unfortunately        Chrissy Ackerman MD, MD

## 2021-09-10 NOTE — CARE COORDINATION
Patient now requiring 6LO2. Liver biopsy results pending. SW to follow and assist as needed.    ALVINO Can, ABILIOW, Social Work/Case Management   [phone]  Electronically signed by ALVINO Can, ROYCE on 9/10/2021 at 4:09 PM

## 2021-09-10 NOTE — PROGRESS NOTES
Pt sats 87-88% on 4L per NC, increased oxygen to 6L to maintain sats above 90%. Pt denies any SOB. RT and Dr Tami Maldonado updated.   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 Patient went to her PCP, Dr. Fitzgerald, who told her her symptoms were likely 2/2 to bronchitis caused by allergies, prescribed albuterol inhaler and Flonase with no improvement in her symptoms. Cough is productive of green sputum. According to patient and her daughters, she developed chronic, intermittent SOB and wheezing after fracturing several ribs in , and undergoing thoracentesis. She follows with Dr. Lerma, but has never been diagnosed with asthma and has never been prescribed an inhaler until last week. SOB and wheeze have been worsening over the years, patients and her daughters noticing that she gets winded more quickly after performing household chores.  Patient has had echocardiograms of her heart, ordered by her internist, and was referred to cardiologists, but has never been told she has heart failure.  Patient admits pleuritic chest pain with coughing and some mild nausea. Denies fever, headache, dizziness, chest pressure, swelling in hands or feet, difficulty lying flat, abdominal pain, C/D/V, blood in stool.  In ED VS T 98.1  /71 RR 24 O2 sat 91 on RA  Labs significant for WBC 12.17, percent neuts 82.3   Flu A/B, RSV negative   CT chest showed patchy groundglass opacities in the right upper lobe, increased since . This could be due to infection, however neoplasm is not excluded.   New right lower lobe groundglass opacities raising the possibility of   pneumonia.  Type III combined hiatal hernia with almost the entire stomach in an   intrathoracic location.  Received duonebs, solumedrol, azithromycin and Rocephin (11 Mar 2019 18:26)    gi consulted for hh. chart reviewed. pt seen and examined.    recent vs/labs/imaging reviewed- afebrile  leukocytosis resolved  ct chest- Type III combined hiatal hernia with almost the entire stomach in an   intrathoracic location.    No Known Allergies      ALBUTerol    90 MICROgram(s) HFA Inhaler 1 Puff(s) Inhalation every 4 hours  ALBUTerol/ipratropium for Nebulization 3 milliLiter(s) Nebulizer every 6 hours  aspirin enteric coated 81 milliGRAM(s) Oral daily  calcium carbonate 1250 mG  + Vitamin D (OsCal 500 + D) 1 Tablet(s) Oral daily  cyanocobalamin 1000 MICROGram(s) Oral daily  enoxaparin Injectable 40 milliGRAM(s) SubCutaneous daily  fluticasone propionate 50 MICROgram(s)/spray Nasal Spray 1 Spray(s) Both Nostrils two times a day PRN  guaiFENesin ER 1200 milliGRAM(s) Oral every 12 hours PRN  lisinopril 10 milliGRAM(s) Oral daily  multivitamin 1 Tablet(s) Oral daily  pantoprazole    Tablet 40 milliGRAM(s) Oral before breakfast  predniSONE   Tablet 50 milliGRAM(s) Oral daily  simvastatin 10 milliGRAM(s) Oral at bedtime  tiotropium 18 MICROgram(s) Capsule 1 Capsule(s) Inhalation daily        FAMILY HISTORY:  Family history of pancreatic cancer (Mother)        Review of Systems:    General:  No wt loss, fevers, chills, night sweats, fatigue  Eyes:  Good vision, no reported pain  ENT:  No sore throat, pain, runny nose, dysphagia  CV:  No pain, palpitations, no lightheadedness  Resp:  No dyspnea, cough, tachypnea, wheezing  GI: see above  :  No pain, bleeding, incontinence, nocturia  Muscle:  No pain, weakness  Neuro:  No weakness, tingling, memory problems  Psych:  No fatigue, insomnia, mood problems, depression  Endocrine:  No polyuria, polydypsia, cold/heat intolerance  Heme:  No petechiae, ecchymosis, easy bruisability  Skin:  No rash, tattoos, scars, edema    Relevant Family History:   n/c    Relevant Social History: n/c      Physical Exam:    Vital Signs:  Vital Signs Last 24 Hrs  T(C): 36.9 (12 Mar 2019 07:04), Max: 37.4 (11 Mar 2019 19:39)  T(F): 98.4 (12 Mar 2019 07:04), Max: 99.3 (11 Mar 2019 19:39)  HR: 77 (12 Mar 2019 07:04) (71 - 102)  BP: 125/82 (12 Mar 2019 07:04) (105/64 - 183/77)  BP(mean): 99 (11 Mar 2019 22:28) (91 - 99)  RR: 17 (12 Mar 2019 07:04) (16 - 24)  SpO2: 94% (12 Mar 2019 07:04) (91% - 95%)  Daily Height in cm: 154.94 (11 Mar 2019 16:43)    Daily Weight in k.8 (12 Mar 2019 04:30)    General:  Appears stated age, well-groomed, nad  HEENT:  NC/AT,  conjunctivae clear and pink, no thyromegaly, nodules, adenopathy, no JVD  Chest:  Full & symmetric excursion, no increased effort, breath sounds clear  Cardiovascular:  Regular rhythm, S1, S2, no murmur/rub/S3/S4, no abdominal bruit, no edema  Abdomen:  Soft, non-tender, non-distended, normoactive bowel sounds,  no masses ,no hepatosplenomeagaly, no signs of chronic liver disease  Extremities:  no cyanosis,clubbing or edema  Skin:  No rash/erythema/ecchymoses/petechiae/wounds/abscess/warm/dry  Neuro/Psych:  A&O  , no asterixis, no tremor, no encephalopathy    Laboratory:                            13.5   5.85  )-----------( 185      ( 12 Mar 2019 06:32 )             40.6     03-12    143  |  106  |  19  ----------------------------<  117<H>  4.4   |  29  |  0.90    Ca    9.0      12 Mar 2019 06:32    TPro  7.1  /  Alb  3.6  /  TBili  0.6  /  DBili  x   /  AST  24  /  ALT  27  /  AlkPhos  69  03-11    LIVER FUNCTIONS - ( 11 Mar 2019 17:23 )  Alb: 3.6 g/dL / Pro: 7.1 g/dL / ALK PHOS: 69 U/L / ALT: 27 U/L / AST: 24 U/L / GGT: x           PT/INR - ( 11 Mar 2019 17:23 )   PT: 12.7 sec;   INR: 1.12 ratio         PTT - ( 11 Mar 2019 17:23 )  PTT:34.1 sec  Urinalysis Basic - ( 11 Mar 2019 20:30 )    Color: Yellow / Appearance: Clear / S.020 / pH: x  Gluc: x / Ketone: Small  / Bili: Negative / Urobili: Negative   Blood: x / Protein: Negative / Nitrite: Negative   Leuk Esterase: Negative / RBC: 3-5 /HPF / WBC 0-2   Sq Epi: x / Non Sq Epi: Occasional / Bacteria: Few        Imaging:    < from: CT Chest No Cont (19 @ 18:24) >      < end of copied text >      < from: CT Chest No Cont (19 @ 18:24) >    EXAM:  CT CHEST                            PROCEDURE DATE:  2019          INTERPRETATION:  Clinical information: Shortness of breath, dyspnea on   exertion and atelectasis. Evaluate hiatal hernia.    COMPARISON: May 05, 2015    PROCEDURE:   CT of the Chest was performed without intravenous contrast.  Sagittal and coronal reformats were performed.    FINDINGS:    LOWER NECK: Within normal limits.  AXILLA, MEDIASTINUM AND KISHORE: Large hiatal hernia with paraesophageal and   sliding components, with almost the entire stomach located in the   thoracic cavity as well as portions of the pancreas.  VESSELS: Atherosclerotic arterial calcifications, including the coronary   arteries.  Normal caliber aorta.  HEART: The heart is normal in size.  No pericardial effusion.     PLEURA: No pleural effusion.  LUNGS AND LARGE AIRWAYS: Patchy groundglass opacities at the right apex,   which are increased since 2015, and new opacities in the medial right   lower lobe.       VISUALIZED UPPER ABDOMEN: Within normal limits.  BONES: No acute abnormality.  CHEST WALL:  Unremarkable    IMPRESSION:   Patchy groundglass opacities in the right upper lobe, increased since   . This could be due to infection, however neoplasm is not excluded.   Recommend continued CT follow-up with repeat study in 6 weeks.    New right lower lobe groundglass opacities raising the possibility of   pneumonia.    Type III combined hiatal hernia with almost the entire stomach in an   intrathoracic location.                  TALYA ROY M.D., ATTENDING RADIOLOGIST  This document has been electronically signed. 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 Patient went to her PCP, Dr. Fitzgerald, who told her her symptoms were likely 2/2 to bronchitis caused by allergies, prescribed albuterol inhaler and Flonase with no improvement in her symptoms. Cough is productive of green sputum. According to patient and her daughters, she developed chronic, intermittent SOB and wheezing after fracturing several ribs in , and undergoing thoracentesis. She follows with Dr. Lerma, but has never been diagnosed with asthma and has never been prescribed an inhaler until last week. SOB and wheeze have been worsening over the years, patients and her daughters noticing that she gets winded more quickly after performing household chores.  Patient has had echocardiograms of her heart, ordered by her internist, and was referred to cardiologists, but has never been told she has heart failure.  Patient admits pleuritic chest pain with coughing and some mild nausea. Denies fever, headache, dizziness, chest pressure, swelling in hands or feet, difficulty lying flat, abdominal pain, C/D/V, blood in stool.  In ED VS T 98.1  /71 RR 24 O2 sat 91 on RA  Labs significant for WBC 12.17, percent neuts 82.3   Flu A/B, RSV negative   CT chest showed patchy groundglass opacities in the right upper lobe, increased since . This could be due to infection, however neoplasm is not excluded.   New right lower lobe groundglass opacities raising the possibility of   pneumonia.  Type III combined hiatal hernia with almost the entire stomach in an   intrathoracic location.  Received duonebs, solumedrol, azithromycin and Rocephin (11 Mar 2019 18:26)    gi consulted for hh. chart reviewed. pt seen and examined. sitting comfortably in bed, eating lunch. states came to hospital for worsening sob x 1 week. [facility] hh is chronic for the past 30yrs, has been managed conservatively and has been asymptomatic until about 4 yrs ago at which time she fell and had associated rib fxs. manages symptoms by eating small frequent meals, stays upright etc. denies f/c/n/v/abd pain. denies wt loss, dysphagia, odynophagia. good po intake. reports egd/colon about 5yrs ago, showed hh, colon neg. denies prior abd sx. fhx n/c. takes asa 81. upon eval states breathing is better.    recent vs/labs/imaging reviewed- afebrile  leukocytosis resolved  ct chest- Type III combined hiatal hernia with almost the entire stomach in an   intrathoracic location.    No Known Allergies      ALBUTerol    90 MICROgram(s) HFA Inhaler 1 Puff(s) Inhalation every 4 hours  ALBUTerol/ipratropium for Nebulization 3 milliLiter(s) Nebulizer every 6 hours  aspirin enteric coated 81 milliGRAM(s) Oral daily  calcium carbonate 1250 mG  + Vitamin D (OsCal 500 + D) 1 Tablet(s) Oral daily  cyanocobalamin 1000 MICROGram(s) Oral daily  enoxaparin Injectable 40 milliGRAM(s) SubCutaneous daily  fluticasone propionate 50 MICROgram(s)/spray Nasal Spray 1 Spray(s) Both Nostrils two times a day PRN  guaiFENesin ER 1200 milliGRAM(s) Oral every 12 hours PRN  lisinopril 10 milliGRAM(s) Oral daily  multivitamin 1 Tablet(s) Oral daily  pantoprazole    Tablet 40 milliGRAM(s) Oral before breakfast  predniSONE   Tablet 50 milliGRAM(s) Oral daily  simvastatin 10 milliGRAM(s) Oral at bedtime  tiotropium 18 MICROgram(s) Capsule 1 Capsule(s) Inhalation daily        FAMILY HISTORY:  Family history of pancreatic cancer (Mother)        Review of Systems:    General:  No wt loss, fevers, chills, night sweats, fatigue  Eyes:  Good vision, no reported pain  ENT:  No sore throat, pain, runny nose, dysphagia  CV:  No pain, palpitations, no lightheadedness  Resp:  sob  GI: see above  :  No pain, bleeding, incontinence, nocturia  Muscle:  No pain, weakness  Neuro:  No weakness, tingling, memory problems  Psych:  No fatigue, insomnia, mood problems, depression  Endocrine:  No polyuria, polydypsia, cold/heat intolerance  Heme:  No petechiae, ecchymosis, easy bruisability  Skin:  No rash, tattoos, scars, edema    Relevant Family History:   n/c    Relevant Social History: n/c      Physical Exam:    Vital Signs:  Vital Signs Last 24 Hrs  T(C): 36.9 (12 Mar 2019 07:04), Max: 37.4 (11 Mar 2019 19:39)  T(F): 98.4 (12 Mar 2019 07:04), Max: 99.3 (11 Mar 2019 19:39)  HR: 77 (12 Mar 2019 07:04) (71 - 102)  BP: 125/82 (12 Mar 2019 07:04) (105/64 - 183/77)  BP(mean): 99 (11 Mar 2019 22:28) (91 - 99)  RR: 17 (12 Mar 2019 07:04) (16 - 24)  SpO2: 94% (12 Mar 2019 07:04) (91% - 95%)  Daily Height in cm: 154.94 (11 Mar 2019 16:43)    Daily Weight in k.8 (12 Mar 2019 04:30)    General:  non toxic appearing sitting up in bed eating lunch  HEENT:  NC/AT  Chest:  dec bs  Cardiovascular:  Regular rhythm, S1, S2  Abdomen:  soft nt nd  Extremities:  edema  Skin:  No rash  Neuro/Psych:  Awake alert responds appropriately    Laboratory:                            13.5   5.85  )-----------( 185      ( 12 Mar 2019 06:32 )             40.6     03-12    143  |  106  |  19  ----------------------------<  117<H>  4.4   |  29  |  0.90    Ca    9.0      12 Mar 2019 06:32    TPro  7.1  /  Alb  3.6  /  TBili  0.6  /  DBili  x   /  AST  24  /  ALT  27  /  AlkPhos  69  03-11    LIVER FUNCTIONS - ( 11 Mar 2019 17:23 )  Alb: 3.6 g/dL / Pro: 7.1 g/dL / ALK PHOS: 69 U/L / ALT: 27 U/L / AST: 24 U/L / GGT: x           PT/INR - ( 11 Mar 2019 17:23 )   PT: 12.7 sec;   INR: 1.12 ratio         PTT - ( 11 Mar 2019 17:23 )  PTT:34.1 sec  Urinalysis Basic - ( 11 Mar 2019 20:30 )    Color: Yellow / Appearance: Clear / S.020 / pH: x  Gluc: x / Ketone: Small  / Bili: Negative / Urobili: Negative   Blood: x / Protein: Negative / Nitrite: Negative   Leuk Esterase: Negative / RBC: 3-5 /HPF / WBC 0-2   Sq Epi: x / Non Sq Epi: Occasional / Bacteria: Few        Imaging:    < from: CT Chest No Cont (19 @ 18:24) >      < end of copied text >      < from: CT Chest No Cont (19 @ 18:24) >    EXAM:  CT CHEST                            PROCEDURE DATE:  2019          INTERPRETATION:  Clinical information: Shortness of breath, dyspnea on   exertion and atelectasis. Evaluate hiatal hernia.    COMPARISON: May 05, 2015    PROCEDURE:   CT of the Chest was performed without intravenous contrast.  Sagittal and coronal reformats were performed.    FINDINGS:    LOWER NECK: Within normal limits.  AXILLA, MEDIASTINUM AND KISHORE: Large hiatal hernia with paraesophageal and   sliding components, with almost the entire stomach located in the   thoracic cavity as well as portions of the pancreas.  VESSELS: Atherosclerotic arterial calcifications, including the coronary   arteries.  Normal caliber aorta.  HEART: The heart is normal in size.  No pericardial effusion.     PLEURA: No pleural effusion.  LUNGS AND LARGE AIRWAYS: Patchy groundglass opacities at the right apex,   which are increased since 2015, and new opacities in the medial right   lower lobe.       VISUALIZED UPPER ABDOMEN: Within normal limits.  BONES: No acute abnormality.  CHEST WALL:  Unremarkable    IMPRESSION:   Patchy groundglass opacities in the right upper lobe, increased since   . This could be due to infection, however neoplasm is not excluded.   Recommend continued CT follow-up with repeat study in 6 weeks.    New right lower lobe groundglass opacities raising the possibility of   pneumonia.    Type III combined hiatal hernia with almost the entire stomach in an   intrathoracic location.                  TALYA ROY M.D., ATTENDING RADIOLOGIST  This document has been electronically signed. Mar 11 2019  7:06PM                < end of copied text >

## 2021-09-10 NOTE — PLAN OF CARE
Problem: Safety:  Goal: Free from accidental physical injury  Description: Free from accidental physical injury  Outcome: Ongoing  Note: Patient assessed for fall risk; fall precautions initiated. Patient and family instructed about safety devices. Environment kept free of clutter and adequate lighting provided. Bed locked and in lowest position. Call light within reach. Will continue to monitor. Problem: Pain:  Goal: Patient's pain/discomfort is manageable  Description: Patient's pain/discomfort is manageable  Outcome: Ongoing  Note: Pain/discomfort being managed with PRN analgesics per MD orders. Pt able to express presence and absence of pain and rate pain appropriately using numerical scale.       Problem: Airway Clearance - Ineffective:  Goal: Ability to maintain a clear airway will improve  Description: Ability to maintain a clear airway will improve  Outcome: Ongoing     Problem: Breathing Pattern - Ineffective:  Goal: Ability to achieve and maintain a regular respiratory rate will improve  Description: Ability to achieve and maintain a regular respiratory rate will improve  Outcome: Ongoing

## 2021-09-11 NOTE — PROGRESS NOTES
PICC PLACEMENT:    Upon arrival to place PICC line assessed chart for issues related to picc placement, check for consent, and did time out with AMOR Brown. Pt. Tolerated PICC placement well, no difficulty accessing basilic vein and 3CG technology used to verify PICC tip placement. Positive P wave with no negative deflection. Printed wave form and placed In chart. Reported off to AMOR Anderson ok to use line and to please replace any existing IV tubing.

## 2021-09-11 NOTE — PLAN OF CARE
Problem: Infection:  Goal: Will remain free from infection  Description: Will remain free from infection  Outcome: Ongoing     Problem: Safety:  Goal: Free from accidental physical injury  Description: Free from accidental physical injury  Outcome: Ongoing  Goal: Free from intentional harm  Description: Free from intentional harm  Outcome: Ongoing     Problem: Daily Care:  Goal: Daily care needs are met  Description: Daily care needs are met  9/11/2021 0041 by David Graff RN  Outcome: Ongoing    Note: Patient's ability assessed to perform self care and independent activity encouraged according to that ability. Assisted with ADL's as needed. Risk for skin breakdown assessed. Potential discharge needs assessed. Patient and family included in daily care decisions. Problem: Pain:  Goal: Patient's pain/discomfort is manageable  Description: Patient's pain/discomfort is manageable  Outcome: Ongoing     Problem: Skin Integrity:  Goal: Skin integrity will stabilize  Description: Skin integrity will stabilize  Outcome: Ongoing     Problem: Discharge Planning:  Goal: Patients continuum of care needs are met  Description: Patients continuum of care needs are met  9/11/2021 0041 by David Graff RN  Outcome: Ongoing        Problem:  Activity Intolerance:  Goal: Ability to tolerate increased activity will improve  Description: Ability to tolerate increased activity will improve  Outcome: Ongoing     Problem: Airway Clearance - Ineffective:  Goal: Ability to maintain a clear airway will improve  Description: Ability to maintain a clear airway will improve  9/11/2021 0041 by David Graff RN  Outcome: Ongoing       Problem: Breathing Pattern - Ineffective:  Goal: Ability to achieve and maintain a regular respiratory rate will improve  Description: Ability to achieve and maintain a regular respiratory rate will improve  Outcome: Ongoing     Problem: Gas Exchange - Impaired:  Goal: Levels of oxygenation will improve  Description: Levels of oxygenation will improve  9/11/2021 0041 by Kelsie Weber RN  Outcome: Ongoing

## 2021-09-11 NOTE — PROGRESS NOTES
Accompanied pt to nuclear medicine, then to MRI, due to pt being on 8L O2 hi-flow. Pt back to room 3103, family at bedside. PICC RN notified that pt back in room.

## 2021-09-11 NOTE — PROGRESS NOTES
[facility]  Progress Note      SUBJECTIVE:   Ike is doing ok   We are going to start Elana etoposide today -atezol Monday  There is a risk for tumor lysis   I did talk to pt about side effects  Nausea vomiting hair loss mouth sores diarrhea constipation myelosuppression transfusions   Given his dx he has ectopic acth type syndrome   OBJECTIVE      Medications    Current Facility-Administered Medications: lidocaine PF 1 % injection 5 mL, 5 mL, IntraDERmal, Once  ondansetron (ZOFRAN) 8 mg in dextrose 5 % 50 mL IVPB, 8 mg, IntraVENous, Q8H PRN  0.9 % sodium chloride infusion, , IntraVENous, Continuous  potassium chloride (KLOR-CON M) extended release tablet 40 mEq, 40 mEq, Oral, TID WC  insulin glargine (LANTUS) injection vial 30 Units, 30 Units, SubCUTAneous, Daily  allopurinol (ZYLOPRIM) tablet 300 mg, 300 mg, Oral, Daily  etoposide (VEPESID) 188 mg in sodium chloride 0.9 % 500 mL chemo IVPB, 80 mg/m2, IntraVENous, Daily  CARBOplatin (PARAPLATIN) 700 mg in sodium chloride 0.9 % 100 mL chemo IVPB, 700 mg, IntraVENous, Once  ondansetron (ZOFRAN) 16 mg, dexamethasone (DECADRON) 10 mg in sodium chloride 0.9 % 50 mL IVPB, , IntraVENous, Daily  [START ON 9/13/2021] atezolizumab (TECENTRIQ) 1,200 mg in sodium chloride 0.9 % 250 mL chemo IVPB, 1,200 mg, IntraVENous, Once  enoxaparin (LOVENOX) injection 40 mg, 40 mg, SubCUTAneous, Daily  insulin lispro (HUMALOG) injection vial 0-18 Units, 0-18 Units, SubCUTAneous, TID WC  insulin lispro (HUMALOG) injection vial 0-9 Units, 0-9 Units, SubCUTAneous, Nightly  spironolactone (ALDACTONE) tablet 50 mg, 50 mg, Oral, BID  cloNIDine (CATAPRES) tablet 0.2 mg, 0.2 mg, Oral, Q4H PRN  hydrALAZINE (APRESOLINE) injection 10 mg, 10 mg, IntraVENous, Q4H PRN  acetaminophen (TYLENOL) tablet 650 mg, 650 mg, Oral, Q4H PRN  doxazosin (CARDURA) tablet 4 mg, 4 mg, Oral, Daily  NIFEdipine (PROCARDIA XL) extended release tablet 60 mg, 60 mg, Oral, BID  furosemide (LASIX) injection 40 mg, 40 mg, IntraVENous, BID  sodium chloride (Inhalant) 3 % nebulizer solution 15 mL, 15 mL, Nebulization, Q4H WA  lisinopril (PRINIVIL;ZESTRIL) tablet 40 mg, 40 mg, Oral, Daily  glucose (GLUTOSE) 40 % oral gel 15 g, 15 g, Oral, PRN  dextrose 50 % IV solution, 12.5 g, IntraVENous, PRN  glucagon (rDNA) injection 1 mg, 1 mg, IntraMUSCular, PRN  dextrose 5 % solution, 100 mL/hr, IntraVENous, PRN  iopamidol (ISOVUE-370) 76 % injection 75 mL, 75 mL, IntraVENous, ONCE PRN  budesonide-formoterol (SYMBICORT) 160-4.5 MCG/ACT inhaler 2 puff, 2 puff, Inhalation, BID  montelukast (SINGULAIR) tablet 10 mg, 10 mg, Oral, Nightly  sodium chloride flush 0.9 % injection 5-40 mL, 5-40 mL, IntraVENous, 2 times per day  sodium chloride flush 0.9 % injection 5-40 mL, 5-40 mL, IntraVENous, PRN  0.9 % sodium chloride infusion, 25 mL, IntraVENous, PRN  ondansetron (ZOFRAN-ODT) disintegrating tablet 4 mg, 4 mg, Oral, Q8H PRN **OR** ondansetron (ZOFRAN) injection 4 mg, 4 mg, IntraVENous, Q6H PRN  polyethylene glycol (GLYCOLAX) packet 17 g, 17 g, Oral, Daily PRN  [DISCONTINUED] acetaminophen (TYLENOL) tablet 650 mg, 650 mg, Oral, Q6H PRN **OR** acetaminophen (TYLENOL) suppository 650 mg, 650 mg, Rectal, Q6H PRN  nicotine (NICODERM CQ) 21 MG/24HR 1 patch, 1 patch, TransDERmal, Daily  ipratropium-albuterol (DUONEB) nebulizer solution 1 ampule, 1 ampule, Inhalation, Q4H WA  Physical    VITALS:  BP (!) 154/90   Pulse 90   Temp 97.9 °F (36.6 °C) (Oral)   Resp 20   Ht 6' (1.829 m)   Wt 239 lb 13.8 oz (108.8 kg)   SpO2 90%   BMI 32.53 kg/m²   TEMPERATURE:  Current - Temp: 97.9 °F (36.6 °C);  Max - Temp  Av.9 °F (36.6 °C)  Min: 97.5 °F (36.4 °C)  Max: 98.2 °F (36.8 °C)  PULSE OXIMETRY RANGE: SpO2  Av.4 %  Min: 87 %  Max: 92 %  24HR INTAKE/OUTPUT:      Intake/Output Summary (Last 24 hours) at 2021 0717  Last data filed at 2021 0519  Gross per 24 hour   Intake 1711.41 ml   Output 2625 ml   Net -913.59 ml CONSTITUTIONAL:  awake, alert, cooperative, no apparent distress, HEENT oral pharynx , no scleral icterus  HEMATOLOGIC/LYMPHATICS:  no cervical lymphadenopathy, no supraclavicular lymphadenopathy,LUNGS:  No increased work of breathing, good air exchange, clear to auscultation bilaterally, no crackles or wheezing  CARDIOVASCULAR:  , regular rate and rhythm, normal S1 and S2, no S3 or S4, and no murmur noted  ABDOMEN:  No scars, normal bowel sounds, soft, non-distended, non-tender, no masses palpated, no hepatosplenomegally  MUSCULOSKELETAL:  There is no redness, warmth, or swelling of the joints. EXTREMETIES:s+ edema   NEUROLOGIC:  Awake, alert, oriented to name, place and time. =SKIN:  no bruising or bleeding      Data      Recent Labs     09/10/21  0510   WBC 8.5   HGB 13.3*   HCT 39.0*   *   MCV 99.3        Recent Labs     09/09/21  0445 09/10/21  0510 09/11/21  0449    139 138   K 3.6 3.1* 3.9   CL 94* 94* 94*   CO2 36* 36* 38*   PHOS  --   --  3.0   BUN 23* 28* 26*   CREATININE 0.7* 0.7* 0.7*     No results for input(s): AST, ALT, ALB, BILIDIR, BILITOT, ALKPHOS in the last 72 hours.     Magnesium:    Lab Results   Component Value Date    MG 2.00 09/11/2021    MG 1.80 09/10/2021    MG 2.00 09/09/2021       Imaging ECHO Complete 2D W Doppler W Color    Result Date: 9/3/2021  Transthoracic Echocardiography Report (TTE)  Demographics   Patient Name      Arash Peters   Date of Study     09/03/2021          Gender              Male   Patient Number    [id number]          Date of Birth       1970   Visit Number      [id number]           Age                 46 year(s)   Accession Number  [id number]          Room Number         5740   Corporate ID      [id number]             [address] [facility],                                                            [facility]   Ordering          Brand Randal,             [facility] [address] DO     Physician           Basilio Rabago MD  Procedure Type of Study   TTE procedure:ECHOCARDIOGRAM COMPLETE 2D W DOPPLER W COLOR. Procedure Date Date: 09/03/2021 Start: 02:20 PM Study Location: [facility] Technical Quality: Limited visualization due to poor acoustical window. Additional Indications:Lower extremity swelling, evaluate the right heart and LV; HTN, HLD, COPD. Patient Status: Routine Height: 72 inches Weight: 247.01 pounds BSA: 2.33 m2 BMI: 33.5 kg/m2 Rhythm: Within normal limits HR: 70 bpm BP: 183/87 mmHg  Conclusions   Summary  Normal left ventricle size, wall thickness, and systolic function with an  estimated ejection fraction of 60-65%. No regional wall motion abnormalities  are seen. Normal diastolic function. Normal right ventricular size and function. No significant valve abnormalities noted. Signature   ------------------------------------------------------------------  Electronically signed by Pratik Romero MD  (Interpreting physician) on 09/03/2021 at 03:46 PM  ------------------------------------------------------------------   Findings   Left Ventricle  Normal left ventricle size, wall thickness, and systolic function with an  estimated ejection fraction of 60-65%. No regional wall motion abnormalities  are seen. Normal diastolic function. Mitral Valve  Mitral valve leaflets appear mildly thickened. No evidence of mitral regurgitation or stenosis. Left Atrium  The left atrium is normal in size. Aortic Valve  The aortic valve leaflets are not well visualized. No evidence of aortic valve regurgitation or stenosis. Aorta  The aortic root is normal in size. Right Ventricle  Normal right ventricular size and function. TAPSE 2.8 cm   Tricuspid Valve  The tricuspid valve was not well visualized. No evidence of tricuspid stenosis. Right Atrium  The right atrial size is normal.   Pulmonic Valve  The pulmonic valve is not well visualized. No evidence of pulmonic valve regurgitation. No evidence of pulmonic valve stenosis. Pericardial Effusion  No pericardial effusion noted. Pleural Effusion  No pleural effusion. Miscellaneous  IVC not well visualized. M-Mode/2D Measurements (cm)   LV Diastolic Dimension: 9.93 cm LV Systolic Dimension: 2.37 cm  LV Septum Diastolic: 0.9 cm  LV PW Diastolic: 6.36 cm        AO Root Dimension: 3.4 cm                                   LA Area: 23.6 cm2  LVOT: 2.1 cm                    LA volume/Index: 70.5 ml /30 ml/m2  Doppler Measurements   AV Peak Velocity: 184 cm/s     MV Peak E-Wave: 106 cm/s  AV Peak Gradient: 13.54 mmHg   MV Peak A-Wave: 87.5 cm/s  AV Mean Gradient: 6 mmHg       MV E/A Ratio: 1.21  LVOT Peak Velocity: 160 cm/s   MV Mean Gradient: 2 mmHg  AV Area (Continuity):3.58 cm2  MV Max P mmHg                                 MV Vmax:114 cm/s                                 MV VTI:32.9 cm/s   E' Septal Velocity: 10.9 cm/s  MV Area (continuity): 3.61 cm2  E' Lateral Velocity: 15.5 cm/s MV Deceleration Time: 259 msec  PV Peak Velocity: 131 cm/s  PV Peak Gradient: 6.86 mmHg   Aortic Valve   Peak Velocity: 184 cm/s     Mean Velocity: 114 cm/s  Peak Gradient: 13.54 mmHg   Mean Gradient: 6 mmHg  Area (continuity): 3.58 cm2  AV VTI: 33.2 cm  Aorta   Aortic Root: 3.4 cm  Ascending Aorta: 3.3 cm  LVOT Diameter: 2.1 cm      XR CHEST (2 VW)    Result Date: 2021  EXAMINATION: TWO XRAY VIEWS OF THE CHEST 2021 8:48 am COMPARISON: None. HISTORY: ORDERING SYSTEM PROVIDED HISTORY: WEEMS (dyspnea on exertion) TECHNOLOGIST PROVIDED HISTORY: Reason for Exam: WEEMS, asthma Acuity: Unknown Type of Exam: Initial FINDINGS: Clear lungs. No pleural effusion or pneumothorax. Cardiomediastinal silhouette is unremarkable. Degenerative changes of the visualized osseous structures. Clear lungs.      CT GUIDED NEEDLE PLACEMENT    Result Date: 9/8/2021  PROCEDURE: CT GUIDED CORE BIOPSY OF LEFT HEPATIC LOBE MASS MODERATE CONSCIOUS SEDATION 9/7/2021 HISTORY: ORDERING SYSTEM PROVIDED HISTORY: lesion TECHNOLOGIST PROVIDED HISTORY: Reason for exam:->lesion Reason for Exam: lesion Acuity: Acute Type of Exam: Initial; ORDERING SYSTEM PROVIDED HISTORY: bx TECHNOLOGIST PROVIDED HISTORY: Reason for exam:->bx Reason for Exam: lesion Acuity: Acute Type of Exam: Initial PHYSICIANS: Amilcar Simpsontal SEDATION: 0.5 mgversed and 25 mcg fentanyl were titrated intravenously for moderate sedation monitored under my direction. Total intraservice time of sedation was 15 minutes. The patient's vital signs were monitored throughout the procedure and recorded in the patient's medical record by the nurse. TECHNIQUE: Informed consent was obtained after a detailed discussion about the procedure including the risk, benefits, and alternatives. Universal protocol was followed. Axial images were obtained through the liver and a suitable skin site was prepped and draped in sterile fashion. Local anesthesia was achieved with lidocaine. Under intermittent CT guidance, a 17 gauge coaxial needle was inserted into the a left hepatic lobe mass. Through this, 1 18 gauge core biopsy specimens were obtained using coaxial technique and submitted to pathology. Gel-Foam slurry was administered as the needle was removed. A sterile bandage was then placed. The patient tolerated the procedure well. Estimated blood loss: Less than 5 cc Dose modulation, iterative reconstruction, and/or weight based adjustment of the mA/kV was utilized to reduce the radiation dose to as low as reasonably achievable. DLP: 1987.59 mGy-cm     Successful CT guided core biopsy of a left hepatic lobe mass.      CT ABDOMEN PELVIS W IV CONTRAST Additional Contrast? Oral    Result Date: 9/5/2021  EXAMINATION: CT OF THE ABDOMEN AND PELVIS WITH CONTRAST 9/3/2021 5:13 pm TECHNIQUE: CT of the Pelvis: Prostate and seminal vesicles are grossly unremarkable. The urinary bladder is within normal limits. Peritoneum/Retroperitoneum: No significant lymphadenopathy. The abdominal aorta is normal in course and caliber with scattered atherosclerotic disease. No definite filling defects within the IVC, the iliac veins, or visualized femoral veins to suggest thrombus. Bones/Soft Tissues: There is asymmetric thickening of the inferior right rectus abdominus musculature with mild surrounding stranding. Visualization is partially obscured by streak artifact from dense oral contrast in the adjacent small bowel. Body wall edema demonstrated. No focal drainable collections. Multilevel moderate degenerative changes in the visualized spine. No destructive osseous lesions. 1. Multiple hypoattenuating liver lesions which are most suggestive of metastatic disease. 2. Right adrenal gland nodule measuring up to 1.6 cm, indeterminate. 3. Hypoattenuating nodule in the region of the 2/3 portion of the duodenum in the region of the ampulla measuring up to 1.3 cm. Findings could be artifactual or related to fold thickening; however, a small ampullary or possibly pancreatic lesion cannot be entirely excluded. 4. Diffuse fold thickening involving the majority of the jejunum, nonspecific but can be related to an infectious etiology or lymphatic obstruction. 5. No definite filling defects in the IVC, iliac veins, or visualized femoral veins to suggest thrombus. 6. Asymmetric thickening of the inferior right rectus abdominus musculature with surrounding stranding which is partially obscured by adjacent streak artifact from dense oral contrast in the small bowel. No definite focal lesion is demonstrated; however, an underlying lesion cannot be entirely excluded. Hematoma is also a consideration.      CT CHEST PULMONARY EMBOLISM W CONTRAST    Result Date: 9/5/2021  EXAMINATION: CTA OF THE CHEST 9/2/2021 12:47 pm TECHNIQUE: CTA of the chest was performed after the administration of intravenous contrast.  Multiplanar reformatted images are provided for review. MIP images are provided for review. Dose modulation, iterative reconstruction, and/or weight based adjustment of the mA/kV was utilized to reduce the radiation dose to as low as reasonably achievable. COMPARISON: None. HISTORY: ORDERING SYSTEM PROVIDED HISTORY: leg swelling sob new o2 requirement TECHNOLOGIST PROVIDED HISTORY: Reason for exam:->leg swelling sob new o2 requirement Decision Support Exception - unselect if not a suspected or confirmed emergency medical condition->Emergency Medical Condition (MA) Reason for Exam: leg swelling sob new o2 requirement Acuity: Acute Type of Exam: Initial FINDINGS: Pulmonary Arteries:  No central, lobar, or segmental pulmonary embolus. Narrowing of the right lower lobe pulmonary artery by the adenopathy. Mediastinum: Heart size is normal.  No pericardial effusion. Thoracic aorta is normal caliber. Extensive mediastinal and hilar lymphadenopathy is present. Subcarinal node measures 32 x 37 mm. Right hilar node measures 30 x 29 mm. Lungs/pleura: Moderate emphysema. Multiple 3 mm nodules are present in the right middle lobe and left lower lobe. Superimposed in the posterior right lower lobe, there is a dominant nodule measuring 17 x 17 mm. An additional right lower lobe nodule measures 11 mm. No pleural effusion or pneumothorax. Narrowing of bronchus intermedius due to the right hilar adenopathy. Upper Abdomen: Multiple low-attenuation liver lesions are present measuring up to 48 x 40 mm and 26 x 28 mm. At least 30 lesions are noted in the visualized portions of the liver. Soft Tissues/Bones: No lytic or blastic osseous lesion. 1. No acute pulmonary embolus. 2. Extensive mediastinal and hilar lymphadenopathy compatible with metastatic neoplasm. 3. Multiple new indeterminate 3 mm nodules in the right middle lobe and left lower lobe. These could be infectious or neoplastic. 4. Right lower lobe 17 mm and 11 mm nodules are nonspecific, but metastatic disease not excluded. 5. Multiple liver metastases measuring up to 48 x 40 mm. RECOMMENDATIONS:     CT NEEDLE BIOPSY LIVER PERCUTANEOUS    Result Date: 9/8/2021  PROCEDURE: CT GUIDED CORE BIOPSY OF LEFT HEPATIC LOBE MASS MODERATE CONSCIOUS SEDATION 9/7/2021 HISTORY: ORDERING SYSTEM PROVIDED HISTORY: lesion TECHNOLOGIST PROVIDED HISTORY: Reason for exam:->lesion Reason for Exam: lesion Acuity: Acute Type of Exam: Initial; ORDERING SYSTEM PROVIDED HISTORY: bx TECHNOLOGIST PROVIDED HISTORY: Reason for exam:->bx Reason for Exam: lesion Acuity: Acute Type of Exam: Initial PHYSICIANS: Amilcar Simpsontal SEDATION: 0.5 mgversed and 25 mcg fentanyl were titrated intravenously for moderate sedation monitored under my direction. Total intraservice time of sedation was 15 minutes. The patient's vital signs were monitored throughout the procedure and recorded in the patient's medical record by the nurse. TECHNIQUE: Informed consent was obtained after a detailed discussion about the procedure including the risk, benefits, and alternatives. Universal protocol was followed. Axial images were obtained through the liver and a suitable skin site was prepped and draped in sterile fashion. Local anesthesia was achieved with lidocaine. Under intermittent CT guidance, a 17 gauge coaxial needle was inserted into the a left hepatic lobe mass. Through this, 1 18 gauge core biopsy specimens were obtained using coaxial technique and submitted to pathology. Gel-Foam slurry was administered as the needle was removed. A sterile bandage was then placed. The patient tolerated the procedure well. Estimated blood loss: Less than 5 cc Dose modulation, iterative reconstruction, and/or weight based adjustment of the mA/kV was utilized to reduce the radiation dose to as low as reasonably achievable.  DLP: 8081.99 mGy-cm     Successful CT guided core biopsy of a left hepatic lobe mass. VL Extremity Venous Bilateral    Result Date: 9/8/2021  Lower Extremities DVT Study  Demographics   Patient Name       Celsa Clarke   Date of Study      09/07/2021         Gender              Male   Patient Number     [id number]         Date of Birth       1970   Visit Number       [id number]          Age                 46 year(s)   Accession Number   [id number]         Room Number         [facility]   Corporate ID       [id number]            [facility]   Ordering Physician Joe Summers MD         Physician           MD  Procedure Type of Study:   Veins:Lower Extremities DVT Study, VASC EXTREMITY VENOUS DUPLEX BILATERAL. Vascular Sonographer Report  Indications for Study:Edema. Impressions Right Impression No evidence of deep vein or superficial vein thrombosis involving the right lower extremity in vessels clearly visualized. Proximal and Mid Posterior Tibial veins and Peroneal veins were not visualized due to swelling. Calf and ankle edema noted. Left Impression No evidence of deep vein or superficial vein thrombosis involving the left lower extremity in vessels clearly visualized. Proximal Posterior Tibial veins and Soleal vein were not visualized due to swelling. Calf and ankle edema noted. Conclusions   Summary   No evidence of deep vein or superficial vein thrombosis involving the  bilateral lower extremities in vessels clearly visualized as detailed above. Signature   ------------------------------------------------------------------  Electronically signed by Claud Harada, MD (Interpreting  physician) on 09/08/2021 at 08:09 AM  ------------------------------------------------------------------  Patient Status:Routine. Study [facility]. Technical Quality:Limited visualization due to swelling.  Risk Factors   - The been on lasix and has needed aggressive bp mgmt    I have ordered his chemo to start today carbo etoposide atezolizumab   Chemo will be three days     He will get a picc  Bone scan/mri brain pending for final staging but wont hold my start    Moniter for tumor lysis -I have ordered labs q 12   I spoke to nephrology -given he has been on lasix 40iv bid and usually I need to hydrate these pts for tumor lysis prevention I did speak to nephrology on this-we will hold lasix and give gentle fluids-if he lysis this may have to change   Appreciate nephrology involvement   I will place on tele just in case for this potential   He has had low k and is on k replacement thus -and on spironolactone-yang to watch k levels     Side effects discussed at length with pt     Carbo etoposide ordered -his lft were ok a few days ago will repeat      bp managed by nephrology/Danielle Burkett MD, MD

## 2021-09-11 NOTE — PROGRESS NOTES
Original chemotherapy orders reviewed and acknowledged. 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 Patient educated on chemotherapy regimen. Acknowledgement of informed consent for chemotherapy obtained. Pt height, weight, and BSA verified. Appropriate dosing calculations of chemotherapy based on height, weight, and BSA verified. Administration: Chemotherapy drug Etoposide independently verified with Kenisha Green RN prior to administration. Original order, appropriateness of regimen, drug supplied, height, weight, BSA, dose calculations, expiration dates/times, drug appearance, and two patient identifiers were verified by both RNs. Drug checked for vesicant/irritant status and for risk of hypersensitivity. Most recent laboratory values and allergies, were reviewed. Appropriate IV access available: Positive, brisk blood return via CVC was confirmed prior to administration. Chest x-ray for correct line placement reviewed  Edi Durant RN and Kenisha Green RN verified correct rate of chemotherapy and maintenance IV fluids. Patient was educated on chemotherapy regimen prior to administration including indication for treatment related to disease & side effects of chemotherapy drug. Patient verbalizes understanding of all instructions.

## 2021-09-11 NOTE — PROGRESS NOTES
IM Progress Note      Subjective: The patient is a 46 y.o. male patient ,feels ok  Swelling is getting less and loosing weight  Has no cough wheezing orthopnea or pnd  Appetite is good  Has no other cp gi or gu symptoms      Review of Systems:  Review of Systems   Constitutional: Negative for activity change, appetite change, chills, fever and unexpected weight change. Respiratory: Negative for cough, chest tightness, shortness of breath and wheezing. Cardiovascular: Positive for leg swelling. Negative for chest pain and palpitations. Gastrointestinal: Negative. Genitourinary: Negative. Neurological: Negative for dizziness, light-headedness and headaches. Objective:    BP (!) 165/90   Pulse 95   Temp 97.7 °F (36.5 °C) (Oral)   Resp 16   Ht 6' (1.829 m)   Wt 239 lb 13.8 oz (108.8 kg)   SpO2 90%   BMI 32.53 kg/m²     Intake/Output Summary (Last 24 hours) at 9/11/2021 1309  Last data filed at 9/11/2021 0859  Gross per 24 hour   Intake 1610 ml   Output 1900 ml   Net -290 ml           Physical Exam  Vitals and nursing note reviewed. Constitutional:       General: He is not in acute distress. Eyes:      General: No scleral icterus. Extraocular Movements: Extraocular movements intact. Conjunctiva/sclera: Conjunctivae normal.      Pupils: Pupils are equal, round, and reactive to light. Neck:      Thyroid: No thyromegaly. Vascular: No carotid bruit or JVD. Cardiovascular:      Rate and Rhythm: Normal rate and regular rhythm. Heart sounds: Normal heart sounds. No murmur heard. No gallop. Pulmonary:      Effort: No respiratory distress. Breath sounds: Normal breath sounds. No wheezing, rhonchi or rales. Abdominal:      General: Bowel sounds are normal. There is no distension. Palpations: Abdomen is soft. There is no mass. Tenderness: There is no abdominal tenderness. Musculoskeletal:      Right lower leg: Edema present.       Left lower leg:

## 2021-09-11 NOTE — PROGRESS NOTES
Pt transferred to room 3103 per wheel chair, with oxygen and RN, and telemetry, and belongings. Tolerated well, stated would inform family.

## 2021-09-11 NOTE — PROGRESS NOTES
Patient A&O in the bed - transferred from 5110. Patient tolerating PO intake well. Patient denies pain, nausea or vomiting. Patient oriented to room. Call light within reach, able to make needs known, fall precautions in place. Denies any needs at this time. Will check on patient Q2 hours.

## 2021-09-11 NOTE — PROGRESS NOTES
Pt sitting on side of bed majority of afternoon. Multiple family members visiting throughout day. He tolerated chemotherapy well, no adverse reactions noted. He remains on 8L hi-flow O2 with occasional cough. He remains SR on telemetry. Will continue to monitor.

## 2021-09-11 NOTE — PLAN OF CARE
a clear airway will improve  9/11/2021 0905 by Christin Solomon RN  Outcome: Ongoing  9/11/2021 0041 by Peggy Chandler RN  Outcome: Ongoing     Problem: Breathing Pattern - Ineffective:  Goal: Ability to achieve and maintain a regular respiratory rate will improve  Description: Ability to achieve and maintain a regular respiratory rate will improve  9/11/2021 0905 by Christin Solomon RN  Outcome: Ongoing  Note: Pt's respirations are WDL at rest, but he does have dyspnea with exertion. Pt currently on 8L O2 hi-flow. Will continue to monitor. 9/11/2021 0041 by Peggy Chandler RN  Outcome: Ongoing     Problem: Gas Exchange - Impaired:  Goal: Levels of oxygenation will improve  Description: Levels of oxygenation will improve  9/11/2021 0905 by Christin Solomon RN  Outcome: Ongoing  Note: Pt's respirations are WDL at rest, but he does have dyspnea with exertion. Pt currently on 8L O2 hi-flow. Will continue to monitor. 9/11/2021 0041 by Peggy Chandler RN  Outcome: Ongoing     Problem: Health Behavior:  Goal: Identification of resources available to assist in meeting health care needs will improve  Description: Identification of resources available to assist in meeting health care needs will improve  Outcome: Ongoing  Note: Reviewed available resources for pt to contact for any needs. Problem: Physical Regulation:  Goal: Complications related to the disease process, condition or treatment will be avoided or minimized  Description: Complications related to the disease process, condition or treatment will be avoided or minimized  Outcome: Ongoing  Note: Complications will be minimized during chemotherapy by completing frequent VS and assessing pt's condition throughout treatment.

## 2021-09-11 NOTE — PROGRESS NOTES
Pulmonary Progress Note    Date of Admission: 9/2/2021   LOS: 9 days       CC:  copd    Subjective:  Feeling better     ROS:   No nausea  No Vomiting  No chest pain      Assessment:         Plan:        Hospital Day: 9     Acute hypoxemia   * likely secondary to emphysema with poor air movement  * Wean O2 to sat >90%     copd  * Symbicort   * Duoneb's    * poor air movement on right . Data:        PHYSICAL EXAM:   Blood pressure (!) 155/81, pulse 99, temperature 97.7 °F (36.5 °C), temperature source Axillary, resp. rate 16, height 6' (1.829 m), weight 239 lb 13.8 oz (108.8 kg), SpO2 (!) 89 %.'  Body mass index is 32.53 kg/m². Gen: No distress. ENT:   Resp: No accessory muscle use. No crackles. No wheezes. No rhonchi. CV: Regular rate. Regular rhythm. No murmur or rub. No edema. Skin: Warm, dry, normal texture and turgor. No nodule on exposed extremities. M/S: No cyanosis. No clubbing. No joint deformity. Psych: Oriented x 3. No anxiety. Awake. Alert. Intact judgement and insight. Good Mood / Affect.   Memory appears in tact       Medications:    Scheduled Meds:   etoposide (VEPESID) chemo IVPB  80 mg/m2 IntraVENous Q24H    potassium chloride  40 mEq Oral TID WC    insulin glargine  30 Units SubCUTAneous Daily    allopurinol  300 mg Oral Daily    CARBOplatin (PARAPLATIN) chemo IVPB (by mg/m2)  700 mg IntraVENous Once    ondansetron with dexamethasone IVPB   IntraVENous Daily    [START ON 9/13/2021] atezolizumab (TECENTRIQ) chemo IVPB  1,200 mg IntraVENous Once    enoxaparin  40 mg SubCUTAneous Daily    insulin lispro  0-18 Units SubCUTAneous TID     insulin lispro  0-9 Units SubCUTAneous Nightly    spironolactone  50 mg Oral BID    doxazosin  4 mg Oral Daily    NIFEdipine  60 mg Oral BID    sodium chloride (Inhalant)  15 mL Nebulization Q4H WA    lisinopril  40 mg Oral Daily    budesonide-formoterol  2 puff Inhalation BID    montelukast  10 mg Oral Nightly    sodium chloride flush  5-40 mL IntraVENous 2 times per day    nicotine  1 patch TransDERmal Daily    ipratropium-albuterol  1 ampule Inhalation Q4H WA       Continuous Infusions:   sodium chloride 50 mL/hr at 09/11/21 1339    dextrose      sodium chloride Stopped (09/10/21 0349)       PRN Meds:  ondansetron (ZOFRAN) IVPB, cloNIDine, hydrALAZINE, acetaminophen, glucose, dextrose, glucagon (rDNA), dextrose, iopamidol, sodium chloride flush, sodium chloride, ondansetron **OR** ondansetron, polyethylene glycol, [DISCONTINUED] acetaminophen **OR** acetaminophen    Labs reviewed:  CBC:   Recent Labs     09/10/21  0510   WBC 8.5   HGB 13.3*   HCT 39.0*   MCV 99.3   *     BMP:   Recent Labs     09/09/21  0445 09/10/21  0510 09/11/21  0449    139 138   K 3.6 3.1* 3.9   CL 94* 94* 94*   CO2 36* 36* 38*   PHOS  --   --  3.0   BUN 23* 28* 26*   CREATININE 0.7* 0.7* 0.7*     LIVER PROFILE:   Recent Labs     09/11/21  0449   AST 53*   *   BILIDIR 0.3   BILITOT 0.9   ALKPHOS 213*     PT/INR:   Recent Labs     09/11/21  0811   PROTIME 11.5   INR 1.02     APTT: No results for input(s): APTT in the last 72 hours. UA:No results for input(s): NITRITE, COLORU, PHUR, LABCAST, WBCUA, RBCUA, MUCUS, TRICHOMONAS, YEAST, BACTERIA, CLARITYU, SPECGRAV, LEUKOCYTESUR, UROBILINOGEN, BILIRUBINUR, BLOODU, GLUCOSEU, AMORPHOUS in the last 72 hours. Invalid input(s): Ted Meek  No results for input(s): PH, PCO2, PO2 in the last 72 hours. Cx:      Films:  Radiology Review:  Pertinent images / reports were reviewed as a part of this visit. CT Chest w/ contrast: No results found for this or any previous visit. CT Chest w/o contrast: No results found for this or any previous visit.       CTPA: Results for orders placed during the hospital encounter of 09/02/21    CT CHEST PULMONARY EMBOLISM W CONTRAST    Narrative  EXAMINATION:  CTA OF THE CHEST 9/2/2021 12:47 pm    TECHNIQUE:  CTA of the chest was performed after the administration of intravenous  contrast.  Multiplanar reformatted images are provided for review. MIP  images are provided for review. Dose modulation, iterative reconstruction,  and/or weight based adjustment of the mA/kV was utilized to reduce the  radiation dose to as low as reasonably achievable. COMPARISON:  None. HISTORY:  ORDERING SYSTEM PROVIDED HISTORY: leg swelling sob new o2 requirement  TECHNOLOGIST PROVIDED HISTORY:  Reason for exam:->leg swelling sob new o2 requirement  Decision Support Exception - unselect if not a suspected or confirmed  emergency medical condition->Emergency Medical Condition (MA)  Reason for Exam: leg swelling sob new o2 requirement  Acuity: Acute  Type of Exam: Initial    FINDINGS:  Pulmonary Arteries:  No central, lobar, or segmental pulmonary embolus. Narrowing of the right lower lobe pulmonary artery by the adenopathy. Mediastinum: Heart size is normal.  No pericardial effusion. Thoracic aorta  is normal caliber. Extensive mediastinal and hilar lymphadenopathy is  present. Subcarinal node measures 32 x 37 mm. Right hilar node measures 30  x 29 mm. Lungs/pleura: Moderate emphysema. Multiple 3 mm nodules are present in the  right middle lobe and left lower lobe. Superimposed in the posterior right  lower lobe, there is a dominant nodule measuring 17 x 17 mm. An additional  right lower lobe nodule measures 11 mm. No pleural effusion or pneumothorax. Narrowing of bronchus intermedius due to the right hilar adenopathy. Upper Abdomen: Multiple low-attenuation liver lesions are present measuring  up to 48 x 40 mm and 26 x 28 mm. At least 30 lesions are noted in the  visualized portions of the liver. Soft Tissues/Bones: No lytic or blastic osseous lesion. Impression  1. No acute pulmonary embolus. 2. Extensive mediastinal and hilar lymphadenopathy compatible with metastatic  neoplasm.   3. Multiple new indeterminate 3 mm nodules in the right middle lobe and left  lower lobe. These could be infectious or neoplastic. 4. Right lower lobe 17 mm and 11 mm nodules are nonspecific, but metastatic  disease not excluded. 5. Multiple liver metastases measuring up to 48 x 40 mm. RECOMMENDATIONS:      CXR PA/LAT: Results for orders placed during the hospital encounter of 09/02/21    XR CHEST (2 VW)    Narrative  EXAMINATION:  TWO XRAY VIEWS OF THE CHEST    9/2/2021 8:48 am    COMPARISON:  None. HISTORY:  ORDERING SYSTEM PROVIDED HISTORY: WEEMS (dyspnea on exertion)  TECHNOLOGIST PROVIDED HISTORY:  Reason for Exam: WEEMS, asthma  Acuity: Unknown  Type of Exam: Initial    FINDINGS:  Clear lungs. No pleural effusion or pneumothorax. Cardiomediastinal  silhouette is unremarkable. Degenerative changes of the visualized osseous  structures. Impression  Clear lungs. CXR portable: Results for orders placed during the hospital encounter of 09/02/21    XR CHEST PORTABLE    Narrative  EXAMINATION:  ONE XRAY VIEW OF THE CHEST    9/9/2021 1:52 pm    COMPARISON:  September 2, 2021    HISTORY:  ORDERING SYSTEM PROVIDED HISTORY: Metastatic cancer. Increased oxygen  requirement. TECHNOLOGIST PROVIDED HISTORY:  Reason for exam:->Metastatic cancer. Increased oxygen requirement. Reason for Exam: metastatic ca  Acuity: Unknown  Type of Exam: Ongoing    FINDINGS:  Increased bibasilar atelectasis versus infiltrates. Normal cardiac size. No  pneumothorax. Mild osteopenic changes and degenerative changes identified in  the bony structures. .    Impression  Increased bibasilar atelectasis versus infiltrates. No pneumothorax. This note was transcribed using Chairish. Please disregard any translational errors.       Alannah Parkinson [facility] [phone]

## 2021-09-11 NOTE — PROGRESS NOTES
Original chemotherapy orders reviewed and acknowledged. Appropriateness of chemotherapy treatment regimen Carboplatin for diagnosis of small cell carcinoma was verified. Patient educated on chemotherapy regimen. Acknowledgement of informed consent for chemotherapy verified. Pt height, weight, and BSA verified. Appropriate dosing calculations of chemotherapy based on height, weight, and BSA verified. Administration: Chemotherapy drug Carboplatin independently verified with Jaja Webber RN prior to administration. Original order, appropriateness of regimen, drug supplied, height, weight, BSA, dose calculations, expiration dates/times, drug appearance, and two patient identifiers were verified by both RNs. Drug checked for vesicant/irritant status and for risk of hypersensitivity. Most recent laboratory values and allergies, were reviewed. Appropriate IV access available: Positive, brisk blood return via CVC was confirmed prior to administration. Chest x-ray for correct line placement reviewed  Monica Grier RN and Jaja Webber RN verified correct rate of chemotherapy and maintenance IV fluids. Patient was educated on chemotherapy regimen prior to administration including indication for treatment related to disease & side effects of chemotherapy drug. Patient verbalizes understanding of all instructions.

## 2021-09-11 NOTE — PROGRESS NOTES
[facility]  Nephrology   Progress Note        We are following the patient for uncontrolled HTN Severe Hypokalemia  47 y/o WM, gray and heavy smoker admitted with severe Hypokalemia  He has h/o HTN for ~ 20 years that has been well controlled on Amlodipine  About 3 weeks ago he started to have leg swelling, weight gain fatigue and SOB  Out patient labs showed severe Hypokalemia  On admission K+ noted to be 2.2 meq CO2 40   Noted to have uncontrolled HTN with /108 mm  BP has remained resistant despite addition of multiple meds    SUBJECTIVE:  S/P Liver biopsy 21  Denies new complaints   UOP 2625 ml/24   Notes edema is improving, remains in thighs  BP has been reasonable overnight    Sister at bedside        OBJECTIVE:     PHYSICAL:    TEMPERATURE:  Current - Temp: 97.9 °F (36.6 °C);  Max - Temp  Av.8 °F (36.6 °C)  Min: 97.4 °F (36.3 °C)  Max: 98.3 °F (36.8 °C)  RESPIRATIONS RANGE: Resp  Av.9  Min: 16  Max: 21  PULSE RANGE: Pulse  Av.1  Min: 80  Max: 104  BLOOD PRESSURE RANGE:  Systolic (04HMS), CST:668 , Min:141 , HFS:318   ; Diastolic (68VUE), IZI:30, Min:74, Max:98    PULSE OXIMETRY RANGE: SpO2  Av.3 %  Min: 87 %  Max: 94 %  24HR INTAKE/OUTPUT:      Intake/Output Summary (Last 24 hours) at 2021 1851  Last data filed at 2021 1812  Gross per 24 hour   Intake 1730 ml   Output 1650 ml   Net 80 ml       CONSTITUTIONAL:  awake, alert, cooperative, no apparent distress, and appears stated age  HEENT:  Lids and lashes normal, pupils equal, round and reactive to light  NECK:  Supple, symmetrical, trachea midline, no adenopathy  LUNGS:fairly clear today no wheezes noted  ABDOMEN:  No scars, normal bowel sounds, soft, non-distended  NEUROLOGIC:  alert, oriented, normal speech, no focal findings or movement disorder noted  SKIN: no bruising or bleeding  EXTREMITIES: ace wraps LE's + edema thighs    Medications     sodium chloride 50 mL/hr at 21 1877    dextrose      sodium chloride Stopped (09/10/21 0349)        Data      CBC:   Recent Labs     09/10/21  0510   WBC 8.5   RBC 3.93*   HGB 13.3*   HCT 39.0*   *     BMP:    Recent Labs     09/09/21  0445 09/10/21  0510 09/11/21  0449    139 138   K 3.6 3.1* 3.9   CL 94* 94* 94*   CO2 36* 36* 38*   BUN 23* 28* 26*   CREATININE 0.7* 0.7* 0.7*   CALCIUM 8.4 8.7 8.8   GLUCOSE 207* 211* 177*     Phosphorus:    Recent Labs     09/11/21 0449   PHOS 3.0     Magnesium:    Recent Labs     09/09/21  0445 09/10/21  0510 09/11/21  0449   MG 2.00 1.80 2.00         ASSESSMENT     Patient Active Problem List   Diagnosis    Pure hypercholesterolemia    Anxiety    Essential hypertension    Moderate persistent asthma with acute exacerbation    Metastatic cancer ([facility] [address]    Hypokalemia    Acute respiratory failure with hypoxia (HCC)    Abnormal CT of the chest    COPD exacerbation (HCC)    Lung nodules    Tobacco abuse       PLAN    1-Hypertension resistant to treatment with multiple meds Concern for secondary HTN including Hypercortisolism ( Cortisol level 90 with adrenal nodule  ?  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  Biopsy shows poorly differentiated neuroendocrine carcinoma   Will continue current regimen and monitor  Ketoconazole can be used to interrupt Cortisol synthesis but elevated LFT's preclude it Rx of tumor will help with ACTH reduction     2-Hypokalemia profound possibly related to Hypercortisolism and renal K+ wasting , started on Aldactone K+ 3.9 meq on supplement Aldactone dose  Increased  to 100 mg daily K= low at 3.1 meq  K+ supplement increased to TID Mg++ 1.8  mg today    3-Met Alkalosis secondary to above     4 Edema Doppler negative for DVT U/A with + protein serum Albumin 3.5 gm Suspect fluid retention due to hypercortisolism. 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

## 2021-09-11 NOTE — PROGRESS NOTES
Pt sitting on side of bed eating breakfast at beginning of shift. He denies having any pain or nausea, but does report some numbness and tingling in BLE which he relates to swelling. Pt currently on 8L O2 hi-flow. LS are diminished with some expiratory wheezes on left side. He is SR on telemetry. Pt is A&O, UAL. Will continue to monitor and assess.

## 2021-09-12 NOTE — PROGRESS NOTES
Pt sitting on side of bed eating breakfast at beginning of shift. He denies having any pain or nausea, and he also stated his numbness and tingling in BLE has resolved at this time. He continues to have +4 pitting edema to BLE. Pt encouraged to elevate legs as much as possible. He remains SR on telemetry. Pt is A&O, UAL. Will continue to monitor and assess.

## 2021-09-12 NOTE — PROGRESS NOTES
[facility]  [facility]  Progress Note      SUBJECTIVE:   Scooby Lemon has no nausea  So far no issues with chemo -labs this am pending but lasts nights-no severe tumor lysis -he remains at potential risk of this   HE has no pain cough sob nv   Reviewed bone scan and brain mri with him   OBJECTIVE      Medications    Current Facility-Administered Medications: ondansetron (ZOFRAN) 8 mg in dextrose 5 % 50 mL IVPB, 8 mg, IntraVENous, Q8H PRN  0.9 % sodium chloride infusion, , IntraVENous, Continuous  etoposide (VEPESID) 188 mg in sodium chloride 0.9 % 500 mL chemo IVPB, 80 mg/m2, IntraVENous, Q24H  potassium chloride (KLOR-CON M) extended release tablet 40 mEq, 40 mEq, Oral, TID WC  insulin glargine (LANTUS) injection vial 30 Units, 30 Units, SubCUTAneous, Daily  allopurinol (ZYLOPRIM) tablet 300 mg, 300 mg, Oral, Daily  ondansetron (ZOFRAN) 16 mg, dexamethasone (DECADRON) 10 mg in sodium chloride 0.9 % 50 mL IVPB, , IntraVENous, Daily  [START ON 9/13/2021] atezolizumab (TECENTRIQ) 1,200 mg in sodium chloride 0.9 % 250 mL chemo IVPB, 1,200 mg, IntraVENous, Once  enoxaparin (LOVENOX) injection 40 mg, 40 mg, SubCUTAneous, Daily  insulin lispro (HUMALOG) injection vial 0-18 Units, 0-18 Units, SubCUTAneous, TID WC  insulin lispro (HUMALOG) injection vial 0-9 Units, 0-9 Units, SubCUTAneous, Nightly  spironolactone (ALDACTONE) tablet 50 mg, 50 mg, Oral, BID  cloNIDine (CATAPRES) tablet 0.2 mg, 0.2 mg, Oral, Q4H PRN  hydrALAZINE (APRESOLINE) injection 10 mg, 10 mg, IntraVENous, Q4H PRN  acetaminophen (TYLENOL) tablet 650 mg, 650 mg, Oral, Q4H PRN  doxazosin (CARDURA) tablet 4 mg, 4 mg, Oral, Daily  NIFEdipine (PROCARDIA XL) extended release tablet 60 mg, 60 mg, Oral, BID  sodium chloride (Inhalant) 3 % nebulizer solution 15 mL, 15 mL, Nebulization, Q4H WA  lisinopril (PRINIVIL;ZESTRIL) tablet 40 mg, 40 mg, Oral, Daily  glucose (GLUTOSE) 40 % oral gel 15 g, 15 g, Oral, PRN  dextrose 50 % IV solution, 12.5 g, IntraVENous, PRN  glucagon (rDNA) injection 1 mg, 1 mg, IntraMUSCular, PRN  dextrose 5 % solution, 100 mL/hr, IntraVENous, PRN  iopamidol (ISOVUE-370) 76 % injection 75 mL, 75 mL, IntraVENous, ONCE PRN  budesonide-formoterol (SYMBICORT) 160-4.5 MCG/ACT inhaler 2 puff, 2 puff, Inhalation, BID  montelukast (SINGULAIR) tablet 10 mg, 10 mg, Oral, Nightly  sodium chloride flush 0.9 % injection 5-40 mL, 5-40 mL, IntraVENous, 2 times per day  sodium chloride flush 0.9 % injection 5-40 mL, 5-40 mL, IntraVENous, PRN  0.9 % sodium chloride infusion, 25 mL, IntraVENous, PRN  ondansetron (ZOFRAN-ODT) disintegrating tablet 4 mg, 4 mg, Oral, Q8H PRN **OR** ondansetron (ZOFRAN) injection 4 mg, 4 mg, IntraVENous, Q6H PRN  polyethylene glycol (GLYCOLAX) packet 17 g, 17 g, Oral, Daily PRN  [DISCONTINUED] acetaminophen (TYLENOL) tablet 650 mg, 650 mg, Oral, Q6H PRN **OR** acetaminophen (TYLENOL) suppository 650 mg, 650 mg, Rectal, Q6H PRN  nicotine (NICODERM CQ) 21 MG/24HR 1 patch, 1 patch, TransDERmal, Daily  ipratropium-albuterol (DUONEB) nebulizer solution 1 ampule, 1 ampule, Inhalation, Q4H WA  Physical    VITALS:  BP (!) 143/79   Pulse 87   Temp 97.7 °F (36.5 °C) (Oral)   Resp 19   Ht 6' (1.829 m)   Wt 245 lb 6 oz (111.3 kg)   SpO2 92%   BMI 33.28 kg/m²   TEMPERATURE:  Current - Temp: 97.7 °F (36.5 °C);  Max - Temp  Av.8 °F (36.6 °C)  Min: 97.4 °F (36.3 °C)  Max: 98.3 °F (36.8 °C)  PULSE OXIMETRY RANGE: SpO2  Av.1 %  Min: 86 %  Max: 94 %  24HR INTAKE/OUTPUT:      Intake/Output Summary (Last 24 hours) at 2021 0644  Last data filed at 2021 4433  Gross per 24 hour   Intake 2040 ml   Output 1500 ml   Net 540 ml       CONSTITUTIONAL:  awake, alert, cooperative, no apparent distress, HEENT oral pharynx , no scleral icterus  HEMATOLOGIC/LYMPHATICS:  no cervical lymphadenopathy, no supraclavicular lymphadenopathy,LUNGS:  No increased work of breathing, good air exchange, clear to auscultation bilaterally, no crackles or wheezing  CARDIOVASCULAR:  , regular rate and rhythm, normal S1 and S2, no S3 or S4, and no murmur noted  ABDOMEN:  No scars, normal bowel sounds, soft, non-distended, non-tender, no masses palpated, no hepatosplenomegally  MUSCULOSKELETAL:  There is no redness, warmth, or swelling of the joints. EXTREMETIES:s+ edema   NEUROLOGIC:  Awake, alert, oriented to name, place and time. =SKIN:  no bruising or bleeding      Data      Recent Labs     09/10/21  0510   WBC 8.5   HGB 13.3*   HCT 39.0*   *   MCV 99.3        Recent Labs     09/10/21  0510 09/11/21  0449 09/11/21  1805    138 138   K 3.1* 3.9 3.1*   CL 94* 94* 95*   CO2 36* 38* 37*   PHOS  --  3.0 2.3*   BUN 28* 26* 30*   CREATININE 0.7* 0.7* 0.9     Recent Labs     09/11/21 0449   AST 53*   *   BILIDIR 0.3   BILITOT 0.9   ALKPHOS 213*       Magnesium:    Lab Results   Component Value Date    MG 1.90 09/11/2021    MG 2.00 09/11/2021    MG 1.80 09/10/2021       Imaging ECHO Complete 2D W Doppler W Color    Result Date: 9/3/2021  Transthoracic Echocardiography Report (TTE)  Demographics   Patient Name      Nicole Roa   Date of Study     09/03/2021          Gender              Male   Patient Number    [id number]          Date of Birth       1970   Visit Number      [id number]           Age                 46 year(s)   Accession Number  [id number]          Room Number         0510   Corporate ID      [id number]             Sonographer         Alina Villa, RVT,                                                            [facility]   Ordering          Beena Grumbling,             [address]          Leigh Dunbar     Physician           Nallely Epps, Oc Causey MD  Procedure Type of Study   TTE procedure:ECHOCARDIOGRAM COMPLETE 2D W DOPPLER W COLOR.   Procedure Date Date: 09/03/2021 Start: 02:20 PM Study Location: [facility] Technical Quality: Limited visualization due to poor acoustical window. Additional Indications:Lower extremity swelling, evaluate the right heart and LV; HTN, HLD, COPD. Patient Status: Routine Height: 72 inches Weight: 247.01 pounds BSA: 2.33 m2 BMI: 33.5 kg/m2 Rhythm: Within normal limits HR: 70 bpm BP: 183/87 mmHg  Conclusions   Summary  Normal left ventricle size, wall thickness, and systolic function with an  estimated ejection fraction of 60-65%. No regional wall motion abnormalities  are seen. Normal diastolic function. Normal right ventricular size and function. No significant valve abnormalities noted. Signature   ------------------------------------------------------------------  Electronically signed by Juan Pacheco MD  (Interpreting physician) on 09/03/2021 at 03:46 PM  ------------------------------------------------------------------   Findings   Left Ventricle  Normal left ventricle size, wall thickness, and systolic function with an  estimated ejection fraction of 60-65%. No regional wall motion abnormalities  are seen. Normal diastolic function. Mitral Valve  Mitral valve leaflets appear mildly thickened. No evidence of mitral regurgitation or stenosis. Left Atrium  The left atrium is normal in size. Aortic Valve  The aortic valve leaflets are not well visualized. No evidence of aortic valve regurgitation or stenosis. Aorta  The aortic root is normal in size. Right Ventricle  Normal right ventricular size and function. TAPSE 2.8 cm   Tricuspid Valve  The tricuspid valve was not well visualized. No evidence of tricuspid stenosis. Right Atrium  The right atrial size is normal.   Pulmonic Valve  The pulmonic valve is not well visualized. No evidence of pulmonic valve regurgitation. No evidence of pulmonic valve stenosis. Pericardial Effusion  No pericardial effusion noted. Pleural Effusion  No pleural effusion. Miscellaneous  IVC not well visualized. M-Mode/2D Measurements (cm)   LV Diastolic Dimension: 1.84 cm LV Systolic Dimension: 9.56 cm  LV Septum Diastolic: 0.9 cm  LV PW Diastolic: 5.80 cm        AO Root Dimension: 3.4 cm                                   LA Area: 23.6 cm2  LVOT: 2.1 cm                    LA volume/Index: 70.5 ml /30 ml/m2  Doppler Measurements   AV Peak Velocity: 184 cm/s     MV Peak E-Wave: 106 cm/s  AV Peak Gradient: 13.54 mmHg   MV Peak A-Wave: 87.5 cm/s  AV Mean Gradient: 6 mmHg       MV E/A Ratio: 1.21  LVOT Peak Velocity: 160 cm/s   MV Mean Gradient: 2 mmHg  AV Area (Continuity):3.58 cm2  MV Max P mmHg                                 MV Vmax:114 cm/s                                 MV VTI:32.9 cm/s   E' Septal Velocity: 10.9 cm/s  MV Area (continuity): 3.61 cm2  E' Lateral Velocity: 15.5 cm/s MV Deceleration Time: 259 msec  PV Peak Velocity: 131 cm/s  PV Peak Gradient: 6.86 mmHg   Aortic Valve   Peak Velocity: 184 cm/s     Mean Velocity: 114 cm/s  Peak Gradient: 13.54 mmHg   Mean Gradient: 6 mmHg  Area (continuity): 3.58 cm2  AV VTI: 33.2 cm  Aorta   Aortic Root: 3.4 cm  Ascending Aorta: 3.3 cm  LVOT Diameter: 2.1 cm      XR CHEST (2 VW)    Result Date: 2021  EXAMINATION: TWO XRAY VIEWS OF THE CHEST 2021 8:48 am COMPARISON: None. HISTORY: ORDERING SYSTEM PROVIDED HISTORY: WEEMS (dyspnea on exertion) TECHNOLOGIST PROVIDED HISTORY: Reason for Exam: WEEMS, asthma Acuity: Unknown Type of Exam: Initial FINDINGS: Clear lungs. No pleural effusion or pneumothorax. Cardiomediastinal silhouette is unremarkable. Degenerative changes of the visualized osseous structures. Clear lungs.      CT GUIDED NEEDLE PLACEMENT    Result Date: 2021  PROCEDURE: CT GUIDED CORE BIOPSY OF LEFT HEPATIC LOBE MASS MODERATE CONSCIOUS SEDATION 2021 HISTORY: ORDERING SYSTEM PROVIDED HISTORY: lesion TECHNOLOGIST PROVIDED HISTORY: Reason for exam:->lesion Reason for Exam: lesion Acuity: Acute Type of Exam: Initial; ORDERING SYSTEM PROVIDED HISTORY: bx TECHNOLOGIST PROVIDED HISTORY: Reason for exam:->bx Reason for Exam: lesion Acuity: Acute Type of Exam: Initial PHYSICIANS: Amilcar Simpsontal SEDATION: 0.5 mgversed and 25 mcg fentanyl were titrated intravenously for moderate sedation monitored under my direction. Total intraservice time of sedation was 15 minutes. The patient's vital signs were monitored throughout the procedure and recorded in the patient's medical record by the nurse. TECHNIQUE: Informed consent was obtained after a detailed discussion about the procedure including the risk, benefits, and alternatives. Universal protocol was followed. Axial images were obtained through the liver and a suitable skin site was prepped and draped in sterile fashion. Local anesthesia was achieved with lidocaine. Under intermittent CT guidance, a 17 gauge coaxial needle was inserted into the a left hepatic lobe mass. Through this, 1 18 gauge core biopsy specimens were obtained using coaxial technique and submitted to pathology. Gel-Foam slurry was administered as the needle was removed. A sterile bandage was then placed. The patient tolerated the procedure well. Estimated blood loss: Less than 5 cc Dose modulation, iterative reconstruction, and/or weight based adjustment of the mA/kV was utilized to reduce the radiation dose to as low as reasonably achievable. DLP: 8200.29 mGy-cm     Successful CT guided core biopsy of a left hepatic lobe mass. CT ABDOMEN PELVIS W IV CONTRAST Additional Contrast? Oral    Result Date: 9/5/2021  EXAMINATION: CT OF THE ABDOMEN AND PELVIS WITH CONTRAST 9/3/2021 5:13 pm TECHNIQUE: CT of the abdomen and pelvis was performed with the administration of intravenous contrast. Multiplanar reformatted images are provided for review.  Dose modulation, iterative reconstruction, and/or weight based adjustment of the mA/kV was utilized to reduce the radiation dose to as low as reasonably achievable. COMPARISON: CT pulmonary angiogram performed September 2, 2021. HISTORY: ORDERING SYSTEM PROVIDED HISTORY: Metastatic disease, evaluate for primary. Bilateral lower ext edema, eval for IVC thrombosis/compression TECHNOLOGIST PROVIDED HISTORY: Reason for exam:->Metastatic disease, evaluate for primary. Bilateral lower ext edema, eval for IVC thrombosis/compression Additional Contrast?->Oral FINDINGS: Images are degraded by motion artifact. Lower Chest: Respiratory motion artifact. Visualized pulmonary nodules and enlarged mediastinal lymph node, unchanged from prior CT chest. Organs: There are multifocal hypoattenuating lesions throughout the liver. Lesion within the left hepatic lobe measures 6.1 x 5.7 cm (axial image 35). Lesion within the right hepatic lobe measures 3.6 x 2.6 cm (axial image 51). The gallbladder is normally distended with layering hyperintensity suggestive of sludge. No calcified stones. No gallbladder wall thickening or pericholecystic fluid. The spleen and pancreas are grossly unremarkable. Right adrenal gland nodule measures up to 1.6 cm. There is mild thickening of the left adrenal gland without definite nodularity. There is symmetric renal cortical enhancement. Simple appearing right renal cysts demonstrated. No hydronephrosis. GI/Bowel: There is a hypoattenuating nodule in the region of the 2/3 portion of the duodenum in the region of the ampulla which measures up to 1.3 cm (axial image 83). There is diffuse fold thickening involving the majority of the jejunum. No evidence of obstruction. Sigmoid diverticulosis without evidence of acute diverticulitis. The appendix is normal. Pelvis: Prostate and seminal vesicles are grossly unremarkable. The urinary bladder is within normal limits. Peritoneum/Retroperitoneum: No significant lymphadenopathy. The abdominal aorta is normal in course and caliber with scattered atherosclerotic disease.  No definite filling defects within the IVC, the iliac veins, or visualized femoral veins to suggest thrombus. Bones/Soft Tissues: There is asymmetric thickening of the inferior right rectus abdominus musculature with mild surrounding stranding. Visualization is partially obscured by streak artifact from dense oral contrast in the adjacent small bowel. Body wall edema demonstrated. No focal drainable collections. Multilevel moderate degenerative changes in the visualized spine. No destructive osseous lesions. 1. Multiple hypoattenuating liver lesions which are most suggestive of metastatic disease. 2. Right adrenal gland nodule measuring up to 1.6 cm, indeterminate. 3. Hypoattenuating nodule in the region of the 2/3 portion of the duodenum in the region of the ampulla measuring up to 1.3 cm. Findings could be artifactual or related to fold thickening; however, a small ampullary or possibly pancreatic lesion cannot be entirely excluded. 4. Diffuse fold thickening involving the majority of the jejunum, nonspecific but can be related to an infectious etiology or lymphatic obstruction. 5. No definite filling defects in the IVC, iliac veins, or visualized femoral veins to suggest thrombus. 6. Asymmetric thickening of the inferior right rectus abdominus musculature with surrounding stranding which is partially obscured by adjacent streak artifact from dense oral contrast in the small bowel. No definite focal lesion is demonstrated; however, an underlying lesion cannot be entirely excluded. Hematoma is also a consideration. CT CHEST PULMONARY EMBOLISM W CONTRAST    Result Date: 9/5/2021  EXAMINATION: CTA OF THE CHEST 9/2/2021 12:47 pm TECHNIQUE: CTA of the chest was performed after the administration of intravenous contrast.  Multiplanar reformatted images are provided for review. MIP images are provided for review.  Dose modulation, iterative reconstruction, and/or weight based adjustment of the mA/kV was utilized to reduce the radiation dose to as low as reasonably achievable. COMPARISON: None. HISTORY: ORDERING SYSTEM PROVIDED HISTORY: leg swelling sob new o2 requirement TECHNOLOGIST PROVIDED HISTORY: Reason for exam:->leg swelling sob new o2 requirement Decision Support Exception - unselect if not a suspected or confirmed emergency medical condition->Emergency Medical Condition (MA) Reason for Exam: leg swelling sob new o2 requirement Acuity: Acute Type of Exam: Initial FINDINGS: Pulmonary Arteries:  No central, lobar, or segmental pulmonary embolus. Narrowing of the right lower lobe pulmonary artery by the adenopathy. Mediastinum: Heart size is normal.  No pericardial effusion. Thoracic aorta is normal caliber. Extensive mediastinal and hilar lymphadenopathy is present. Subcarinal node measures 32 x 37 mm. Right hilar node measures 30 x 29 mm. Lungs/pleura: Moderate emphysema. Multiple 3 mm nodules are present in the right middle lobe and left lower lobe. Superimposed in the posterior right lower lobe, there is a dominant nodule measuring 17 x 17 mm. An additional right lower lobe nodule measures 11 mm. No pleural effusion or pneumothorax. Narrowing of bronchus intermedius due to the right hilar adenopathy. Upper Abdomen: Multiple low-attenuation liver lesions are present measuring up to 48 x 40 mm and 26 x 28 mm. At least 30 lesions are noted in the visualized portions of the liver. Soft Tissues/Bones: No lytic or blastic osseous lesion. 1. No acute pulmonary embolus. 2. Extensive mediastinal and hilar lymphadenopathy compatible with metastatic neoplasm. 3. Multiple new indeterminate 3 mm nodules in the right middle lobe and left lower lobe. These could be infectious or neoplastic. 4. Right lower lobe 17 mm and 11 mm nodules are nonspecific, but metastatic disease not excluded. 5. Multiple liver metastases measuring up to 48 x 40 mm.  RECOMMENDATIONS:     CT NEEDLE BIOPSY LIVER PERCUTANEOUS    Result Date: 9/8/2021  PROCEDURE: CT GUIDED CORE BIOPSY OF LEFT HEPATIC LOBE MASS MODERATE CONSCIOUS SEDATION 9/7/2021 HISTORY: ORDERING SYSTEM PROVIDED HISTORY: lesion TECHNOLOGIST PROVIDED HISTORY: Reason for exam:->lesion Reason for Exam: lesion Acuity: Acute Type of Exam: Initial; ORDERING SYSTEM PROVIDED HISTORY: bx TECHNOLOGIST PROVIDED HISTORY: Reason for exam:->bx Reason for Exam: lesion Acuity: Acute Type of Exam: Initial PHYSICIANS: Amilcar Simpsontal SEDATION: 0.5 mgversed and 25 mcg fentanyl were titrated intravenously for moderate sedation monitored under my direction. Total intraservice time of sedation was 15 minutes. The patient's vital signs were monitored throughout the procedure and recorded in the patient's medical record by the nurse. TECHNIQUE: Informed consent was obtained after a detailed discussion about the procedure including the risk, benefits, and alternatives. Universal protocol was followed. Axial images were obtained through the liver and a suitable skin site was prepped and draped in sterile fashion. Local anesthesia was achieved with lidocaine. Under intermittent CT guidance, a 17 gauge coaxial needle was inserted into the a left hepatic lobe mass. Through this, 1 18 gauge core biopsy specimens were obtained using coaxial technique and submitted to pathology. Gel-Foam slurry was administered as the needle was removed. A sterile bandage was then placed. The patient tolerated the procedure well. Estimated blood loss: Less than 5 cc Dose modulation, iterative reconstruction, and/or weight based adjustment of the mA/kV was utilized to reduce the radiation dose to as low as reasonably achievable. DLP: 6490.43 mGy-cm     Successful CT guided core biopsy of a left hepatic lobe mass.      VL Extremity Venous Bilateral    Result Date: 9/8/2021  Lower Extremities DVT Study  Demographics   Patient Name       Ekaterina Averysom   Date of Study      09/07/2021         Gender              Male   Patient Number     [id number]         Date of Birth       1970   Visit Number       [id number]          Age                 46 year(s)   Accession Number   [id number]         Room Number         2987   Corporate ID       [id number]            Roberto Jasmine RVT   Ordering Physician Roger Wagoner MD         Physician           MD  Procedure Type of Study:   Veins:Lower Extremities DVT Study, VASC EXTREMITY VENOUS DUPLEX BILATERAL. Vascular Sonographer Report  Indications for Study:Edema. Impressions Right Impression No evidence of deep vein or superficial vein thrombosis involving the right lower extremity in vessels clearly visualized. Proximal and Mid Posterior Tibial veins and Peroneal veins were not visualized due to swelling. Calf and ankle edema noted. Left Impression No evidence of deep vein or superficial vein thrombosis involving the left lower extremity in vessels clearly visualized. Proximal Posterior Tibial veins and Soleal vein were not visualized due to swelling. Calf and ankle edema noted. Conclusions   Summary   No evidence of deep vein or superficial vein thrombosis involving the  bilateral lower extremities in vessels clearly visualized as detailed above. Signature   ------------------------------------------------------------------  Electronically signed by Christian Rosado MD (Interpreting  physician) on 09/08/2021 at 08:09 AM  ------------------------------------------------------------------  Patient Status:Routine. Study [address] [facility]. Technical Quality:Limited visualization due to swelling. Risk Factors   - The patient's risk factor(s) include: dyslipidemia and arterial     hypertension.   - The patient has a current/recent (within 1 year) tobacco history.  Velocities are measured in cm/s ; Diameters are measured in mm Right Lower Extremities DVT Study Measurements Right 2D Measurements +------------------------+----------+---------------+----------+ ! Location                ! Visualized! Compressibility! Thrombosis! +------------------------+----------+---------------+----------+ ! Sapheno Femoral Junction! Yes       ! Yes            ! None      ! +------------------------+----------+---------------+----------+ ! GSV Thigh               ! Yes       ! Yes            ! None      ! +------------------------+----------+---------------+----------+ ! Common Femoral          !Yes       ! Yes            ! None      ! +------------------------+----------+---------------+----------+ ! Prox Femoral            !Yes       ! Yes            ! None      ! +------------------------+----------+---------------+----------+ ! Mid Femoral             !Yes       ! Yes            ! None      ! +------------------------+----------+---------------+----------+ ! Dist Femoral            !Yes       ! Yes            ! None      ! +------------------------+----------+---------------+----------+ ! Deep Femoral            !Yes       ! Yes            ! None      ! +------------------------+----------+---------------+----------+ ! Popliteal               !Yes       ! Yes            ! None      ! +------------------------+----------+---------------+----------+ ! Gastroc                 ! Yes       ! Yes            ! None      ! +------------------------+----------+---------------+----------+ ! Soleal                  !Yes       ! Yes            ! None      ! +------------------------+----------+---------------+----------+ ! PTV                     ! Partial   !Yes            ! None      ! +------------------------+----------+---------------+----------+ ! ATV                     ! Yes       ! Yes            ! None      ! +------------------------+----------+---------------+----------+ ! Peroneal                !Partial   !Yes            ! None      ! +------------------------+----------+---------------+----------+ ! GSV Calf                ! Yes       ! Yes !None      ! +------------------------+----------+---------------+----------+ ! SSV                     ! Yes       ! Yes            ! None      ! +------------------------+----------+---------------+----------+ Right Doppler Measurements +--------------+------+------+------------+ ! Location      ! Signal!Reflux! Reflux (sec)! +--------------+------+------+------------+ ! Common Femoral!Phasic! No    !            ! +--------------+------+------+------------+ ! Popliteal     !Phasic! No    !            ! +--------------+------+------+------------+ Left Lower Extremities DVT Study Measurements Left 2D Measurements +------------------------+----------+---------------+----------+ ! Location                ! Visualized! Compressibility! Thrombosis! +------------------------+----------+---------------+----------+ ! Sapheno Femoral Junction! Yes       ! Yes            ! None      ! +------------------------+----------+---------------+----------+ ! GSV Thigh               ! Yes       ! Yes            ! None      ! +------------------------+----------+---------------+----------+ ! Common Femoral          !Yes       ! Yes            ! None      ! +------------------------+----------+---------------+----------+ ! Prox Femoral            !Yes       ! Yes            ! None      ! +------------------------+----------+---------------+----------+ ! Mid Femoral             !Yes       ! Yes            ! None      ! +------------------------+----------+---------------+----------+ ! Dist Femoral            !Yes       ! Yes            ! None      ! +------------------------+----------+---------------+----------+ ! Deep Femoral            !Yes       ! Yes            ! None      ! +------------------------+----------+---------------+----------+ ! Popliteal               !Yes       ! Yes            ! None      ! +------------------------+----------+---------------+----------+ ! Gastroc                 ! Yes       ! Yes            ! None      ! +------------------------+----------+---------------+----------+ ! Soleal                  !No        !               !          ! +------------------------+----------+---------------+----------+ ! PTV                     ! Partial   !Yes            ! None      ! +------------------------+----------+---------------+----------+ ! ATV                     ! Yes       ! Yes            ! None      ! +------------------------+----------+---------------+----------+ ! Peroneal                !Yes       ! Yes            ! None      ! +------------------------+----------+---------------+----------+ ! GSV Calf                ! Yes       ! Yes            ! None      ! +------------------------+----------+---------------+----------+ ! SSV                     ! Yes       ! Yes            ! None      ! +------------------------+----------+---------------+----------+ Left Doppler Measurements +--------------+------+------+------------+ ! Location      ! Signal!Reflux! Reflux (sec)! +--------------+------+------+------------+ ! Common Femoral!Phasic! No    !            ! +--------------+------+------+------------+ ! Popliteal     !Phasic! No    !            ! +--------------+------+------+------------+      Problem List  Patient Active Problem List   Diagnosis    Pure hypercholesterolemia    Anxiety    Essential hypertension    Moderate persistent asthma with acute exacerbation    [facility]ast[facility] ([facility] [address]    Hypokalemia    Acute respiratory failure with hypoxia (HCC)    Abnormal CT of the chest    COPD exacerbation (HCC)    Lung nodules    Tobacco abuse       ASSESSMENT AND PLAN    Extensive stage small cell  Ectopic acth -agree related to the disease   Rare paraneoplastic syndrome   He has been on lasix and has needed aggressive bp mgmt  I spoke with nephrology yesterday who agreed to hold lasix while getting chemo -but we are only gently hydrating trying to balance edema/htn with tumor lysis potential   He is on allopurinol-uric acid pending but was in good shape at beginning     Today is day 2 carbo etoposide atezolizumab  Picc line in place    MRI neg for mets as is bone scan     Moniter for tumor lysis -I have ordered labs q 12   HE is on tele just incase his electrolytes lead to an arrhythmia     Labs pending this am     Frank Orozco MD, MD

## 2021-09-12 NOTE — PROGRESS NOTES
[facility] Internal Medicine Note      Chief Complaint: doing fine    Subjective/Interval History:      He states his breathing is okay. He remains on 4 L of oxygen. Using incentive spirometer. He denies pain. Minimal sputum, if any. Continues to diurese. Legs have been very swollen  Thinks its connected to the rest of his problems  wght up 6lb from yesterday? No chest pain or shortness breath. No cough or sputum. No nausea, vomiting, diarrhea. No abdominal pain. No dysuria. The remainder of the review of systems is negative. Chest x-ray from yesterday reviewed and showed increased basilar atelectasis versus infiltrate. Incentive spirometer instituted. PMH, PSH, FH/SH reviewed and unchanged as documented in the H&P personally documented at admission 21    Medication list reviewed    Objective:    BP (!) 147/89   Pulse 99   Temp 97.3 °F (36.3 °C) (Oral)   Resp 16   Ht 6' (1.829 m)   Wt 245 lb 6 oz (111.3 kg)   SpO2 90%   BMI 33.28 kg/m²   Temp  Av.8 °F (36.6 °C)  Min: 97.3 °F (36.3 °C)  Max: 98.1 °F (36.7 °C)    RRR  Chest-respirations easy. The chest remains clear today. Abd- BS+, soft, lower abdominal fullness   Ext- edema up through thighs 2-3+ wrapped below knees.     Rubio complexion face  Calm, sitting up on side of bed    The Following Labs Were Reviewed Today:    Recent Results (from the past 24 hour(s))   POCT Glucose    Collection Time: 21 12:27 PM   Result Value Ref Range    POC Glucose 213 (H) 70 - 99 mg/dl    Performed on ACCU-CHEK    POCT Glucose    Collection Time: 21  4:21 PM   Result Value Ref Range    POC Glucose 176 (H) 70 - 99 mg/dl    Performed on ACCU-CHEK    Basic Metabolic Panel    Collection Time: 21  6:05 PM   Result Value Ref Range    Sodium 138 136 - 145 mmol/L    Potassium 3.1 (L) 3.5 - 5.1 mmol/L    Chloride 95 (L) 99 - 110 mmol/L    CO2 37 (H) 21 - 32 mmol/L    Anion Gap 6 3 - 16    Glucose 277 (H) 70 - 99 mg/dL    BUN 30 (H) 7 - 20 mg/dL    CREATININE 0.9 0.9 - 1.3 mg/dL    GFR Non-African American >60 >60    GFR African American >60 >60    Calcium 8.6 8.3 - 10.6 mg/dL   Lactate Dehydrogenase    Collection Time: 09/11/21  6:05 PM   Result Value Ref Range     (H) 100 - 190 U/L   Magnesium    Collection Time: 09/11/21  6:05 PM   Result Value Ref Range    Magnesium 1.90 1.80 - 2.40 mg/dL   Phosphorus    Collection Time: 09/11/21  6:05 PM   Result Value Ref Range    Phosphorus 2.3 (L) 2.5 - 4.9 mg/dL   POCT Glucose    Collection Time: 09/11/21  7:28 PM   Result Value Ref Range    POC Glucose 254 (H) 70 - 99 mg/dl    Performed on ACCU-CHEK    POCT Glucose    Collection Time: 09/12/21  6:05 AM   Result Value Ref Range    POC Glucose 174 (H) 70 - 99 mg/dl    Performed on ACCU-CHEK    Basic Metabolic Panel    Collection Time: 09/12/21  6:11 AM   Result Value Ref Range    Sodium 146 (H) 136 - 145 mmol/L    Potassium 3.7 3.5 - 5.1 mmol/L    Chloride 103 99 - 110 mmol/L    CO2 35 (H) 21 - 32 mmol/L    Anion Gap 8 3 - 16    Glucose 190 (H) 70 - 99 mg/dL    BUN 24 (H) 7 - 20 mg/dL    CREATININE 0.7 (L) 0.9 - 1.3 mg/dL    GFR Non-African American >60 >60    GFR African American >60 >60    Calcium 8.5 8.3 - 10.6 mg/dL   Lactate Dehydrogenase    Collection Time: 09/12/21  6:11 AM   Result Value Ref Range     (H) 100 - 190 U/L   Magnesium    Collection Time: 09/12/21  6:11 AM   Result Value Ref Range    Magnesium 1.90 1.80 - 2.40 mg/dL   Phosphorus    Collection Time: 09/12/21  6:11 AM   Result Value Ref Range    Phosphorus 3.0 2.5 - 4.9 mg/dL   Uric Acid    Collection Time: 09/12/21  6:11 AM   Result Value Ref Range    Uric Acid, Serum 2.4 (L) 3.5 - 7.2 mg/dL       ASSESSMENT/PLAN:      Principal Problem:    Hypokalemia-potassium better today. Had intended to increase potassium to 3 times daily yesterday, but inadvertently did not. This order has been placed today. ACTH markedly elevated. Urine and salivary cortisol pending.   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 Did not review with patient as covering physician    Moderate persistent asthma with acute exacerbation-lungs remain clear today. Discontinue prednisone and stop doxycycline and Zosyn, has completed 7 days of antibiotics. Pure hypercholesterolemia-continue to hold Lipitor given abnormal LFTs. Essential hypertension-blood pressure improving. ?  Increase Cardura. Bilateral leg edema-Continue lasix 40 mg twice daily. Weight down today. Continue to monitor. Monitor I/os. Steroid-induced hyperglycemia-increase lantus to 30 units and continue high sliding scale. Hypomagnesemia-low normal today. Supplement with 1 g IV. Time > 35 minutes reviewing chart and patient data, examining and interviewing patient, and discussing with nursing staff, family, etc.     .   Myles Sy MD, [address]  9:52 AM  9/12/2021

## 2021-09-12 NOTE — PLAN OF CARE
Problem: Infection:  Goal: Will remain free from infection  Description: Will remain free from infection  Outcome: Ongoing  Note: Patient shows no signs or symptoms of infection related to central venous catheter. Dressing clean dry and intact. Flushes well. No drainage or discharge noted from site. Patient not experiencing discomfort. Will continue to monitor. Problem: Safety:  Goal: Free from accidental physical injury  Description: Free from accidental physical injury  Outcome: Ongoing  Note: Pt assessed for fall risk and fall precautions put into place. Bed in lowest position and wheels locked, call light within reach. Nonskid footwear in place. Patient educated on appropriate method of transfer and to call for assistance. Problem: Safety:  Goal: Free from intentional harm  Description: Free from intentional harm  Outcome: Ongoing  Note: No intentional harm has come to pt up to this point in care. Problem: Daily Care:  Goal: Daily care needs are met  Description: Daily care needs are met  Outcome: Ongoing  Note: Daily care needs are met. Pt able to verbalize needs and complaints. Problem: Pain:  Goal: Patient's pain/discomfort is manageable  Description: Patient's pain/discomfort is manageable  Outcome: Ongoing  Note: Pain/discomfort being managed with PRN analgesics per MD orders. Pt able to express presence and absence of pain and rate pain appropriately using numerical scale. Problem: Skin Integrity:  Goal: Skin integrity will stabilize  Description: Skin integrity will stabilize  Outcome: Ongoing  Note: Skin assessment complete. No new signs of skin breakdown noted. Assistance provided with repositioning while in bed if needed. Problem: Discharge Planning:  Goal: Patients continuum of care needs are met  Description: Patients continuum of care needs are met  Outcome: Ongoing  Note: Continuum of care needs are met pt able to verbalize needs and complaints. Problem:  Activity Intolerance:  Goal: Ability to tolerate increased activity will improve  Description: Ability to tolerate increased activity will improve  Outcome: Ongoing  Note: Patient ambulating in room UAL gait steady. Problem: Airway Clearance - Ineffective:  Goal: Ability to maintain a clear airway will improve  Description: Ability to maintain a clear airway will improve  Outcome: Ongoing  Note: Pt able to maintain a clear airway. Up to this point in care     Problem: Breathing Pattern - Ineffective:  Goal: Ability to achieve and maintain a regular respiratory rate will improve  Description: Ability to achieve and maintain a regular respiratory rate will improve  Outcome: Ongoing  Note: Respirations easy and even. Rate wnl. Problem: Gas Exchange - Impaired:  Goal: Levels of oxygenation will improve  Description: Levels of oxygenation will improve  Outcome: Ongoing  Note: Patient wearing o2 at 8l high flow nc. O2 sats 86-89% will continue to monitor. Problem: Health Behavior:  Goal: Identification of resources available to assist in meeting health care needs will improve  Description: Identification of resources available to assist in meeting health care needs will improve  Outcome: Ongoing     Problem: Physical Regulation:  Goal: Complications related to the disease process, condition or treatment will be avoided or minimized  Description: Complications related to the disease process, condition or treatment will be avoided or minimized  Outcome: Ongoing  Note: Teaching done regarding disease process and it treatment.

## 2021-09-12 NOTE — PLAN OF CARE
far this shift. Will monitor and medicate as needed. 9/12/2021 0241 by Hiren Silva RN  Outcome: Ongoing  Note: Pain/discomfort being managed with PRN analgesics per MD orders. Pt able to express presence and absence of pain and rate pain appropriately using numerical scale. Problem: Skin Integrity:  Goal: Skin integrity will stabilize  Description: Skin integrity will stabilize  9/12/2021 0732 by Jodi Webb RN  Outcome: Ongoing  Note: No areas of skin breakdown noted. Will monitor for changes. 9/12/2021 0241 by Hiren Silva RN  Outcome: Ongoing  Note: Skin assessment complete. No new signs of skin breakdown noted. Assistance provided with repositioning while in bed if needed. Problem: Discharge Planning:  Goal: Patients continuum of care needs are met  Description: Patients continuum of care needs are met  9/12/2021 0732 by Jodi Webb RN  Outcome: Ongoing  Note: No home healthcare needs identified at this time. 9/12/2021 0241 by Hiren Silva RN  Outcome: Ongoing  Note: Continuum of care needs are met pt able to verbalize needs and complaints. Problem: Activity Intolerance:  Goal: Ability to tolerate increased activity will improve  Description: Ability to tolerate increased activity will improve  9/12/2021 0732 by Jodi Webb RN  Outcome: Ongoing  Note: Pt ambulating independently without difficulty; however, he does become dyspneic with exertion. 9/12/2021 0241 by Hiren Silva RN  Outcome: Ongoing  Note: Patient ambulating in room UAL gait steady. Problem: Airway Clearance - Ineffective:  Goal: Ability to maintain a clear airway will improve  Description: Ability to maintain a clear airway will improve  9/12/2021 0732 by Jodi Webb RN  Outcome: Ongoing  Note: Pt able to maintain a clear airway. Will monitor for changes. 9/12/2021 0241 by Hiren Silva RN  Outcome: Ongoing  Note: Pt able to maintain a clear airway.  Up to this point in care Problem: Breathing Pattern - Ineffective:  Goal: Ability to achieve and maintain a regular respiratory rate will improve  Description: Ability to achieve and maintain a regular respiratory rate will improve  9/12/2021 0732 by Gustavo Alba RN  Outcome: Ongoing  Note: Pt's respirations are WDL at rest; however, he does become dyspneic with any exertion. 9/12/2021 0241 by Saroj De Guzman RN  Outcome: Ongoing  Note: Respirations easy and even. Rate wnl. Problem: Gas Exchange - Impaired:  Goal: Levels of oxygenation will improve  Description: Levels of oxygenation will improve  9/12/2021 0732 by Gustavo Alba RN  Outcome: Ongoing  Note: Pt currently on 8L O2 hi-flow NC with O2 sats in high 80's. He is receiving breathing tx, as ordered. Will monitor. 9/12/2021 0241 by Saroj De Guzman RN  Outcome: Ongoing  Note: Patient wearing o2 at 8l high flow nc. O2 sats 86-89% will continue to monitor. Problem: Health Behavior:  Goal: Identification of resources available to assist in meeting health care needs will improve  Description: Identification of resources available to assist in meeting health care needs will improve  9/12/2021 0732 by Gustavo Alba RN  Outcome: Ongoing  Note: Pt able to identify available resources r/t his healthcare needs. 9/12/2021 0241 by Saroj De Guzman RN  Outcome: Ongoing     Problem: Physical Regulation:  Goal: Complications related to the disease process, condition or treatment will be avoided or minimized  Description: Complications related to the disease process, condition or treatment will be avoided or minimized  9/12/2021 0732 by Gustavo Alba RN  Outcome: Ongoing  Note: Pt tolerating treatment well at this time with no s/s of complications. Will monitor for changes. 9/12/2021 0241 by Saroj De Guzman RN  Outcome: Ongoing  Note: Teaching done regarding disease process and it treatment.

## 2021-09-12 NOTE — PROGRESS NOTES
Original chemotherapy orders reviewed and acknowledged. 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 Patient educated on chemotherapy regimen. Acknowledgement of informed consent for chemotherapy verified. Pt height, weight, and BSA verified. Appropriate dosing calculations of chemotherapy based on height, weight, and BSA verified. Administration: Chemotherapy drug Etoposide independently verified with Regino Morris RN prior to administration. Original order, appropriateness of regimen, drug supplied, height, weight, BSA, dose calculations, expiration dates/times, drug appearance, and two patient identifiers were verified by both RNs. Drug checked for vesicant/irritant status and for risk of hypersensitivity. Most recent laboratory values and allergies, were reviewed. Appropriate IV access available: Positive, brisk blood return via CVC was confirmed prior to administration. Chest x-ray for correct line placement reviewed  Steve Cai RN and Regino Morris RN verified correct rate of chemotherapy and maintenance IV fluids. Patient was educated on chemotherapy regimen prior to administration including indication for treatment related to disease & side effects of chemotherapy drug. Patient verbalizes understanding of all instructions.

## 2021-09-12 NOTE — PROGRESS NOTES
[facility]  Nephrology   Progress Note        We are following the patient for uncontrolled HTN Severe Hypokalemia  47 y/o WM, gray and heavy smoker admitted with severe Hypokalemia  He has h/o HTN for ~ 20 years that has been well controlled on Amlodipine  About 3 weeks ago he started to have leg swelling, weight gain fatigue and SOB  Out patient labs showed severe Hypokalemia  On admission K+ noted to be 2.2 meq CO2 40   Noted to have uncontrolled HTN with /108 mm  BP has remained resistant despite addition of multiple meds    SUBJECTIVE:  -pt seen and examined  -PMSHx and meds reviewed  -No family at bedside      BP trending higher  Requiring more oxygen  UO is good, overall net -7.2L  Getting chemo infusion    ROS: neg chest pain. SOB    OBJECTIVE:     PHYSICAL:    TEMPERATURE:  Current - Temp: 97.8 °F (36.6 °C);  Max - Temp  Av.8 °F (36.6 °C)  Min: 97.3 °F (36.3 °C)  Max: 98.1 °F (36.7 °C)  RESPIRATIONS RANGE: Resp  Av  Min: 15  Max: 21  PULSE RANGE: Pulse  Av.9  Min: 85  Max: 104  BLOOD PRESSURE RANGE:  Systolic (76PUG), CGV:173 , Min:141 , MGZ:885   ; Diastolic (11JOB), TQJ:68, Min:74, Max:90    PULSE OXIMETRY RANGE: SpO2  Av %  Min: 86 %  Max: 94 %  24HR INTAKE/OUTPUT:      Intake/Output Summary (Last 24 hours) at 2021 1425  Last data filed at 2021 0948  Gross per 24 hour   Intake 1670 ml   Output 1850 ml   Net -180 ml       CONSTITUTIONAL:  awake, alert, cooperative, no apparent distress, and appears stated age  HEENT:  Lids and lashes normal, pupils equal, round and reactive to light  NECK:  Supple, symmetrical, trachea midline, no adenopathy  LUNGS:fairly clear today no wheezes noted  ABDOMEN:  No scars, normal bowel sounds, soft, non-distended  NEUROLOGIC:  alert, oriented, normal speech, no focal findings or movement disorder noted  SKIN: no bruising or bleeding  EXTREMITIES: ace wraps LE's + edema thighs    Medications     sodium chloride 50

## 2021-09-12 NOTE — PROGRESS NOTES
Pt resting in bed in afternoon. He has had a few family members in visiting this shift. He has had no c/o pain, nausea, numbness, or tingling. His O2 requirements have increased to 10L hi-flow O2 as his O2 sats on 8L were in mid 80's. On 10L, sats are now in low to mid 90's. He remains SR on telemetry. Will continue to monitor.

## 2021-09-13 NOTE — PROGRESS NOTES
[facility]  Nephrology   Progress Note        We are following the patient for uncontrolled HTN Severe Hypokalemia  45 y/o WM, gray and heavy smoker admitted with severe Hypokalemia  He has h/o HTN for ~ 20 years that has been well controlled on Amlodipine  About 3 weeks ago he started to have leg swelling, weight gain fatigue and SOB  Out patient labs showed severe Hypokalemia  On admission K+ noted to be 2.2 meq CO2 40   Noted to have uncontrolled HTN with /108 mm  BP has remained resistant despite addition of multiple meds    SUBJECTIVE:  -pt seen and examined  -PMSHx and meds reviewed  -No family at bedside      BP trending higher  Requiring more oxygen  Getting chemo infusion  Has had increased edema Wt up 10 lbs since last week     ROS: neg chest pain nausea tolerating chemo well so far    OBJECTIVE:     PHYSICAL:    TEMPERATURE:  Current - Temp: 97.8 °F (36.6 °C);  Max - Temp  Av.9 °F (36.6 °C)  Min: 97.4 °F (36.3 °C)  Max: 98.3 °F (36.8 °C)  RESPIRATIONS RANGE: Resp  Av.8  Min: 15  Max: 20  PULSE RANGE: Pulse  Av.8  Min: 86  Max: 101  BLOOD PRESSURE RANGE:  Systolic (11IMJ), UAA:903 , Min:148 , NCU:445   ; Diastolic (42CKU), IVM:76, Min:82, Max:96    PULSE OXIMETRY RANGE: SpO2  Av.1 %  Min: 85 %  Max: 97 %  24HR INTAKE/OUTPUT:      Intake/Output Summary (Last 24 hours) at 2021 1153  Last data filed at 2021 1127  Gross per 24 hour   Intake 2862.57 ml   Output 1150 ml   Net 1712.57 ml       CONSTITUTIONAL:  awake, alert, cooperative, no apparent distress, and appears stated age  HEENT:  Lids and lashes normal, pupils equal, round and reactive to light  NECK:  Supple, symmetrical, trachea midline, no adenopathy  LUNGS:diminished bilaterally  ABDOMEN:  No scars, normal bowel sounds, soft, non-distended  NEUROLOGIC:  alert, oriented, normal speech, no focal findings or movement disorder noted  SKIN: no bruising or bleeding  EXTREMITIES: ace wraps LE's ++edema thighs    Medications     sodium chloride 50 mL/hr at 09/13/21 1114    dextrose      sodium chloride Stopped (09/10/21 0349)        Data      CBC:   No results for input(s): WBC, RBC, HGB, HCT, PLT in the last 72 hours. BMP:    Recent Labs     09/12/21  0611 09/12/21  1811 09/13/21  0430   * 143 147*   K 3.7 3.5 3.8    101 108   CO2 35* 31 32   BUN 24* 30* 25*   CREATININE 0.7* 0.7* 0.6*   CALCIUM 8.5 8.3 8.2*   GLUCOSE 190* 295* 167*     Phosphorus:    Recent Labs     09/12/21  0611 09/12/21  1811 09/13/21  0430   PHOS 3.0 2.3* 2.7     Magnesium:    Recent Labs     09/12/21  0611 09/12/21  1811 09/13/21  0430   MG 1.90 1.70* 1.80         ASSESSMENT     Patient Active Problem List   Diagnosis    Pure hypercholesterolemia    Anxiety    Essential hypertension    Moderate persistent asthma with acute exacerbation    [facility] ([facility] [address]    Hypokalemia    Acute respiratory failure with hypoxia (HCC)    Abnormal CT of the chest    COPD exacerbation (HCC)    Lung nodules    Tobacco abuse       PLAN    Hypertension resistant to treatment with multiple meds Concern for secondary HTN including Hypercortisolism ( Cortisol level 90 with adrenal nodule  ?  Ectopic ACTH production and  Neuroendocrine tumor a possibility   24 hour urine for cortisol  [place] [address] !!  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  Biopsy shows poorly differentiated neuroendocrine carcinoma   BP fair Likely due to fluid overload Doxazosin dose increased Will give IV Lasix now Prn clonidine as ordered for SBP > 160    Ketoconazole can be used to interrupt Cortisol synthesis but elevated LFT's preclude it Rx of tumor will help with ACTH reduction   -started on carbo/etoposide/atezolizumab  -consider Octreotide (reduces ectopic ACTH)    Hypokalemia profound possibly related to Hypercortisolism and renal K+ wasting , started on Aldactone K+ 3.8 meq on supplement Aldactone dose  Increased  to 100 mg bid  Mg++ 1.8 mg   Metabolic alkalosis: due to above  Hypernatremia: due to above on 1/2 NS    Edema Doppler negative for DVT U/A with + protein serum Albumin 3.5 gm Suspect fluid retention due to hypercortisolism. 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

## 2021-09-13 NOTE — PLAN OF CARE
Problem: Infection:  Goal: Will remain free from infection  Description: Will remain free from infection  Outcome: Ongoing     Problem: Safety:  Goal: Free from accidental physical injury  Description: Free from accidental physical injury  Outcome: Ongoing  Note: Pt assessed for fall risk and fall precautions put into place. Bed in lowest position and wheels locked, call light within reach. Nonskid footwear in place. Patient educated on appropriate method of transfer and to call for assistance. Problem: Safety:  Goal: Free from intentional harm  Description: Free from intentional harm  Outcome: Ongoing  Note: No intentional harm to pt up to this point in care. Problem: Daily Care:  Goal: Daily care needs are met  Description: Daily care needs are met  Outcome: Ongoing  Note: Pt daily care needs are met. Pt able to verbalize needs and complaints. Problem: Pain:  Goal: Patient's pain/discomfort is manageable  Description: Patient's pain/discomfort is manageable  Outcome: Ongoing  Note: Pt verbalize he is without pain at this time. Will continue to monitor. Problem: Skin Integrity:  Goal: Skin integrity will stabilize  Description: Skin integrity will stabilize  Outcome: Ongoing  Note: Skin assessment complete. No new signs of skin breakdown noted. Repositioning patient with pillows at two hour intervals. Heels elevated off bed. Problem: Discharge Planning:  Goal: Patients continuum of care needs are met  Description: Patients continuum of care needs are met  Outcome: Ongoing  Note: Patient continuum of care needs are met. Pt able to verbalize needs and complaints. Problem: Activity Intolerance:  Goal: Ability to tolerate increased activity will improve  Description: Ability to tolerate increased activity will improve  Outcome: Ongoing  Note: Patient states afsaneh when ambulating. Pt continue to wear high flow oz. Will continue to monitor.      Problem: Airway Clearance - Ineffective:  Goal: Ability to maintain a clear airway will improve  Description: Ability to maintain a clear airway will improve  Outcome: Ongoing  Note: Pt airway remains clear. Will continue to monitor. Problem: Breathing Pattern - Ineffective:  Goal: Ability to achieve and maintain a regular respiratory rate will improve  Description: Ability to achieve and maintain a regular respiratory rate will improve  Outcome: Ongoing  Note: Resp rate wnl . Will continue to monitor. Problem: Gas Exchange - Impaired:  Goal: Levels of oxygenation will improve  Description: Levels of oxygenation will improve  Outcome: Ongoing  Note: O sats 91-91% on 10l randy flow o2. Problem: Health Behavior:  Goal: Identification of resources available to assist in meeting health care needs will improve  Description: Identification of resources available to assist in meeting health care needs will improve  Outcome: Ongoing  Note: Patient was given resources to assist in meeting health care need. Will continue to monitor. Problem: Physical Regulation:  Goal: Complications related to the disease process, condition or treatment will be avoided or minimized  Description: Complications related to the disease process, condition or treatment will be avoided or minimized  Outcome: Ongoing  Note: Monitoring for complications r/t disease process, condition and treatment is on going. Will continue to monitor.

## 2021-09-13 NOTE — RT PROTOCOL NOTE
RT Inhaler-Nebulizer Bronchodilator Protocol Note    There is a bronchodilator order in the chart from a provider indicating to follow the RT Bronchodilator Protocol and there is an Initiate RT Bronchodilator Protocol order as well (see protocol at bottom of note). The findings from the last RT Protocol Assessment were as follows:  Smoking: >15 Pack years  Surgical Status: No surgery  Xray: X-ray not available  Respiratory Pattern: RR 12-20  Mental Status: Alert and Oriented  Breath Sounds: Scattered wheeze  Cough: Strong, spontaneous, non-productive  Activity Level: Walking unassisted  Oxygen Requirement: 41% or 6LNC - 60%  Indication for Bronchodilator Therapy: Decreased or absent breath sounds, Wheezing associated with pulm disorder  Bronchodilator Assessment Score: 4    Aerosolized bronchodilator medication orders have been revised according to the RT Bronchodilator Protocol. RT Inhaler-Nebulizer Bronchodilator Protocol:    Respiratory Therapist to perform RT Therapy Protocol Assessment then follow the protocol. No Indications - adjust the frequency to every 6 hours PRN wheezing or bronchospasm, if no treatments needed after 48 hours then discontinue using Per Protocol order mode. If indication present, adjust the RT bronchodilator orders based on the Bronchodilator Assessment Score as follows:    0-6 - enter or revise RT bronchodilator order to Albuterol Inhaler order with frequency of every 2 hours PRN for wheezing or increased work of breathing using Per Protocol order mode. If Albuterol Inhaler not tolerated or not effective, then discontinue the Albuterol Inhaler order and enter Albuterol Nebulizer order with same frequency and PRN reasons. Repeat RT Therapy Protocol Assessment as needed.     7-10 - discontinue any other Inpatient aerosolized bronchodilator medication orders and enter or revise two Albuterol Inhaler orders, one with BID frequency and one with frequency of every 2 hours PRN wheezing or increased work of breathing using Per Protocol order mode. Repeat RT Therapy Protocol Assessment with second treatment then BID and as needed. If Albuterol Inhaler not tolerated or not effective, then discontinue the Albuterol Inhaler orders and enter two Albuterol Nebulizer orders with same frequencies and PRN reasons. 11-13 - discontinue any other Inpatient aerosolized bronchodilator medication orders and enter DuoNeb Nebulizer orders QID frequency and an Albuterol Nebulizer order every 2 hours PRN wheezing or increased work of breathing using Per Protocol order mode. Repeat RT Therapy Protocol Assessment with second treatment then QID and as needed. Greater than 13 - discontinue any other Inpatient bronchodilator aerosolized medication orders and enter DuoNeb Nebulizer order every 4 hours frequency and Albuterol Nebulizer every 2 hours PRN wheezing or increased work of breathing using Per Protocol order mode. Repeat RT Therapy Protocol Assessment with second treatment then every 4 hours and as needed. RT to enter RT Home Evaluation for COPD & MDI Assessment order using Per Protocol order mode.     Electronically signed by Joe Caba RCP on 9/12/2021 at 8:24 PM

## 2021-09-13 NOTE — PROGRESS NOTES
[facility]  Oncology Hematology Care  Progress Note      SUBJECTIVE:   Today is day 3   Etoposide and atezolizumab  He has no complaints  NO severe nausea no severe tumor lysis   BP remains elevated -thought to be related to htn   OBJECTIVE      Medications    Current Facility-Administered Medications: insulin glargine (LANTUS) injection vial 34 Units, 34 Units, SubCUTAneous, Daily  spironolactone (ALDACTONE) tablet 100 mg, 100 mg, Oral, BID  potassium chloride (KLOR-CON M) extended release tablet 40 mEq, 40 mEq, Oral, BID WC  insulin lispro (HUMALOG) injection vial 5 Units, 5 Units, SubCUTAneous, TID WC  ondansetron (ZOFRAN) 8 mg in dextrose 5 % 50 mL IVPB, 8 mg, IntraVENous, Q8H PRN  0.9 % sodium chloride infusion, , IntraVENous, Continuous  etoposide (VEPESID) 188 mg in sodium chloride 0.9 % 500 mL chemo IVPB, 80 mg/m2, IntraVENous, Q24H  allopurinol (ZYLOPRIM) tablet 300 mg, 300 mg, Oral, Daily  ondansetron (ZOFRAN) 16 mg, dexamethasone (DECADRON) 10 mg in sodium chloride 0.9 % 50 mL IVPB, , IntraVENous, Daily  atezolizumab (TECENTRIQ) 1,200 mg in sodium chloride 0.9 % 250 mL chemo IVPB, 1,200 mg, IntraVENous, Once  enoxaparin (LOVENOX) injection 40 mg, 40 mg, SubCUTAneous, Daily  insulin lispro (HUMALOG) injection vial 0-18 Units, 0-18 Units, SubCUTAneous, TID WC  insulin lispro (HUMALOG) injection vial 0-9 Units, 0-9 Units, SubCUTAneous, Nightly  cloNIDine (CATAPRES) tablet 0.2 mg, 0.2 mg, Oral, Q4H PRN  hydrALAZINE (APRESOLINE) injection 10 mg, 10 mg, IntraVENous, Q4H PRN  acetaminophen (TYLENOL) tablet 650 mg, 650 mg, Oral, Q4H PRN  doxazosin (CARDURA) tablet 4 mg, 4 mg, Oral, Daily  NIFEdipine (PROCARDIA XL) extended release tablet 60 mg, 60 mg, Oral, BID  sodium chloride (Inhalant) 3 % nebulizer solution 15 mL, 15 mL, Nebulization, Q4H WA  lisinopril (PRINIVIL;ZESTRIL) tablet 40 mg, 40 mg, Oral, Daily  glucose (GLUTOSE) 40 % oral gel 15 g, 15 g, Oral, PRN  dextrose 50 % IV solution, 12.5 g, IntraVENous, PRN  glucagon (rDNA) injection 1 mg, 1 mg, IntraMUSCular, PRN  dextrose 5 % solution, 100 mL/hr, IntraVENous, PRN  iopamidol (ISOVUE-370) 76 % injection 75 mL, 75 mL, IntraVENous, ONCE PRN  budesonide-formoterol (SYMBICORT) 160-4.5 MCG/ACT inhaler 2 puff, 2 puff, Inhalation, BID  montelukast (SINGULAIR) tablet 10 mg, 10 mg, Oral, Nightly  sodium chloride flush 0.9 % injection 5-40 mL, 5-40 mL, IntraVENous, 2 times per day  sodium chloride flush 0.9 % injection 5-40 mL, 5-40 mL, IntraVENous, PRN  0.9 % sodium chloride infusion, 25 mL, IntraVENous, PRN  ondansetron (ZOFRAN-ODT) disintegrating tablet 4 mg, 4 mg, Oral, Q8H PRN **OR** ondansetron (ZOFRAN) injection 4 mg, 4 mg, IntraVENous, Q6H PRN  polyethylene glycol (GLYCOLAX) packet 17 g, 17 g, Oral, Daily PRN  [DISCONTINUED] acetaminophen (TYLENOL) tablet 650 mg, 650 mg, Oral, Q6H PRN **OR** acetaminophen (TYLENOL) suppository 650 mg, 650 mg, Rectal, Q6H PRN  nicotine (NICODERM CQ) 21 MG/24HR 1 patch, 1 patch, TransDERmal, Daily  ipratropium-albuterol (DUONEB) nebulizer solution 1 ampule, 1 ampule, Inhalation, Q4H WA  Physical    VITALS:  BP (!) 155/84   Pulse 89   Temp 97.8 °F (36.6 °C) (Oral)   Resp 18   Ht 6' (1.829 m)   Wt 249 lb 9 oz (113.2 kg)   SpO2 92%   BMI 33.85 kg/m²   TEMPERATURE:  Current - Temp: 97.8 °F (36.6 °C);  Max - Temp  Av.8 °F (36.6 °C)  Min: 97.3 °F (36.3 °C)  Max: 98.3 °F (36.8 °C)  PULSE OXIMETRY RANGE: SpO2  Av %  Min: 85 %  Max: 97 %  24HR INTAKE/OUTPUT:      Intake/Output Summary (Last 24 hours) at 2021 0746  Last data filed at 2021 0410  Gross per 24 hour   Intake 3102.57 ml   Output 900 ml   Net 2202.57 ml       CONSTITUTIONAL:  awake, alert, cooperative, no apparent distress, HEENT oral pharynx , no scleral icterus  HEMATOLOGIC/LYMPHATICS:  no cervical lymphadenopathy, no supraclavicular lymphadenopathy,LUNGS:  No increased work of breathing, good air exchange, clear to auscultation bilaterally, no crackles or wheezing  CARDIOVASCULAR:  , regular rate and rhythm, normal S1 and S2, no S3 or S4, and no murmur noted  ABDOMEN:  No scars, normal bowel sounds, soft, non-distended, non-tender, no masses palpated, no hepatosplenomegally  MUSCULOSKELETAL:  There is no redness, warmth, or swelling of the joints. EXTREMETIES:s+ edema   NEUROLOGIC:  Awake, alert, oriented to name, place and time. =SKIN:  no bruising or bleeding      Data      No results for input(s): WBC, HGB, HCT, PLT, MCV in the last 72 hours. Recent Labs     09/12/21  0611 09/12/21  1811 09/13/21  0430   * 143 147*   K 3.7 3.5 3.8    101 108   CO2 35* 31 32   PHOS 3.0 2.3* 2.7   BUN 24* 30* 25*   CREATININE 0.7* 0.7* 0.6*     Recent Labs     09/11/21  0449   AST 53*   *   BILIDIR 0.3   BILITOT 0.9   ALKPHOS 213*       Magnesium:    Lab Results   Component Value Date    MG 1.80 09/13/2021    MG 1.70 09/12/2021    MG 1.90 09/12/2021       Imaging ECHO Complete 2D W Doppler W Color    Result Date: 9/3/2021  Transthoracic Echocardiography Report (TTE)  Demographics   Patient Name      Salena Slater   Date of Study     09/03/2021          Gender              Male   Patient Number    [id number]          Date of Birth       1970   Visit Number      [id number]           Age                 46 year(s)   Accession Number  [id number]          Room Number         3911   Corporate ID      [id number]             [address],                                                            [facility]   Ordering          Tiarra Reyes,             [address] [facility]     Physician           Kirti Nguyen MD  Procedure Type of Study   TTE procedure:ECHOCARDIOGRAM COMPLETE 2D W DOPPLER W COLOR.   Procedure Date Date: 09/03/2021 Start: 02:20 PM Study Location: [facility] - [facility] Technical Quality: Limited visualization due to poor acoustical window. Additional Indications:Lower extremity swelling, evaluate the right heart and LV; HTN, HLD, COPD. Patient Status: Routine Height: 72 inches Weight: 247.01 pounds BSA: 2.33 m2 BMI: 33.5 kg/m2 Rhythm: Within normal limits HR: 70 bpm BP: 183/87 mmHg  Conclusions   Summary  Normal left ventricle size, wall thickness, and systolic function with an  estimated ejection fraction of 60-65%. No regional wall motion abnormalities  are seen. Normal diastolic function. Normal right ventricular size and function. No significant valve abnormalities noted. Signature   ------------------------------------------------------------------  Electronically signed by Kalani De La Vega MD  (Interpreting physician) on 09/03/2021 at 03:46 PM  ------------------------------------------------------------------   Findings   Left Ventricle  Normal left ventricle size, wall thickness, and systolic function with an  estimated ejection fraction of 60-65%. No regional wall motion abnormalities  are seen. Normal diastolic function. Mitral Valve  Mitral valve leaflets appear mildly thickened. No evidence of mitral regurgitation or stenosis. Left Atrium  The left atrium is normal in size. Aortic Valve  The aortic valve leaflets are not well visualized. No evidence of aortic valve regurgitation or stenosis. Aorta  The aortic root is normal in size. Right Ventricle  Normal right ventricular size and function. TAPSE 2.8 cm   Tricuspid Valve  The tricuspid valve was not well visualized. No evidence of tricuspid stenosis. Right Atrium  The right atrial size is normal.   Pulmonic Valve  The pulmonic valve is not well visualized. No evidence of pulmonic valve regurgitation. No evidence of pulmonic valve stenosis. Pericardial Effusion  No pericardial effusion noted. Pleural Effusion  No pleural effusion.    Miscellaneous  IVC not well visualized. M-Mode/2D Measurements (cm)   LV Diastolic Dimension: 6.74 cm LV Systolic Dimension: 7.78 cm  LV Septum Diastolic: 0.9 cm  LV PW Diastolic: 0.42 cm        AO Root Dimension: 3.4 cm                                   LA Area: 23.6 cm2  LVOT: 2.1 cm                    LA volume/Index: 70.5 ml /30 ml/m2  Doppler Measurements   AV Peak Velocity: 184 cm/s     MV Peak E-Wave: 106 cm/s  AV Peak Gradient: 13.54 mmHg   MV Peak A-Wave: 87.5 cm/s  AV Mean Gradient: 6 mmHg       MV E/A Ratio: 1.21  LVOT Peak Velocity: 160 cm/s   MV Mean Gradient: 2 mmHg  AV Area (Continuity):3.58 cm2  MV Max P mmHg                                 MV Vmax:114 cm/s                                 MV VTI:32.9 cm/s   E' Septal Velocity: 10.9 cm/s  MV Area (continuity): 3.61 cm2  E' Lateral Velocity: 15.5 cm/s MV Deceleration Time: 259 msec  PV Peak Velocity: 131 cm/s  PV Peak Gradient: 6.86 mmHg   Aortic Valve   Peak Velocity: 184 cm/s     Mean Velocity: 114 cm/s  Peak Gradient: 13.54 mmHg   Mean Gradient: 6 mmHg  Area (continuity): 3.58 cm2  AV VTI: 33.2 cm  Aorta   Aortic Root: 3.4 cm  Ascending Aorta: 3.3 cm  LVOT Diameter: 2.1 cm      XR CHEST (2 VW)    Result Date: 2021  EXAMINATION: TWO XRAY VIEWS OF THE CHEST 2021 8:48 am COMPARISON: None. HISTORY: ORDERING SYSTEM PROVIDED HISTORY: WEEMS (dyspnea on exertion) TECHNOLOGIST PROVIDED HISTORY: Reason for Exam: WEEMS, asthma Acuity: Unknown Type of Exam: Initial FINDINGS: Clear lungs. No pleural effusion or pneumothorax. Cardiomediastinal silhouette is unremarkable. Degenerative changes of the visualized osseous structures. Clear lungs.      CT GUIDED NEEDLE PLACEMENT    Result Date: 2021  PROCEDURE: CT GUIDED CORE BIOPSY OF LEFT HEPATIC LOBE MASS MODERATE CONSCIOUS SEDATION 2021 HISTORY: ORDERING SYSTEM PROVIDED HISTORY: lesion TECHNOLOGIST PROVIDED HISTORY: Reason for exam:->lesion Reason for Exam: lesion Acuity: Acute Type of Exam: Initial; ORDERING SYSTEM PROVIDED HISTORY: bx TECHNOLOGIST PROVIDED HISTORY: Reason for exam:->bx Reason for Exam: lesion Acuity: Acute Type of Exam: Initial PHYSICIANS: Amilcar Decker SEDATION: 0.5 mgversed and 25 mcg fentanyl were titrated intravenously for moderate sedation monitored under my direction. Total intraservice time of sedation was 15 minutes. The patient's vital signs were monitored throughout the procedure and recorded in the patient's medical record by the nurse. TECHNIQUE: Informed consent was obtained after a detailed discussion about the procedure including the risk, benefits, and alternatives. Universal protocol was followed. Axial images were obtained through the liver and a suitable skin site was prepped and draped in sterile fashion. Local anesthesia was achieved with lidocaine. Under intermittent CT guidance, a 17 gauge coaxial needle was inserted into the a left hepatic lobe mass. Through this, 1 18 gauge core biopsy specimens were obtained using coaxial technique and submitted to pathology. Gel-Foam slurry was administered as the needle was removed. A sterile bandage was then placed. The patient tolerated the procedure well. Estimated blood loss: Less than 5 cc Dose modulation, iterative reconstruction, and/or weight based adjustment of the mA/kV was utilized to reduce the radiation dose to as low as reasonably achievable. DLP: 9023.35 mGy-cm     Successful CT guided core biopsy of a left hepatic lobe mass. CT ABDOMEN PELVIS W IV CONTRAST Additional Contrast? Oral    Result Date: 9/5/2021  EXAMINATION: CT OF THE ABDOMEN AND PELVIS WITH CONTRAST 9/3/2021 5:13 pm TECHNIQUE: CT of the abdomen and pelvis was performed with the administration of intravenous contrast. Multiplanar reformatted images are provided for review. Dose modulation, iterative reconstruction, and/or weight based adjustment of the mA/kV was utilized to reduce the radiation dose to as low as reasonably achievable.  COMPARISON: CT pulmonary angiogram performed September 2, 2021. HISTORY: ORDERING SYSTEM PROVIDED HISTORY: Metastatic disease, evaluate for primary. Bilateral lower ext edema, eval for IVC thrombosis/compression TECHNOLOGIST PROVIDED HISTORY: Reason for exam:->Metastatic disease, evaluate for primary. Bilateral lower ext edema, eval for IVC thrombosis/compression Additional Contrast?->Oral FINDINGS: Images are degraded by motion artifact. Lower Chest: Respiratory motion artifact. Visualized pulmonary nodules and enlarged mediastinal lymph node, unchanged from prior CT chest. Organs: There are multifocal hypoattenuating lesions throughout the liver. Lesion within the left hepatic lobe measures 6.1 x 5.7 cm (axial image 35). Lesion within the right hepatic lobe measures 3.6 x 2.6 cm (axial image 51). The gallbladder is normally distended with layering hyperintensity suggestive of sludge. No calcified stones. No gallbladder wall thickening or pericholecystic fluid. The spleen and pancreas are grossly unremarkable. Right adrenal gland nodule measures up to 1.6 cm. There is mild thickening of the left adrenal gland without definite nodularity. There is symmetric renal cortical enhancement. Simple appearing right renal cysts demonstrated. No hydronephrosis. GI/Bowel: There is a hypoattenuating nodule in the region of the 2/3 portion of the duodenum in the region of the ampulla which measures up to 1.3 cm (axial image 83). There is diffuse fold thickening involving the majority of the jejunum. No evidence of obstruction. Sigmoid diverticulosis without evidence of acute diverticulitis. The appendix is normal. Pelvis: Prostate and seminal vesicles are grossly unremarkable. The urinary bladder is within normal limits. Peritoneum/Retroperitoneum: No significant lymphadenopathy. The abdominal aorta is normal in course and caliber with scattered atherosclerotic disease.  No definite filling defects within the IVC, the iliac veins, or visualized femoral veins to suggest thrombus. Bones/Soft Tissues: There is asymmetric thickening of the inferior right rectus abdominus musculature with mild surrounding stranding. Visualization is partially obscured by streak artifact from dense oral contrast in the adjacent small bowel. Body wall edema demonstrated. No focal drainable collections. Multilevel moderate degenerative changes in the visualized spine. No destructive osseous lesions. 1. Multiple hypoattenuating liver lesions which are most suggestive of metastatic disease. 2. Right adrenal gland nodule measuring up to 1.6 cm, indeterminate. 3. Hypoattenuating nodule in the region of the 2/3 portion of the duodenum in the region of the ampulla measuring up to 1.3 cm. Findings could be artifactual or related to fold thickening; however, a small ampullary or possibly pancreatic lesion cannot be entirely excluded. 4. Diffuse fold thickening involving the majority of the jejunum, nonspecific but can be related to an infectious etiology or lymphatic obstruction. 5. No definite filling defects in the IVC, iliac veins, or visualized femoral veins to suggest thrombus. 6. Asymmetric thickening of the inferior right rectus abdominus musculature with surrounding stranding which is partially obscured by adjacent streak artifact from dense oral contrast in the small bowel. No definite focal lesion is demonstrated; however, an underlying lesion cannot be entirely excluded. Hematoma is also a consideration. CT CHEST PULMONARY EMBOLISM W CONTRAST    Result Date: 9/5/2021  EXAMINATION: CTA OF THE CHEST 9/2/2021 12:47 pm TECHNIQUE: CTA of the chest was performed after the administration of intravenous contrast.  Multiplanar reformatted images are provided for review. MIP images are provided for review.  Dose modulation, iterative reconstruction, and/or weight based adjustment of the mA/kV was utilized to reduce the radiation dose to as low as reasonably achievable. COMPARISON: None. HISTORY: ORDERING SYSTEM PROVIDED HISTORY: leg swelling sob new o2 requirement TECHNOLOGIST PROVIDED HISTORY: Reason for exam:->leg swelling sob new o2 requirement Decision Support Exception - unselect if not a suspected or confirmed emergency medical condition->Emergency Medical Condition (MA) Reason for Exam: leg swelling sob new o2 requirement Acuity: Acute Type of Exam: Initial FINDINGS: Pulmonary Arteries:  No central, lobar, or segmental pulmonary embolus. Narrowing of the right lower lobe pulmonary artery by the adenopathy. Mediastinum: Heart size is normal.  No pericardial effusion. Thoracic aorta is normal caliber. Extensive mediastinal and hilar lymphadenopathy is present. Subcarinal node measures 32 x 37 mm. Right hilar node measures 30 x 29 mm. Lungs/pleura: Moderate emphysema. Multiple 3 mm nodules are present in the right middle lobe and left lower lobe. Superimposed in the posterior right lower lobe, there is a dominant nodule measuring 17 x 17 mm. An additional right lower lobe nodule measures 11 mm. No pleural effusion or pneumothorax. Narrowing of bronchus intermedius due to the right hilar adenopathy. Upper Abdomen: Multiple low-attenuation liver lesions are present measuring up to 48 x 40 mm and 26 x 28 mm. At least 30 lesions are noted in the visualized portions of the liver. Soft Tissues/Bones: No lytic or blastic osseous lesion. 1. No acute pulmonary embolus. 2. Extensive mediastinal and hilar lymphadenopathy compatible with metastatic neoplasm. 3. Multiple new indeterminate 3 mm nodules in the right middle lobe and left lower lobe. These could be infectious or neoplastic. 4. Right lower lobe 17 mm and 11 mm nodules are nonspecific, but metastatic disease not excluded. 5. Multiple liver metastases measuring up to 48 x 40 mm.  RECOMMENDATIONS:     CT NEEDLE BIOPSY LIVER PERCUTANEOUS    Result Date: 9/8/2021  PROCEDURE: CT GUIDED CORE BIOPSY OF LEFT HEPATIC LOBE MASS MODERATE CONSCIOUS SEDATION 9/7/2021 HISTORY: ORDERING SYSTEM PROVIDED HISTORY: lesion TECHNOLOGIST PROVIDED HISTORY: Reason for exam:->lesion Reason for Exam: lesion Acuity: Acute Type of Exam: Initial; ORDERING SYSTEM PROVIDED HISTORY: bx TECHNOLOGIST PROVIDED HISTORY: Reason for exam:->bx Reason for Exam: lesion Acuity: Acute Type of Exam: Initial PHYSICIANS: Amilcar Simpsontal SEDATION: 0.5 mgversed and 25 mcg fentanyl were titrated intravenously for moderate sedation monitored under my direction. Total intraservice time of sedation was 15 minutes. The patient's vital signs were monitored throughout the procedure and recorded in the patient's medical record by the nurse. TECHNIQUE: Informed consent was obtained after a detailed discussion about the procedure including the risk, benefits, and alternatives. Universal protocol was followed. Axial images were obtained through the liver and a suitable skin site was prepped and draped in sterile fashion. Local anesthesia was achieved with lidocaine. Under intermittent CT guidance, a 17 gauge coaxial needle was inserted into the a left hepatic lobe mass. Through this, 1 18 gauge core biopsy specimens were obtained using coaxial technique and submitted to pathology. Gel-Foam slurry was administered as the needle was removed. A sterile bandage was then placed. The patient tolerated the procedure well. Estimated blood loss: Less than 5 cc Dose modulation, iterative reconstruction, and/or weight based adjustment of the mA/kV was utilized to reduce the radiation dose to as low as reasonably achievable. DLP: 7327.98 mGy-cm     Successful CT guided core biopsy of a left hepatic lobe mass.      VL Extremity Venous Bilateral    Result Date: 9/8/2021  Lower Extremities DVT Study  Demographics   Patient Name       Stacey Dennison   Date of Study      09/07/2021         Gender              Male   Patient Number     [id number]         Date of Birth 1970   Visit Number       [id number]          Age                 46 year(s)   Accession Number   [id number]         Room Number         1093   Corporate ID       [id number]            Ashia Ramirez CHARLY   Ordering Physician Catalina Pandya MD         Physician           MD  Procedure Type of Study:   Veins:Lower Extremities DVT Study, VASC EXTREMITY VENOUS DUPLEX BILATERAL. Vascular Sonographer Report  Indications for Study:Edema. Impressions Right Impression No evidence of deep vein or superficial vein thrombosis involving the right lower extremity in vessels clearly visualized. Proximal and Mid Posterior Tibial veins and Peroneal veins were not visualized due to swelling. Calf and ankle edema noted. Left Impression No evidence of deep vein or superficial vein thrombosis involving the left lower extremity in vessels clearly visualized. Proximal Posterior Tibial veins and Soleal vein were not visualized due to swelling. Calf and ankle edema noted. Conclusions   Summary   No evidence of deep vein or superficial vein thrombosis involving the  bilateral lower extremities in vessels clearly visualized as detailed above. Signature   ------------------------------------------------------------------  Electronically signed by Marjan Dasilva MD (Interpreting  physician) on 09/08/2021 at 08:09 AM  ------------------------------------------------------------------  Patient Status:Routine. Study [address] [facility]. Technical Quality:Limited visualization due to swelling. Risk Factors   - The patient's risk factor(s) include: dyslipidemia and arterial     hypertension.   - The patient has a current/recent (within 1 year) tobacco history.  Velocities are measured in cm/s ; Diameters are measured in mm Right Lower Extremities DVT Study Measurements Right 2D Measurements +------------------------+----------+---------------+----------+ ! Location                ! Visualized! Compressibility! Thrombosis! +------------------------+----------+---------------+----------+ ! Sapheno Femoral Junction! Yes       ! Yes            ! None      ! +------------------------+----------+---------------+----------+ ! GSV Thigh               ! Yes       ! Yes            ! None      ! +------------------------+----------+---------------+----------+ ! Common Femoral          !Yes       ! Yes            ! None      ! +------------------------+----------+---------------+----------+ ! Prox Femoral            !Yes       ! Yes            ! None      ! +------------------------+----------+---------------+----------+ ! Mid Femoral             !Yes       ! Yes            ! None      ! +------------------------+----------+---------------+----------+ ! Dist Femoral            !Yes       ! Yes            ! None      ! +------------------------+----------+---------------+----------+ ! Deep Femoral            !Yes       ! Yes            ! None      ! +------------------------+----------+---------------+----------+ ! Popliteal               !Yes       ! Yes            ! None      ! +------------------------+----------+---------------+----------+ ! Gastroc                 ! Yes       ! Yes            ! None      ! +------------------------+----------+---------------+----------+ ! Soleal                  !Yes       ! Yes            ! None      ! +------------------------+----------+---------------+----------+ ! PTV                     ! Partial   !Yes            ! None      ! +------------------------+----------+---------------+----------+ ! ATV                     ! Yes       ! Yes            ! None      ! +------------------------+----------+---------------+----------+ ! Peroneal                !Partial   !Yes            ! None      ! +------------------------+----------+---------------+----------+ ! GSV Calf                ! Yes       ! Yes            ! None      ! +------------------------+----------+---------------+----------+ ! SSV                     ! Yes       ! Yes            ! None      ! +------------------------+----------+---------------+----------+ Right Doppler Measurements +--------------+------+------+------------+ ! Location      ! Signal!Reflux! Reflux (sec)! +--------------+------+------+------------+ ! Common Femoral!Phasic! No    !            ! +--------------+------+------+------------+ ! Popliteal     !Phasic! No    !            ! +--------------+------+------+------------+ Left Lower Extremities DVT Study Measurements Left 2D Measurements +------------------------+----------+---------------+----------+ ! Location                ! Visualized! Compressibility! Thrombosis! +------------------------+----------+---------------+----------+ ! Sapheno Femoral Junction! Yes       ! Yes            ! None      ! +------------------------+----------+---------------+----------+ ! GSV Thigh               ! Yes       ! Yes            ! None      ! +------------------------+----------+---------------+----------+ ! Common Femoral          !Yes       ! Yes            ! None      ! +------------------------+----------+---------------+----------+ ! Prox Femoral            !Yes       ! Yes            ! None      ! +------------------------+----------+---------------+----------+ ! Mid Femoral             !Yes       ! Yes            ! None      ! +------------------------+----------+---------------+----------+ ! Dist Femoral            !Yes       ! Yes            ! None      ! +------------------------+----------+---------------+----------+ ! Deep Femoral            !Yes       ! Yes            ! None      ! +------------------------+----------+---------------+----------+ ! Popliteal               !Yes       ! Yes            ! None      ! +------------------------+----------+---------------+----------+ ! Gastroc                 ! Yes       ! Yes            ! None      ! +------------------------+----------+---------------+----------+ ! Soleal                  !No        !               !          ! +------------------------+----------+---------------+----------+ ! PTV                     ! Partial   !Yes            ! None      ! +------------------------+----------+---------------+----------+ ! ATV                     ! Yes       ! Yes            ! None      ! +------------------------+----------+---------------+----------+ ! Peroneal                !Yes       ! Yes            ! None      ! +------------------------+----------+---------------+----------+ ! GSV Calf                ! Yes       ! Yes            ! None      ! +------------------------+----------+---------------+----------+ ! SSV                     ! Yes       ! Yes            ! None      ! +------------------------+----------+---------------+----------+ Left Doppler Measurements +--------------+------+------+------------+ ! Location      ! Signal!Reflux! Reflux (sec)! +--------------+------+------+------------+ ! Common Femoral!Phasic! No    !            ! +--------------+------+------+------------+ ! Popliteal     !Phasic! No    !            ! +--------------+------+------+------------+      Problem List  Patient Active Problem List   Diagnosis    Pure hypercholesterolemia    Anxiety    Essential hypertension    Moderate persistent asthma with acute exacerbation    Metastatic cancer ([facility] [address]    Hypokalemia    Acute respiratory failure with hypoxia (HCC)    Abnormal CT of the chest    COPD exacerbation (HCC)    Lung nodules    Tobacco abuse       ASSESSMENT AND PLAN    Extensive stage small cell  Ectopic acth -agree related to the disease   Rare paraneoplastic syndrome   He has been on lasix and has needed aggressive bp mgmt  I spoke with nephrology yesterday who agreed to hold lasix while getting chemo -  He is on gentle fluids ? Stop tomorrow   ? Restart lasix tomorrow?        Today is day 3 carbo etoposide atezolizumab  Picc line in place  Will start neupogen tomorrow     MRI neg for mets as is bone scan     Moniter for tumor lysis -I have ordered labs q 12   HE is on tele just incase his electrolytes lead to an arrhythmia   ?  Perhaps can stop flluids tomorrow     THe high cortisol-nephrology suggested octreotide-defer to them on this       Labs pending this am     Gal Lemus MD, MD

## 2021-09-13 NOTE — PROGRESS NOTES
Pt started on Tecentiq chemo at 1508. Informed consent signed, medication verified by second RN, Jose Chavarria RN. Side effects discussed with pt, pt stated Dr. Tad Gong had spoke with him earlier regarding the chemotherapy treatment. Blood returned noted before administration of IV medication. No s/sx of infiltration, phlebitis, or extravsation. VSS. No overt s/sx of distress. Pt tolerating well and without complications. Will continue to monitor and assess.  Electronically signed by Santino Macias RN on 9/13/2021 at 3:11 PM

## 2021-09-13 NOTE — PROGRESS NOTES
American >60 >60    GFR African American >60 >60    Calcium 8.3 8.3 - 10.6 mg/dL   Lactate Dehydrogenase    Collection Time: 09/12/21  6:11 PM   Result Value Ref Range     (H) 100 - 190 U/L   Magnesium    Collection Time: 09/12/21  6:11 PM   Result Value Ref Range    Magnesium 1.70 (L) 1.80 - 2.40 mg/dL   Phosphorus    Collection Time: 09/12/21  6:11 PM   Result Value Ref Range    Phosphorus 2.3 (L) 2.5 - 4.9 mg/dL   Uric Acid    Collection Time: 09/12/21  6:11 PM   Result Value Ref Range    Uric Acid, Serum 1.7 (L) 3.5 - 7.2 mg/dL   POCT Glucose    Collection Time: 09/12/21  7:49 PM   Result Value Ref Range    POC Glucose 281 (H) 70 - 99 mg/dl    Performed on ACCU-CHEK    Basic Metabolic Panel    Collection Time: 09/13/21  4:30 AM   Result Value Ref Range    Sodium 147 (H) 136 - 145 mmol/L    Potassium 3.8 3.5 - 5.1 mmol/L    Chloride 108 99 - 110 mmol/L    CO2 32 21 - 32 mmol/L    Anion Gap 7 3 - 16    Glucose 167 (H) 70 - 99 mg/dL    BUN 25 (H) 7 - 20 mg/dL    CREATININE 0.6 (L) 0.9 - 1.3 mg/dL    GFR Non-African American >60 >60    GFR African American >60 >60    Calcium 8.2 (L) 8.3 - 10.6 mg/dL   Lactate Dehydrogenase    Collection Time: 09/13/21  4:30 AM   Result Value Ref Range     (H) 100 - 190 U/L   Magnesium    Collection Time: 09/13/21  4:30 AM   Result Value Ref Range    Magnesium 1.80 1.80 - 2.40 mg/dL   Phosphorus    Collection Time: 09/13/21  4:30 AM   Result Value Ref Range    Phosphorus 2.7 2.5 - 4.9 mg/dL   Uric Acid    Collection Time: 09/13/21  4:30 AM   Result Value Ref Range    Uric Acid, Serum 2.1 (L) 3.5 - 7.2 mg/dL   POCT Glucose    Collection Time: 09/13/21  6:10 AM   Result Value Ref Range    POC Glucose 183 (H) 70 - 99 mg/dl    Performed on ACCU-CHEK        ASSESSMENT/PLAN:      Principal Problem:    Hypokalemia-potassium is okay today. Aldactone was increased yesterday and potassium reduced to twice daily. Continue to monitor daily.     Active Problems:    Metastatic small cell lung cancer, poorly differentiated-tolerating chemotherapy well. Likely the source of ectopic ACTH. IV fluids changed to 1/2 NS given his hyponatremia today. Moderate persistent asthma with acute exacerbation-oxygen requirement continues to increase. Repeat chest x-ray today again. Has completed antibiotics. Continue with incentive spirometer. Pure hypercholesterolemia-continue to hold Lipitor given abnormal LFTs. Repeat LFTs tomorrow. Essential hypertension-blood pressure remains high. Increase Cardura to 4 mg twice daily. Bilateral leg edema-minimal edema this morning, but he had his legs up overnight I suspect. Lasix has been on hold over the weekend due to chemotherapy. Weight is up 10 pounds over the weekend. Restart Lasix whenever okay with oncology. Steroid-induced hyperglycemia-remains on Lantus, sliding scale insulin, and mealtime insulin. Continue to titrate to get better control. Hypomagnesemia- continue to monitor and supplement as needed.       Carlos Odom MD, [address]  8:01 AM  9/13/2021

## 2021-09-14 NOTE — PROGRESS NOTES
Pt started on Etoposide chemo at 1700. Informed consent signed, medication verified by second RN, Josee Thompson RN. Side effects discussed with pt, pt stated Dr. Milan Medeiros had spoke with him earlier regarding the chemotherapy treatment. Blood returned noted, before, during, and after administration of IV medication. No s/sx of infiltration, phlebitis, or extravsation. VSS. No overt s/sx of distress. Pt tolerating well and without complications. Will continue to monitor and assess.  Electronically signed by Rossana Avalos RN on 9/13/2021 at 8:18 PM No

## 2021-09-14 NOTE — PLAN OF CARE
Problem: Infection:  Goal: Will remain free from infection  Description: Will remain free from infection  9/14/2021 0743 by Claudeen Fought, RN  Outcome: Ongoing  Note: Patient is alert and oriented, afebrile, has manageable complaints of pain, skin is intact and appropriate for ethnicity in color    9/13/2021 2312 by Wilian Hunter RN  Outcome: Ongoing  Note: Patient will remain free from infection  9/13/2021 2029 by Michaela Callaway RN  Outcome: Ongoing  Note: Pt remains free of infection. Will monitor patient, labs and vitals. Problem: Safety:  Goal: Free from accidental physical injury  Description: Free from accidental physical injury  9/14/2021 0743 by Claudeen Fought, RN  Outcome: Ongoing  9/13/2021 2312 by Wilian Hunter RN  Outcome: Ongoing  Note: Patient will be free from accidental physical injury  9/13/2021 2029 by Michaela Callaway RN  Outcome: Ongoing  Note: Pt is free of injury. No injury noted. Fall precautions in place. Call light within reach. Will monitor. Goal: Free from intentional harm  Description: Free from intentional harm  9/14/2021 0743 by Claudeen Fought, RN  Outcome: Ongoing  9/13/2021 2312 by Wilian Hunter RN  Outcome: Ongoing  Note: Patient will be free from intentional harm  9/13/2021 2029 by Michaela Callaway RN  Outcome: Ongoing  Note: Pt is free of intentional harm. No intentions noted. Will monitor. Problem: Daily Care:  Goal: Daily care needs are met  Description: Daily care needs are met  9/14/2021 0743 by Claudeen Fought, RN  Outcome: Ongoing  Note: Checking on patient Q2H for nutrition needs, hygiene needs, comfort measures, mobility, fall risk interventions, and safe environment. All precautions and interventions in place. Educated patient on use of call light and telephone. Patient verbalizes understanding. Call light/telephone in reach.     9/13/2021 2312 by Wilian Hunter RN  Outcome: Ongoing  Note: Patients daily care needs will be met  9/13/2021 2029 by Carlton Phelps RN  Outcome: Ongoing  Note: Daily care needs are being met by patient and staff. Will monitor. Problem: Pain:  Goal: Patient's pain/discomfort is manageable  Description: Patient's pain/discomfort is manageable  9/14/2021 0743 by Armani Milan RN  Outcome: Ongoing  Note: Pain /discomfort being managed with PRN analgesics per MD orders. Patient able to express presence and absence of pain and rate pain appropriately using numerical scale. 9/13/2021 2312 by Jaylene Meyer RN  Outcome: Ongoing  Note: Patients pain will be manageable  9/13/2021 2029 by Carlton Phelps RN  Outcome: Ongoing  Note: Pt assessed for pain. Pt denies any pain at this time. Will continue to monitor pt and assess for pain throughout rest of shift. Problem: Skin Integrity:  Goal: Skin integrity will stabilize  Description: Skin integrity will stabilize  9/14/2021 0743 by Armani Milan RN  Outcome: Ongoing  Note: Chad score assessed. Patient able to ambulate and turn self. Educated patient on importance of repositioning to prevent skin issues. 9/13/2021 2312 by Jaylene Meyer RN  Outcome: Ongoing  Note: Patients skin integrity will stabilize  9/13/2021 2029 by Carlton Phelps RN  Outcome: Ongoing  Note: Pt skin integrity is being maintained. Will continue to monitor and encourage patient to turn and reposition every two hours. Problem: Discharge Planning:  Goal: Patients continuum of care needs are met  Description: Patients continuum of care needs are met  9/14/2021 0743 by Armani Milan RN  Outcome: Ongoing  Note: Working with patient, physician, and social work to discharge patient to the appropriate level of care. 9/13/2021 2312 by Jaylene Meyer RN  Outcome: Ongoing  Note: Patients continuum of care needs will be met  9/13/2021 2029 by Carlton Phelps RN  Outcome: Ongoing  Note: Pt LUIS needs are being met. SW and medical team are following. Will monitor. Problem: Activity Intolerance:  Goal: Ability to tolerate increased activity will improve  Description: Ability to tolerate increased activity will improve  9/14/2021 0743 by Brian Dallas RN  Outcome: Ongoing  Note: Patient will describe an ease of ambulation this shift. 9/13/2021 2312 by Kelechi Falcon RN  Outcome: Ongoing  Note: Patients ability to tolerate increased activity will improve  9/13/2021 2029 by Omar Perales RN  Outcome: Ongoing  Note: Pt activity is being maintained. Will monitor. Problem: Airway Clearance - Ineffective:  Goal: Ability to maintain a clear airway will improve  Description: Ability to maintain a clear airway will improve  9/14/2021 0743 by Brian Dallas RN  Outcome: Ongoing  Note: Patient has maintained a patent airway, repositions self, lung sounds bilaterally diminished, cough noted and is infrequent and nonproductive, RT is involved in patient care and is providing care, patient has had adequate fluid intake and is on a 1500ml fluid restriction, no need to suction airway at this time, educated patient to turn and cough and deep breathe every two hours and as needed, encouraged incentive spirometer and patient has been performing. Will continue to monitor and reassess. 9/13/2021 2312 by Kelechi Falcon RN  Outcome: Ongoing  Note: Patients ability to maintain a clear airway will improve  9/13/2021 2029 by Omar Perales RN  Outcome: Ongoing  Note: Pt maintaining a clear airway. No distress noted. Will monitor.       Problem: Breathing Pattern - Ineffective:  Goal: Ability to achieve and maintain a regular respiratory rate will improve  Description: Ability to achieve and maintain a regular respiratory rate will improve  9/14/2021 0743 by Brian Dallas RN  Outcome: Ongoing  9/13/2021 2312 by Kelechi Falcon RN  Outcome: Ongoing  Note: Patient will maintain regular respiratory rate  9/13/2021 2029 by Omar Perales RN  Outcome: Ongoing     Problem: Gas Exchange - Impaired:  Goal: Levels of oxygenation will improve  Description: Levels of oxygenation will improve  9/14/2021 0743 by Valentín Hernandez RN  Outcome: Ongoing  9/13/2021 2312 by Ca Amado RN  Outcome: Ongoing  Note: Patient O2 level will improve  9/13/2021 2029 by Travis Hall RN  Outcome: Ongoing     Problem: Health Behavior:  Goal: Identification of resources available to assist in meeting health care needs will improve  Description: Identification of resources available to assist in meeting health care needs will improve  9/14/2021 0743 by Valentín Hernandez RN  Outcome: Ongoing  9/13/2021 2312 by Ca Amado RN  Outcome: Ongoing  Note: Patients identification of resources available will improve  9/13/2021 2029 by Travis Hall RN  Outcome: Ongoing     Problem: Physical Regulation:  Goal: Complications related to the disease process, condition or treatment will be avoided or minimized  Description: Complications related to the disease process, condition or treatment will be avoided or minimized  9/14/2021 0743 by Valentín Hernandez RN  Outcome: Ongoing  9/13/2021 2312 by Ca Amado RN  Outcome: Ongoing  Note: Patient will have minimal complications related to disease process  9/13/2021 2029 by Travis Hall RN  Outcome: Ongoing

## 2021-09-14 NOTE — PROGRESS NOTES
Patient AxO. In the bed. AM medications were tolerated well. Pills were given whole with ice water. Pt denied any pain. Pt ate 100% of breakfast and lunch without any complications. PICC line in place. Dressing clean, dry, and intact. Blood return noted. No signs of infection. Pt is on 5L nasal canula high flow. No distress noted. Pt uses urinal. Call light within reach. Able to make needs known. Fall precautions in place. Will continue to monitor.      Electronically signed by Maira Driscoll on 9/14/2021 at 1:23 PM

## 2021-09-14 NOTE — PROGRESS NOTES
Pt A&O in the bed. Pt tolerating PO intake well. AM medications administered without complications. Pt has no complaints at this time. Denies SOB. Continues on 10L NC high flow. 02 97%. Continues with BLE edema. ACE wraps in place on lower legs. UAL in the room. Using urinal. Call light within reach. Able to make needs known. Fall precautions in place. Will monitor.  Electronically signed by Abdullahi Wilcox RN on 9/13/2021 at 8:24 PM

## 2021-09-14 NOTE — PROGRESS NOTES
Pt A&O in the bed. Resting without complications. Pt remains on 6L NC high flow. Will continue to monitor and wean as tolerated. Pt does have WEEMS. No distress noted. IV Lasix effective. Will continue to monitor output. Call light within reach. Able to make needs known. Fall precautions in place. Will monitor.  Electronically signed by Sommer De Guzman RN on 9/13/2021 at 8:28 PM

## 2021-09-14 NOTE — PROGRESS NOTES
Patient resting in bed upon assessment, A/Ox4, no complaints at this time, vital signs stable. Patient remains on 5L nasal cannula, IV fluids infusing, call light in reach, will continue to monitor.

## 2021-09-14 NOTE — PROGRESS NOTES
[facility]  Nephrology   Progress Note        We are following the patient for uncontrolled HTN Severe Hypokalemia  47 y/o WM, gray and heavy smoker admitted with severe Hypokalemia  He has h/o HTN for ~ 20 years that has been well controlled on Amlodipine  About 3 weeks ago he started to have leg swelling, weight gain fatigue and SOB  Out patient labs showed severe Hypokalemia  On admission K+ noted to be 2.2 meq CO2 40   Noted to have uncontrolled HTN with /108 mm  BP has remained resistant despite addition of multiple meds    SUBJECTIVE:  -pt seen and examined  -PMSHx and meds reviewed  -No family at bedside      Completed chemo  O2 requirement decreasing Given Lasix yesterday UOIP 3 L   Wt up 2 lbs since yesterday    ROS: neg chest pain nausea     OBJECTIVE:     PHYSICAL:    TEMPERATURE:  Current - Temp: 98.2 °F (36.8 °C);  Max - Temp  Av °F (36.7 °C)  Min: 97.7 °F (36.5 °C)  Max: 98.2 °F (36.8 °C)  RESPIRATIONS RANGE: Resp  Av  Min: 14  Max: 18  PULSE RANGE: Pulse  Av.1  Min: 84  Max: 100  BLOOD PRESSURE RANGE:  Systolic (19GHQ), NXA:808 , Min:137 , UYZ:291   ; Diastolic (95XIZ), BHS:74, Min:82, Max:97    PULSE OXIMETRY RANGE: SpO2  Av.4 %  Min: 90 %  Max: 96 %  24HR INTAKE/OUTPUT:      Intake/Output Summary (Last 24 hours) at 2021 1158  Last data filed at 2021 1139  Gross per 24 hour   Intake 1304.53 ml   Output 3950 ml   Net -2645.47 ml       CONSTITUTIONAL:  awake, alert, cooperative, no apparent distress, and appears stated age  HEENT:  Lids and lashes normal, pupils equal, round and reactive to light  NECK:  Supple, symmetrical, trachea midline, no adenopathy  LUNGS: FAIRLY CLEAR  ABDOMEN:  No scars, normal bowel sounds, soft, non-distended  NEUROLOGIC:  alert, oriented, normal speech, no focal findings or movement disorder noted  SKIN: no bruising or bleeding  EXTREMITIES: ace wraps LE's ++edema thighs    Medications     dextrose      sodium chloride Stopped (09/10/21 0349)        Data      CBC:   Recent Labs     09/14/21  0450   WBC 6.6   RBC 3.65*   HGB 12.1*   HCT 36.5*   PLT 98*     BMP:    Recent Labs     09/13/21  0430 09/13/21 1845 09/14/21  0450   * 145 144   K 3.8 3.5 3.9    105 106   CO2 32 32 31   BUN 25* 28* 26*   CREATININE 0.6* 0.7* 0.6*   CALCIUM 8.2* 8.5 8.3   GLUCOSE 167* 153* 144*     Phosphorus:    Recent Labs     09/13/21  0430 09/13/21 1845 09/14/21  0450   PHOS 2.7 2.6 3.1     Magnesium:    Recent Labs     09/13/21 0430 09/13/21 1845 09/14/21  0450   MG 1.80 1.90 1.90         ASSESSMENT     Patient Active Problem List   Diagnosis    Pure hypercholesterolemia    Anxiety    Essential hypertension    Moderate persistent asthma with acute exacerbation    [facility] ([facility] [address]    Hypokalemia    Acute respiratory failure with hypoxia (HCC)    Abnormal CT of the chest    COPD exacerbation (HCC)    Lung nodules    Tobacco abuse       PLAN    Hypertension resistant to treatment with multiple meds Concern for secondary HTN including Hypercortisolism ( Cortisol level 90 with adrenal nodule      24 hour urine for cortisol  Elevated at 14755 !!  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   BP fair Likely due to fluid overload and Hypercortisolism   Lasix resumed off of IVF's Hoping to see improvement in BP as ACTH levels fall  Continue Aldactone      -Poorly differentiated small cell Lung CA started on carbo/etoposide/atezolizumab Monitoring labs for TLS       Hypokalemia profound possibly related to Hypercortisolism and renal K+ wasting , started on Aldactone K+ 3.9 meq on supplement Aldactone dose  Increased  to 374 mg bid   Metabolic alkalosis: due to above    Edema Doppler negative for DVT U/A with + protein serum Albumin 3.5 gm Suspect fluid retention due to hypercortisolism. Edema worsening Wt up 10 lbs since last week CXR noted Lasix restarted UOP 3 L after one dose of Lasix off of IVF's     COPD exacerbation on prednisone      Elevated LFT's stable

## 2021-09-14 NOTE — PROGRESS NOTES
[facility] Internal Medicine Note      Chief Complaint: I feel ok    Subjective/Interval History: This morning the patient is sitting up stating he feels okay. He is eating and drinking okay. He has completed chemotherapy. Oxygen requirement is reduced to 5 L per nasal cannula. No other new problems overnight. Case discussed with Dr. Arun Obando by phone. She has already discontinued the IV fluids. No chest pain. No cough or sputum. No nausea, vomiting, diarrhea. No abdominal pain. No dysuria. The remainder of the review of systems is negative. PMH, PSH, FH/SH reviewed and unchanged as documented in the H&P personally documented at admission 21    Medication list reviewed    Objective:    BP (!) 158/87   Pulse 88   Temp 98.2 °F (36.8 °C) (Oral)   Resp 16   Ht 6' (1.829 m)   Wt 251 lb 15.8 oz (114.3 kg)   SpO2 93%   BMI 34.18 kg/m²   Temp  Av °F (36.7 °C)  Min: 97.7 °F (36.5 °C)  Max: 98.2 °F (36.8 °C)    RRR  Chest-respirations easy. The chest remains clear today. No wheezes or rhonchi or crackles. Air movement reduced in the right base. Abd- BS+, soft, nondistended  Ext-more pronounced edema in his lower extremities today. 2-3+ throughout.     The Following Labs Were Reviewed Today:    Recent Results (from the past 24 hour(s))   POCT Glucose    Collection Time: 21 11:43 AM   Result Value Ref Range    POC Glucose 249 (H) 70 - 99 mg/dl    Performed on ACCU-CHEK    POCT Glucose    Collection Time: 21  1:43 PM   Result Value Ref Range    POC Glucose 224 (H) 70 - 99 mg/dl    Performed on ACCU-CHEK    POCT Glucose    Collection Time: 21  3:14 PM   Result Value Ref Range    POC Glucose 174 (H) 70 - 99 mg/dl    Performed on ACCU-CHEK    Basic Metabolic Panel    Collection Time: 21  6:45 PM   Result Value Ref Range    Sodium 145 136 - 145 mmol/L    Potassium 3.5 3.5 - 5.1 mmol/L    Chloride 105 99 - 110 mmol/L    CO2 32 21 - 32 mmol/L    Anion Gap 8 3 - 16 Glucose 153 (H) 70 - 99 mg/dL    BUN 28 (H) 7 - 20 mg/dL    CREATININE 0.7 (L) 0.9 - 1.3 mg/dL    GFR Non-African American >60 >60    GFR African American >60 >60    Calcium 8.5 8.3 - 10.6 mg/dL   Lactate Dehydrogenase    Collection Time: 09/13/21  6:45 PM   Result Value Ref Range     (H) 100 - 190 U/L   Magnesium    Collection Time: 09/13/21  6:45 PM   Result Value Ref Range    Magnesium 1.90 1.80 - 2.40 mg/dL   Phosphorus    Collection Time: 09/13/21  6:45 PM   Result Value Ref Range    Phosphorus 2.6 2.5 - 4.9 mg/dL   Uric Acid    Collection Time: 09/13/21  6:45 PM   Result Value Ref Range    Uric Acid, Serum 1.9 (L) 3.5 - 7.2 mg/dL   POCT Glucose    Collection Time: 09/13/21  9:17 PM   Result Value Ref Range    POC Glucose 147 (H) 70 - 99 mg/dl    Performed on ACCU-CHEK    Basic Metabolic Panel    Collection Time: 09/14/21  4:50 AM   Result Value Ref Range    Sodium 144 136 - 145 mmol/L    Potassium 3.9 3.5 - 5.1 mmol/L    Chloride 106 99 - 110 mmol/L    CO2 31 21 - 32 mmol/L    Anion Gap 7 3 - 16    Glucose 144 (H) 70 - 99 mg/dL    BUN 26 (H) 7 - 20 mg/dL    CREATININE 0.6 (L) 0.9 - 1.3 mg/dL    GFR Non-African American >60 >60    GFR African American >60 >60    Calcium 8.3 8.3 - 10.6 mg/dL   Lactate Dehydrogenase    Collection Time: 09/14/21  4:50 AM   Result Value Ref Range     (H) 100 - 190 U/L   Magnesium    Collection Time: 09/14/21  4:50 AM   Result Value Ref Range    Magnesium 1.90 1.80 - 2.40 mg/dL   Phosphorus    Collection Time: 09/14/21  4:50 AM   Result Value Ref Range    Phosphorus 3.1 2.5 - 4.9 mg/dL   CBC Auto Differential    Collection Time: 09/14/21  4:50 AM   Result Value Ref Range    WBC 6.6 4.0 - 11.0 K/uL    RBC 3.65 (L) 4.20 - 5.90 M/uL    Hemoglobin 12.1 (L) 13.5 - 17.5 g/dL    Hematocrit 36.5 (L) 40.5 - 52.5 %    .0 80.0 - 100.0 fL    MCH 33.2 26.0 - 34.0 pg    MCHC 33.2 31.0 - 36.0 g/dL    RDW 14.2 12.4 - 15.4 %    Platelets 98 (L) 255 - 450 K/uL    MPV 8.2 5.0 - 10.5 fL    Neutrophils % 94.3 %    Lymphocytes % 4.7 %    Monocytes % 1.0 %    Eosinophils % 0.0 %    Basophils % 0.0 %    Neutrophils Absolute 6.2 1.7 - 7.7 K/uL    Lymphocytes Absolute 0.3 (L) 1.0 - 5.1 K/uL    Monocytes Absolute 0.1 0.0 - 1.3 K/uL    Eosinophils Absolute 0.0 0.0 - 0.6 K/uL    Basophils Absolute 0.0 0.0 - 0.2 K/uL   Hepatic Function Panel    Collection Time: 09/14/21  4:50 AM   Result Value Ref Range    Total Protein 4.9 (L) 6.4 - 8.2 g/dL    Albumin 3.0 (L) 3.4 - 5.0 g/dL    Alkaline Phosphatase 219 (H) 40 - 129 U/L     (H) 10 - 40 U/L    AST 50 (H) 15 - 37 U/L    Total Bilirubin 0.8 0.0 - 1.0 mg/dL    Bilirubin, Direct 0.3 0.0 - 0.3 mg/dL    Bilirubin, Indirect 0.5 0.0 - 1.0 mg/dL   Uric Acid    Collection Time: 09/14/21  4:50 AM   Result Value Ref Range    Uric Acid, Serum 2.2 (L) 3.5 - 7.2 mg/dL   POCT Glucose    Collection Time: 09/14/21  6:13 AM   Result Value Ref Range    POC Glucose 202 (H) 70 - 99 mg/dl    Performed on ACCU-CHEK        ASSESSMENT/PLAN:      Principal Problem:    Hypokalemia-potassium is okay today. Continue to monitor. Remains on Aldactone and potassium supplementation. Restarting twice daily Lasix today. Continue to monitor. Active Problems:    Metastatic small cell lung cancer, poorly differentiated-patient has tolerated this round of chemotherapy. Discussed with Dr. Jeanmarie Guerra. Hopefully there will be some rapid decline in ACTH activity. Continue to monitor closely as we work to continue to control his blood pressure and swelling in sugar. Moderate persistent asthma with acute exacerbation-oxygen requirement improved. No wheezing noted. Continue to monitor. Pure hypercholesterolemia-continue to hold Lipitor given abnormal LFTs. LFTs remain abnormal.  Continue to monitor. Essential hypertension-blood pressure somewhat improved but still too high. Continue to monitor. Suspect volume overload is contributing to the elevated blood pressure as well. Bilateral leg edema-received a single dose of Lasix yesterday. Give twice daily Lasix today and monitor renal function and weights and I's/O's    Steroid-induced hyperglycemia-remains on Lantus, sliding scale insulin, and mealtime insulin. Continue to titrate to get better control. Hypomagnesemia- continue to monitor and supplement as needed. Magnesium is okay today.     Amelia Christie MD, FACP  8:11 AM  9/14/2021

## 2021-09-14 NOTE — RT PROTOCOL NOTE
RT Inhaler-Nebulizer Bronchodilator Protocol Note    There is a bronchodilator order in the chart from a provider indicating to follow the RT Bronchodilator Protocol and there is an Initiate RT Bronchodilator Protocol order as well (see protocol at bottom of note). The findings from the last RT Protocol Assessment were as follows:  Smoking: >15 Pack years  Surgical Status: No surgery  Xray: Clear  Respiratory Pattern: RR 12-20  Mental Status: Alert and Oriented  Breath Sounds: Clear  Cough: Strong, spontaneous, non-productive  Activity Level: Walking unassisted  Oxygen Requirement: 29% or 3LNC - 5LNC/40%  Indication for Bronchodilator Therapy: Decreased or absent breath sounds  Bronchodilator Assessment Score: 2    Aerosolized bronchodilator medication orders have been revised according to the RT Bronchodilator Protocol. RT Inhaler-Nebulizer Bronchodilator Protocol:    Respiratory Therapist to perform RT Therapy Protocol Assessment then follow the protocol. No Indications - adjust the frequency to every 6 hours PRN wheezing or bronchospasm, if no treatments needed after 48 hours then discontinue using Per Protocol order mode. If indication present, adjust the RT bronchodilator orders based on the Bronchodilator Assessment Score as follows:    0-6 - enter or revise RT bronchodilator order to Albuterol Inhaler order with frequency of every 2 hours PRN for wheezing or increased work of breathing using Per Protocol order mode. If Albuterol Inhaler not tolerated or not effective, then discontinue the Albuterol Inhaler order and enter Albuterol Nebulizer order with same frequency and PRN reasons. Repeat RT Therapy Protocol Assessment as needed.     7-10 - discontinue any other Inpatient aerosolized bronchodilator medication orders and enter or revise two Albuterol Inhaler orders, one with BID frequency and one with frequency of every 2 hours PRN wheezing or increased work of breathing using Per Protocol

## 2021-09-14 NOTE — PLAN OF CARE
Problem: Infection:  Goal: Will remain free from infection  Description: Will remain free from infection  Outcome: Ongoing  Note: Pt remains free of infection. Will monitor patient, labs and vitals. Problem: Safety:  Goal: Free from accidental physical injury  Description: Free from accidental physical injury  Outcome: Ongoing  Note: Pt is free of injury. No injury noted. Fall precautions in place. Call light within reach. Will monitor. Goal: Free from intentional harm  Description: Free from intentional harm  Outcome: Ongoing  Note: Pt is free of intentional harm. No intentions noted. Will monitor. Problem: Daily Care:  Goal: Daily care needs are met  Description: Daily care needs are met  Outcome: Ongoing  Note: Daily care needs are being met by patient and staff. Will monitor. Problem: Pain:  Goal: Patient's pain/discomfort is manageable  Description: Patient's pain/discomfort is manageable  Outcome: Ongoing  Note: Pt assessed for pain. Pt denies any pain at this time. Will continue to monitor pt and assess for pain throughout rest of shift. Problem: Skin Integrity:  Goal: Skin integrity will stabilize  Description: Skin integrity will stabilize  Outcome: Ongoing  Note: Pt skin integrity is being maintained. Will continue to monitor and encourage patient to turn and reposition every two hours. Problem: Discharge Planning:  Goal: Patients continuum of care needs are met  Description: Patients continuum of care needs are met  Outcome: Ongoing  Note: Pt LUIS needs are being met. SW and medical team are following. Will monitor. Problem: Activity Intolerance:  Goal: Ability to tolerate increased activity will improve  Description: Ability to tolerate increased activity will improve  Outcome: Ongoing  Note: Pt activity is being maintained. Will monitor.       Problem: Airway Clearance - Ineffective:  Goal: Ability to maintain a clear airway will improve  Description: Ability to maintain a clear airway will improve  Outcome: Ongoing  Note: Pt maintaining a clear airway. No distress noted. Will monitor.       Problem: Breathing Pattern - Ineffective:  Goal: Ability to achieve and maintain a regular respiratory rate will improve  Description: Ability to achieve and maintain a regular respiratory rate will improve  Outcome: Ongoing     Problem: Gas Exchange - Impaired:  Goal: Levels of oxygenation will improve  Description: Levels of oxygenation will improve  Outcome: Ongoing     Problem: Health Behavior:  Goal: Identification of resources available to assist in meeting health care needs will improve  Description: Identification of resources available to assist in meeting health care needs will improve  Outcome: Ongoing     Problem: Physical Regulation:  Goal: Complications related to the disease process, condition or treatment will be avoided or minimized  Description: Complications related to the disease process, condition or treatment will be avoided or minimized  Outcome: Ongoing

## 2021-09-14 NOTE — PROGRESS NOTES
Checking on patient Q2H for nutrition needs, hygiene needs, comfort measures, mobility, fall risk interventions, and safe environment. All precautions and interventions in place. Educated patient on use of call light and telephone. Patient verbalizes understanding. Call light/telephone in reach.   Electronically signed by Hal Alexander RN on 9/14/2021 at 7:47 AM

## 2021-09-14 NOTE — PLAN OF CARE
Problem: Infection:  Goal: Will remain free from infection  Description: Will remain free from infection  9/13/2021 2312 by Manuela Ortega RN  Outcome: Ongoing  Note: Patient will remain free from infection  9/13/2021 2029 by Christian Barcenas RN  Outcome: Ongoing  Note: Pt remains free of infection. Will monitor patient, labs and vitals. Problem: Safety:  Goal: Free from accidental physical injury  Description: Free from accidental physical injury  9/13/2021 2312 by Manuela Ortega RN  Outcome: Ongoing  Note: Patient will be free from accidental physical injury  9/13/2021 2029 by Christian Barcenas RN  Outcome: Ongoing  Note: Pt is free of injury. No injury noted. Fall precautions in place. Call light within reach. Will monitor. Goal: Free from intentional harm  Description: Free from intentional harm  9/13/2021 2312 by Manuela Ortega RN  Outcome: Ongoing  Note: Patient will be free from intentional harm  9/13/2021 2029 by Christian Barcenas RN  Outcome: Ongoing  Note: Pt is free of intentional harm. No intentions noted. Will monitor. Problem: Daily Care:  Goal: Daily care needs are met  Description: Daily care needs are met  9/13/2021 2312 by Manuela Ortega RN  Outcome: Ongoing  Note: Patients daily care needs will be met  9/13/2021 2029 by Christian Barcenas RN  Outcome: Ongoing  Note: Daily care needs are being met by patient and staff. Will monitor. Problem: Pain:  Goal: Patient's pain/discomfort is manageable  Description: Patient's pain/discomfort is manageable  9/13/2021 2312 by Manuela Ortega RN  Outcome: Ongoing  Note: Patients pain will be manageable  9/13/2021 2029 by Christian Barcenas RN  Outcome: Ongoing  Note: Pt assessed for pain. Pt denies any pain at this time. Will continue to monitor pt and assess for pain throughout rest of shift.        Problem: Skin Integrity:  Goal: Skin integrity will stabilize  Description: Skin integrity will stabilize  9/13/2021 2312 by [facility] Jaspal Gurrola RN  Outcome: Ongoing  Note: Patients skin integrity will stabilize  9/13/2021 2029 by Maribell Ruff RN  Outcome: Ongoing  Note: Pt skin integrity is being maintained. Will continue to monitor and encourage patient to turn and reposition every two hours. Problem: Discharge Planning:  Goal: Patients continuum of care needs are met  Description: Patients continuum of care needs are met  9/13/2021 2312 by Bandar Baez RN  Outcome: Ongoing  Note: Patients continuum of care needs will be met  9/13/2021 2029 by Maribell Ruff RN  Outcome: Ongoing  Note: Pt LUIS needs are being met. SW and medical team are following. Will monitor. Problem: Activity Intolerance:  Goal: Ability to tolerate increased activity will improve  Description: Ability to tolerate increased activity will improve  9/13/2021 2312 by Bandar Baez RN  Outcome: Ongoing  Note: Patients ability to tolerate increased activity will improve  9/13/2021 2029 by Maribell Ruff RN  Outcome: Ongoing  Note: Pt activity is being maintained. Will monitor. Problem: Airway Clearance - Ineffective:  Goal: Ability to maintain a clear airway will improve  Description: Ability to maintain a clear airway will improve  9/13/2021 2312 by Bandar Baez RN  Outcome: Ongoing  Note: Patients ability to maintain a clear airway will improve  9/13/2021 2029 by Maribell Ruff RN  Outcome: Ongoing  Note: Pt maintaining a clear airway. No distress noted. Will monitor.       Problem: Breathing Pattern - Ineffective:  Goal: Ability to achieve and maintain a regular respiratory rate will improve  Description: Ability to achieve and maintain a regular respiratory rate will improve  9/13/2021 2312 by Bandar Baez RN  Outcome: Ongoing  Note: Patient will maintain regular respiratory rate  9/13/2021 2029 by Maribell Ruff RN  Outcome: Ongoing     Problem: Gas Exchange - Impaired:  Goal: Levels of oxygenation will improve  Description: Levels of oxygenation will improve  9/13/2021 2312 by Danae Heaton RN  Outcome: Ongoing  Note: Patient O2 level will improve  9/13/2021 2029 by Toñito Shipman RN  Outcome: Ongoing     Problem: Health Behavior:  Goal: Identification of resources available to assist in meeting health care needs will improve  Description: Identification of resources available to assist in meeting health care needs will improve  9/13/2021 2312 by Danae Heaton RN  Outcome: Ongoing  Note: Patients identification of resources available will improve  9/13/2021 2029 by Toñito Shipman RN  Outcome: Ongoing     Problem: Physical Regulation:  Goal: Complications related to the disease process, condition or treatment will be avoided or minimized  Description: Complications related to the disease process, condition or treatment will be avoided or minimized  9/13/2021 2312 by Danae Heaton RN  Outcome: Ongoing  Note: Patient will have minimal complications related to disease process  9/13/2021 2029 by Toñito Shipman RN  Outcome: Ongoing

## 2021-09-14 NOTE — PROGRESS NOTES
Etoposide infusion ran over 120 mins at 255 mL/hour. Blood returned noted, before, during, and after administration of IV medication. No s/sx of infiltration, phlebitis, or extravsation. VSS. No overt s/sx of distress. Pt tolerating well and without complications. Will continue to monitor and assess.  Electronically signed by Sommer De Guzman RN on 9/13/2021 at 8:19 PM

## 2021-09-14 NOTE — PROGRESS NOTES
Tecentiq infusion ran over 60 mins at 270 mL/hour. Blood returned noted, before, during, and after administration of IV medication. No s/sx of infiltration, phlebitis, or extravsation. VSS. No overt s/sx of distress. Pt tolerating well and without complications. Will continue to monitor and assess.  Electronically signed by Christian Barcenas RN on 9/13/2021 at 8:21 PM

## 2021-09-14 NOTE — PROGRESS NOTES
[facility]  Oncology Hematology Care  Progress Note      SUBJECTIVE:   PT finished chemo   HE is due in 3 weeks  He is doing ok with nausea  NO severe tumor lysis   MORe or less he did reasonable on this     OBJECTIVE      Medications    Current Facility-Administered Medications: Tbo-Filgrastim (GRANIX) injection 300 mcg, 300 mcg, SubCUTAneous, QPM  prochlorperazine (COMPAZINE) tablet 10 mg, 10 mg, Oral, Q6H PRN  doxazosin (CARDURA) tablet 4 mg, 4 mg, Oral, 2 times per day  sodium chloride (Inhalant) 3 % nebulizer solution 4 mL, 4 mL, Nebulization, BID  insulin glargine (LANTUS) injection vial 34 Units, 34 Units, SubCUTAneous, Daily  spironolactone (ALDACTONE) tablet 100 mg, 100 mg, Oral, BID  potassium chloride (KLOR-CON M) extended release tablet 40 mEq, 40 mEq, Oral, BID WC  insulin lispro (HUMALOG) injection vial 5 Units, 5 Units, SubCUTAneous, TID WC  ondansetron (ZOFRAN) 8 mg in dextrose 5 % 50 mL IVPB, 8 mg, IntraVENous, Q8H PRN  enoxaparin (LOVENOX) injection 40 mg, 40 mg, SubCUTAneous, Daily  insulin lispro (HUMALOG) injection vial 0-18 Units, 0-18 Units, SubCUTAneous, TID WC  insulin lispro (HUMALOG) injection vial 0-9 Units, 0-9 Units, SubCUTAneous, Nightly  cloNIDine (CATAPRES) tablet 0.2 mg, 0.2 mg, Oral, Q4H PRN  hydrALAZINE (APRESOLINE) injection 10 mg, 10 mg, IntraVENous, Q4H PRN  acetaminophen (TYLENOL) tablet 650 mg, 650 mg, Oral, Q4H PRN  NIFEdipine (PROCARDIA XL) extended release tablet 60 mg, 60 mg, Oral, BID  lisinopril (PRINIVIL;ZESTRIL) tablet 40 mg, 40 mg, Oral, Daily  glucose (GLUTOSE) 40 % oral gel 15 g, 15 g, Oral, PRN  dextrose 50 % IV solution, 12.5 g, IntraVENous, PRN  glucagon (rDNA) injection 1 mg, 1 mg, IntraMUSCular, PRN  dextrose 5 % solution, 100 mL/hr, IntraVENous, PRN  iopamidol (ISOVUE-370) 76 % injection 75 mL, 75 mL, IntraVENous, ONCE PRN  budesonide-formoterol (SYMBICORT) 160-4.5 MCG/ACT inhaler 2 puff, 2 puff, Inhalation, BID  montelukast (SINGULAIR) tablet 10 mg, 10 mg, Oral, Nightly  sodium chloride flush 0.9 % injection 5-40 mL, 5-40 mL, IntraVENous, 2 times per day  sodium chloride flush 0.9 % injection 5-40 mL, 5-40 mL, IntraVENous, PRN  0.9 % sodium chloride infusion, 25 mL, IntraVENous, PRN  ondansetron (ZOFRAN-ODT) disintegrating tablet 4 mg, 4 mg, Oral, Q8H PRN **OR** ondansetron (ZOFRAN) injection 4 mg, 4 mg, IntraVENous, Q6H PRN  polyethylene glycol (GLYCOLAX) packet 17 g, 17 g, Oral, Daily PRN  [DISCONTINUED] acetaminophen (TYLENOL) tablet 650 mg, 650 mg, Oral, Q6H PRN **OR** acetaminophen (TYLENOL) suppository 650 mg, 650 mg, Rectal, Q6H PRN  nicotine (NICODERM CQ) 21 MG/24HR 1 patch, 1 patch, TransDERmal, Daily  ipratropium-albuterol (DUONEB) nebulizer solution 1 ampule, 1 ampule, Inhalation, Q4H WA  Physical    VITALS:  BP (!) 158/87   Pulse 88   Temp 98.2 °F (36.8 °C) (Oral)   Resp 16   Ht 6' (1.829 m)   Wt 251 lb 15.8 oz (114.3 kg)   SpO2 93%   BMI 34.18 kg/m²   TEMPERATURE:  Current - Temp: 98.2 °F (36.8 °C); Max - Temp  Av.9 °F (36.6 °C)  Min: 97.7 °F (36.5 °C)  Max: 98.2 °F (36.8 °C)  PULSE OXIMETRY RANGE: SpO2  Av.8 %  Min: 90 %  Max: 96 %  24HR INTAKE/OUTPUT:      Intake/Output Summary (Last 24 hours) at 2021 0802  Last data filed at 2021 0744  Gross per 24 hour   Intake 1304.53 ml   Output 3450 ml   Net -2145.47 ml       CONSTITUTIONAL:  awake, alert, cooperative, no apparent distress, HEENT oral pharynx , no scleral icterus  HEMATOLOGIC/LYMPHATICS:  no cervical lymphadenopathy, no supraclavicular lymphadenopathy,LUNGS:  No increased work of breathing, good air exchange, clear to auscultation bilaterally, no crackles or wheezing  CARDIOVASCULAR:  , regular rate and rhythm, normal S1 and S2, no S3 or S4, and no murmur noted  ABDOMEN:  No scars, normal bowel sounds, soft, non-distended, non-tender, no masses palpated, no hepatosplenomegally  MUSCULOSKELETAL:  There is no redness, warmth, or swelling of the joints.   EXTREMETIES:s+ edema NEUROLOGIC:  Awake, alert, oriented to name, place and time. =SKIN:  no bruising or bleeding      Data      Recent Labs     09/14/21  0450   WBC 6.6   HGB 12.1*   HCT 36.5*   PLT 98*   .0        Recent Labs     09/13/21  0430 09/13/21  1845 09/14/21  0450   * 145 144   K 3.8 3.5 3.9    105 106   CO2 32 32 31   PHOS 2.7 2.6 3.1   BUN 25* 28* 26*   CREATININE 0.6* 0.7* 0.6*     Recent Labs     09/14/21  0450   AST 50*   *   BILIDIR 0.3   BILITOT 0.8   ALKPHOS 219*       Magnesium:    Lab Results   Component Value Date    MG 1.90 09/14/2021    MG 1.90 09/13/2021    MG 1.80 09/13/2021       Imaging ECHO Complete 2D W Doppler W Color    Result Date: 9/3/2021  Transthoracic Echocardiography Report (TTE)  Demographics   Patient Name      Chema Mackayr   Date of Study     09/03/2021          Gender              Male   Patient Number    [id number]          Date of Birth       1970   Visit Number      [id number]           Age                 46 year(s)   Accession Number  [id number]          Room Number         4806   Corporate ID      [id number]             Sonographer         Aiyana Lipscomb RVT,                                                            RDCS   Ordering          Mally Forbes,             [address] [place], [place] [address]          Rikki Fabry     Physician           Branden Burns MD  Procedure Type of Study   TTE procedure:ECHOCARDIOGRAM COMPLETE 2D W DOPPLER W COLOR. Procedure Date Date: 09/03/2021 Start: 02:20 PM Study Location: [facility] - Echo Lab Technical Quality: Limited visualization due to poor acoustical window. Additional Indications:Lower extremity swelling, evaluate the right heart and LV; HTN, HLD, COPD.  Patient Status: Routine Height: 72 inches Weight: 247.01 pounds BSA: 2.33 m2 BMI: 33.5 kg/m2 Rhythm: Within normal limits HR: 70 bpm BP: 183/87 mmHg  Conclusions   Summary  Normal left ventricle size, wall thickness, and systolic function with an  estimated ejection fraction of 60-65%. No regional wall motion abnormalities  are seen. Normal diastolic function. Normal right ventricular size and function. No significant valve abnormalities noted. Signature   ------------------------------------------------------------------  Electronically signed by Ami Hastings MD  (Interpreting physician) on 09/03/2021 at 03:46 PM  ------------------------------------------------------------------   Findings   Left Ventricle  Normal left ventricle size, wall thickness, and systolic function with an  estimated ejection fraction of 60-65%. No regional wall motion abnormalities  are seen. Normal diastolic function. Mitral Valve  Mitral valve leaflets appear mildly thickened. No evidence of mitral regurgitation or stenosis. Left Atrium  The left atrium is normal in size. Aortic Valve  The aortic valve leaflets are not well visualized. No evidence of aortic valve regurgitation or stenosis. Aorta  The aortic root is normal in size. Right Ventricle  Normal right ventricular size and function. TAPSE 2.8 cm   Tricuspid Valve  The tricuspid valve was not well visualized. No evidence of tricuspid stenosis. Right Atrium  The right atrial size is normal.   Pulmonic Valve  The pulmonic valve is not well visualized. No evidence of pulmonic valve regurgitation. No evidence of pulmonic valve stenosis. Pericardial Effusion  No pericardial effusion noted. Pleural Effusion  No pleural effusion. Miscellaneous  IVC not well visualized.   M-Mode/2D Measurements (cm)   LV Diastolic Dimension: 0.06 cm LV Systolic Dimension: 8.59 cm  LV Septum Diastolic: 0.9 cm  LV PW Diastolic: 2.54 cm        AO Root Dimension: 3.4 cm                                   LA Area: 23.6 cm2  LVOT: 2.1 cm                    LA volume/Index: 70.5 ml /30 ml/m2  Doppler Measurements   AV Peak Velocity: 184 cm/s     MV Peak E-Wave: 106 cm/s  AV Peak Gradient: 13.54 mmHg   MV Peak A-Wave: 87.5 cm/s  AV Mean Gradient: 6 mmHg       MV E/A Ratio: 1.21  LVOT Peak Velocity: 160 cm/s   MV Mean Gradient: 2 mmHg  AV Area (Continuity):3.58 cm2  MV Max P mmHg                                 MV Vmax:114 cm/s                                 MV VTI:32.9 cm/s   E' Septal Velocity: 10.9 cm/s  MV Area (continuity): 3.61 cm2  E' Lateral Velocity: 15.5 cm/s MV Deceleration Time: 259 msec  PV Peak Velocity: 131 cm/s  PV Peak Gradient: 6.86 mmHg   Aortic Valve   Peak Velocity: 184 cm/s     Mean Velocity: 114 cm/s  Peak Gradient: 13.54 mmHg   Mean Gradient: 6 mmHg  Area (continuity): 3.58 cm2  AV VTI: 33.2 cm  Aorta   Aortic Root: 3.4 cm  Ascending Aorta: 3.3 cm  LVOT Diameter: 2.1 cm      XR CHEST (2 VW)    Result Date: 2021  EXAMINATION: TWO XRAY VIEWS OF THE CHEST 2021 8:48 am COMPARISON: None. HISTORY: ORDERING SYSTEM PROVIDED HISTORY: WEEMS (dyspnea on exertion) TECHNOLOGIST PROVIDED HISTORY: Reason for Exam: WEEMS, asthma Acuity: Unknown Type of Exam: Initial FINDINGS: Clear lungs. No pleural effusion or pneumothorax. Cardiomediastinal silhouette is unremarkable. Degenerative changes of the visualized osseous structures. Clear lungs. CT GUIDED NEEDLE PLACEMENT    Result Date: 2021  PROCEDURE: CT GUIDED CORE BIOPSY OF LEFT HEPATIC LOBE MASS MODERATE CONSCIOUS SEDATION 2021 HISTORY: ORDERING SYSTEM PROVIDED HISTORY: lesion TECHNOLOGIST PROVIDED HISTORY: Reason for exam:->lesion Reason for Exam: lesion Acuity: Acute Type of Exam: Initial; ORDERING SYSTEM PROVIDED HISTORY: bx TECHNOLOGIST PROVIDED HISTORY: Reason for exam:->bx Reason for Exam: lesion Acuity: Acute Type of Exam: Initial PHYSICIANS: Amilcar Brianne SEDATION: 0.5 mgversed and 25 mcg fentanyl were titrated intravenously for moderate sedation monitored under my direction.   Total intraservice time of sedation was 15 minutes. The patient's vital signs were monitored throughout the procedure and recorded in the patient's medical record by the nurse. TECHNIQUE: Informed consent was obtained after a detailed discussion about the procedure including the risk, benefits, and alternatives. Universal protocol was followed. Axial images were obtained through the liver and a suitable skin site was prepped and draped in sterile fashion. Local anesthesia was achieved with lidocaine. Under intermittent CT guidance, a 17 gauge coaxial needle was inserted into the a left hepatic lobe mass. Through this, 1 18 gauge core biopsy specimens were obtained using coaxial technique and submitted to pathology. Gel-Foam slurry was administered as the needle was removed. A sterile bandage was then placed. The patient tolerated the procedure well. Estimated blood loss: Less than 5 cc Dose modulation, iterative reconstruction, and/or weight based adjustment of the mA/kV was utilized to reduce the radiation dose to as low as reasonably achievable. DLP: 4982.16 mGy-cm     Successful CT guided core biopsy of a left hepatic lobe mass. CT ABDOMEN PELVIS W IV CONTRAST Additional Contrast? Oral    Result Date: 9/5/2021  EXAMINATION: CT OF THE ABDOMEN AND PELVIS WITH CONTRAST 9/3/2021 5:13 pm TECHNIQUE: CT of the abdomen and pelvis was performed with the administration of intravenous contrast. Multiplanar reformatted images are provided for review. Dose modulation, iterative reconstruction, and/or weight based adjustment of the mA/kV was utilized to reduce the radiation dose to as low as reasonably achievable. COMPARISON: CT pulmonary angiogram performed September 2, 2021. HISTORY: ORDERING SYSTEM PROVIDED HISTORY: Metastatic disease, evaluate for primary. Bilateral lower ext edema, eval for IVC thrombosis/compression TECHNOLOGIST PROVIDED HISTORY: Reason for exam:->Metastatic disease, evaluate for primary.   Bilateral lower ext edema, eval for IVC thrombosis/compression Additional Contrast?->Oral FINDINGS: Images are degraded by motion artifact. Lower Chest: Respiratory motion artifact. Visualized pulmonary nodules and enlarged mediastinal lymph node, unchanged from prior CT chest. Organs: There are multifocal hypoattenuating lesions throughout the liver. Lesion within the left hepatic lobe measures 6.1 x 5.7 cm (axial image 35). Lesion within the right hepatic lobe measures 3.6 x 2.6 cm (axial image 51). The gallbladder is normally distended with layering hyperintensity suggestive of sludge. No calcified stones. No gallbladder wall thickening or pericholecystic fluid. The spleen and pancreas are grossly unremarkable. Right adrenal gland nodule measures up to 1.6 cm. There is mild thickening of the left adrenal gland without definite nodularity. There is symmetric renal cortical enhancement. Simple appearing right renal cysts demonstrated. No hydronephrosis. GI/Bowel: There is a hypoattenuating nodule in the region of the 2/3 portion of the duodenum in the region of the ampulla which measures up to 1.3 cm (axial image 83). There is diffuse fold thickening involving the majority of the jejunum. No evidence of obstruction. Sigmoid diverticulosis without evidence of acute diverticulitis. The appendix is normal. Pelvis: Prostate and seminal vesicles are grossly unremarkable. The urinary bladder is within normal limits. Peritoneum/Retroperitoneum: No significant lymphadenopathy. The abdominal aorta is normal in course and caliber with scattered atherosclerotic disease. No definite filling defects within the IVC, the iliac veins, or visualized femoral veins to suggest thrombus. Bones/Soft Tissues: There is asymmetric thickening of the inferior right rectus abdominus musculature with mild surrounding stranding. Visualization is partially obscured by streak artifact from dense oral contrast in the adjacent small bowel.   Body wall edema demonstrated. No focal drainable collections. Multilevel moderate degenerative changes in the visualized spine. No destructive osseous lesions. 1. Multiple hypoattenuating liver lesions which are most suggestive of metastatic disease. 2. Right adrenal gland nodule measuring up to 1.6 cm, indeterminate. 3. Hypoattenuating nodule in the region of the 2/3 portion of the duodenum in the region of the ampulla measuring up to 1.3 cm. Findings could be artifactual or related to fold thickening; however, a small ampullary or possibly pancreatic lesion cannot be entirely excluded. 4. Diffuse fold thickening involving the majority of the jejunum, nonspecific but can be related to an infectious etiology or lymphatic obstruction. 5. No definite filling defects in the IVC, iliac veins, or visualized femoral veins to suggest thrombus. 6. Asymmetric thickening of the inferior right rectus abdominus musculature with surrounding stranding which is partially obscured by adjacent streak artifact from dense oral contrast in the small bowel. No definite focal lesion is demonstrated; however, an underlying lesion cannot be entirely excluded. Hematoma is also a consideration. CT CHEST PULMONARY EMBOLISM W CONTRAST    Result Date: 9/5/2021  EXAMINATION: CTA OF THE CHEST 9/2/2021 12:47 pm TECHNIQUE: CTA of the chest was performed after the administration of intravenous contrast.  Multiplanar reformatted images are provided for review. MIP images are provided for review. Dose modulation, iterative reconstruction, and/or weight based adjustment of the mA/kV was utilized to reduce the radiation dose to as low as reasonably achievable. COMPARISON: None.  HISTORY: ORDERING SYSTEM PROVIDED HISTORY: leg swelling sob new o2 requirement TECHNOLOGIST PROVIDED HISTORY: Reason for exam:->leg swelling sob new o2 requirement Decision Support Exception - unselect if not a suspected or confirmed emergency medical condition->Emergency Medical Condition (MA) Reason for Exam: leg swelling sob new o2 requirement Acuity: Acute Type of Exam: Initial FINDINGS: Pulmonary Arteries:  No central, lobar, or segmental pulmonary embolus. Narrowing of the right lower lobe pulmonary artery by the adenopathy. Mediastinum: Heart size is normal.  No pericardial effusion. Thoracic aorta is normal caliber. Extensive mediastinal and hilar lymphadenopathy is present. Subcarinal node measures 32 x 37 mm. Right hilar node measures 30 x 29 mm. Lungs/pleura: Moderate emphysema. Multiple 3 mm nodules are present in the right middle lobe and left lower lobe. Superimposed in the posterior right lower lobe, there is a dominant nodule measuring 17 x 17 mm. An additional right lower lobe nodule measures 11 mm. No pleural effusion or pneumothorax. Narrowing of bronchus intermedius due to the right hilar adenopathy. Upper Abdomen: Multiple low-attenuation liver lesions are present measuring up to 48 x 40 mm and 26 x 28 mm. At least 30 lesions are noted in the visualized portions of the liver. Soft Tissues/Bones: No lytic or blastic osseous lesion. 1. No acute pulmonary embolus. 2. Extensive mediastinal and hilar lymphadenopathy compatible with metastatic neoplasm. 3. Multiple new indeterminate 3 mm nodules in the right middle lobe and left lower lobe. These could be infectious or neoplastic. 4. Right lower lobe 17 mm and 11 mm nodules are nonspecific, but metastatic disease not excluded. 5. Multiple liver metastases measuring up to 48 x 40 mm.  RECOMMENDATIONS:     CT NEEDLE BIOPSY LIVER PERCUTANEOUS    Result Date: 9/8/2021  PROCEDURE: CT GUIDED CORE BIOPSY OF LEFT HEPATIC LOBE MASS MODERATE CONSCIOUS SEDATION 9/7/2021 HISTORY: ORDERING SYSTEM PROVIDED HISTORY: lesion TECHNOLOGIST PROVIDED HISTORY: Reason for exam:->lesion Reason for Exam: lesion Acuity: Acute Type of Exam: Initial; ORDERING SYSTEM PROVIDED HISTORY: bx TECHNOLOGIST PROVIDED HISTORY: Reason for exam:->bx Reason for Exam: lesion Acuity: Acute Type of Exam: Initial PHYSICIANS: Amilcar Decker SEDATION: 0.5 mgversed and 25 mcg fentanyl were titrated intravenously for moderate sedation monitored under my direction. Total intraservice time of sedation was 15 minutes. The patient's vital signs were monitored throughout the procedure and recorded in the patient's medical record by the nurse. TECHNIQUE: Informed consent was obtained after a detailed discussion about the procedure including the risk, benefits, and alternatives. Universal protocol was followed. Axial images were obtained through the liver and a suitable skin site was prepped and draped in sterile fashion. Local anesthesia was achieved with lidocaine. Under intermittent CT guidance, a 17 gauge coaxial needle was inserted into the a left hepatic lobe mass. Through this, 1 18 gauge core biopsy specimens were obtained using coaxial technique and submitted to pathology. Gel-Foam slurry was administered as the needle was removed. A sterile bandage was then placed. The patient tolerated the procedure well. Estimated blood loss: Less than 5 cc Dose modulation, iterative reconstruction, and/or weight based adjustment of the mA/kV was utilized to reduce the radiation dose to as low as reasonably achievable. DLP: 2497.24 mGy-cm     Successful CT guided core biopsy of a left hepatic lobe mass.      VL Extremity Venous Bilateral    Result Date: 9/8/2021  Lower Extremities DVT Study  Demographics   Patient Name       Meredith Carson   Date of Study      09/07/2021         Gender              Male   Patient Number     [id number]         Date of Birth       1970   Visit Number       [id number]          Age                 46 year(s)   Accession Number   [id number]         Room Number         7387   Corporate ID       [id number]            Sonographer         Jose Funes RVT   Ordering Physician Disha Alfred   Interpreting        James Buckner Kellen Hyde MD         Physician           MD  Procedure Type of Study:   Veins:Lower Extremities DVT Study, VASC EXTREMITY VENOUS DUPLEX BILATERAL. Vascular Sonographer Report  Indications for Study:Edema. Impressions Right Impression No evidence of deep vein or superficial vein thrombosis involving the right lower extremity in vessels clearly visualized. Proximal and Mid Posterior Tibial veins and Peroneal veins were not visualized due to swelling. Calf and ankle edema noted. Left Impression No evidence of deep vein or superficial vein thrombosis involving the left lower extremity in vessels clearly visualized. Proximal Posterior Tibial veins and Soleal vein were not visualized due to swelling. Calf and ankle edema noted. Conclusions   Summary   No evidence of deep vein or superficial vein thrombosis involving the  bilateral lower extremities in vessels clearly visualized as detailed above. Signature   ------------------------------------------------------------------  Electronically signed by Dave Major MD (Interpreting  physician) on 09/08/2021 at 08:09 AM  ------------------------------------------------------------------  Patient Status:Routine. Study [address] [facility]. Technical Quality:Limited visualization due to swelling. Risk Factors   - The patient's risk factor(s) include: dyslipidemia and arterial     hypertension.   - The patient has a current/recent (within 1 year) tobacco history. Velocities are measured in cm/s ; Diameters are measured in mm Right Lower Extremities DVT Study Measurements Right 2D Measurements +------------------------+----------+---------------+----------+ ! Location                ! Visualized! Compressibility! Thrombosis! +------------------------+----------+---------------+----------+ ! Sapheno Femoral Junction! Yes       ! Yes            ! None      ! +------------------------+----------+---------------+----------+ ! GSV Thigh !Yes       !Yes            ! None      ! +------------------------+----------+---------------+----------+ ! Common Femoral          !Yes       ! Yes            ! None      ! +------------------------+----------+---------------+----------+ ! Prox Femoral            !Yes       ! Yes            ! None      ! +------------------------+----------+---------------+----------+ ! Mid Femoral             !Yes       ! Yes            ! None      ! +------------------------+----------+---------------+----------+ ! Dist Femoral            !Yes       ! Yes            ! None      ! +------------------------+----------+---------------+----------+ ! Deep Femoral            !Yes       ! Yes            ! None      ! +------------------------+----------+---------------+----------+ ! Popliteal               !Yes       ! Yes            ! None      ! +------------------------+----------+---------------+----------+ ! Gastroc                 ! Yes       ! Yes            ! None      ! +------------------------+----------+---------------+----------+ ! Soleal                  !Yes       ! Yes            ! None      ! +------------------------+----------+---------------+----------+ ! PTV                     ! Partial   !Yes            ! None      ! +------------------------+----------+---------------+----------+ ! ATV                     ! Yes       ! Yes            ! None      ! +------------------------+----------+---------------+----------+ ! Peroneal                !Partial   !Yes            ! None      ! +------------------------+----------+---------------+----------+ ! GSV Calf                ! Yes       ! Yes            ! None      ! +------------------------+----------+---------------+----------+ ! SSV                     ! Yes       ! Yes            ! None      ! +------------------------+----------+---------------+----------+ Right Doppler Measurements +--------------+------+------+------------+ ! Location      ! Signal!Reflux! Reflux (sec)! +--------------+------+------+------------+ ! Common Femoral!Phasic! No    !            ! +--------------+------+------+------------+ ! Popliteal     !Phasic! No    !            ! +--------------+------+------+------------+ Left Lower Extremities DVT Study Measurements Left 2D Measurements +------------------------+----------+---------------+----------+ ! Location                ! Visualized! Compressibility! Thrombosis! +------------------------+----------+---------------+----------+ ! Sapheno Femoral Junction! Yes       ! Yes            ! None      ! +------------------------+----------+---------------+----------+ ! GSV Thigh               ! Yes       ! Yes            ! None      ! +------------------------+----------+---------------+----------+ ! Common Femoral          !Yes       ! Yes            ! None      ! +------------------------+----------+---------------+----------+ ! Prox Femoral            !Yes       ! Yes            ! None      ! +------------------------+----------+---------------+----------+ ! Mid Femoral             !Yes       ! Yes            ! None      ! +------------------------+----------+---------------+----------+ ! Dist Femoral            !Yes       ! Yes            ! None      ! +------------------------+----------+---------------+----------+ ! Deep Femoral            !Yes       ! Yes            ! None      ! +------------------------+----------+---------------+----------+ ! Popliteal               !Yes       ! Yes            ! None      ! +------------------------+----------+---------------+----------+ ! Gastroc                 ! Yes       ! Yes            ! None      ! +------------------------+----------+---------------+----------+ ! Soleal                  !No        !               !          ! +------------------------+----------+---------------+----------+ ! PTV                     ! Partial   !Yes            ! None      ! +------------------------+----------+---------------+----------+ ! ATV                     ! Yes !Yes            !None      ! +------------------------+----------+---------------+----------+ ! Peroneal                !Yes       ! Yes            ! None      ! +------------------------+----------+---------------+----------+ ! GSV Calf                ! Yes       ! Yes            ! None      ! +------------------------+----------+---------------+----------+ ! SSV                     ! Yes       ! Yes            ! None      ! +------------------------+----------+---------------+----------+ Left Doppler Measurements +--------------+------+------+------------+ ! Location      ! Signal!Reflux! Reflux (sec)! +--------------+------+------+------------+ ! Common Femoral!Phasic! No    !            ! +--------------+------+------+------------+ ! Popliteal     !Phasic! No    !            ! +--------------+------+------+------------+      Problem List  Patient Active Problem List   Diagnosis    Pure hypercholesterolemia    Anxiety    Essential hypertension    Moderate persistent asthma with acute exacerbation    Metastatic cancer ([facility] [address]    Hypokalemia    Acute respiratory failure with hypoxia (HCC)    Abnormal CT of the chest    COPD exacerbation (HCC)    Lung nodules    Tobacco abuse       ASSESSMENT AND PLAN    Extensive stage small cell  Ectopic acth -agree related to the disease   Rare paraneoplastic syndrome   HE is day 4 carbo etoposide atezolizumab  HE is finished with chemo   He will need granix daily -start today =wbc may increase be mindful this can worry people about infection but he will drop in a week   I will stop allopurinol  I stopped his fluids     ?  Is there a desire to restart lasix-defer to either Dr Zachery Pineda or nephrology     I think tele can be likely held     WOuld be mindful as treatment takes effect-I am not sure when his blood pressure will improve as if we treat the cortisol should decrease and thus his need for meds may change  I think monitering his bp closely -we may get an idea of how it will go over the next 3 weeks     Flavio Glover MD, MD

## 2021-09-15 NOTE — PROGRESS NOTES
Checking on patient Q2H for nutrition needs, hygiene needs, comfort measures, mobility, fall risk interventions, and safe environment. All precautions and interventions in place. Educated patient on use of call light and telephone. Patient verbalizes understanding. Call light/telephone in reach.   Electronically signed by Goran Kamara RN on 9/15/2021 at 7:35 AM

## 2021-09-15 NOTE — PLAN OF CARE
Problem: Infection:  Goal: Will remain free from infection  Description: Will remain free from infection  9/15/2021 0730 by Alberto Gallegos RN  Outcome: Ongoing  Note: Patient is alert and oriented, afebrile, has manageable complaints of pain, skin is intact and appropriate for ethnicity in color    9/15/2021 0016 by Pj Moran RN  Outcome: Ongoing  Note: Patient will remain free from infection. Problem: Safety:  Goal: Free from accidental physical injury  Description: Free from accidental physical injury  9/15/2021 0730 by Alberto Gallegos RN  Outcome: Ongoing  Note: Fall risk assessment completed . Fall precautions in place, bed alarm on, side rails 2/4 up, call light in reach, educated pt on calling for assistance when needed, room clear of clutter. Pt verbalized understanding. 9/15/2021 0016 by Pj Moran RN  Outcome: Ongoing  Note: Pt assessed for fall risk and fall precautions put into place. Bed in lowest position and wheels locked, call light within reach. Nonskid footwear in place. Patient educated on appropriate method of transfer and to call for assistance. Goal: Free from intentional harm  Description: Free from intentional harm  9/15/2021 0730 by Alberto Gallegos RN  Outcome: Ongoing  9/15/2021 0016 by Pj Moran RN  Outcome: Ongoing  Note: Patient remains free from intentional harm, no signs or symptoms of intention to harm noted. Will continue to monitor patient throughout shift. Problem: Daily Care:  Goal: Daily care needs are met  Description: Daily care needs are met  9/15/2021 0730 by Alberto Gallegos RN  Outcome: Ongoing  Note: Checking on patient Q2H for nutrition needs, hygiene needs, comfort measures, mobility, fall risk interventions, and safe environment. All precautions and interventions in place. Educated patient on use of call light and telephone. Patient verbalizes understanding. Call light/telephone in reach.     9/15/2021 0016 by Kavita Regan Kendrick Phillip RN  Outcome: Ongoing  Note: Patient assessed to determine ability to perform daily needs and ADLs. Patient assisted with any daily needs that were not able to be met individually by patient. Ada encouraged, although patient educated to ask for assistance when needed. Will continue to monitor and assist patient with meeting daily care needs as needed. Problem: Pain:  Goal: Patient's pain/discomfort is manageable  Description: Patient's pain/discomfort is manageable  9/15/2021 0730 by Karissa Rojas RN  Outcome: Ongoing  Note: Pain /discomfort being managed with PRN analgesics per MD orders. Patient able to express presence and absence of pain and rate pain appropriately using numerical scale. 9/15/2021 0016 by Carolina Arshad RN  Outcome: Ongoing  Note: Educated patient on pain management. Will assess patients pain level per unit protocol, and provide pain management measures as needed. Problem: Skin Integrity:  Goal: Skin integrity will stabilize  Description: Skin integrity will stabilize  9/15/2021 0730 by Karissa Rojas RN  Outcome: Ongoing  Note: Chad score assessed. Patient able to ambulate and turn self and repositioned patient Q2H and assessed skin. Educated patient on importance of repositioning to prevent skin issues. 9/15/2021 0016 by Carolina Arshad RN  Outcome: Ongoing  Note: Will monitor skin and mucous members. Will turn patient every 2 hours, monitor for friction and sheering, and change dressings as needed. Will preform skin assessment every shift. Problem: Discharge Planning:  Goal: Patients continuum of care needs are met  Description: Patients continuum of care needs are met  9/15/2021 0730 by Karissa Rojas RN  Outcome: Ongoing  Note: Working with patient, physician, and social work to discharge patient to the appropriate level of care.    9/15/2021 0016 by Carolina Arshad RN  Outcome: Ongoing  Note: Assessed patients knowledge of discharge. Will continue to work with patient on discharge planning and answer patient questions. Will consult case management and  as necessary. Problem: Activity Intolerance:  Goal: Ability to tolerate increased activity will improve  Description: Ability to tolerate increased activity will improve  9/15/2021 0730 by Alberto Gallegos RN  Outcome: Ongoing  Note: Patient will describe an ease of ambulation this shift. 9/15/2021 0016 by Pj Moran RN  Outcome: Ongoing  Note: Early and frequent ambulqtion encouraged. Educated patient on importance of early ambulation. Patient assisted with ambulation. Will continue to monitor. Problem: Airway Clearance - Ineffective:  Goal: Ability to maintain a clear airway will improve  Description: Ability to maintain a clear airway will improve  9/15/2021 0730 by Alberto Gallegos RN  Outcome: Ongoing  Note: Patient has maintained a patent airway, repositions self, lung sounds bilaterally diminished, cough noted and is infrequent and nonproductive, RT is involved in patient care and is providing care, patient has had adequate fluid intake and is on a 1500ml fluid restriction, no need to suction airway at this time, educated patient to turn and cough and deep breathe every two hours and as needed, encouraged incentive spirometer and patient has been performing. Will continue to monitor and reassess. 9/15/2021 0016 by Pj Moran RN  Outcome: Ongoing  Note: Patient will maintain patent airway. Problem: Breathing Pattern - Ineffective:  Goal: Ability to achieve and maintain a regular respiratory rate will improve  Description: Ability to achieve and maintain a regular respiratory rate will improve  9/15/2021 0730 by Alberto Gallegos RN  Outcome: Ongoing  9/15/2021 0016 by Pj Moran RN  Outcome: Ongoing  Note: Patient respiratory rate within normal limits. Will continue to monitor throughout the shift.         Problem: Gas Exchange - Impaired:  Goal: Levels of oxygenation will improve  Description: Levels of oxygenation will improve  9/15/2021 0730 by Valentín Hernandez RN  Outcome: Ongoing  9/15/2021 0016 by Gal Hayes RN  Outcome: Ongoing  Note: Patient will have improved levels of oxygenation.         Problem: Health Behavior:  Goal: Identification of resources available to assist in meeting health care needs will improve  Description: Identification of resources available to assist in meeting health care needs will improve  9/15/2021 0730 by Valentín Hernandez RN  Outcome: Ongoing  9/15/2021 0016 by Gal Hayes RN  Outcome: Ongoing     Problem: Physical Regulation:  Goal: Complications related to the disease process, condition or treatment will be avoided or minimized  Description: Complications related to the disease process, condition or treatment will be avoided or minimized  9/15/2021 0730 by Valentín Hernandez RN  Outcome: Ongoing  9/15/2021 0016 by Gal Hayes RN  Outcome: Ongoing

## 2021-09-15 NOTE — PROGRESS NOTES
[facility]  Oncology Hematology Care  Progress Note      SUBJECTIVE:   He remains to be doing ok   NO Nv   NO severe fatigue or pain   Labs are reasonable considering  He is still on quite a bit of oxygen     OBJECTIVE      Medications    Current Facility-Administered Medications: Tbo-Filgrastim (GRANIX) injection 300 mcg, 300 mcg, SubCUTAneous, QPM  prochlorperazine (COMPAZINE) tablet 10 mg, 10 mg, Oral, Q6H PRN  furosemide (LASIX) injection 40 mg, 40 mg, IntraVENous, BID  insulin glargine (LANTUS) injection vial 38 Units, 38 Units, SubCUTAneous, Daily  doxazosin (CARDURA) tablet 4 mg, 4 mg, Oral, 2 times per day  sodium chloride (Inhalant) 3 % nebulizer solution 4 mL, 4 mL, Nebulization, BID  spironolactone (ALDACTONE) tablet 100 mg, 100 mg, Oral, BID  potassium chloride (KLOR-CON M) extended release tablet 40 mEq, 40 mEq, Oral, BID WC  insulin lispro (HUMALOG) injection vial 5 Units, 5 Units, SubCUTAneous, TID WC  ondansetron (ZOFRAN) 8 mg in dextrose 5 % 50 mL IVPB, 8 mg, IntraVENous, Q8H PRN  enoxaparin (LOVENOX) injection 40 mg, 40 mg, SubCUTAneous, Daily  insulin lispro (HUMALOG) injection vial 0-18 Units, 0-18 Units, SubCUTAneous, TID WC  insulin lispro (HUMALOG) injection vial 0-9 Units, 0-9 Units, SubCUTAneous, Nightly  cloNIDine (CATAPRES) tablet 0.2 mg, 0.2 mg, Oral, Q4H PRN  hydrALAZINE (APRESOLINE) injection 10 mg, 10 mg, IntraVENous, Q4H PRN  acetaminophen (TYLENOL) tablet 650 mg, 650 mg, Oral, Q4H PRN  NIFEdipine (PROCARDIA XL) extended release tablet 60 mg, 60 mg, Oral, BID  lisinopril (PRINIVIL;ZESTRIL) tablet 40 mg, 40 mg, Oral, Daily  glucose (GLUTOSE) 40 % oral gel 15 g, 15 g, Oral, PRN  dextrose 50 % IV solution, 12.5 g, IntraVENous, PRN  glucagon (rDNA) injection 1 mg, 1 mg, IntraMUSCular, PRN  dextrose 5 % solution, 100 mL/hr, IntraVENous, PRN  iopamidol (ISOVUE-370) 76 % injection 75 mL, 75 mL, IntraVENous, ONCE PRN  budesonide-formoterol (SYMBICORT) 160-4.5 MCG/ACT inhaler 2 puff, 2 puff, Inhalation, BID  montelukast (SINGULAIR) tablet 10 mg, 10 mg, Oral, Nightly  sodium chloride flush 0.9 % injection 5-40 mL, 5-40 mL, IntraVENous, 2 times per day  sodium chloride flush 0.9 % injection 5-40 mL, 5-40 mL, IntraVENous, PRN  0.9 % sodium chloride infusion, 25 mL, IntraVENous, PRN  ondansetron (ZOFRAN-ODT) disintegrating tablet 4 mg, 4 mg, Oral, Q8H PRN **OR** ondansetron (ZOFRAN) injection 4 mg, 4 mg, IntraVENous, Q6H PRN  polyethylene glycol (GLYCOLAX) packet 17 g, 17 g, Oral, Daily PRN  [DISCONTINUED] acetaminophen (TYLENOL) tablet 650 mg, 650 mg, Oral, Q6H PRN **OR** acetaminophen (TYLENOL) suppository 650 mg, 650 mg, Rectal, Q6H PRN  nicotine (NICODERM CQ) 21 MG/24HR 1 patch, 1 patch, TransDERmal, Daily  ipratropium-albuterol (DUONEB) nebulizer solution 1 ampule, 1 ampule, Inhalation, Q4H WA  Physical    VITALS:  BP (!) 151/87   Pulse 86   Temp 97.9 °F (36.6 °C) (Oral)   Resp 20   Ht 6' (1.829 m)   Wt 251 lb 15.8 oz (114.3 kg)   SpO2 94%   BMI 34.18 kg/m²   TEMPERATURE:  Current - Temp: 97.9 °F (36.6 °C); Max - Temp  Av.2 °F (36.8 °C)  Min: 97.9 °F (36.6 °C)  Max: 98.3 °F (36.8 °C)  PULSE OXIMETRY RANGE: SpO2  Av.5 %  Min: 92 %  Max: 95 %  24HR INTAKE/OUTPUT:      Intake/Output Summary (Last 24 hours) at 9/15/2021 0743  Last data filed at 2021 1914  Gross per 24 hour   Intake 600 ml   Output 3050 ml   Net -2450 ml       CONSTITUTIONAL:  awake, alert, cooperative, no apparent distress, HEENT oral pharynx , no scleral icterus  HEMATOLOGIC/LYMPHATICS:  no cervical lymphadenopathy, no supraclavicular lymphadenopathy,LUNGS:overall diminished g  CARDIOVASCULAR:  , regular rate and rhythm, normal S1 and S2, no S3 or S4, and no murmur noted  ABDOMEN:  No scars, normal bowel sounds, soft, non-distended, non-tender, no masses palpated, no hepatosplenomegally  MUSCULOSKELETAL:  There is no redness, warmth, or swelling of the joints.   EXTREMETIES:s+ edema   NEUROLOGIC:  Awake, alert, Rhythm: Within normal limits HR: 70 bpm BP: 183/87 mmHg  Conclusions   Summary  Normal left ventricle size, wall thickness, and systolic function with an  estimated ejection fraction of 60-65%. No regional wall motion abnormalities  are seen. Normal diastolic function. Normal right ventricular size and function. No significant valve abnormalities noted. Signature   ------------------------------------------------------------------  Electronically signed by Surinder Costello MD  (Interpreting physician) on 09/03/2021 at 03:46 PM  ------------------------------------------------------------------   Findings   Left Ventricle  Normal left ventricle size, wall thickness, and systolic function with an  estimated ejection fraction of 60-65%. No regional wall motion abnormalities  are seen. Normal diastolic function. Mitral Valve  Mitral valve leaflets appear mildly thickened. No evidence of mitral regurgitation or stenosis. Left Atrium  The left atrium is normal in size. Aortic Valve  The aortic valve leaflets are not well visualized. No evidence of aortic valve regurgitation or stenosis. Aorta  The aortic root is normal in size. Right Ventricle  Normal right ventricular size and function. TAPSE 2.8 cm   Tricuspid Valve  The tricuspid valve was not well visualized. No evidence of tricuspid stenosis. Right Atrium  The right atrial size is normal.   Pulmonic Valve  The pulmonic valve is not well visualized. No evidence of pulmonic valve regurgitation. No evidence of pulmonic valve stenosis. Pericardial Effusion  No pericardial effusion noted. Pleural Effusion  No pleural effusion. Miscellaneous  IVC not well visualized.   M-Mode/2D Measurements (cm)   LV Diastolic Dimension: 2.38 cm LV Systolic Dimension: 8.22 cm  LV Septum Diastolic: 0.9 cm  LV PW Diastolic: 5.99 cm        AO Root Dimension: 3.4 cm                                   LA Area: 23.6 cm2  LVOT: 2.1 cm                    LA volume/Index: 70.5 ml /30 ml/m2  Doppler Measurements   AV Peak Velocity: 184 cm/s     MV Peak E-Wave: 106 cm/s  AV Peak Gradient: 13.54 mmHg   MV Peak A-Wave: 87.5 cm/s  AV Mean Gradient: 6 mmHg       MV E/A Ratio: 1.21  LVOT Peak Velocity: 160 cm/s   MV Mean Gradient: 2 mmHg  AV Area (Continuity):3.58 cm2  MV Max P mmHg                                 MV Vmax:114 cm/s                                 MV VTI:32.9 cm/s   E' Septal Velocity: 10.9 cm/s  MV Area (continuity): 3.61 cm2  E' Lateral Velocity: 15.5 cm/s MV Deceleration Time: 259 msec  PV Peak Velocity: 131 cm/s  PV Peak Gradient: 6.86 mmHg   Aortic Valve   Peak Velocity: 184 cm/s     Mean Velocity: 114 cm/s  Peak Gradient: 13.54 mmHg   Mean Gradient: 6 mmHg  Area (continuity): 3.58 cm2  AV VTI: 33.2 cm  Aorta   Aortic Root: 3.4 cm  Ascending Aorta: 3.3 cm  LVOT Diameter: 2.1 cm      XR CHEST (2 VW)    Result Date: 2021  EXAMINATION: TWO XRAY VIEWS OF THE CHEST 2021 8:48 am COMPARISON: None. HISTORY: ORDERING SYSTEM PROVIDED HISTORY: WEEMS (dyspnea on exertion) TECHNOLOGIST PROVIDED HISTORY: Reason for Exam: WEEMS, asthma Acuity: Unknown Type of Exam: Initial FINDINGS: Clear lungs. No pleural effusion or pneumothorax. Cardiomediastinal silhouette is unremarkable. Degenerative changes of the visualized osseous structures. Clear lungs. CT GUIDED NEEDLE PLACEMENT    Result Date: 2021  PROCEDURE: CT GUIDED CORE BIOPSY OF LEFT HEPATIC LOBE MASS MODERATE CONSCIOUS SEDATION 2021 HISTORY: ORDERING SYSTEM PROVIDED HISTORY: lesion TECHNOLOGIST PROVIDED HISTORY: Reason for exam:->lesion Reason for Exam: lesion Acuity: Acute Type of Exam: Initial; ORDERING SYSTEM PROVIDED HISTORY: bx TECHNOLOGIST PROVIDED HISTORY: Reason for exam:->bx Reason for Exam: lesion Acuity: Acute Type of Exam: Initial PHYSICIANS: Amilcar Brianne SEDATION: 0.5 mgversed and 25 mcg fentanyl were titrated intravenously for moderate sedation monitored under my direction. Total intraservice time of sedation was 15 minutes. The patient's vital signs were monitored throughout the procedure and recorded in the patient's medical record by the nurse. TECHNIQUE: Informed consent was obtained after a detailed discussion about the procedure including the risk, benefits, and alternatives. Universal protocol was followed. Axial images were obtained through the liver and a suitable skin site was prepped and draped in sterile fashion. Local anesthesia was achieved with lidocaine. Under intermittent CT guidance, a 17 gauge coaxial needle was inserted into the a left hepatic lobe mass. Through this, 1 18 gauge core biopsy specimens were obtained using coaxial technique and submitted to pathology. Gel-Foam slurry was administered as the needle was removed. A sterile bandage was then placed. The patient tolerated the procedure well. Estimated blood loss: Less than 5 cc Dose modulation, iterative reconstruction, and/or weight based adjustment of the mA/kV was utilized to reduce the radiation dose to as low as reasonably achievable. DLP: 8223.60 mGy-cm     Successful CT guided core biopsy of a left hepatic lobe mass. CT ABDOMEN PELVIS W IV CONTRAST Additional Contrast? Oral    Result Date: 9/5/2021  EXAMINATION: CT OF THE ABDOMEN AND PELVIS WITH CONTRAST 9/3/2021 5:13 pm TECHNIQUE: CT of the abdomen and pelvis was performed with the administration of intravenous contrast. Multiplanar reformatted images are provided for review. Dose modulation, iterative reconstruction, and/or weight based adjustment of the mA/kV was utilized to reduce the radiation dose to as low as reasonably achievable. COMPARISON: CT pulmonary angiogram performed September 2, 2021. HISTORY: ORDERING SYSTEM PROVIDED HISTORY: Metastatic disease, evaluate for primary. Bilateral lower ext edema, eval for IVC thrombosis/compression TECHNOLOGIST PROVIDED HISTORY: Reason for exam:->Metastatic disease, evaluate for primary. Bilateral lower ext edema, eval for IVC thrombosis/compression Additional Contrast?->Oral FINDINGS: Images are degraded by motion artifact. Lower Chest: Respiratory motion artifact. Visualized pulmonary nodules and enlarged mediastinal lymph node, unchanged from prior CT chest. Organs: There are multifocal hypoattenuating lesions throughout the liver. Lesion within the left hepatic lobe measures 6.1 x 5.7 cm (axial image 35). Lesion within the right hepatic lobe measures 3.6 x 2.6 cm (axial image 51). The gallbladder is normally distended with layering hyperintensity suggestive of sludge. No calcified stones. No gallbladder wall thickening or pericholecystic fluid. The spleen and pancreas are grossly unremarkable. Right adrenal gland nodule measures up to 1.6 cm. There is mild thickening of the left adrenal gland without definite nodularity. There is symmetric renal cortical enhancement. Simple appearing right renal cysts demonstrated. No hydronephrosis. GI/Bowel: There is a hypoattenuating nodule in the region of the 2/3 portion of the duodenum in the region of the ampulla which measures up to 1.3 cm (axial image 83). There is diffuse fold thickening involving the majority of the jejunum. No evidence of obstruction. Sigmoid diverticulosis without evidence of acute diverticulitis. The appendix is normal. Pelvis: Prostate and seminal vesicles are grossly unremarkable. The urinary bladder is within normal limits. Peritoneum/Retroperitoneum: No significant lymphadenopathy. The abdominal aorta is normal in course and caliber with scattered atherosclerotic disease. No definite filling defects within the IVC, the iliac veins, or visualized femoral veins to suggest thrombus. Bones/Soft Tissues: There is asymmetric thickening of the inferior right rectus abdominus musculature with mild surrounding stranding.   Visualization is partially obscured by streak artifact from dense oral contrast in the adjacent small bowel.  Body wall edema demonstrated. No focal drainable collections. Multilevel moderate degenerative changes in the visualized spine. No destructive osseous lesions. 1. Multiple hypoattenuating liver lesions which are most suggestive of metastatic disease. 2. Right adrenal gland nodule measuring up to 1.6 cm, indeterminate. 3. Hypoattenuating nodule in the region of the 2/3 portion of the duodenum in the region of the ampulla measuring up to 1.3 cm. Findings could be artifactual or related to fold thickening; however, a small ampullary or possibly pancreatic lesion cannot be entirely excluded. 4. Diffuse fold thickening involving the majority of the jejunum, nonspecific but can be related to an infectious etiology or lymphatic obstruction. 5. No definite filling defects in the IVC, iliac veins, or visualized femoral veins to suggest thrombus. 6. Asymmetric thickening of the inferior right rectus abdominus musculature with surrounding stranding which is partially obscured by adjacent streak artifact from dense oral contrast in the small bowel. No definite focal lesion is demonstrated; however, an underlying lesion cannot be entirely excluded. Hematoma is also a consideration. CT CHEST PULMONARY EMBOLISM W CONTRAST    Result Date: 9/5/2021  EXAMINATION: CTA OF THE CHEST 9/2/2021 12:47 pm TECHNIQUE: CTA of the chest was performed after the administration of intravenous contrast.  Multiplanar reformatted images are provided for review. MIP images are provided for review. Dose modulation, iterative reconstruction, and/or weight based adjustment of the mA/kV was utilized to reduce the radiation dose to as low as reasonably achievable. COMPARISON: None.  HISTORY: ORDERING SYSTEM PROVIDED HISTORY: leg swelling sob new o2 requirement TECHNOLOGIST PROVIDED HISTORY: Reason for exam:->leg swelling sob new o2 requirement Decision Support Exception - unselect if not a suspected or confirmed emergency medical condition->Emergency Medical Condition (MA) Reason for Exam: leg swelling sob new o2 requirement Acuity: Acute Type of Exam: Initial FINDINGS: Pulmonary Arteries:  No central, lobar, or segmental pulmonary embolus. Narrowing of the right lower lobe pulmonary artery by the adenopathy. Mediastinum: Heart size is normal.  No pericardial effusion. Thoracic aorta is normal caliber. Extensive mediastinal and hilar lymphadenopathy is present. Subcarinal node measures 32 x 37 mm. Right hilar node measures 30 x 29 mm. Lungs/pleura: Moderate emphysema. Multiple 3 mm nodules are present in the right middle lobe and left lower lobe. Superimposed in the posterior right lower lobe, there is a dominant nodule measuring 17 x 17 mm. An additional right lower lobe nodule measures 11 mm. No pleural effusion or pneumothorax. Narrowing of bronchus intermedius due to the right hilar adenopathy. Upper Abdomen: Multiple low-attenuation liver lesions are present measuring up to 48 x 40 mm and 26 x 28 mm. At least 30 lesions are noted in the visualized portions of the liver. Soft Tissues/Bones: No lytic or blastic osseous lesion. 1. No acute pulmonary embolus. 2. Extensive mediastinal and hilar lymphadenopathy compatible with metastatic neoplasm. 3. Multiple new indeterminate 3 mm nodules in the right middle lobe and left lower lobe. These could be infectious or neoplastic. 4. Right lower lobe 17 mm and 11 mm nodules are nonspecific, but metastatic disease not excluded. 5. Multiple liver metastases measuring up to 48 x 40 mm.  RECOMMENDATIONS:     CT NEEDLE BIOPSY LIVER PERCUTANEOUS    Result Date: 9/8/2021  PROCEDURE: CT GUIDED CORE BIOPSY OF LEFT HEPATIC LOBE MASS MODERATE CONSCIOUS SEDATION 9/7/2021 HISTORY: ORDERING SYSTEM PROVIDED HISTORY: lesion TECHNOLOGIST PROVIDED HISTORY: Reason for exam:->lesion Reason for Exam: lesion Acuity: Acute Type of Exam: Initial; ORDERING SYSTEM PROVIDED HISTORY: bx TECHNOLOGIST PROVIDED HISTORY: Reason for exam:->bx Reason for Exam: lesion Acuity: Acute Type of Exam: Initial PHYSICIANS: Amilcar Decker SEDATION: 0.5 mgversed and 25 mcg fentanyl were titrated intravenously for moderate sedation monitored under my direction. Total intraservice time of sedation was 15 minutes. The patient's vital signs were monitored throughout the procedure and recorded in the patient's medical record by the nurse. TECHNIQUE: Informed consent was obtained after a detailed discussion about the procedure including the risk, benefits, and alternatives. Universal protocol was followed. Axial images were obtained through the liver and a suitable skin site was prepped and draped in sterile fashion. Local anesthesia was achieved with lidocaine. Under intermittent CT guidance, a 17 gauge coaxial needle was inserted into the a left hepatic lobe mass. Through this, 1 18 gauge core biopsy specimens were obtained using coaxial technique and submitted to pathology. Gel-Foam slurry was administered as the needle was removed. A sterile bandage was then placed. The patient tolerated the procedure well. Estimated blood loss: Less than 5 cc Dose modulation, iterative reconstruction, and/or weight based adjustment of the mA/kV was utilized to reduce the radiation dose to as low as reasonably achievable. DLP: 8734.86 mGy-cm     Successful CT guided core biopsy of a left hepatic lobe mass.      VL Extremity Venous Bilateral    Result Date: 9/8/2021  Lower Extremities DVT Study  Demographics   Patient Name       Gloria Abraham   Date of Study      09/07/2021         Gender              Male   Patient Number     [id number]         Date of Birth       1970   Visit Number       [id number]          Age                 46 year(s)   Accession Number   [id number]         Room Number         9996   Corporate ID       [id number]            Sonographer         Rodri Vazquez RVT   Ordering Physician Jamin Chris   Interpreting Marleen Rosado MD         Physician           MD  Procedure Type of Study:   Veins:Lower Extremities DVT Study, VASC EXTREMITY VENOUS DUPLEX BILATERAL. Vascular Sonographer Report  Indications for Study:Edema. Impressions Right Impression No evidence of deep vein or superficial vein thrombosis involving the right lower extremity in vessels clearly visualized. Proximal and Mid Posterior Tibial veins and Peroneal veins were not visualized due to swelling. Calf and ankle edema noted. Left Impression No evidence of deep vein or superficial vein thrombosis involving the left lower extremity in vessels clearly visualized. Proximal Posterior Tibial veins and Soleal vein were not visualized due to swelling. Calf and ankle edema noted. Conclusions   Summary   No evidence of deep vein or superficial vein thrombosis involving the  bilateral lower extremities in vessels clearly visualized as detailed above. Signature   ------------------------------------------------------------------  Electronically signed by Sanchez Fuller MD (Interpreting  physician) on 09/08/2021 at 08:09 AM  ------------------------------------------------------------------  Patient Status:Routine. Study [address] [facility]. Technical Quality:Limited visualization due to swelling. Risk Factors   - The patient's risk factor(s) include: dyslipidemia and arterial     hypertension.   - The patient has a current/recent (within 1 year) tobacco history. Velocities are measured in cm/s ; Diameters are measured in mm Right Lower Extremities DVT Study Measurements Right 2D Measurements +------------------------+----------+---------------+----------+ ! Location                ! Visualized! Compressibility! Thrombosis! +------------------------+----------+---------------+----------+ ! Sapheno Femoral Junction! Yes       ! Yes            ! None      ! +------------------------+----------+---------------+----------+ !GSV Thigh               ! Yes       ! Yes            ! None      ! +------------------------+----------+---------------+----------+ ! Common Femoral          !Yes       ! Yes            ! None      ! +------------------------+----------+---------------+----------+ ! Prox Femoral            !Yes       ! Yes            ! None      ! +------------------------+----------+---------------+----------+ ! Mid Femoral             !Yes       ! Yes            ! None      ! +------------------------+----------+---------------+----------+ ! Dist Femoral            !Yes       ! Yes            ! None      ! +------------------------+----------+---------------+----------+ ! Deep Femoral            !Yes       ! Yes            ! None      ! +------------------------+----------+---------------+----------+ ! Popliteal               !Yes       ! Yes            ! None      ! +------------------------+----------+---------------+----------+ ! Gastroc                 ! Yes       ! Yes            ! None      ! +------------------------+----------+---------------+----------+ ! Soleal                  !Yes       ! Yes            ! None      ! +------------------------+----------+---------------+----------+ ! PTV                     ! Partial   !Yes            ! None      ! +------------------------+----------+---------------+----------+ ! ATV                     ! Yes       ! Yes            ! None      ! +------------------------+----------+---------------+----------+ ! Peroneal                !Partial   !Yes            ! None      ! +------------------------+----------+---------------+----------+ ! GSV Calf                ! Yes       ! Yes            ! None      ! +------------------------+----------+---------------+----------+ ! SSV                     ! Yes       ! Yes            ! None      ! +------------------------+----------+---------------+----------+ Right Doppler Measurements +--------------+------+------+------------+ ! Location      ! Signal!Reflux! Reflux (sec)! +--------------+------+------+------------+ ! Common Femoral!Phasic! No    !            ! +--------------+------+------+------------+ ! Popliteal     !Phasic! No    !            ! +--------------+------+------+------------+ Left Lower Extremities DVT Study Measurements Left 2D Measurements +------------------------+----------+---------------+----------+ ! Location                ! Visualized! Compressibility! Thrombosis! +------------------------+----------+---------------+----------+ ! Sapheno Femoral Junction! Yes       ! Yes            ! None      ! +------------------------+----------+---------------+----------+ ! GSV Thigh               ! Yes       ! Yes            ! None      ! +------------------------+----------+---------------+----------+ ! Common Femoral          !Yes       ! Yes            ! None      ! +------------------------+----------+---------------+----------+ ! Prox Femoral            !Yes       ! Yes            ! None      ! +------------------------+----------+---------------+----------+ ! Mid Femoral             !Yes       ! Yes            ! None      ! +------------------------+----------+---------------+----------+ ! Dist Femoral            !Yes       ! Yes            ! None      ! +------------------------+----------+---------------+----------+ ! Deep Femoral            !Yes       ! Yes            ! None      ! +------------------------+----------+---------------+----------+ ! Popliteal               !Yes       ! Yes            ! None      ! +------------------------+----------+---------------+----------+ ! Gastroc                 ! Yes       ! Yes            ! None      ! +------------------------+----------+---------------+----------+ ! Soleal                  !No        !               !          ! +------------------------+----------+---------------+----------+ ! PTV                     ! Partial   !Yes            ! None      ! +------------------------+----------+---------------+----------+ ! ATV                     ! Yes !Yes            !None      ! +------------------------+----------+---------------+----------+ ! Peroneal                !Yes       ! Yes            ! None      ! +------------------------+----------+---------------+----------+ ! GSV Calf                ! Yes       ! Yes            ! None      ! +------------------------+----------+---------------+----------+ ! SSV                     ! Yes       ! Yes            ! None      ! +------------------------+----------+---------------+----------+ Left Doppler Measurements +--------------+------+------+------------+ ! Location      ! Signal!Reflux! Reflux (sec)! +--------------+------+------+------------+ ! Common Femoral!Phasic! No    !            ! +--------------+------+------+------------+ ! Popliteal     !Phasic! No    !            ! +--------------+------+------+------------+      Problem List  Patient Active Problem List   Diagnosis    Pure hypercholesterolemia    Anxiety    Essential hypertension    Moderate persistent asthma with acute exacerbation    Metastatic cancer ([facility] [address]    Hypokalemia    Acute respiratory failure with hypoxia (HCC)    Abnormal CT of the chest    COPD exacerbation (HCC)    Lung nodules    Tobacco abuse       ASSESSMENT AND PLAN    Extensive stage small cell  Ectopic acth -agree related to the disease   Rare paraneoplastic syndrome   HE is day carbo etoposide atezolizumab  HE is finished with chemo   On granix if inpt will give daily -usually 7 days -if he goes home before that im ok with that   Will cut back on tumor lysis labs       WOuld be mindful as treatment takes effect-I am not sure when his blood pressure will improve thus watch for hypotension as I would assume as he responds he will require less bp meds     Otf Boyle MD, MD

## 2021-09-15 NOTE — PROGRESS NOTES
[facility]  Nephrology   Progress Note        We are following the patient for uncontrolled HTN Severe Hypokalemia  47 y/o WM, gray and heavy smoker admitted with severe Hypokalemia  He has h/o HTN for ~ 20 years that has been well controlled on Amlodipine  About 3 weeks ago he started to have leg swelling, weight gain fatigue and SOB  Out patient labs showed severe Hypokalemia  On admission K+ noted to be 2.2 meq CO2 40   Noted to have uncontrolled HTN with /108 mm  BP has remained resistant despite addition of multiple meds    SUBJECTIVE:  -pt seen and examined  -PMSHx and meds reviewed  -No family at bedside      Completed chemo   UOP 3 L   Wt not recorded today  On 4 L O2 now     ROS: neg chest pain nausea overall feels well Except edema has no complaints  BP remains fair    OBJECTIVE:     PHYSICAL:    TEMPERATURE:  Current - Temp: 98.1 °F (36.7 °C);  Max - Temp  Av °F (36.7 °C)  Min: 97.7 °F (36.5 °C)  Max: 98.3 °F (36.8 °C)  RESPIRATIONS RANGE: Resp  Av.3  Min: 15  Max: 20  PULSE RANGE: Pulse  Av.7  Min: 83  Max: 89  BLOOD PRESSURE RANGE:  Systolic (37SBR), JPT:329 , Min:151 , CFT:860   ; Diastolic (86MJU), MAY:64, Min:78, Max:93    PULSE OXIMETRY RANGE: SpO2  Av.7 %  Min: 92 %  Max: 95 %  24HR INTAKE/OUTPUT:      Intake/Output Summary (Last 24 hours) at 9/15/2021 1629  Last data filed at 2021 1914  Gross per 24 hour   Intake 120 ml   Output 1250 ml   Net -1130 ml       CONSTITUTIONAL:  awake, alert, cooperative, no apparent distress, and appears stated age  HEENT:  Lids and lashes normal, pupils equal, round and reactive to light  NECK:  Supple, symmetrical, trachea midline, no adenopathy  LUNGS: FAIRLY CLEAR  ABDOMEN:  No scars, normal bowel sounds, soft, non-distended  NEUROLOGIC:  alert, oriented, normal speech, no focal findings or movement disorder noted  SKIN: no bruising or bleeding  EXTREMITIES: ace wraps LE's ++edema     Medications     dextrose      sodium chloride Stopped (09/10/21 0349)        Data      CBC:   Recent Labs     09/14/21  0450 09/15/21  0455   WBC 6.6 11.6*   RBC 3.65* 3.76*   HGB 12.1* 12.3*   HCT 36.5* 36.9*   PLT 98* 102*     BMP:    Recent Labs     09/14/21  0450 09/14/21  2030 09/15/21  0455    145 142   K 3.9 3.3* 3.5    103 102   CO2 31 35* 32   BUN 26* 28* 25*   CREATININE 0.6* 0.7* 0.5*   CALCIUM 8.3 8.5 8.4   GLUCOSE 144* 128* 111*     Phosphorus:    Recent Labs     09/14/21  0450 09/14/21  2030 09/15/21  0455   PHOS 3.1 3.0 2.8     Magnesium:    Recent Labs     09/14/21  0450 09/14/21  2030 09/15/21  0455   MG 1.90 1.80 1.90         ASSESSMENT     Patient Active Problem List   Diagnosis    Pure hypercholesterolemia    Anxiety    Essential hypertension    Moderate persistent asthma with acute exacerbation    Met[facility] cancer ([facility] [address]    Hypokalemia    Acute respiratory failure with hypoxia (HCC)    Abnormal CT of the chest    COPD exacerbation (HCC)    Lung nodules    Tobacco abuse       PLAN    Hypertension resistant to treatment with multiple meds Concern for secondary HTN including Hypercortisolism ( Cortisol level 90 with adrenal nodule      24 hour urine for cortisol  Elevated at [address] !!  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    K+ 3.5 meq on supplement Aldactone dose  Increased  to 067 mg bid   Metabolic alkalosis: due to above    Edema Doppler negative for DVT Suspect fluid retention due to hypercortisolism.  UOP 3 L on Lasix IV BID Monitor daily weights Net 9 L negative so far    COPD exacerbation on prednisone      Elevated LFT's stable

## 2021-09-15 NOTE — PROGRESS NOTES
Pulmonary Progress Note     Patient's name:  Nino Hogue  Medical Record Number: [id number]  Patient's account/billing number: [de-identified]  Patient's YOB: 1970  Age: 46 y.o. Date of Admission: 9/2/2021 10:52 AM  Date of Consult: 9/15/2021      Primary Care Physician: Lesvia Fofana MD      Code Status: Full Code    Chief complaint: Mediastinal LAP     Assessment and Plan     1. Acute hypoxic respiratory failure. 2. Extensive stage small cell lung cancer on carbo etoposide atezolizumab  3. COPD/asthma  with acute exacerbation. 4. LE edema   5. Tobacco abuse      Plan:  Need O2 evaluation before d/c likely will require home o2  Bronchodilators  Diuresis per primary team       Overnight: On 4 LPM NC  Less wheezing  Improving edema       REVIEW OF SYSTEMS:  Review of Systems -   General ROS: negative  Psychological ROS: negative  Ophthalmic ROS: negative  ENT ROS: negative  Allergy and Immunology ROS: negative  Hematological and Lymphatic ROS: negative  Endocrine ROS: negative  Breast ROS: negative  Respiratory ROS: Cough, shortness of breath, wheezing. Cardiovascular ROS: no chest pain or dyspnea on exertion  Gastrointestinal ROS:negative  Genito-Urinary ROS: negative  Musculoskeletal ROS: negative  Neurological ROS: negative  Dermatological ROS: negative        Physical Exam:    Vitals: BP (!) 153/82   Pulse 83   Temp 97.7 °F (36.5 °C) (Oral)   Resp 18   Ht 6' (1.829 m)   Wt 251 lb 15.8 oz (114.3 kg)   SpO2 94%   BMI 34.18 kg/m²     Last Body weight:   Wt Readings from Last 3 Encounters:   09/14/21 251 lb 15.8 oz (114.3 kg)   09/01/21 247 lb 12.8 oz (112.4 kg)   04/26/21 232 lb 12.8 oz (105.6 kg)       Body Mass Index : Body mass index is 34.18 kg/m².       Intake and Output summary:     Intake/Output Summary (Last 24 hours) at 9/15/2021 1130  Last data filed at 9/14/2021 1914  Gross per 24 hour   Intake 480 ml   Output 2400 ml   Net -1920 ml       Physical Examination: Gen: Mild to moderate acute distress. Eyes: PERRL. Anicteric sclera. No conjunctival injection. ENT: No discharge. Posterior oropharynx clear. External appearance of ears and nose normal.  Neck: Trachea midline. No mass   Resp: Diminished breath sounds bilaterally with scattered rhonchi and prolonged expiratory phase  CV: Regular rate. Regular rhythm. No murmur or rub. GI: Soft, Non-tender. Non-distended. +BS  Skin: Warm, dry, w/o erythema, positive edema. Lymph: No cervical or supraclavicular LAD. M/S: No cyanosis. No clubbing. Neuro:  CN 2-12 tested, no focal neurologic deficit. Moves all extremities  Psych: Awake and alert, Oriented x 3. Judgement and insight appropriate. Mood stable. Laboratory findings:-    CBC:   Recent Labs     09/15/21  0455   WBC 11.6*   HGB 12.3*   *     BMP:    Recent Labs     09/14/21  0450 09/14/21  0450 09/14/21  2030 09/14/21  2030 09/15/21  0455      < > 145   < > 142   K 3.9   < > 3.3*   < > 3.5      < > 103   < > 102   CO2 31   < > 35*   < > 32   BUN 26*   < > 28*   < > 25*   CREATININE 0.6*  --  0.7*  --  0.5*   GLUCOSE 144*   < > 128*   < > 111*    < > = values in this interval not displayed. S. Calcium:  Recent Labs     09/15/21  0455   CALCIUM 8.4     S. Ionized Calcium:No results for input(s): IONCA in the last 72 hours. S. Magnesium:  Recent Labs     09/15/21  0455   MG 1.90     S. Phosphorus:  Recent Labs     09/15/21  0455   PHOS 2.8     S. Glucose:  Recent Labs     09/14/21  1646 09/14/21  2051 09/15/21  1114   POCGLU 93 154* 104*     Glycosylated hemoglobin A1C: No results for input(s): LABA1C in the last 72 hours. INR:   No results for input(s): INR in the last 72 hours. Hepatic functions:   Recent Labs     09/14/21 0450   ALKPHOS 219*   *   AST 50*   PROT 4.9*   BILITOT 0.8   BILIDIR 0.3   LABALBU 3.0*     Pancreatic functions:No results for input(s): LACTA, AMYLASE in the last 72 hours.   S. Lactic Acid: No results for input(s): LACTA in the last 72 hours. Cardiac enzymes:  No results for input(s): CKTOTAL, CKMB, CKMBINDEX, TROPONINI in the last 72 hours. BNP:No results for input(s): BNP in the last 72 hours. Lipid profile: No results for input(s): CHOL, TRIG, HDL, LDLCALC in the last 72 hours. Invalid input(s): LDL  Blood Gases: No results found for: PH, PCO2, PO2, HCO3, O2SAT  Thyroid functions:   Lab Results   Component Value Date    TSH 1.80 11/19/2019          Radiology Review:  Pertinent images / reports were reviewed as a part of this visit. CT Chest w/ contrast: No results found for this or any previous visit. CT Chest w/o contrast: No results found for this or any previous visit. CTPA: Results for orders placed during the hospital encounter of 09/02/21    CT CHEST PULMONARY EMBOLISM W CONTRAST    Narrative  EXAMINATION:  CTA OF THE CHEST 9/2/2021 12:47 pm    TECHNIQUE:  CTA of the chest was performed after the administration of intravenous  contrast.  Multiplanar reformatted images are provided for review. MIP  images are provided for review. Dose modulation, iterative reconstruction,  and/or weight based adjustment of the mA/kV was utilized to reduce the  radiation dose to as low as reasonably achievable. COMPARISON:  None. HISTORY:  ORDERING SYSTEM PROVIDED HISTORY: leg swelling sob new o2 requirement  TECHNOLOGIST PROVIDED HISTORY:  Reason for exam:->leg swelling sob new o2 requirement  Decision Support Exception - unselect if not a suspected or confirmed  emergency medical condition->Emergency Medical Condition (MA)  Reason for Exam: leg swelling sob new o2 requirement  Acuity: Acute  Type of Exam: Initial    FINDINGS:  Pulmonary Arteries:  No central, lobar, or segmental pulmonary embolus. Narrowing of the right lower lobe pulmonary artery by the adenopathy. Mediastinum: Heart size is normal.  No pericardial effusion. Thoracic aorta  is normal caliber.   Extensive mediastinal and hilar lymphadenopathy is  present. Subcarinal node measures 32 x 37 mm. Right hilar node measures 30  x 29 mm. Lungs/pleura: Moderate emphysema. Multiple 3 mm nodules are present in the  right middle lobe and left lower lobe. Superimposed in the posterior right  lower lobe, there is a dominant nodule measuring 17 x 17 mm. An additional  right lower lobe nodule measures 11 mm. No pleural effusion or pneumothorax. Narrowing of bronchus intermedius due to the right hilar adenopathy. Upper Abdomen: Multiple low-attenuation liver lesions are present measuring  up to 48 x 40 mm and 26 x 28 mm. At least 30 lesions are noted in the  visualized portions of the liver. Soft Tissues/Bones: No lytic or blastic osseous lesion. Impression  1. No acute pulmonary embolus. 2. Extensive mediastinal and hilar lymphadenopathy compatible with metastatic  neoplasm. 3. Multiple new indeterminate 3 mm nodules in the right middle lobe and left  lower lobe. These could be infectious or neoplastic. 4. Right lower lobe 17 mm and 11 mm nodules are nonspecific. 5. Multiple liver metastases measuring up to 48 x 40 mm. CXR PA/LAT: Results for orders placed during the hospital encounter of 09/02/21    XR CHEST (2 VW)    Narrative  EXAMINATION:  TWO XRAY VIEWS OF THE CHEST    9/2/2021 8:48 am    COMPARISON:  None. HISTORY:  ORDERING SYSTEM PROVIDED HISTORY: WEEMS (dyspnea on exertion)  TECHNOLOGIST PROVIDED HISTORY:  Reason for Exam: WEEMS, asthma  Acuity: Unknown  Type of Exam: Initial    FINDINGS:  Clear lungs. No pleural effusion or pneumothorax. Cardiomediastinal  silhouette is unremarkable. Degenerative changes of the visualized osseous  structures. Impression  Clear lungs. CXR portable: No results found for this or any previous visit.                          Danny Stringer MD, M.D.            9/15/2021, 11:30 AM

## 2021-09-15 NOTE — PLAN OF CARE
Problem: Infection:  Goal: Will remain free from infection  Description: Will remain free from infection  Outcome: Ongoing  Note: Patient will remain free from infection. Problem: Safety:  Goal: Free from accidental physical injury  Description: Free from accidental physical injury  Outcome: Ongoing  Note: Pt assessed for fall risk and fall precautions put into place. Bed in lowest position and wheels locked, call light within reach. Nonskid footwear in place. Patient educated on appropriate method of transfer and to call for assistance. Goal: Free from intentional harm  Description: Free from intentional harm  Outcome: Ongoing  Note: Patient remains free from intentional harm, no signs or symptoms of intention to harm noted. Will continue to monitor patient throughout shift. Problem: Daily Care:  Goal: Daily care needs are met  Description: Daily care needs are met  Outcome: Ongoing  Note: Patient assessed to determine ability to perform daily needs and ADLs. Patient assisted with any daily needs that were not able to be met individually by patient. [place] encouraged, although patient educated to ask for assistance when needed. Will continue to monitor and assist patient with meeting daily care needs as needed. Problem: Pain:  Goal: Patient's pain/discomfort is manageable  Description: Patient's pain/discomfort is manageable  Outcome: Ongoing  Note: Educated patient on pain management. Will assess patients pain level per unit protocol, and provide pain management measures as needed. Problem: Skin Integrity:  Goal: Skin integrity will stabilize  Description: Skin integrity will stabilize  Outcome: Ongoing  Note: Will monitor skin and mucous members. Will turn patient every 2 hours, monitor for friction and sheering, and change dressings as needed. Will preform skin assessment every shift.          Problem: Discharge Planning:  Goal: Patients continuum of care needs are met  Description: Patients continuum of care needs are met  Outcome: Ongoing  Note: Assessed patients knowledge of discharge. Will continue to work with patient on discharge planning and answer patient questions. Will consult case management and  as necessary. Problem: Activity Intolerance:  Goal: Ability to tolerate increased activity will improve  Description: Ability to tolerate increased activity will improve  Outcome: Ongoing  Note: Early and frequent ambulqtion encouraged. Educated patient on importance of early ambulation. Patient assisted with ambulation. Will continue to monitor. Problem: Airway Clearance - Ineffective:  Goal: Ability to maintain a clear airway will improve  Description: Ability to maintain a clear airway will improve  Outcome: Ongoing  Note: Patient will maintain patent airway. Problem: Breathing Pattern - Ineffective:  Goal: Ability to achieve and maintain a regular respiratory rate will improve  Description: Ability to achieve and maintain a regular respiratory rate will improve  Outcome: Ongoing  Note: Patient respiratory rate within normal limits. Will continue to monitor throughout the shift. Problem: Gas Exchange - Impaired:  Goal: Levels of oxygenation will improve  Description: Levels of oxygenation will improve  Outcome: Ongoing  Note: Patient will have improved levels of oxygenation.         Problem: Health Behavior:  Goal: Identification of resources available to assist in meeting health care needs will improve  Description: Identification of resources available to assist in meeting health care needs will improve  Outcome: Ongoing     Problem: Physical Regulation:  Goal: Complications related to the disease process, condition or treatment will be avoided or minimized  Description: Complications related to the disease process, condition or treatment will be avoided or minimized  Outcome: Ongoing

## 2021-09-15 NOTE — PROGRESS NOTES
[facility] Internal Medicine Note      Chief Complaint: I feel ok    Subjective/Interval History: This morning the patient is sitting up in bed. He has no new complaints  Good urine output yesterday. Blood pressure is improving. Blood sugars are improving. His appetite remains okay. He denies pain. Weight is up slightly. 9 L negative fluid balance since admission. Case discussed with Dr. Linda Olsen. No chest pain. No cough or sputum. No nausea, vomiting, diarrhea. No abdominal pain. No dysuria. The remainder of the review of systems is negative. PMH, PSH, FH/SH reviewed and unchanged as documented in the H&P personally documented at admission 21    Medication list reviewed    Objective:    BP (!) 151/87   Pulse 86   Temp 97.9 °F (36.6 °C) (Oral)   Resp 18   Ht 6' (1.829 m)   Wt 251 lb 15.8 oz (114.3 kg)   SpO2 94%   BMI 34.18 kg/m²   Temp  Av.2 °F (36.8 °C)  Min: 97.9 °F (36.6 °C)  Max: 98.3 °F (36.8 °C)    RRR  Chest-respirations easy. The chest remains clear today. No wheezes or rhonchi or crackles. Abd- BS+, soft, nondistended  Ext-leg edema seems increased today despite good urine output yesterday.     The Following Labs Were Reviewed Today:    Recent Results (from the past 24 hour(s))   POCT Glucose    Collection Time: 21 11:40 AM   Result Value Ref Range    POC Glucose 127 (H) 70 - 99 mg/dl    Performed on ACCU-CHEK    POCT Glucose    Collection Time: 21  4:46 PM   Result Value Ref Range    POC Glucose 93 70 - 99 mg/dl    Performed on ACCU-CHEK    Basic Metabolic Panel    Collection Time: 21  8:30 PM   Result Value Ref Range    Sodium 145 136 - 145 mmol/L    Potassium 3.3 (L) 3.5 - 5.1 mmol/L    Chloride 103 99 - 110 mmol/L    CO2 35 (H) 21 - 32 mmol/L    Anion Gap 7 3 - 16    Glucose 128 (H) 70 - 99 mg/dL    BUN 28 (H) 7 - 20 mg/dL    CREATININE 0.7 (L) 0.9 - 1.3 mg/dL    GFR Non-African American >60 >60    GFR African American >60 >60    Calcium 8.5 8.3 - 10.6 mg/dL   Lactate Dehydrogenase    Collection Time: 09/14/21  8:30 PM   Result Value Ref Range     (H) 100 - 190 U/L   Magnesium    Collection Time: 09/14/21  8:30 PM   Result Value Ref Range    Magnesium 1.80 1.80 - 2.40 mg/dL   Phosphorus    Collection Time: 09/14/21  8:30 PM   Result Value Ref Range    Phosphorus 3.0 2.5 - 4.9 mg/dL   Uric Acid    Collection Time: 09/14/21  8:30 PM   Result Value Ref Range    Uric Acid, Serum 3.1 (L) 3.5 - 7.2 mg/dL   POCT Glucose    Collection Time: 09/14/21  8:51 PM   Result Value Ref Range    POC Glucose 154 (H) 70 - 99 mg/dl    Performed on ACCU-CHEK    Basic Metabolic Panel    Collection Time: 09/15/21  4:55 AM   Result Value Ref Range    Sodium 142 136 - 145 mmol/L    Potassium 3.5 3.5 - 5.1 mmol/L    Chloride 102 99 - 110 mmol/L    CO2 32 21 - 32 mmol/L    Anion Gap 8 3 - 16    Glucose 111 (H) 70 - 99 mg/dL    BUN 25 (H) 7 - 20 mg/dL    CREATININE 0.5 (L) 0.9 - 1.3 mg/dL    GFR Non-African American >60 >60    GFR African American >60 >60    Calcium 8.4 8.3 - 10.6 mg/dL   Lactate Dehydrogenase    Collection Time: 09/15/21  4:55 AM   Result Value Ref Range     (H) 100 - 190 U/L   Magnesium    Collection Time: 09/15/21  4:55 AM   Result Value Ref Range    Magnesium 1.90 1.80 - 2.40 mg/dL   Phosphorus    Collection Time: 09/15/21  4:55 AM   Result Value Ref Range    Phosphorus 2.8 2.5 - 4.9 mg/dL   CBC Auto Differential    Collection Time: 09/15/21  4:55 AM   Result Value Ref Range    WBC 11.6 (H) 4.0 - 11.0 K/uL    RBC 3.76 (L) 4.20 - 5.90 M/uL    Hemoglobin 12.3 (L) 13.5 - 17.5 g/dL    Hematocrit 36.9 (L) 40.5 - 52.5 %    MCV 98.2 80.0 - 100.0 fL    MCH 32.9 26.0 - 34.0 pg    MCHC 33.4 31.0 - 36.0 g/dL    RDW 14.1 12.4 - 15.4 %    Platelets 495 (L) 290 - 450 K/uL    MPV 8.3 5.0 - 10.5 fL    Neutrophils % 96.2 %    Lymphocytes % 3.3 %    Monocytes % 0.3 %    Eosinophils % 0.1 %    Basophils % 0.1 %    Neutrophils Absolute 11.1 (H) 1.7 - 7.7 K/uL Lymphocytes Absolute 0.4 (L) 1.0 - 5.1 K/uL    Monocytes Absolute 0.0 0.0 - 1.3 K/uL    Eosinophils Absolute 0.0 0.0 - 0.6 K/uL    Basophils Absolute 0.0 0.0 - 0.2 K/uL   Uric Acid    Collection Time: 09/15/21  4:55 AM   Result Value Ref Range    Uric Acid, Serum 2.6 (L) 3.5 - 7.2 mg/dL       ASSESSMENT/PLAN:      Principal Problem:    Hypokalemia-potassium is okay today. Continue to monitor closely while we diurese him. Active Problems:    Metastatic small cell lung cancer, poorly differentiated-patient has tolerated this round of chemotherapy. Discussed with Dr. Gabriella Beal. Hopefully there will be some rapid decline in ACTH activity. Blood pressure improved today as is his blood sugar. Hopefully this is indicative of reduced ACTH activity. Moderate persistent asthma with acute exacerbation-remains with a substantial oxygen demand. Continue to follow. Continue diuresis. Pure hypercholesterolemia-continue to hold Lipitor given abnormal LFTs. LFTs remain abnormal.  Continue to monitor. Essential hypertension-blood pressure improving. Continue to monitor. Hopefully cortisol levels following. Bilateral leg edema-continue Lasix twice daily. Monitor weight, urine output, and electrolytes. Steroid-induced hyperglycemia-sugars much improved. Stop prandial insulin and reduce Lantus to 30 units. Continue sliding scale. Hypomagnesemia- continue to monitor and supplement as needed. Magnesium is okay today.     Edith Warner MD, [address]  9:17 AM  9/15/2021

## 2021-09-16 NOTE — PROGRESS NOTES
[facility]  Progress Note      SUBJECTIVE:   HE is doing ok  bp doing better as is sugars  k a little low   Oxygen requireiment improving   day3 of granix   OBJECTIVE      Medications    Current Facility-Administered Medications: insulin glargine (LANTUS) injection vial 20 Units, 20 Units, SubCUTAneous, Daily  insulin lispro (HUMALOG) injection vial 0-12 Units, 0-12 Units, SubCUTAneous, TID WC  insulin lispro (HUMALOG) injection vial 0-6 Units, 0-6 Units, SubCUTAneous, Nightly  potassium chloride (KLOR-CON) extended release tablet 40 mEq, 40 mEq, Oral, TID  Tbo-Filgrastim (GRANIX) injection 300 mcg, 300 mcg, SubCUTAneous, QPM  prochlorperazine (COMPAZINE) tablet 10 mg, 10 mg, Oral, Q6H PRN  furosemide (LASIX) injection 40 mg, 40 mg, IntraVENous, BID  doxazosin (CARDURA) tablet 4 mg, 4 mg, Oral, 2 times per day  sodium chloride (Inhalant) 3 % nebulizer solution 4 mL, 4 mL, Nebulization, BID  spironolactone (ALDACTONE) tablet 100 mg, 100 mg, Oral, BID  ondansetron (ZOFRAN) 8 mg in dextrose 5 % 50 mL IVPB, 8 mg, IntraVENous, Q8H PRN  enoxaparin (LOVENOX) injection 40 mg, 40 mg, SubCUTAneous, Daily  cloNIDine (CATAPRES) tablet 0.2 mg, 0.2 mg, Oral, Q4H PRN  hydrALAZINE (APRESOLINE) injection 10 mg, 10 mg, IntraVENous, Q4H PRN  acetaminophen (TYLENOL) tablet 650 mg, 650 mg, Oral, Q4H PRN  NIFEdipine (PROCARDIA XL) extended release tablet 60 mg, 60 mg, Oral, BID  lisinopril (PRINIVIL;ZESTRIL) tablet 40 mg, 40 mg, Oral, Daily  glucose (GLUTOSE) 40 % oral gel 15 g, 15 g, Oral, PRN  dextrose 50 % IV solution, 12.5 g, IntraVENous, PRN  glucagon (rDNA) injection 1 mg, 1 mg, IntraMUSCular, PRN  dextrose 5 % solution, 100 mL/hr, IntraVENous, PRN  iopamidol (ISOVUE-370) 76 % injection 75 mL, 75 mL, IntraVENous, ONCE PRN  budesonide-formoterol (SYMBICORT) 160-4.5 MCG/ACT inhaler 2 puff, 2 puff, Inhalation, BID  montelukast (SINGULAIR) tablet 10 mg, 10 mg, Oral, Nightly  sodium chloride flush 0.9 % injection 5-40 mL, 5-40 mL, IntraVENous, 2 times per day  sodium chloride flush 0.9 % injection 5-40 mL, 5-40 mL, IntraVENous, PRN  0.9 % sodium chloride infusion, 25 mL, IntraVENous, PRN  ondansetron (ZOFRAN-ODT) disintegrating tablet 4 mg, 4 mg, Oral, Q8H PRN **OR** ondansetron (ZOFRAN) injection 4 mg, 4 mg, IntraVENous, Q6H PRN  polyethylene glycol (GLYCOLAX) packet 17 g, 17 g, Oral, Daily PRN  [DISCONTINUED] acetaminophen (TYLENOL) tablet 650 mg, 650 mg, Oral, Q6H PRN **OR** acetaminophen (TYLENOL) suppository 650 mg, 650 mg, Rectal, Q6H PRN  nicotine (NICODERM CQ) 21 MG/24HR 1 patch, 1 patch, TransDERmal, Daily  ipratropium-albuterol (DUONEB) nebulizer solution 1 ampule, 1 ampule, Inhalation, Q4H WA  Physical    VITALS:  BP (!) 143/80   Pulse 86   Temp 98.1 °F (36.7 °C) (Oral)   Resp 20   Ht 6' (1.829 m)   Wt 243 lb 6.2 oz (110.4 kg)   SpO2 93%   BMI 33.01 kg/m²   TEMPERATURE:  Current - Temp: 98.1 °F (36.7 °C); Max - Temp  Av °F (36.7 °C)  Min: 97.7 °F (36.5 °C)  Max: 98.3 °F (36.8 °C)  PULSE OXIMETRY RANGE: SpO2  Av.6 %  Min: 92 %  Max: 99 %  24HR INTAKE/OUTPUT:      Intake/Output Summary (Last 24 hours) at 2021 0932  Last data filed at 2021 8315  Gross per 24 hour   Intake 120 ml   Output 2500 ml   Net -2380 ml       CONSTITUTIONAL:  awake, alert, cooperative, no apparent distress, HEENT oral pharynx , no scleral icterus  HEMATOLOGIC/LYMPHATICS:  no cervical lymphadenopathy, no supraclavicular lymphadenopathy,LUNGS:overall diminished g  CARDIOVASCULAR:  , regular rate and rhythm, normal S1 and S2, no S3 or S4, and no murmur noted  ABDOMEN:  No scars, normal bowel sounds, soft, non-distended, non-tender, no masses palpated, no hepatosplenomegally  MUSCULOSKELETAL:  There is no redness, warmth, or swelling of the joints. EXTREMETIES:s+ edema   NEUROLOGIC:  Awake, alert, oriented to name, place and time.  =SKIN:  no bruising or bleeding      Data      Recent Labs     21  5300 09/15/21  0455   WBC 6.6 11.6*   HGB 12.1* 12.3*   HCT 36.5* 36.9*   PLT 98* 102*   .0 98.2        Recent Labs     09/14/21  2030 09/15/21  0455 09/16/21  0530    142 142   K 3.3* 3.5 3.1*    102 102   CO2 35* 32 30   PHOS 3.0 2.8 2.7   BUN 28* 25* 19   CREATININE 0.7* 0.5* 0.5*     Recent Labs     09/14/21  0450   AST 50*   *   BILIDIR 0.3   BILITOT 0.8   ALKPHOS 219*       Magnesium:    Lab Results   Component Value Date    MG 1.90 09/16/2021    MG 1.90 09/15/2021    MG 1.80 09/14/2021       Imaging ECHO Complete 2D W Doppler W Color    Result Date: 9/3/2021  Transthoracic Echocardiography Report (TTE)  Demographics   Patient Name      Arash Peters   Date of Study     09/03/2021          Gender              Male   Patient Number    [id number]          Date of Birth       1970   Visit Number      [id number]           Age                 46 year(s)   Accession Number  [id number]          Room Number         9420   Corporate ID      [id number]             Sonographer         Harriett Jaimes RVT,                                                            GÓMEZ   Ordering          [address]     Physician           Aleyda Monteiro MD  Procedure Type of Study   TTE procedure:ECHOCARDIOGRAM COMPLETE 2D W DOPPLER W COLOR. Procedure Date Date: 09/03/2021 Start: 02:20 PM Study Location: [facility] - Echo Lab Technical Quality: Limited visualization due to poor acoustical window. Additional Indications:Lower extremity swelling, evaluate the right heart and LV; HTN, HLD, COPD.  Patient Status: Routine Height: 72 inches Weight: 247.01 pounds BSA: 2.33 m2 BMI: 33.5 kg/m2 Rhythm: Within normal limits HR: 70 bpm BP: 183/87 mmHg  Conclusions   Summary  Normal left ventricle size, wall thickness, and systolic function with an  estimated ejection fraction of 60-65%. No regional wall motion abnormalities  are seen. Normal diastolic function. Normal right ventricular size and function. No significant valve abnormalities noted. Signature   ------------------------------------------------------------------  Electronically signed by Stalin Cortes MD  (Interpreting physician) on 09/03/2021 at 03:46 PM  ------------------------------------------------------------------   Findings   Left Ventricle  Normal left ventricle size, wall thickness, and systolic function with an  estimated ejection fraction of 60-65%. No regional wall motion abnormalities  are seen. Normal diastolic function. Mitral Valve  Mitral valve leaflets appear mildly thickened. No evidence of mitral regurgitation or stenosis. Left Atrium  The left atrium is normal in size. Aortic Valve  The aortic valve leaflets are not well visualized. No evidence of aortic valve regurgitation or stenosis. Aorta  The aortic root is normal in size. Right Ventricle  Normal right ventricular size and function. TAPSE 2.8 cm   Tricuspid Valve  The tricuspid valve was not well visualized. No evidence of tricuspid stenosis. Right Atrium  The right atrial size is normal.   Pulmonic Valve  The pulmonic valve is not well visualized. No evidence of pulmonic valve regurgitation. No evidence of pulmonic valve stenosis. Pericardial Effusion  No pericardial effusion noted. Pleural Effusion  No pleural effusion. Miscellaneous  IVC not well visualized.   M-Mode/2D Measurements (cm)   LV Diastolic Dimension: 4.79 cm LV Systolic Dimension: 1.64 cm  LV Septum Diastolic: 0.9 cm  LV PW Diastolic: 0.16 cm        AO Root Dimension: 3.4 cm                                   LA Area: 23.6 cm2  LVOT: 2.1 cm                    LA volume/Index: 70.5 ml /30 ml/m2  Doppler Measurements   AV Peak Velocity: 184 cm/s     MV Peak E-Wave: 106 cm/s  AV Peak Gradient: 13.54 mmHg   MV Peak A-Wave: 87.5 cm/s  AV Mean Gradient: 6 mmHg       MV E/A Ratio: 1.21  LVOT Peak Velocity: 160 cm/s   MV Mean Gradient: 2 mmHg  AV Area (Continuity):3.58 cm2  MV Max P mmHg                                 MV Vmax:114 cm/s                                 MV VTI:32.9 cm/s   E' Septal Velocity: 10.9 cm/s  MV Area (continuity): 3.61 cm2  E' Lateral Velocity: 15.5 cm/s MV Deceleration Time: 259 msec  PV Peak Velocity: 131 cm/s  PV Peak Gradient: 6.86 mmHg   Aortic Valve   Peak Velocity: 184 cm/s     Mean Velocity: 114 cm/s  Peak Gradient: 13.54 mmHg   Mean Gradient: 6 mmHg  Area (continuity): 3.58 cm2  AV VTI: 33.2 cm  Aorta   Aortic Root: 3.4 cm  Ascending Aorta: 3.3 cm  LVOT Diameter: 2.1 cm      XR CHEST (2 VW)    Result Date: 2021  EXAMINATION: TWO XRAY VIEWS OF THE CHEST 2021 8:48 am COMPARISON: None. HISTORY: ORDERING SYSTEM PROVIDED HISTORY: WEEMS (dyspnea on exertion) TECHNOLOGIST PROVIDED HISTORY: Reason for Exam: WEEMS, asthma Acuity: Unknown Type of Exam: Initial FINDINGS: Clear lungs. No pleural effusion or pneumothorax. Cardiomediastinal silhouette is unremarkable. Degenerative changes of the visualized osseous structures. Clear lungs. CT GUIDED NEEDLE PLACEMENT    Result Date: 2021  PROCEDURE: CT GUIDED CORE BIOPSY OF LEFT HEPATIC LOBE MASS MODERATE CONSCIOUS SEDATION 2021 HISTORY: ORDERING SYSTEM PROVIDED HISTORY: lesion TECHNOLOGIST PROVIDED HISTORY: Reason for exam:->lesion Reason for Exam: lesion Acuity: Acute Type of Exam: Initial; ORDERING SYSTEM PROVIDED HISTORY: bx TECHNOLOGIST PROVIDED HISTORY: Reason for exam:->bx Reason for Exam: lesion Acuity: Acute Type of Exam: Initial PHYSICIANS: Amilcar Brianne SEDATION: 0.5 mgversed and 25 mcg fentanyl were titrated intravenously for moderate sedation monitored under my direction. Total intraservice time of sedation was 15 minutes.   The patient's vital signs were monitored throughout the procedure and recorded in the patient's medical record by the nurse. TECHNIQUE: Informed consent was obtained after a detailed discussion about the procedure including the risk, benefits, and alternatives. Universal protocol was followed. Axial images were obtained through the liver and a suitable skin site was prepped and draped in sterile fashion. Local anesthesia was achieved with lidocaine. Under intermittent CT guidance, a 17 gauge coaxial needle was inserted into the a left hepatic lobe mass. Through this, 1 18 gauge core biopsy specimens were obtained using coaxial technique and submitted to pathology. Gel-Foam slurry was administered as the needle was removed. A sterile bandage was then placed. The patient tolerated the procedure well. Estimated blood loss: Less than 5 cc Dose modulation, iterative reconstruction, and/or weight based adjustment of the mA/kV was utilized to reduce the radiation dose to as low as reasonably achievable. DLP: 4740.06 mGy-cm     Successful CT guided core biopsy of a left hepatic lobe mass. CT ABDOMEN PELVIS W IV CONTRAST Additional Contrast? Oral    Result Date: 9/5/2021  EXAMINATION: CT OF THE ABDOMEN AND PELVIS WITH CONTRAST 9/3/2021 5:13 pm TECHNIQUE: CT of the abdomen and pelvis was performed with the administration of intravenous contrast. Multiplanar reformatted images are provided for review. Dose modulation, iterative reconstruction, and/or weight based adjustment of the mA/kV was utilized to reduce the radiation dose to as low as reasonably achievable. COMPARISON: CT pulmonary angiogram performed September 2, 2021. HISTORY: ORDERING SYSTEM PROVIDED HISTORY: Metastatic disease, evaluate for primary. Bilateral lower ext edema, eval for IVC thrombosis/compression TECHNOLOGIST PROVIDED HISTORY: Reason for exam:->Metastatic disease, evaluate for primary.   Bilateral lower ext edema, eval for IVC thrombosis/compression Additional Contrast?->Oral FINDINGS: Images are degraded by motion artifact. Lower Chest: Respiratory motion artifact. Visualized pulmonary nodules and enlarged mediastinal lymph node, unchanged from prior CT chest. Organs: There are multifocal hypoattenuating lesions throughout the liver. Lesion within the left hepatic lobe measures 6.1 x 5.7 cm (axial image 35). Lesion within the right hepatic lobe measures 3.6 x 2.6 cm (axial image 51). The gallbladder is normally distended with layering hyperintensity suggestive of sludge. No calcified stones. No gallbladder wall thickening or pericholecystic fluid. The spleen and pancreas are grossly unremarkable. Right adrenal gland nodule measures up to 1.6 cm. There is mild thickening of the left adrenal gland without definite nodularity. There is symmetric renal cortical enhancement. Simple appearing right renal cysts demonstrated. No hydronephrosis. GI/Bowel: There is a hypoattenuating nodule in the region of the 2/3 portion of the duodenum in the region of the ampulla which measures up to 1.3 cm (axial image 83). There is diffuse fold thickening involving the majority of the jejunum. No evidence of obstruction. Sigmoid diverticulosis without evidence of acute diverticulitis. The appendix is normal. Pelvis: Prostate and seminal vesicles are grossly unremarkable. The urinary bladder is within normal limits. Peritoneum/Retroperitoneum: No significant lymphadenopathy. The abdominal aorta is normal in course and caliber with scattered atherosclerotic disease. No definite filling defects within the IVC, the iliac veins, or visualized femoral veins to suggest thrombus. Bones/Soft Tissues: There is asymmetric thickening of the inferior right rectus abdominus musculature with mild surrounding stranding. Visualization is partially obscured by streak artifact from dense oral contrast in the adjacent small bowel. Body wall edema demonstrated. No focal drainable collections.   Multilevel moderate degenerative changes in the visualized spine. No destructive osseous lesions. 1. Multiple hypoattenuating liver lesions which are most suggestive of metastatic disease. 2. Right adrenal gland nodule measuring up to 1.6 cm, indeterminate. 3. Hypoattenuating nodule in the region of the 2/3 portion of the duodenum in the region of the ampulla measuring up to 1.3 cm. Findings could be artifactual or related to fold thickening; however, a small ampullary or possibly pancreatic lesion cannot be entirely excluded. 4. Diffuse fold thickening involving the majority of the jejunum, nonspecific but can be related to an infectious etiology or lymphatic obstruction. 5. No definite filling defects in the IVC, iliac veins, or visualized femoral veins to suggest thrombus. 6. Asymmetric thickening of the inferior right rectus abdominus musculature with surrounding stranding which is partially obscured by adjacent streak artifact from dense oral contrast in the small bowel. No definite focal lesion is demonstrated; however, an underlying lesion cannot be entirely excluded. Hematoma is also a consideration. CT CHEST PULMONARY EMBOLISM W CONTRAST    Result Date: 9/5/2021  EXAMINATION: CTA OF THE CHEST 9/2/2021 12:47 pm TECHNIQUE: CTA of the chest was performed after the administration of intravenous contrast.  Multiplanar reformatted images are provided for review. MIP images are provided for review. Dose modulation, iterative reconstruction, and/or weight based adjustment of the mA/kV was utilized to reduce the radiation dose to as low as reasonably achievable. COMPARISON: None.  HISTORY: ORDERING SYSTEM PROVIDED HISTORY: leg swelling sob new o2 requirement TECHNOLOGIST PROVIDED HISTORY: Reason for exam:->leg swelling sob new o2 requirement Decision Support Exception - unselect if not a suspected or confirmed emergency medical condition->Emergency Medical Condition (MA) Reason for Exam: leg swelling sob new o2 requirement Acuity: Acute Type of Exam: Initial FINDINGS: Pulmonary Arteries:  No central, lobar, or segmental pulmonary embolus. Narrowing of the right lower lobe pulmonary artery by the adenopathy. Mediastinum: Heart size is normal.  No pericardial effusion. Thoracic aorta is normal caliber. Extensive mediastinal and hilar lymphadenopathy is present. Subcarinal node measures 32 x 37 mm. Right hilar node measures 30 x 29 mm. Lungs/pleura: Moderate emphysema. Multiple 3 mm nodules are present in the right middle lobe and left lower lobe. Superimposed in the posterior right lower lobe, there is a dominant nodule measuring 17 x 17 mm. An additional right lower lobe nodule measures 11 mm. No pleural effusion or pneumothorax. Narrowing of bronchus intermedius due to the right hilar adenopathy. Upper Abdomen: Multiple low-attenuation liver lesions are present measuring up to 48 x 40 mm and 26 x 28 mm. At least 30 lesions are noted in the visualized portions of the liver. Soft Tissues/Bones: No lytic or blastic osseous lesion. 1. No acute pulmonary embolus. 2. Extensive mediastinal and hilar lymphadenopathy compatible with metastatic neoplasm. 3. Multiple new indeterminate 3 mm nodules in the right middle lobe and left lower lobe. These could be infectious or neoplastic. 4. Right lower lobe 17 mm and 11 mm nodules are nonspecific, but metastatic disease not excluded. 5. Multiple liver metastases measuring up to 48 x 40 mm.  RECOMMENDATIONS:     CT NEEDLE BIOPSY LIVER PERCUTANEOUS    Result Date: 9/8/2021  PROCEDURE: CT GUIDED CORE BIOPSY OF LEFT HEPATIC LOBE MASS MODERATE CONSCIOUS SEDATION 9/7/2021 HISTORY: ORDERING SYSTEM PROVIDED HISTORY: lesion TECHNOLOGIST PROVIDED HISTORY: Reason for exam:->lesion Reason for Exam: lesion Acuity: Acute Type of Exam: Initial; ORDERING SYSTEM PROVIDED HISTORY: bx TECHNOLOGIST PROVIDED HISTORY: Reason for exam:->bx Reason for Exam: lesion Acuity: Acute Type of Exam: Initial PHYSICIANS: Amilcar Brianne SEDATION: 0.5 mgversed and 25 mcg fentanyl were titrated intravenously for moderate sedation monitored under my direction. Total intraservice time of sedation was 15 minutes. The patient's vital signs were monitored throughout the procedure and recorded in the patient's medical record by the nurse. TECHNIQUE: Informed consent was obtained after a detailed discussion about the procedure including the risk, benefits, and alternatives. Universal protocol was followed. Axial images were obtained through the liver and a suitable skin site was prepped and draped in sterile fashion. Local anesthesia was achieved with lidocaine. Under intermittent CT guidance, a 17 gauge coaxial needle was inserted into the a left hepatic lobe mass. Through this, 1 18 gauge core biopsy specimens were obtained using coaxial technique and submitted to pathology. Gel-Foam slurry was administered as the needle was removed. A sterile bandage was then placed. The patient tolerated the procedure well. Estimated blood loss: Less than 5 cc Dose modulation, iterative reconstruction, and/or weight based adjustment of the mA/kV was utilized to reduce the radiation dose to as low as reasonably achievable. DLP: 3152.05 mGy-cm     Successful CT guided core biopsy of a left hepatic lobe mass.      VL Extremity Venous Bilateral    Result Date: 9/8/2021  Lower Extremities DVT Study  Demographics   Patient Name       Jerica Thomaston   Date of Study      09/07/2021         Gender              Male   Patient Number     [id number]         Date of Birth       1970   Visit Number       [id number]          Age                 46 year(s)   Accession Number   [id number]         Room Number         3024   Corporate ID       [id number]            Gamaliel Becker RVT   Ordering Physician Wendi Osman MD         Physician           MD  Procedure Type of Study:   Veins:Lower Extremities DVT Study, VASC EXTREMITY VENOUS DUPLEX BILATERAL. Vascular Sonographer Report  Indications for Study:Edema. Impressions Right Impression No evidence of deep vein or superficial vein thrombosis involving the right lower extremity in vessels clearly visualized. Proximal and Mid Posterior Tibial veins and Peroneal veins were not visualized due to swelling. Calf and ankle edema noted. Left Impression No evidence of deep vein or superficial vein thrombosis involving the left lower extremity in vessels clearly visualized. Proximal Posterior Tibial veins and Soleal vein were not visualized due to swelling. Calf and ankle edema noted. Conclusions   Summary   No evidence of deep vein or superficial vein thrombosis involving the  bilateral lower extremities in vessels clearly visualized as detailed above. Signature   ------------------------------------------------------------------  Electronically signed by Verónica Damian MD (Interpreting  physician) on 09/08/2021 at 08:09 AM  ------------------------------------------------------------------  Patient Status:Routine. Study [address] V[facility] Lab. Technical Quality:Limited visualization due to swelling. Risk Factors   - The patient's risk factor(s) include: dyslipidemia and arterial     hypertension.   - The patient has a current/recent (within 1 year) tobacco history. Velocities are measured in cm/s ; Diameters are measured in mm Right Lower Extremities DVT Study Measurements Right 2D Measurements +------------------------+----------+---------------+----------+ ! Location                ! Visualized! Compressibility! Thrombosis! +------------------------+----------+---------------+----------+ ! Sapheno Femoral Junction! Yes       ! Yes            ! None      ! +------------------------+----------+---------------+----------+ ! GSV Thigh               ! Yes       ! Yes            ! None      ! +------------------------+----------+---------------+----------+ !Phasic! No    !            ! +--------------+------+------+------------+ Left Lower Extremities DVT Study Measurements Left 2D Measurements +------------------------+----------+---------------+----------+ ! Location                ! Visualized! Compressibility! Thrombosis! +------------------------+----------+---------------+----------+ ! Sapheno Femoral Junction! Yes       ! Yes            ! None      ! +------------------------+----------+---------------+----------+ ! GSV Thigh               ! Yes       ! Yes            ! None      ! +------------------------+----------+---------------+----------+ ! Common Femoral          !Yes       ! Yes            ! None      ! +------------------------+----------+---------------+----------+ ! Prox Femoral            !Yes       ! Yes            ! None      ! +------------------------+----------+---------------+----------+ ! Mid Femoral             !Yes       ! Yes            ! None      ! +------------------------+----------+---------------+----------+ ! Dist Femoral            !Yes       ! Yes            ! None      ! +------------------------+----------+---------------+----------+ ! Deep Femoral            !Yes       ! Yes            ! None      ! +------------------------+----------+---------------+----------+ ! Popliteal               !Yes       ! Yes            ! None      ! +------------------------+----------+---------------+----------+ ! Gastroc                 ! Yes       ! Yes            ! None      ! +------------------------+----------+---------------+----------+ ! Soleal                  !No        !               !          ! +------------------------+----------+---------------+----------+ ! PTV                     ! Partial   !Yes            ! None      ! +------------------------+----------+---------------+----------+ ! ATV                     ! Yes       ! Yes            ! None      ! +------------------------+----------+---------------+----------+ ! Peroneal                !Yes       ! Yes !None      ! +------------------------+----------+---------------+----------+ ! GSV Calf                ! Yes       ! Yes            ! None      ! +------------------------+----------+---------------+----------+ ! SSV                     ! Yes       ! Yes            ! None      ! +------------------------+----------+---------------+----------+ Left Doppler Measurements +--------------+------+------+------------+ ! Location      ! Signal!Reflux! Reflux (sec)! +--------------+------+------+------------+ ! Common Femoral!Phasic! No    !            ! +--------------+------+------+------------+ ! Popliteal     !Phasic! No    !            ! +--------------+------+------+------------+      Problem List  Patient Active Problem List   Diagnosis    Pure hypercholesterolemia    Anxiety    Essential hypertension    Moderate persistent asthma with acute exacerbation    Metastatic cancer ([facility] [address]    Hypokalemia    Acute respiratory failure with hypoxia (HCC)    Abnormal CT of the chest    COPD exacerbation (HCC)    Lung nodules    Tobacco abuse       ASSESSMENT AND PLAN    Extensive stage small cell  Ectopic acth   Rare paraneoplastic syndrome   HE is day  6carbo etoposide atezolizumab  HE is finished with chemo next dose 3 weeks   On granix if inpt will give daily -usually 7 days -if he goes home before that is ok    he should be out of the window for tumor lysis at this point thus not worried about this anymore     He seems to be improving which is pretty consistent with when we treat small cell-things improve quickly usually     Cristy Beal MD, MD

## 2021-09-16 NOTE — PROGRESS NOTES
[facility]  Nephrology   Progress Note        We are following the patient for uncontrolled HTN Severe Hypokalemia  47 y/o WM, gray and heavy smoker admitted with severe Hypokalemia  He has h/o HTN for ~ 20 years that has been well controlled on Amlodipine  About 3 weeks ago he started to have leg swelling, weight gain fatigue and SOB  Out patient labs showed severe Hypokalemia  On admission K+ noted to be 2.2 meq CO2 40   Noted to have uncontrolled HTN with /108 mm  BP has remained resistant despite addition of multiple meds    SUBJECTIVE:  -pt seen and examined  -PMSHx and meds reviewed  -No family at bedside          Wt down to 243 lbs today  Intake not being recorded so net fluid balance is misleading   On 3 L O2 now , breathing better    ROS: neg chest pain nausea overall feels well edema improving   BP is better    OBJECTIVE:     PHYSICAL:    TEMPERATURE:  Current - Temp: 97.5 °F (36.4 °C);  Max - Temp  Av °F (36.7 °C)  Min: 97.5 °F (36.4 °C)  Max: 98.3 °F (36.8 °C)  RESPIRATIONS RANGE: Resp  Av.7  Min: 15  Max: 20  PULSE RANGE: Pulse  Av  Min: 73  Max: 90  BLOOD PRESSURE RANGE:  Systolic (46XLU), FOM:087 , Min:129 , XAU:585   ; Diastolic (37FSN), HRM:23, Min:70, Max:93    PULSE OXIMETRY RANGE: SpO2  Av.8 %  Min: 92 %  Max: 99 %  24HR INTAKE/OUTPUT:      Intake/Output Summary (Last 24 hours) at 2021 1221  Last data filed at 2021 8812  Gross per 24 hour   Intake 120 ml   Output 2500 ml   Net -2380 ml       CONSTITUTIONAL:  awake, alert, cooperative, no apparent distress, and appears stated age  HEENT:  Lids and lashes normal, pupils equal, round and reactive to light  NECK:  Supple, symmetrical, trachea midline, no adenopathy  LUNGS: CTA  ABDOMEN:  No scars, normal bowel sounds, soft, non-distended  NEUROLOGIC:  alert, oriented, normal speech, no focal findings or movement disorder noted  SKIN: no bruising or bleeding  EXTREMITIES:  +edema Medications     dextrose      sodium chloride Stopped (09/10/21 0939)        Data      CBC:   Recent Labs     09/14/21  0450 09/15/21  0455   WBC 6.6 11.6*   RBC 3.65* 3.76*   HGB 12.1* 12.3*   HCT 36.5* 36.9*   PLT 98* 102*     BMP:    Recent Labs     09/14/21  2030 09/15/21  0455 09/16/21  0530    142 142   K 3.3* 3.5 3.1*    102 102   CO2 35* 32 30   BUN 28* 25* 19   CREATININE 0.7* 0.5* 0.5*   CALCIUM 8.5 8.4 7.9*   GLUCOSE 128* 111* 75     Phosphorus:    Recent Labs     09/14/21  2030 09/15/21  0455 09/16/21  0530   PHOS 3.0 2.8 2.7     Magnesium:    Recent Labs     09/14/21  2030 09/15/21  0455 09/16/21  0530   MG 1.80 1.90 1.90         ASSESSMENT     Patient Active Problem List   Diagnosis    Pure hypercholesterolemia    Anxiety    Essential hypertension    Moderate persistent asthma with acute exacerbation    Metastatic cancer ([facility] [address]    Hypokalemia    Acute respiratory failure with hypoxia (HCC)    Abnormal CT of the chest    COPD exacerbation (HCC)    Lung nodules    Tobacco abuse       PLAN    Hypertension resistant to treatment with multiple meds Concern for secondary HTN including Hypercortisolism ( Cortisol level 90 with adrenal nodule      24 hour urine for cortisol  Elevated at [address] !!  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   Aldactone dose  Increased  to 100 mg bid K= 3.1 meq On Lasix as well Continue TID with additional replacement prn Mg++ 1.9 mg     Edema Doppler negative for DVT Suspect fluid retention due to hypercortisolism.  UOP 3 L on Lasix IV BID Monitor daily weights Net 9 L negative so far    Met Alkalosis improving       Elevated LFT's stable

## 2021-09-16 NOTE — PLAN OF CARE
Problem: Infection:  Goal: Will remain free from infection  Description: Will remain free from infection  9/15/2021 2103 by Maycol Kaye RN  Outcome: Ongoing  Note: Pt is free of signs and symptoms of infection. Incision and dressing are clean, dry and intact. Vital signs stable. Will monitor. 9/15/2021 0730 by Drake Dick RN  Outcome: Ongoing  Note: Patient is alert and oriented, afebrile, has manageable complaints of pain, skin is intact and appropriate for ethnicity in color       Problem: Safety:  Goal: Free from accidental physical injury  Description: Free from accidental physical injury  9/15/2021 2103 by Maycol Kaye RN  Outcome: Ongoing  Note: Pt is free of injury. No injury noted. Fall precautions in place. Call light within reach. Will monitor. 9/15/2021 0730 by Drake Dick RN  Outcome: Ongoing  Note: Fall risk assessment completed . Fall precautions in place, bed alarm on, side rails 2/4 up, call light in reach, educated pt on calling for assistance when needed, room clear of clutter. Pt verbalized understanding. Goal: Free from intentional harm  Description: Free from intentional harm  9/15/2021 2103 by Maycol Kaye RN  Outcome: Ongoing  Note: Pt is free of intentional harm. No intentions noted. Will monitor. 9/15/2021 0730 by Drake Dick RN  Outcome: Ongoing     Problem: Daily Care:  Goal: Daily care needs are met  Description: Daily care needs are met  9/15/2021 2103 by Maycol aKye RN  Outcome: Ongoing  Note: Patients daily care needs will be met this shift    9/15/2021 0730 by Drake Dick RN  Outcome: Ongoing  Note: Checking on patient Q2H for nutrition needs, hygiene needs, comfort measures, mobility, fall risk interventions, and safe environment. All precautions and interventions in place. Educated patient on use of call light and telephone. Patient verbalizes understanding. Call light/telephone in reach.        Problem: Pain:  Goal: Patient's pain/discomfort is manageable  Description: Patient's pain/discomfort is manageable  9/15/2021 2103 by Christi Penn RN  Outcome: Ongoing  Note: Pt assessed for pain. Pt in pain and assessed with 0-10 pain rating scale. Pt given prescribed analgesic for pain. (See eMar) Pt satisfied with pain relief thus far. Will reassess and continue to monitor. 9/15/2021 0730 by Ty Diez RN  Outcome: Ongoing  Note: Pain /discomfort being managed with PRN analgesics per MD orders. Patient able to express presence and absence of pain and rate pain appropriately using numerical scale. Problem: Skin Integrity:  Goal: Skin integrity will stabilize  Description: Skin integrity will stabilize  9/15/2021 2103 by Christi Penn RN  Outcome: Ongoing  Note: Patient will have no new skin breakdown    9/15/2021 0730 by Ty Diez RN  Outcome: Ongoing  Note: Chad score assessed. Patient able to ambulate and turn self and repositioned patient Q2H and assessed skin. Educated patient on importance of repositioning to prevent skin issues. Problem: Discharge Planning:  Goal: Patients continuum of care needs are met  Description: Patients continuum of care needs are met  9/15/2021 2103 by Christi Penn RN  Outcome: Ongoing  Note: Patients continuum of care needs will be met this shift. 9/15/2021 0730 by Ty Diez RN  Outcome: Ongoing  Note: Working with patient, physician, and social work to discharge patient to the appropriate level of care. Problem: Activity Intolerance:  Goal: Ability to tolerate increased activity will improve  Description: Ability to tolerate increased activity will improve  9/15/2021 2103 by Christi Penn RN  Outcome: Ongoing  Note: Patients mobility and activity tolerance will improve    9/15/2021 0730 by Ty Diez RN  Outcome: Ongoing  Note: Patient will describe an ease of ambulation this shift.       Problem: Airway Clearance - Ineffective:  Goal: Ability to maintain a clear airway will improve  Description: Ability to maintain a clear airway will improve  9/15/2021 2103 by Queta Mckeon RN  Outcome: Ongoing  Note: Patients ability to maintain a clear airway will improve    9/15/2021 0730 by Garrel Castleman, RN  Outcome: Ongoing  Note: Patient has maintained a patent airway, repositions self, lung sounds bilaterally diminished, cough noted and is infrequent and nonproductive, RT is involved in patient care and is providing care, patient has had adequate fluid intake and is on a 1500ml fluid restriction, no need to suction airway at this time, educated patient to turn and cough and deep breathe every two hours and as needed, encouraged incentive spirometer and patient has been performing. Will continue to monitor and reassess. Problem: Breathing Pattern - Ineffective:  Goal: Ability to achieve and maintain a regular respiratory rate will improve  Description: Ability to achieve and maintain a regular respiratory rate will improve  9/15/2021 2103 by Queta Mckeon RN  Outcome: Ongoing  Note: Will continue to monitor patients respiratory status    9/15/2021 0730 by Garrel Castleman, RN  Outcome: Ongoing     Problem: Gas Exchange - Impaired:  Goal: Levels of oxygenation will improve  Description: Levels of oxygenation will improve  9/15/2021 2103 by Queta Mckeon RN  Outcome: Ongoing  Note: Patients oxygenation will improve    9/15/2021 0730 by Garrel Castleman, RN  Outcome: Ongoing     Problem: Health Behavior:  Goal: Identification of resources available to assist in meeting health care needs will improve  Description: Identification of resources available to assist in meeting health care needs will improve  9/15/2021 2103 by Queta Mckeon RN  Outcome: Ongoing  Note: Patients care needs are met this shift.     9/15/2021 0730 by Garrel Castleman, RN  Outcome: Ongoing     Problem: Physical Regulation:  Goal: Complications related to the disease process, condition or treatment will be avoided or minimized  Description: Complications related to the disease process, condition or treatment will be avoided or minimized  9/15/2021 2103 by Fransisco Ngo RN  Outcome: Ongoing  Note: Complications will be minimized    9/15/2021 0730 by Alberto Gallegos RN  Outcome: Ongoing   Electronically signed by Fransisco Ngo RN on 9/15/2021 at 9:05 PM

## 2021-09-16 NOTE — PROGRESS NOTES
Patient is resting in bed. Alert and oriented X4. Denies pain at this time. PICC in place to DEEP. Patient remains on 4 L nasal cannula not home dependent. Assessment complete. All patient needs are met at this time. Fall precautions are in place. Call light is in reach. Will continue to monitor.    Electronically signed by North Christy RN on 9/15/2021 at 9:10 PM

## 2021-09-16 NOTE — CARE COORDINATION
[facility] received referral from [facility] for possible HOME OXYGEN  via Self-Pay due to no insurance. ($150/month rental)      Will need:  QUALIFYING O2 SATS TEST  DME ORDERS      aware. [facility] rep will continue to follow.     Thank you for the referral.  Electronically signed by Jared Jacques on 9/16/2021 at 12:47 PM  Cell ph# [phone]

## 2021-09-16 NOTE — PROGRESS NOTES
[facility] Internal Medicine Note      Chief Complaint: I feel ok    Subjective/Interval History: This morning the patient is sitting up in bed. No new concerns. States he is feeling okay. Eating and drinking all right. No new problems. Able to get to and from the bathroom without difficulty. Oxygen down to 3 L this morning. Weight is down today 8 pounds  11+ L negative fluid balance since admission. No chest pain. No cough or sputum. No nausea, vomiting, diarrhea. No abdominal pain. No dysuria. The remainder of the review of systems is negative. PMH, PSH, FH/SH reviewed and unchanged as documented in the H&P personally documented at admission 21    Medication list reviewed    Objective:    BP (!) 143/80   Pulse 86   Temp 98.1 °F (36.7 °C) (Oral)   Resp 20   Ht 6' (1.829 m)   Wt 243 lb 6.2 oz (110.4 kg)   SpO2 93%   BMI 33.01 kg/m²   Temp  Av °F (36.7 °C)  Min: 97.7 °F (36.5 °C)  Max: 98.3 °F (36.8 °C)    RRR  Chest-respirations easy. The chest is clear today. No wheezes or rhonchi or crackles. Good air movement throughout. Abd- BS+, soft, nondistended  Ext-continued extensive edema of the lower extremities.     The Following Labs Were Reviewed Today:    Recent Results (from the past 24 hour(s))   POCT Glucose    Collection Time: 09/15/21 11:14 AM   Result Value Ref Range    POC Glucose 104 (H) 70 - 99 mg/dl    Performed on ACCU-CHEK    POCT Glucose    Collection Time: 09/15/21  4:10 PM   Result Value Ref Range    POC Glucose 131 (H) 70 - 99 mg/dl    Performed on ACCU-CHEK    POCT Glucose    Collection Time: 09/15/21  7:33 PM   Result Value Ref Range    POC Glucose 192 (H) 70 - 99 mg/dl    Performed on ACCU-CHEK    Lactate Dehydrogenase    Collection Time: 21  5:30 AM   Result Value Ref Range     (H) 100 - 190 U/L   Basic Metabolic Panel    Collection Time: 21  5:30 AM   Result Value Ref Range    Sodium 142 136 - 145 mmol/L    Potassium 3.1 (L) 3.5 - 5.1 mmol/L    Chloride 102 99 - 110 mmol/L    CO2 30 21 - 32 mmol/L    Anion Gap 10 3 - 16    Glucose 75 70 - 99 mg/dL    BUN 19 7 - 20 mg/dL    CREATININE 0.5 (L) 0.9 - 1.3 mg/dL    GFR Non-African American >60 >60    GFR African American >60 >60    Calcium 7.9 (L) 8.3 - 10.6 mg/dL   Magnesium    Collection Time: 09/16/21  5:30 AM   Result Value Ref Range    Magnesium 1.90 1.80 - 2.40 mg/dL   Phosphorus    Collection Time: 09/16/21  5:30 AM   Result Value Ref Range    Phosphorus 2.7 2.5 - 4.9 mg/dL   POCT Glucose    Collection Time: 09/16/21  6:13 AM   Result Value Ref Range    POC Glucose 83 70 - 99 mg/dl    Performed on ACCU-CHEK        ASSESSMENT/PLAN:      Principal Problem:    Hypokalemia-potassium is low again today. Increase potassium supplementation. Active Problems:    Metastatic small cell lung cancer, poorly differentiated-patient stable. Blood pressure is improved and blood sugars continue to fall suggesting improvement in ACTH/cortisol excess. Moderate persistent asthma with acute exacerbation-oxygen demand improving. Lungs are clear. Pure hypercholesterolemia-continue to hold Lipitor given abnormal LFTs. Continue to monitor. Essential hypertension-blood pressure continues to trend better and better. Monitor    Bilateral leg edema-weight is down and good urine output. Continue Lasix twice daily. Monitor weight, urine output, and electrolytes. Steroid-induced hyperglycemia-sugars continue to improve. Reduce Lantus to 20 units daily and reduce sliding scale intensity from high to medium dosing. Hypomagnesemia- continue to monitor and supplement as needed. Magnesium remains okay today.     Ravi Mcguire MD, [address]  8:44 AM  9/16/2021

## 2021-09-16 NOTE — PROGRESS NOTES
Patient is A&O. Patient is resting in bed, awake and quiet. Patient is on 3L of O2 NC. Side rails are up x2. Fall precautions are in place. Bed is in lowest position. Patient is UAL. Call light, telephone and bedside table are within reach. VSS taken. AM meds given. Shift assessment completed. Will continue to monitor per unit protocols.  Electronically signed by Froylan Garza RN on 9/16/2021 at 10:43 AM

## 2021-09-16 NOTE — PROGRESS NOTES
Nutrition Assessment     Type and Reason for Visit: Reassess    Nutrition Recommendations/Plan:   Carb Control (5)/1500 ml fl res   Will monitor nutritional adequacy, nutrition-related labs, weights, BMs, and clinical progress     Nutrition Assessment:  Follow-up. Pt continues on Carb Control (5)/1500 ml diet. Eating most of meals without issues. Last BM 9/15. Reviewed labs, glucose improving. Continue current inteventions. Malnutrition Assessment:  Malnutrition Status: No malnutrition    Nutrition Related Findings: +3 pitting edema to BLE      Current Nutrition Therapies:    ADULT DIET;  Regular; 5 carb choices (75 gm/meal); 1500 ml    Anthropometric Measures:  · Height: 6' (182.9 cm)  · Current Body Wt: 243 lb (110.2 kg)   · BMI: 32.9    Nutrition Diagnosis:   No nutrition diagnosis at this time     Nutrition Interventions:   Food and/or Nutrient Delivery:  Continue Current Diet  Nutrition Education/Counseling:  No recommendation at this time   Coordination of Nutrition Care:  Continue to monitor while inpatient    Goals:  Consume greater than 50% of meals and supplements this admission       Nutrition Monitoring and Evaluation:   Behavioral-Environmental Outcomes:  None Identified   Food/Nutrient Intake Outcomes:  Food and Nutrient Intake  Physical Signs/Symptoms Outcomes:  Biochemical Data, Nutrition Focused Physical Findings, Skin, Weight     Discharge Planning:    No discharge needs at this time     Electronically signed by Marilou Hernandez RD, LD on 9/16/21 at 9:25 AM EDT    Contact: [phone]

## 2021-09-17 NOTE — PROGRESS NOTES
Patient is resting in bed. Alert and oriented X4. Denies pain at this time. Patient remains on 3L of nasal cannula. IV in place. Assessment complete. All patient needs are met at this time. Fall precautions are in place. Call light is in reach. will continue to monitor.    Electronically signed by Christi Penn RN on 9/16/2021 at 10:40 PM

## 2021-09-17 NOTE — PROGRESS NOTES
Patient is resting in bed, awake an quiet. Patient is on 3L of O2 NC. Fall precautions are in place. Call light, telephone and bedside table are within reach. Patient able to make needs known. Will continue to monitor patient at this time.

## 2021-09-17 NOTE — PLAN OF CARE
Problem: Infection:  Goal: Will remain free from infection  Description: Will remain free from infection  9/17/2021 0724 by Froylan Garza RN  Outcome: Ongoing   Will remain free from infection. Problem: Safety:  Goal: Free from accidental physical injury  Description: Free from accidental physical injury  9/17/2021 0724 by Froylan Garza RN  Outcome: Ongoing  Problem: Safety:  Goal: Free from intentional harm  Description: Free from intentional harm  9/17/2021 0724 by Froylan Garza RN  Outcome: Ongoing     Problem: Daily Care:  Goal: Daily care needs are met  Description: Daily care needs are met  9/17/2021 0724 by Froylan Garza RN  Outcome: Ongoing  Daily care needs are met   9/16/2021 2128 by Desean Darling RN  Outcome: Ongoing  Note: Patients care needs will be met this shift       Problem: Pain:  Goal: Patient's pain/discomfort is manageable  Description: Patient's pain/discomfort is manageable  9/17/2021 0724 by Froylan Garza RN  Outcome: Ongoing     Problem: Skin Integrity:  Goal: Skin integrity will stabilize  Description: Skin integrity will stabilize  9/17/2021 0724 by Froylan Garza RN  Outcome: Ongoing     Problem: Discharge Planning:  Goal: Patients continuum of care needs are met  Description: Patients continuum of care needs are met  9/17/2021 0724 by Froylan Garza RN  Outcome: Ongoing     Problem:  Activity Intolerance:  Goal: Ability to tolerate increased activity will improve  Description: Ability to tolerate increased activity will improve  9/17/2021 0724 by Froylan Garza RN  Outcome: Ongoing     Problem: Airway Clearance - Ineffective:  Goal: Ability to maintain a clear airway will improve  Description: Ability to maintain a clear airway will improve  9/17/2021 0724 by Froylan Garza RN  Outcome: Ongoing     Problem: Breathing Pattern - Ineffective:  Goal: Ability to achieve and maintain a regular respiratory rate will improve  Description: Ability to achieve and maintain a regular respiratory rate will improve  9/17/2021 0724 by Dorita Habermann, RN  Outcome: Ongoing     Problem: Gas Exchange - Impaired:  Goal: Levels of oxygenation will improve  Description: Levels of oxygenation will improve  9/17/2021 0724 by Dorita Habermann, RN  Outcome: Ongoing     Problem: Health Behavior:  Goal: Identification of resources available to assist in meeting health care needs will improve  Description: Identification of resources available to assist in meeting health care needs will improve  9/17/2021 0724 by Dorita Habermann, RN  Outcome: Ongoing     Problem: Physical Regulation:  Goal: Complications related to the disease process, condition or treatment will be avoided or minimized  Description: Complications related to the disease process, condition or treatment will be avoided or minimized  9/17/2021 0724 by Dorita Habermann, RN  Outcome: Ongoing

## 2021-09-17 NOTE — PROGRESS NOTES
Patient is A&O. Patient is resting in bed, awake and quiet. Patient is on 3L of O2 NC. Side rails are up x2. Fall precautions are in place. Bed is in lowest position. Patient is UAL. Call light, telephone and bedside table are within reach. VSS taken. AM meds given. Shift assessment completed. Will continue to monitor patient per unit protocols.  Electronically signed by Lupe Bullard RN on 9/17/2021 at 10:39 AM

## 2021-09-17 NOTE — PROGRESS NOTES
[facility]  Nephrology   Progress Note        We are following the patient for uncontrolled HTN Severe Hypokalemia  47 y/o WM, gray and heavy smoker admitted with severe Hypokalemia  He has h/o HTN for ~ 20 years that has been well controlled on Amlodipine  About 3 weeks ago he started to have leg swelling, weight gain fatigue and SOB  Out patient labs showed severe Hypokalemia  On admission K+ noted to be 2.2 meq CO2 40   Noted to have uncontrolled HTN with /108 mm  BP has remained resistant despite addition of multiple meds    SUBJECTIVE:  -pt seen and examined  -PMSHx and meds reviewed  -No family at bedside          Wt down to 235 lbs today  Intake not being recorded so net fluid balance is misleading   Remains onn 3 L O2     ROS: neg chest pain nausea   BP is better  Family present    OBJECTIVE:     PHYSICAL:    TEMPERATURE:  Current - Temp: 97.9 °F (36.6 °C);  Max - Temp  Av.1 °F (36.7 °C)  Min: 97.9 °F (36.6 °C)  Max: 98.5 °F (36.9 °C)  RESPIRATIONS RANGE: Resp  Av  Min: 16  Max: 18  PULSE RANGE: Pulse  Av.6  Min: 78  Max: 93  BLOOD PRESSURE RANGE:  Systolic (09JZU), POLY:355 , Min:125 , AXB:114   ; Diastolic (91MCW), DML:28, Min:74, Max:88    PULSE OXIMETRY RANGE: SpO2  Av.7 %  Min: 92 %  Max: 98 %  24HR INTAKE/OUTPUT:      Intake/Output Summary (Last 24 hours) at 2021 1150  Last data filed at 2021 1128  Gross per 24 hour   Intake 600 ml   Output 3700 ml   Net -3100 ml       CONSTITUTIONAL:  awake, alert, cooperative, no apparent distress, and appears stated age  HEENT:  Lids and lashes normal, pupils equal, round and reactive to light  NECK:  Supple, symmetrical, trachea midline, no adenopathy  LUNGS: CTA  ABDOMEN:  No scars, normal bowel sounds, soft, non-distended  NEUROLOGIC:  alert, oriented, normal speech, no focal findings or movement disorder noted  SKIN: no bruising or bleeding  EXTREMITIES:  ++edema     Medications     dextrose      sodium chloride Stopped (09/10/21 0349)        Data      CBC:   Recent Labs     09/15/21  0455 09/17/21  0500   WBC 11.6* 5.9   RBC 3.76* 3.47*   HGB 12.3* 11.7*   HCT 36.9* 34.2*   * 75*     BMP:    Recent Labs     09/15/21  0455 09/16/21  0530 09/17/21  0500    142 139   K 3.5 3.1* 3.5    102 102   CO2 32 30 30   BUN 25* 19 16   CREATININE 0.5* 0.5* <0.5*   CALCIUM 8.4 7.9* 8.2*   GLUCOSE 111* 75 93     Phosphorus:    Recent Labs     09/15/21  0455 09/16/21  0530 09/17/21  0500   PHOS 2.8 2.7 2.2*     Magnesium:    Recent Labs     09/15/21  0455 09/16/21  0530 09/17/21  0500   MG 1.90 1.90 1.70*         ASSESSMENT     Patient Active Problem List   Diagnosis    Pure hypercholesterolemia    Anxiety    Essential hypertension    Moderate persistent asthma with acute exacerbation    [facility]ast[facility] cancer ([facility] [address]    Hypokalemia    Acute respiratory failure with hypoxia (HCC)    Abnormal CT of the chest    COPD exacerbation (HCC)    Lung nodules    Tobacco abuse       PLAN    Hypertension resistant to treatment with multiple meds Concern for secondary HTN including Hypercortisolism ( Cortisol level 90 with adrenal nodule      24 hour urine for cortisol  Elevated at [address] !!  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   Lasix resumed off of IVF's Hoping to see improvement in BP as ACTH levels fall  Continue Aldactone    BP is slowly improving, Continue diuresis    -Poorly differentiated small cell Lung CA started on carbo/etoposide/atezolizumab LDH stable  Remains on 3 L O2 CTA no evidence of PE      Hypokalemia profound  related to ectopic ACTH production and  Hypercortisolism and renal K+ wasting , started on Aldactone and K= supplements  Aldactone dose  Increased  to 100 mg bid K+3.5 meq On Lasix as well Continue TID with additional replacement prn Mg++ 1.7 mg   PO4 2.2 mg will replace PO     Edema Doppler negative for DVT Suspect fluid retention due to hypercortisolism. on Lasix IV BID Wt down to 235 lbs today     Met Alkalosis improving       Elevated LFT's stable

## 2021-09-17 NOTE — PROGRESS NOTES
[facility]  Oncology Hematology Care  Progress Note      SUBJECTIVE:   No new issues except desat with walking-ctpa going to be checked   OBJECTIVE      Medications    Current Facility-Administered Medications: magnesium sulfate 2000 mg in 50 mL IVPB premix, 2,000 mg, IntraVENous, Once  insulin glargine (LANTUS) injection vial 10 Units, 10 Units, SubCUTAneous, Daily  insulin lispro (HUMALOG) injection vial 0-12 Units, 0-12 Units, SubCUTAneous, TID WC  insulin lispro (HUMALOG) injection vial 0-6 Units, 0-6 Units, SubCUTAneous, Nightly  potassium chloride (KLOR-CON) extended release tablet 40 mEq, 40 mEq, Oral, TID  Tbo-Filgrastim (GRANIX) injection 300 mcg, 300 mcg, SubCUTAneous, QPM  prochlorperazine (COMPAZINE) tablet 10 mg, 10 mg, Oral, Q6H PRN  furosemide (LASIX) injection 40 mg, 40 mg, IntraVENous, BID  doxazosin (CARDURA) tablet 4 mg, 4 mg, Oral, 2 times per day  sodium chloride (Inhalant) 3 % nebulizer solution 4 mL, 4 mL, Nebulization, BID  spironolactone (ALDACTONE) tablet 100 mg, 100 mg, Oral, BID  ondansetron (ZOFRAN) 8 mg in dextrose 5 % 50 mL IVPB, 8 mg, IntraVENous, Q8H PRN  enoxaparin (LOVENOX) injection 40 mg, 40 mg, SubCUTAneous, Daily  cloNIDine (CATAPRES) tablet 0.2 mg, 0.2 mg, Oral, Q4H PRN  hydrALAZINE (APRESOLINE) injection 10 mg, 10 mg, IntraVENous, Q4H PRN  acetaminophen (TYLENOL) tablet 650 mg, 650 mg, Oral, Q4H PRN  NIFEdipine (PROCARDIA XL) extended release tablet 60 mg, 60 mg, Oral, BID  lisinopril (PRINIVIL;ZESTRIL) tablet 40 mg, 40 mg, Oral, Daily  glucose (GLUTOSE) 40 % oral gel 15 g, 15 g, Oral, PRN  dextrose 50 % IV solution, 12.5 g, IntraVENous, PRN  glucagon (rDNA) injection 1 mg, 1 mg, IntraMUSCular, PRN  dextrose 5 % solution, 100 mL/hr, IntraVENous, PRN  iopamidol (ISOVUE-370) 76 % injection 75 mL, 75 mL, IntraVENous, ONCE PRN  budesonide-formoterol (SYMBICORT) 160-4.5 MCG/ACT inhaler 2 puff, 2 puff, Inhalation, BID  montelukast (SINGULAIR) tablet 10 mg, 10 mg, Oral, Nightly  sodium chloride flush 0.9 % injection 5-40 mL, 5-40 mL, IntraVENous, 2 times per day  sodium chloride flush 0.9 % injection 5-40 mL, 5-40 mL, IntraVENous, PRN  0.9 % sodium chloride infusion, 25 mL, IntraVENous, PRN  ondansetron (ZOFRAN-ODT) disintegrating tablet 4 mg, 4 mg, Oral, Q8H PRN **OR** ondansetron (ZOFRAN) injection 4 mg, 4 mg, IntraVENous, Q6H PRN  polyethylene glycol (GLYCOLAX) packet 17 g, 17 g, Oral, Daily PRN  [DISCONTINUED] acetaminophen (TYLENOL) tablet 650 mg, 650 mg, Oral, Q6H PRN **OR** acetaminophen (TYLENOL) suppository 650 mg, 650 mg, Rectal, Q6H PRN  nicotine (NICODERM CQ) 21 MG/24HR 1 patch, 1 patch, TransDERmal, Daily  ipratropium-albuterol (DUONEB) nebulizer solution 1 ampule, 1 ampule, Inhalation, Q4H WA  Physical    VITALS:  BP (!) 143/76   Pulse 84   Temp 98 °F (36.7 °C)   Resp 16   Ht 6' (1.829 m)   Wt 235 lb 7.2 oz (106.8 kg)   SpO2 93%   BMI 31.93 kg/m²   TEMPERATURE:  Current - Temp: 98 °F (36.7 °C); Max - Temp  Av °F (36.7 °C)  Min: 97.5 °F (36.4 °C)  Max: 98.5 °F (36.9 °C)  PULSE OXIMETRY RANGE: SpO2  Av.7 %  Min: 92 %  Max: 98 %  24HR INTAKE/OUTPUT:      Intake/Output Summary (Last 24 hours) at 2021 0807  Last data filed at 2021 0710  Gross per 24 hour   Intake 840 ml   Output 2850 ml   Net -2010 ml       CONSTITUTIONAL:  awake, alert, cooperative, no apparent distress, HEENT oral pharynx , no scleral icterus  HEMATOLOGIC/LYMPHATICS:  no cervical lymphadenopathy, no supraclavicular lymphadenopathy,LUNGS:overall diminished g  CARDIOVASCULAR:  , regular rate and rhythm, normal S1 and S2, no S3 or S4, and no murmur noted  ABDOMEN:  No scars, normal bowel sounds, soft, non-distended, non-tender, no masses palpated, no hepatosplenomegally  MUSCULOSKELETAL:  There is no redness, warmth, or swelling of the joints. EXTREMETIES:s+ edema   NEUROLOGIC:  Awake, alert, oriented to name, place and time.  =SKIN:  no bruising or bleeding      Data      Recent Labs     09/15/21  0455 09/17/21  0500   WBC 11.6* 5.9   HGB 12.3* 11.7*   HCT 36.9* 34.2*   *  --    MCV 98.2 98.5        Recent Labs     09/15/21  0455 09/16/21  0530 09/17/21  0500    142 139   K 3.5 3.1* 3.5    102 102   CO2 32 30 30   PHOS 2.8 2.7 2.2*   BUN 25* 19 16   CREATININE 0.5* 0.5* <0.5*     No results for input(s): AST, ALT, ALB, BILIDIR, BILITOT, ALKPHOS in the last 72 hours. Magnesium:    Lab Results   Component Value Date    MG 1.70 09/17/2021    MG 1.90 09/16/2021    MG 1.90 09/15/2021       Imaging ECHO Complete 2D W Doppler W Color    Result Date: 9/3/2021  Transthoracic Echocardiography Report (TTE)  Demographics   Patient Name      Salena Slater   Date of Study     09/03/2021          Gender              Male   Patient Number    [id number]          Date of Birth       1970   Visit Number      [id number]           Age                 46 year(s)   Accession Number  [id number]          Room Number         6489   Corporate ID      [id number]             Sonographer         Juan Montalvo RVT                                                            [facility]   Ordering          Tiarra Reyes,             [facility]     Physician           John Hill MD  Procedure Type of Study   TTE procedure:ECHOCARDIOGRAM COMPLETE 2D W DOPPLER W COLOR. Procedure Date Date: 09/03/2021 Start: 02:20 PM Study Location: [facility] - Echo Lab Technical Quality: Limited visualization due to poor acoustical window. Additional Indications:Lower extremity swelling, evaluate the right heart and LV; HTN, HLD, COPD.  Patient Status: Routine Height: 72 inches Weight: 247.01 pounds BSA: 2.33 m2 BMI: 33.5 kg/m2 Rhythm: Within normal limits HR: 70 bpm BP: 183/87 mmHg  Conclusions   Summary  Normal left ventricle size, wall thickness, and systolic function with an  estimated ejection fraction of 60-65%. No regional wall motion abnormalities  are seen. Normal diastolic function. Normal right ventricular size and function. No significant valve abnormalities noted. Signature   ------------------------------------------------------------------  Electronically signed by Gera Lewis MD  (Interpreting physician) on 09/03/2021 at 03:46 PM  ------------------------------------------------------------------   Findings   Left Ventricle  Normal left ventricle size, wall thickness, and systolic function with an  estimated ejection fraction of 60-65%. No regional wall motion abnormalities  are seen. Normal diastolic function. Mitral Valve  Mitral valve leaflets appear mildly thickened. No evidence of mitral regurgitation or stenosis. Left Atrium  The left atrium is normal in size. Aortic Valve  The aortic valve leaflets are not well visualized. No evidence of aortic valve regurgitation or stenosis. Aorta  The aortic root is normal in size. Right Ventricle  Normal right ventricular size and function. TAPSE 2.8 cm   Tricuspid Valve  The tricuspid valve was not well visualized. No evidence of tricuspid stenosis. Right Atrium  The right atrial size is normal.   Pulmonic Valve  The pulmonic valve is not well visualized. No evidence of pulmonic valve regurgitation. No evidence of pulmonic valve stenosis. Pericardial Effusion  No pericardial effusion noted. Pleural Effusion  No pleural effusion. Miscellaneous  IVC not well visualized.   M-Mode/2D Measurements (cm)   LV Diastolic Dimension: 3.28 cm LV Systolic Dimension: 3.66 cm  LV Septum Diastolic: 0.9 cm  LV PW Diastolic: 2.22 cm        AO Root Dimension: 3.4 cm                                   LA Area: 23.6 cm2  LVOT: 2.1 cm                    LA volume/Index: 70.5 ml /30 ml/m2  Doppler Measurements   AV Peak Velocity: 184 cm/s     MV Peak E-Wave: 106 cm/s  AV Peak Gradient: 13.54 mmHg   MV Peak A-Wave: 87.5 cm/s  AV Mean Gradient: 6 mmHg       MV E/A Ratio: 1.21  LVOT Peak Velocity: 160 cm/s   MV Mean Gradient: 2 mmHg  AV Area (Continuity):3.58 cm2  MV Max P mmHg                                 MV Vmax:114 cm/s                                 MV VTI:32.9 cm/s   E' Septal Velocity: 10.9 cm/s  MV Area (continuity): 3.61 cm2  E' Lateral Velocity: 15.5 cm/s MV Deceleration Time: 259 msec  PV Peak Velocity: 131 cm/s  PV Peak Gradient: 6.86 mmHg   Aortic Valve   Peak Velocity: 184 cm/s     Mean Velocity: 114 cm/s  Peak Gradient: 13.54 mmHg   Mean Gradient: 6 mmHg  Area (continuity): 3.58 cm2  AV VTI: 33.2 cm  Aorta   Aortic Root: 3.4 cm  Ascending Aorta: 3.3 cm  LVOT Diameter: 2.1 cm      XR CHEST (2 VW)    Result Date: 2021  EXAMINATION: TWO XRAY VIEWS OF THE CHEST 2021 8:48 am COMPARISON: None. HISTORY: ORDERING SYSTEM PROVIDED HISTORY: WEEMS (dyspnea on exertion) TECHNOLOGIST PROVIDED HISTORY: Reason for Exam: WEEMS, asthma Acuity: Unknown Type of Exam: Initial FINDINGS: Clear lungs. No pleural effusion or pneumothorax. Cardiomediastinal silhouette is unremarkable. Degenerative changes of the visualized osseous structures. Clear lungs. CT GUIDED NEEDLE PLACEMENT    Result Date: 2021  PROCEDURE: CT GUIDED CORE BIOPSY OF LEFT HEPATIC LOBE MASS MODERATE CONSCIOUS SEDATION 2021 HISTORY: ORDERING SYSTEM PROVIDED HISTORY: lesion TECHNOLOGIST PROVIDED HISTORY: Reason for exam:->lesion Reason for Exam: lesion Acuity: Acute Type of Exam: Initial; ORDERING SYSTEM PROVIDED HISTORY: bx TECHNOLOGIST PROVIDED HISTORY: Reason for exam:->bx Reason for Exam: lesion Acuity: Acute Type of Exam: Initial PHYSICIANS: Amilcar Simpsontal SEDATION: 0.5 mgversed and 25 mcg fentanyl were titrated intravenously for moderate sedation monitored under my direction. Total intraservice time of sedation was 15 minutes.   The patient's vital signs were monitored throughout the procedure and recorded in the patient's medical record by the nurse. TECHNIQUE: Informed consent was obtained after a detailed discussion about the procedure including the risk, benefits, and alternatives. Universal protocol was followed. Axial images were obtained through the liver and a suitable skin site was prepped and draped in sterile fashion. Local anesthesia was achieved with lidocaine. Under intermittent CT guidance, a 17 gauge coaxial needle was inserted into the a left hepatic lobe mass. Through this, 1 18 gauge core biopsy specimens were obtained using coaxial technique and submitted to pathology. Gel-Foam slurry was administered as the needle was removed. A sterile bandage was then placed. The patient tolerated the procedure well. Estimated blood loss: Less than 5 cc Dose modulation, iterative reconstruction, and/or weight based adjustment of the mA/kV was utilized to reduce the radiation dose to as low as reasonably achievable. DLP: 9104.22 mGy-cm     Successful CT guided core biopsy of a left hepatic lobe mass. CT ABDOMEN PELVIS W IV CONTRAST Additional Contrast? Oral    Result Date: 9/5/2021  EXAMINATION: CT OF THE ABDOMEN AND PELVIS WITH CONTRAST 9/3/2021 5:13 pm TECHNIQUE: CT of the abdomen and pelvis was performed with the administration of intravenous contrast. Multiplanar reformatted images are provided for review. Dose modulation, iterative reconstruction, and/or weight based adjustment of the mA/kV was utilized to reduce the radiation dose to as low as reasonably achievable. COMPARISON: CT pulmonary angiogram performed September 2, 2021. HISTORY: ORDERING SYSTEM PROVIDED HISTORY: Metastatic disease, evaluate for primary. Bilateral lower ext edema, eval for IVC thrombosis/compression TECHNOLOGIST PROVIDED HISTORY: Reason for exam:->Metastatic disease, evaluate for primary.   Bilateral lower ext edema, eval for IVC thrombosis/compression Additional Contrast?->Oral FINDINGS: Images are degraded by motion artifact. Lower Chest: Respiratory motion artifact. Visualized pulmonary nodules and enlarged mediastinal lymph node, unchanged from prior CT chest. Organs: There are multifocal hypoattenuating lesions throughout the liver. Lesion within the left hepatic lobe measures 6.1 x 5.7 cm (axial image 35). Lesion within the right hepatic lobe measures 3.6 x 2.6 cm (axial image 51). The gallbladder is normally distended with layering hyperintensity suggestive of sludge. No calcified stones. No gallbladder wall thickening or pericholecystic fluid. The spleen and pancreas are grossly unremarkable. Right adrenal gland nodule measures up to 1.6 cm. There is mild thickening of the left adrenal gland without definite nodularity. There is symmetric renal cortical enhancement. Simple appearing right renal cysts demonstrated. No hydronephrosis. GI/Bowel: There is a hypoattenuating nodule in the region of the 2/3 portion of the duodenum in the region of the ampulla which measures up to 1.3 cm (axial image 83). There is diffuse fold thickening involving the majority of the jejunum. No evidence of obstruction. Sigmoid diverticulosis without evidence of acute diverticulitis. The appendix is normal. Pelvis: Prostate and seminal vesicles are grossly unremarkable. The urinary bladder is within normal limits. Peritoneum/Retroperitoneum: No significant lymphadenopathy. The abdominal aorta is normal in course and caliber with scattered atherosclerotic disease. No definite filling defects within the IVC, the iliac veins, or visualized femoral veins to suggest thrombus. Bones/Soft Tissues: There is asymmetric thickening of the inferior right rectus abdominus musculature with mild surrounding stranding. Visualization is partially obscured by streak artifact from dense oral contrast in the adjacent small bowel. Body wall edema demonstrated. No focal drainable collections.   Multilevel moderate degenerative changes in the visualized spine. No destructive osseous lesions. 1. Multiple hypoattenuating liver lesions which are most suggestive of metastatic disease. 2. Right adrenal gland nodule measuring up to 1.6 cm, indeterminate. 3. Hypoattenuating nodule in the region of the 2/3 portion of the duodenum in the region of the ampulla measuring up to 1.3 cm. Findings could be artifactual or related to fold thickening; however, a small ampullary or possibly pancreatic lesion cannot be entirely excluded. 4. Diffuse fold thickening involving the majority of the jejunum, nonspecific but can be related to an infectious etiology or lymphatic obstruction. 5. No definite filling defects in the IVC, iliac veins, or visualized femoral veins to suggest thrombus. 6. Asymmetric thickening of the inferior right rectus abdominus musculature with surrounding stranding which is partially obscured by adjacent streak artifact from dense oral contrast in the small bowel. No definite focal lesion is demonstrated; however, an underlying lesion cannot be entirely excluded. Hematoma is also a consideration. CT CHEST PULMONARY EMBOLISM W CONTRAST    Result Date: 9/5/2021  EXAMINATION: CTA OF THE CHEST 9/2/2021 12:47 pm TECHNIQUE: CTA of the chest was performed after the administration of intravenous contrast.  Multiplanar reformatted images are provided for review. MIP images are provided for review. Dose modulation, iterative reconstruction, and/or weight based adjustment of the mA/kV was utilized to reduce the radiation dose to as low as reasonably achievable. COMPARISON: None.  HISTORY: ORDERING SYSTEM PROVIDED HISTORY: leg swelling sob new o2 requirement TECHNOLOGIST PROVIDED HISTORY: Reason for exam:->leg swelling sob new o2 requirement Decision Support Exception - unselect if not a suspected or confirmed emergency medical condition->Emergency Medical Condition (MA) Reason for Exam: leg swelling sob new o2 requirement Acuity: Acute Type of Exam: Initial FINDINGS: Pulmonary Arteries:  No central, lobar, or segmental pulmonary embolus. Narrowing of the right lower lobe pulmonary artery by the adenopathy. Mediastinum: Heart size is normal.  No pericardial effusion. Thoracic aorta is normal caliber. Extensive mediastinal and hilar lymphadenopathy is present. Subcarinal node measures 32 x 37 mm. Right hilar node measures 30 x 29 mm. Lungs/pleura: Moderate emphysema. Multiple 3 mm nodules are present in the right middle lobe and left lower lobe. Superimposed in the posterior right lower lobe, there is a dominant nodule measuring 17 x 17 mm. An additional right lower lobe nodule measures 11 mm. No pleural effusion or pneumothorax. Narrowing of bronchus intermedius due to the right hilar adenopathy. Upper Abdomen: Multiple low-attenuation liver lesions are present measuring up to 48 x 40 mm and 26 x 28 mm. At least 30 lesions are noted in the visualized portions of the liver. Soft Tissues/Bones: No lytic or blastic osseous lesion. 1. No acute pulmonary embolus. 2. Extensive mediastinal and hilar lymphadenopathy compatible with metastatic neoplasm. 3. Multiple new indeterminate 3 mm nodules in the right middle lobe and left lower lobe. These could be infectious or neoplastic. 4. Right lower lobe 17 mm and 11 mm nodules are nonspecific, but metastatic disease not excluded. 5. Multiple liver metastases measuring up to 48 x 40 mm.  RECOMMENDATIONS:     CT NEEDLE BIOPSY LIVER PERCUTANEOUS    Result Date: 9/8/2021  PROCEDURE: CT GUIDED CORE BIOPSY OF LEFT HEPATIC LOBE MASS MODERATE CONSCIOUS SEDATION 9/7/2021 HISTORY: ORDERING SYSTEM PROVIDED HISTORY: lesion TECHNOLOGIST PROVIDED HISTORY: Reason for exam:->lesion Reason for Exam: lesion Acuity: Acute Type of Exam: Initial; ORDERING SYSTEM PROVIDED HISTORY: bx TECHNOLOGIST PROVIDED HISTORY: Reason for exam:->bx Reason for Exam: lesion Acuity: Acute Type of Exam: Initial PHYSICIANS: Study:   Veins:Lower Extremities DVT Study, VASC EXTREMITY VENOUS DUPLEX BILATERAL. Vascular Sonographer Report  Indications for Study:Edema. Impressions Right Impression No evidence of deep vein or superficial vein thrombosis involving the right lower extremity in vessels clearly visualized. Proximal and Mid Posterior Tibial veins and Peroneal veins were not visualized due to swelling. Calf and ankle edema noted. Left Impression No evidence of deep vein or superficial vein thrombosis involving the left lower extremity in vessels clearly visualized. Proximal Posterior Tibial veins and Soleal vein were not visualized due to swelling. Calf and ankle edema noted. Conclusions   Summary   No evidence of deep vein or superficial vein thrombosis involving the  bilateral lower extremities in vessels clearly visualized as detailed above. Signature   ------------------------------------------------------------------  Electronically signed by Seferino Powell MD (Interpreting  physician) on 09/08/2021 at 08:09 AM  ------------------------------------------------------------------  Patient Status:Routine. Study [place] [id number] - Vascular Lab. Technical Quality:Limited visualization due to swelling. Risk Factors   - The patient's risk factor(s) include: dyslipidemia and arterial     hypertension.   - The patient has a current/recent (within 1 year) tobacco history. Velocities are measured in cm/s ; Diameters are measured in mm Right Lower Extremities DVT Study Measurements Right 2D Measurements +------------------------+----------+---------------+----------+ ! Location                ! Visualized! Compressibility! Thrombosis! +------------------------+----------+---------------+----------+ ! Sapheno Femoral Junction! Yes       ! Yes            ! None      ! +------------------------+----------+---------------+----------+ ! GSV Thigh               ! Yes       ! Yes            ! None      ! +------------------------+----------+---------------+----------+ ! Common Femoral          !Yes       ! Yes            ! None      ! +------------------------+----------+---------------+----------+ ! Prox Femoral            !Yes       ! Yes            ! None      ! +------------------------+----------+---------------+----------+ ! Mid Femoral             !Yes       ! Yes            ! None      ! +------------------------+----------+---------------+----------+ ! Dist Femoral            !Yes       ! Yes            ! None      ! +------------------------+----------+---------------+----------+ ! Deep Femoral            !Yes       ! Yes            ! None      ! +------------------------+----------+---------------+----------+ ! Popliteal               !Yes       ! Yes            ! None      ! +------------------------+----------+---------------+----------+ ! Gastroc                 ! Yes       ! Yes            ! None      ! +------------------------+----------+---------------+----------+ ! Soleal                  !Yes       ! Yes            ! None      ! +------------------------+----------+---------------+----------+ ! PTV                     ! Partial   !Yes            ! None      ! +------------------------+----------+---------------+----------+ ! ATV                     ! Yes       ! Yes            ! None      ! +------------------------+----------+---------------+----------+ ! Peroneal                !Partial   !Yes            ! None      ! +------------------------+----------+---------------+----------+ ! GSV Calf                ! Yes       ! Yes            ! None      ! +------------------------+----------+---------------+----------+ ! SSV                     ! Yes       ! Yes            ! None      ! +------------------------+----------+---------------+----------+ Right Doppler Measurements +--------------+------+------+------------+ ! Location      ! Signal!Reflux! Reflux (sec)! +--------------+------+------+------------+ ! Common Femoral!Phasic! No    ! ! +--------------+------+------+------------+ ! Popliteal     !Phasic! No    !            ! +--------------+------+------+------------+ Left Lower Extremities DVT Study Measurements Left 2D Measurements +------------------------+----------+---------------+----------+ ! Location                ! Visualized! Compressibility! Thrombosis! +------------------------+----------+---------------+----------+ ! Sapheno Femoral Junction! Yes       ! Yes            ! None      ! +------------------------+----------+---------------+----------+ ! GSV Thigh               ! Yes       ! Yes            ! None      ! +------------------------+----------+---------------+----------+ ! Common Femoral          !Yes       ! Yes            ! None      ! +------------------------+----------+---------------+----------+ ! Prox Femoral            !Yes       ! Yes            ! None      ! +------------------------+----------+---------------+----------+ ! Mid Femoral             !Yes       ! Yes            ! None      ! +------------------------+----------+---------------+----------+ ! Dist Femoral            !Yes       ! Yes            ! None      ! +------------------------+----------+---------------+----------+ ! Deep Femoral            !Yes       ! Yes            ! None      ! +------------------------+----------+---------------+----------+ ! Popliteal               !Yes       ! Yes            ! None      ! +------------------------+----------+---------------+----------+ ! Gastroc                 ! Yes       ! Yes            ! None      ! +------------------------+----------+---------------+----------+ ! Soleal                  !No        !               !          ! +------------------------+----------+---------------+----------+ ! PTV                     ! Partial   !Yes            ! None      ! +------------------------+----------+---------------+----------+ ! ATV                     ! Yes       ! Yes            ! None      ! +------------------------+----------+---------------+----------+ ! Peroneal                !Yes       ! Yes            ! None      ! +------------------------+----------+---------------+----------+ ! GSV Calf                ! Yes       ! Yes            ! None      ! +------------------------+----------+---------------+----------+ ! SSV                     ! Yes       ! Yes            ! None      ! +------------------------+----------+---------------+----------+ Left Doppler Measurements +--------------+------+------+------------+ ! Location      ! Signal!Reflux! Reflux (sec)! +--------------+------+------+------------+ ! Common Femoral!Phasic! No    !            ! +--------------+------+------+------------+ ! Popliteal     !Phasic! No    !            ! +--------------+------+------+------------+      Problem List  Patient Active Problem List   Diagnosis    Pure hypercholesterolemia    Anxiety    Essential hypertension    Moderate persistent asthma with acute exacerbation    Metastatic cancer ([facility] [address]    Hypokalemia    Acute respiratory failure with hypoxia (HCC)    Abnormal CT of the chest    COPD exacerbation (HCC)    Lung nodules    Tobacco abuse       ASSESSMENT AND PLAN    Extensive stage small cell  Ectopic acth   Rare paraneoplastic syndrome   HE is day  7carbo etoposide atezolizumab  tx is every 3 weeks   On granix if inpt will give daily -usually 7 days -  NO tumor llysis ldh improving     Hypoxia-ctpa   Pending   Watch plts if he needs anticoagulation     Acth syndrome improving rather quickly     Gila Brunner MD, MD

## 2021-09-17 NOTE — PLAN OF CARE
Problem: Infection:  Goal: Will remain free from infection  Description: Will remain free from infection  9/16/2021 2128 by Ronan Cutler RN  Outcome: Ongoing  Note: Pt is free of signs and symptoms of infection. Incision and dressing are clean, dry and intact. Vital signs stable. Will monitor. 9/16/2021 1036 by Abhijit Maher RN  Outcome: Ongoing     Problem: Safety:  Goal: Free from accidental physical injury  Description: Free from accidental physical injury  9/16/2021 2128 by Ronan Cutler RN  Outcome: Ongoing  Note: Pt is free of injury. No injury noted. Fall precautions in place. Call light within reach. Will monitor. 9/16/2021 1036 by Abhijit Maher RN  Outcome: Ongoing  Goal: Free from intentional harm  Description: Free from intentional harm  9/16/2021 2128 by Ronan Cutler RN  Outcome: Ongoing  Note: Pt is free of intentional harm. No intentions noted. Will monitor. 9/16/2021 1036 by Abhijit Maher RN  Outcome: Ongoing     Problem: Daily Care:  Goal: Daily care needs are met  Description: Daily care needs are met  9/16/2021 2128 by Ronan Cutler RN  Outcome: Ongoing  Note: Patients care needs will be met this shift    9/16/2021 1036 by Abhijit Maher RN  Outcome: Ongoing     Problem: Pain:  Goal: Patient's pain/discomfort is manageable  Description: Patient's pain/discomfort is manageable  9/16/2021 2128 by Ronan Cutler RN  Outcome: Ongoing  Note: Pt assessed for pain. Pt in pain and assessed with 0-10 pain rating scale. Pt given prescribed analgesic for pain. (See eMar) Pt satisfied with pain relief thus far. Will reassess and continue to monitor.      9/16/2021 1036 by Abhijit Maher RN  Outcome: Ongoing     Problem: Skin Integrity:  Goal: Skin integrity will stabilize  Description: Skin integrity will stabilize  9/16/2021 2128 by Ronan Cutler RN  Outcome: Ongoing  9/16/2021 1036 by Abhijit Maher RN  Outcome: Ongoing     Problem: Discharge Jennifer Wood RN  Outcome: Ongoing     Problem: Physical Regulation:  Goal: Complications related to the disease process, condition or treatment will be avoided or minimized  Description: Complications related to the disease process, condition or treatment will be avoided or minimized  9/16/2021 2128 by Mindi Jaime RN  Outcome: Ongoing  9/16/2021 1036 by Anabela Berumen RN  Outcome: Ongoing   Electronically signed by Mindi Jaime RN on 9/16/2021 at 9:30 PM

## 2021-09-17 NOTE — PROGRESS NOTES
[facility] Internal Medicine Note      Chief Complaint: I feel ok    Subjective/Interval History: This morning the patient states he had an okay night. No new problems. Eating and drinking well. Denies pain. Nursing reports rapid desaturation with any activity. Overall oxygen requirement down to 3 L. Weight is down again today. Recorded weight is down 16 pounds over the last 2 days. Recorded I's/O's state a 12 L negative fluid balance since admission although intake may not be well recorded. No chest pain. No cough or sputum. No nausea, vomiting, diarrhea. No abdominal pain. No dysuria. The remainder of the review of systems is negative. PMH, PSH, FH/SH reviewed and unchanged as documented in the H&P personally documented at admission 21    Medication list reviewed    Objective:    BP (!) 143/76   Pulse 84   Temp 98 °F (36.7 °C)   Resp 16   Ht 6' (1.829 m)   Wt 235 lb 7.2 oz (106.8 kg)   SpO2 93%   BMI 31.93 kg/m²   Temp  Av °F (36.7 °C)  Min: 97.5 °F (36.4 °C)  Max: 98.5 °F (36.9 °C)    RRR  Chest-respirations easy. The chest is clear today. No wheezes or rhonchi or crackles. Good air movement throughout. Abd- BS+, soft, nondistended  Ext-continued extensive edema of the lower extremities, although somewhat improved from yesterday.     The Following Labs Were Reviewed Today:    Recent Results (from the past 24 hour(s))   POCT Glucose    Collection Time: 21 11:22 AM   Result Value Ref Range    POC Glucose 103 (H) 70 - 99 mg/dl    Performed on ACCU-CHEK    POCT Glucose    Collection Time: 21  4:25 PM   Result Value Ref Range    POC Glucose 118 (H) 70 - 99 mg/dl    Performed on ACCU-CHEK    POCT Glucose    Collection Time: 21  8:14 PM   Result Value Ref Range    POC Glucose 188 (H) 70 - 99 mg/dl    Performed on ACCU-CHEK    Lactate Dehydrogenase    Collection Time: 21  5:00 AM   Result Value Ref Range     (H) 100 - 190 U/L   Basic Metabolic Panel Collection Time: 09/17/21  5:00 AM   Result Value Ref Range    Sodium 139 136 - 145 mmol/L    Potassium 3.5 3.5 - 5.1 mmol/L    Chloride 102 99 - 110 mmol/L    CO2 30 21 - 32 mmol/L    Anion Gap 7 3 - 16    Glucose 93 70 - 99 mg/dL    BUN 16 7 - 20 mg/dL    CREATININE <0.5 (L) 0.9 - 1.3 mg/dL    GFR Non-African American >60 >60    GFR African American >60 >60    Calcium 8.2 (L) 8.3 - 10.6 mg/dL   Magnesium    Collection Time: 09/17/21  5:00 AM   Result Value Ref Range    Magnesium 1.70 (L) 1.80 - 2.40 mg/dL   Phosphorus    Collection Time: 09/17/21  5:00 AM   Result Value Ref Range    Phosphorus 2.2 (L) 2.5 - 4.9 mg/dL   CBC Auto Differential    Collection Time: 09/17/21  5:00 AM   Result Value Ref Range    WBC 5.9 4.0 - 11.0 K/uL    RBC 3.47 (L) 4.20 - 5.90 M/uL    Hemoglobin 11.7 (L) 13.5 - 17.5 g/dL    Hematocrit 34.2 (L) 40.5 - 52.5 %    MCV 98.5 80.0 - 100.0 fL    MCH 33.6 26.0 - 34.0 pg    MCHC 34.1 31.0 - 36.0 g/dL    RDW 13.6 12.4 - 15.4 %    Neutrophils % 89.1 %    Lymphocytes % 10.0 %    Monocytes % 0.6 %    Eosinophils % 0.2 %    Basophils % 0.1 %    Neutrophils Absolute 5.2 1.7 - 7.7 K/uL    Lymphocytes Absolute 0.6 (L) 1.0 - 5.1 K/uL    Monocytes Absolute 0.0 0.0 - 1.3 K/uL    Eosinophils Absolute 0.0 0.0 - 0.6 K/uL    Basophils Absolute 0.0 0.0 - 0.2 K/uL   POCT Glucose    Collection Time: 09/17/21  6:04 AM   Result Value Ref Range    POC Glucose 101 (H) 70 - 99 mg/dl    Performed on ACCU-CHEK        ASSESSMENT/PLAN:      Principal Problem:    Hypokalemia-potassium better today. Continue 3 times daily potassium replacement. Active Problems:    Hypoxia-rapid desaturation with any activity, even going to the bathroom. Repeat CTA of the chest to evaluate for pulmonary emboli. Metastatic small cell lung cancer, poorly differentiated-patient stable. Blood pressure is improved and blood sugars continue to fall suggesting improvement in ACTH/cortisol excess.   Continue to monitor and adjust antihypertensives as needed. Moderate persistent asthma with acute exacerbation-asthma is much better overall. Off of steroids and no wheezing. Pure hypercholesterolemia-continue to hold Lipitor given abnormal LFTs. Continue to monitor. Essential hypertension-blood pressure continues to trend better and better. Monitor and adjust meds as needed. Bilateral leg edema-weight is down and good urine output. Continue Lasix twice daily. Monitor weight, urine output, and electrolytes. Hyperglycemia-sugars continue to improve. Reduce Lantus to 10 units daily and reduce sliding scale intensity from high to medium dosing. Hypomagnesemia- continue to monitor and supplement today. Case discussed with nursing and Dr. Andres Paz at the bedside.     Lesvia Fofana MD, Leopold Pace  8:08 AM  9/17/2021

## 2021-09-18 NOTE — PLAN OF CARE
Problem: Infection:  Goal: Will remain free from infection  Description: Will remain free from infection  9/18/2021 0722 by Abiel Serna RN  Outcome: Ongoing  Note: Fall risk assessment completed . Fall precautions in place, bed alarm on, side rails 2/4 up, call light in reach, educated pt on calling for assistance when needed, room clear of clutter. Pt verbalized understanding. 9/17/2021 2122 by Lillian Ivey RN  Outcome: Ongoing  Note: Pt is free of signs and symptoms of infection. Incision and dressing are clean, dry and intact. Vital signs stable. Will monitor. Problem: Safety:  Goal: Free from accidental physical injury  Description: Free from accidental physical injury  9/18/2021 1724 by Abiel Serna RN  Outcome: Ongoing  Note: Fall risk assessment completed . Fall precautions in place, bed alarm on, side rails 2/4 up, call light in reach, educated pt on calling for assistance when needed, room clear of clutter. Pt verbalized understanding. 9/17/2021 2122 by Lillian Ivey RN  Outcome: Ongoing  Note: Pt is free of injury. No injury noted. Fall precautions in place. Call light within reach. Will monitor. Goal: Free from intentional harm  Description: Free from intentional harm  9/18/2021 9514 by Abiel Serna RN  Outcome: Ongoing  9/17/2021 2122 by Lillian Ivey RN  Outcome: Ongoing  Note: Pt is free of intentional harm. No intentions noted. Will monitor. Problem: Daily Care:  Goal: Daily care needs are met  Description: Daily care needs are met  9/18/2021 2238 by Abiel Serna RN  Outcome: Ongoing  9/17/2021 2122 by Lillian Ivey RN  Outcome: Ongoing  Note: Patients daily care needs will be met this shift       Problem: Pain:  Goal: Patient's pain/discomfort is manageable  Description: Patient's pain/discomfort is manageable  9/18/2021 0722 by Abiel Serna RN  Outcome: Ongoing  Note: Pain /discomfort being managed with PRN analgesics per MD orders. Patient able to express presence and absence of pain and rate pain appropriately using numerical scale. 9/17/2021 2122 by Pako Hernandez RN  Outcome: Ongoing  Note: Pt assessed for pain. Pt denies pain and assessed with 0-10 pain rating scale. Pt has not requested prescribed analgesic. (See eMar) Pt satisfied thus far. Will reassess and continue to monitor. Problem: Skin Integrity:  Goal: Skin integrity will stabilize  Description: Skin integrity will stabilize  9/18/2021 0722 by Denise Santamaria RN  Outcome: Ongoing  Note: Chad score assessed. Patient able to ambulate and turn self and repositioned patient Q2H and assessed skin. Educated patient on importance of repositioning to prevent skin issues. 9/17/2021 2122 by Pako Hernandez RN  Outcome: Ongoing  Note: Patient will have no new skin breakdown       Problem: Discharge Planning:  Goal: Patients continuum of care needs are met  Description: Patients continuum of care needs are met  9/18/2021 0722 by Denise Santamaria RN  Outcome: Ongoing  Note: Working with patient and physician to discharge patient to the appropriate level of care. 9/17/2021 2122 by Pako Hernandez RN  Outcome: Ongoing  Note: Patients continuum of care needs will be met this shift       Problem: Activity Intolerance:  Goal: Ability to tolerate increased activity will improve  Description: Ability to tolerate increased activity will improve  9/18/2021 0722 by Denise Santamaria RN  Outcome: Ongoing  Note: Patient will describe an ease of ambulation this shift.    9/17/2021 2122 by Pako Hernandez RN  Outcome: Ongoing  Note: Patients activity level will improve       Problem: Airway Clearance - Ineffective:  Goal: Ability to maintain a clear airway will improve  Description: Ability to maintain a clear airway will improve  9/18/2021 0722 by Denise Santamaria RN  Outcome: Ongoing  Note: Patient has maintained a patent airway, repositions self, lung sounds bilaterally condition or treatment will be avoided or minimized  9/18/2021 0722 by Deangelo Hernandez RN  Outcome: Ongoing  9/17/2021 2122 by Jessica Bermeo RN  Outcome: Ongoing  Note: Complications rt to the disease process will be minimized

## 2021-09-18 NOTE — PROGRESS NOTES
[facility]  Nephrology   Progress Note        We are following the patient for uncontrolled HTN Severe Hypokalemia  45 y/o WM, gray and heavy smoker admitted with severe Hypokalemia  He has h/o HTN for ~ 20 years that has been well controlled on Amlodipine  About 3 weeks ago he started to have leg swelling, weight gain fatigue and SOB  Out patient labs showed severe Hypokalemia  On admission K+ noted to be 2.2 meq CO2 40   Noted to have uncontrolled HTN with /108 mm  BP has remained resistant despite addition of multiple meds    SUBJECTIVE:  -pt seen and examined  -PMSHx and meds reviewed  -No family at bedside          Wt down to 226 lbs today      ROS: neg chest sob/monroe ,improved edema . OBJECTIVE:     PHYSICAL:    TEMPERATURE:  Current - Temp: 98.2 °F (36.8 °C);  Max - Temp  Av °F (36.7 °C)  Min: 97.6 °F (36.4 °C)  Max: 98.6 °F (37 °C)  RESPIRATIONS RANGE: Resp  Avg: 15.3  Min: 14  Max: 18  PULSE RANGE: Pulse  Av.2  Min: 85  Max: 92  BLOOD PRESSURE RANGE:  Systolic (64VCN), VDX:686 , Min:118 , DNO:476   ; Diastolic (63YIV), YGS:70, Min:71, Max:88    PULSE OXIMETRY RANGE: SpO2  Av.3 %  Min: 94 %  Max: 97 %  24HR INTAKE/OUTPUT:      Intake/Output Summary (Last 24 hours) at 2021 1712  Last data filed at 2021 1226  Gross per 24 hour   Intake 770 ml   Output 4000 ml   Net -3230 ml       CONSTITUTIONAL:  awake, alert, cooperative, no apparent distress, and appears stated age  HEENT:  Lids and lashes normal, pupils equal, round and reactive to light  NECK:  Supple, symmetrical, trachea midline, no adenopathy  LUNGS: CTA  ABDOMEN:  No scars, normal bowel sounds, soft, non-distended  NEUROLOGIC:  alert, oriented, normal speech, no focal findings or movement disorder noted  SKIN: no bruising or bleeding  EXTREMITIES:  ++edema     Medications     dextrose      sodium chloride Stopped (09/10/21 4289)        Data      CBC:   Recent Labs     21  0500   WBC 5. 9   RBC 3.47*   HGB 11.7*   HCT 34.2*   PLT 75*     BMP:    Recent Labs     09/16/21  0530 09/17/21  0500 09/18/21  0500    139 136   K 3.1* 3.5 4.0    102 101   CO2 30 30 27   BUN 19 16 14   CREATININE 0.5* <0.5* 0.5*   CALCIUM 7.9* 8.2* 8.2*   GLUCOSE 75 93 99     Phosphorus:    Recent Labs     09/16/21  0530 09/17/21  0500 09/18/21  0500   PHOS 2.7 2.2* 2.2*     Magnesium:    Recent Labs     09/16/21  0530 09/17/21  0500 09/18/21  0500   MG 1.90 1.70* 1.80         ASSESSMENT     Patient Active Problem List   Diagnosis    Pure hypercholesterolemia    Anxiety    Essential hypertension    Moderate persistent asthma with acute exacerbation    Metastatic cancer ([facility] [place] [address]    Hypokalemia    Acute respiratory failure with hypoxia (HCC)    Abnormal CT of the chest    COPD exacerbation (HCC)    Lung nodules    Tobacco abuse       PLAN    Hypertension resistant to treatment with multiple meds Concern for secondary HTN including Hypercortisolism ( Cortisol level 90 with adrenal nodule      24 hour urine for cortisol  Elevated at [address] !!  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   Lasix resumed off of IVF's Hoping to see improvement in BP as ACTH levels fall  Continue Aldactone    BP  improving, Continue diuresis    -Poorly differentiated small cell Lung CA started on carbo/etoposide/atezolizumab LDH stable  Remains on 3 L O2 CTA no evidence of PE      Hypokalemia profound  related to ectopic ACTH production and  Hypercortisolism and renal K+ wasting , started on Aldactone and K= supplements  Continue to monitor and supp accordingly . Edema  Suspect fluid retention due to hypercortisolism. on Lasix IV BID Wt down to 235 lbs today     Met Alkalosis Resolving .        Elevated LFT's stable      Charles Cancer MD,FACP

## 2021-09-18 NOTE — PROGRESS NOTES
Checking on patient Q2H for nutrition needs, hygiene needs, comfort measures, mobility, fall risk interventions, and safe environment. All precautions and interventions in place. Educated patient on use of call light and telephone. Patient verbalizes understanding. Call light/telephone in reach.   Electronically signed by Ty Diez RN on 9/18/2021 at 7:35 AM

## 2021-09-18 NOTE — PROGRESS NOTES
Patient is resting in bed. Alert and oriented X4. Denies pain at this time. Patient remains on 3L nasal cannula. PICC in place. Assessment complete. All patient needs are met at this time. Fall precautions are in place. Call light is in reach. Will continue to monitor.   Electronically signed by Abelardo Fenton RN on 9/17/2021 at 10:01 PM

## 2021-09-18 NOTE — PROGRESS NOTES
Name: Markus Gibbons  /Age/Sex: 1970 (46 y.o. male)   MRN & CSN: [id number] & [id number]  Admission Date/Time: 2021 10:52 AM   Location: [id number]-2094/3103-01  Current Hospital Day: Hospital Day: 17   Principal Problem: Hypokalemia  HPI     Patient seen and examined. No issues overnight. He does not report any complaints today. He is still requiring 3 L of oxygen. Reported 5.6 L output yesterday. All other review of systems negative unless noted above. VITALS VITALS RANGE (24 hours)      INS/OUTS WEIGHTS    Intake/Output Summary (Last 24 hours) at 2021 0815  Last data filed at 2021 0625  Gross per 24 hour   Intake 530 ml   Output 5250 ml   Net -4720 ml      No intake/output data recorded. @       PHYSICAL EXAM     General: Well-appearing  HEENT: Moist oral mucosa  Cardiac: Regular rate and rhythm, no murmurs  Lungs: Clear to auscultation bilaterally  Abdomen: Soft, nontender, nondistended  Extremities: +edema, warm  Neuro: Nonfocal    LABS   BMP  Recent Labs     21  0530 21  0500 21  0500    139 136   K 3.1* 3.5 4.0    102 101   CO2 30 30 27   BUN 19 16 14   CREATININE 0.5* <0.5* 0.5*   CALCIUM 7.9* 8.2* 8.2*   PHOS 2.7 2.2* 2.2*   MG 1.90 1.70* 1.80     CBC/COAGS  Recent Labs     21  0500   WBC 5.9   HCT 34.2*   PLT 75*     Liver & Pancreas  No results for input(s): AST, ALT, ALKPHOS, BILIDIR, ALBUMIN, AMYLASE, LIPASE in the last 72 hours. Invalid input(s): BILI       IMAGING    CT chest: No PE, moderate lingular opacity.   Significant worsening of bilateral pulmonary metastatic disease, bibasilar mucous plugging   Above studies and images were personally reviewed by myself    MEDS   Scheduled Meds:   insulin glargine  10 Units SubCUTAneous Daily    potassium & sodium phosphates  1 packet Oral BID    ipratropium-albuterol  1 ampule Inhalation BID    insulin lispro  0-12 Units SubCUTAneous TID WC    insulin lispro  0-6 Units SubCUTAneous Nightly  potassium chloride  40 mEq Oral TID    tbo-filgrastim  300 mcg SubCUTAneous QPM    furosemide  40 mg IntraVENous BID    doxazosin  4 mg Oral 2 times per day    sodium chloride (Inhalant)  4 mL Nebulization BID    spironolactone  100 mg Oral BID    enoxaparin  40 mg SubCUTAneous Daily    NIFEdipine  60 mg Oral BID    lisinopril  40 mg Oral Daily    budesonide-formoterol  2 puff Inhalation BID    montelukast  10 mg Oral Nightly    sodium chloride flush  5-40 mL IntraVENous 2 times per day    nicotine  1 patch TransDERmal Daily     Continuous Infusions:   dextrose      sodium chloride Stopped (09/10/21 0349)     PRN Meds:prochlorperazine, ondansetron (ZOFRAN) IVPB, cloNIDine, hydrALAZINE, acetaminophen, glucose, dextrose, glucagon (rDNA), dextrose, sodium chloride flush, sodium chloride, ondansetron **OR** ondansetron, polyethylene glycol, [DISCONTINUED] acetaminophen **OR** acetaminophen     CURRENT HOSPITAL PROBLEMS   53-year-old man found to have metastatic small cell cancer with ACTH dependent Cushing's disease.     Acute hypoxic respiratory failure  Emphysematous changes of lungs  -Multifactorial, bibasilar mucous plugging, extensive metastatic disease and lingular opacity  -Continue supplemental oxygen  -Duo nebs  -Hold nebulized saline     Metastatic small cell lung cancer   -Oncology following  -Carbo/etoposide/atezolizumab    ACTH dependent Cushing's syndrome  -Nephrology following  -Continue Aldactone    Hypokalemia  -3 times daily replacement   -Aldactone    Secondary hypertension   -Blood pressure controlled  -Doxazosin 4 mg twice daily  -Nifedipine 60 mg twice daily  Lisinopril 40 mg  -Lasix 40 mg twice daily    HLD  -Holding statin    Hyperglycemia  -Lantus 5 units daily (decreased 9/18)  -Decreased to low dose Sliding scale    Hypomagnesemia  -Replete        DVT prophylaxis: Enoxaparin  -Dispo: Pending improvement in respiratory status       Dave Cardenas MD 9/18/2021 8:15 AM

## 2021-09-18 NOTE — RT PROTOCOL NOTE
RT Inhaler-Nebulizer Bronchodilator Protocol Note    There is a bronchodilator order in the chart from a provider indicating to follow the RT Bronchodilator Protocol and there is an Initiate RT Bronchodilator Protocol order as well (see protocol at bottom of note). The findings from the last RT Protocol Assessment were as follows:  Smoking: >15 Pack years  Surgical Status: No surgery  Xray: Multiple lobe infiltrates/atelectasis/pleural effusion/edema  Respiratory Pattern: RR 12-20  Mental Status: Alert and Oriented  Breath Sounds: Scattered wheeze  Cough: Strong, spontaneous, non-productive  Activity Level: Walking unassisted  Oxygen Requirement: Room Air - 2LNC/28% or home setting  Indication for Bronchodilator Therapy: On home bronchodilators, Wheezing associated with pulm disorder  Bronchodilator Assessment Score: 7    Aerosolized bronchodilator medication orders have been revised according to the RT Bronchodilator Protocol. RT Inhaler-Nebulizer Bronchodilator Protocol:    Respiratory Therapist to perform RT Therapy Protocol Assessment then follow the protocol. No Indications - adjust the frequency to every 6 hours PRN wheezing or bronchospasm, if no treatments needed after 48 hours then discontinue using Per Protocol order mode. If indication present, adjust the RT bronchodilator orders based on the Bronchodilator Assessment Score as follows:    0-6 - enter or revise RT bronchodilator order to Albuterol Inhaler order with frequency of every 2 hours PRN for wheezing or increased work of breathing using Per Protocol order mode. If Albuterol Inhaler not tolerated or not effective, then discontinue the Albuterol Inhaler order and enter Albuterol Nebulizer order with same frequency and PRN reasons. Repeat RT Therapy Protocol Assessment as needed.     7-10 - discontinue any other Inpatient aerosolized bronchodilator medication orders and enter or revise two Albuterol Inhaler orders, one with BID frequency and one with frequency of every 2 hours PRN wheezing or increased work of breathing using Per Protocol order mode. Repeat RT Therapy Protocol Assessment with second treatment then BID and as needed. If Albuterol Inhaler not tolerated or not effective, then discontinue the Albuterol Inhaler orders and enter two Albuterol Nebulizer orders with same frequencies and PRN reasons. 11-13 - discontinue any other Inpatient aerosolized bronchodilator medication orders and enter DuoNeb Nebulizer orders QID frequency and an Albuterol Nebulizer order every 2 hours PRN wheezing or increased work of breathing using Per Protocol order mode. Repeat RT Therapy Protocol Assessment with second treatment then QID and as needed. Greater than 13 - discontinue any other Inpatient bronchodilator aerosolized medication orders and enter DuoNeb Nebulizer order every 4 hours frequency and Albuterol Nebulizer every 2 hours PRN wheezing or increased work of breathing using Per Protocol order mode. Repeat RT Therapy Protocol Assessment with second treatment then every 4 hours and as needed. RT to enter RT Home Evaluation for COPD & MDI Assessment order using Per Protocol order mode.     Electronically signed by Azucena Phan RCP on 9/18/2021 at 4:40 PM

## 2021-09-18 NOTE — PLAN OF CARE
Problem: Infection:  Goal: Will remain free from infection  Description: Will remain free from infection  9/17/2021 2122 by Alyssa River RN  Outcome: Ongoing  Note: Pt is free of signs and symptoms of infection. Incision and dressing are clean, dry and intact. Vital signs stable. Will monitor. 9/17/2021 0724 by Zabrina Maharaj RN  Outcome: Ongoing     Problem: Safety:  Goal: Free from accidental physical injury  Description: Free from accidental physical injury  9/17/2021 2122 by Alyssa River RN  Outcome: Ongoing  Note: Pt is free of injury. No injury noted. Fall precautions in place. Call light within reach. Will monitor. 9/17/2021 0724 by Zabrina Maharaj RN  Outcome: Ongoing  Goal: Free from intentional harm  Description: Free from intentional harm  9/17/2021 2122 by Alyssa River RN  Outcome: Ongoing  Note: Pt is free of intentional harm. No intentions noted. Will monitor. 9/17/2021 0724 by Zabrina Maharaj RN  Outcome: Ongoing     Problem: Daily Care:  Goal: Daily care needs are met  Description: Daily care needs are met  9/17/2021 2122 by Alyssa River RN  Outcome: Ongoing  Note: Patients daily care needs will be met this shift    9/17/2021 0724 by Zabrina Maharaj RN  Outcome: Ongoing     Problem: Pain:  Goal: Patient's pain/discomfort is manageable  Description: Patient's pain/discomfort is manageable  9/17/2021 2122 by Alyssa River RN  Outcome: Ongoing  Note: Pt assessed for pain. Pt denies pain and assessed with 0-10 pain rating scale. Pt has not requested prescribed analgesic. (See eMar) Pt satisfied thus far. Will reassess and continue to monitor.      9/17/2021 0724 by Zabrina Maharaj RN  Outcome: Ongoing     Problem: Skin Integrity:  Goal: Skin integrity will stabilize  Description: Skin integrity will stabilize  9/17/2021 2122 by Alyssa River RN  Outcome: Ongoing  Note: Patient will have no new skin breakdown    9/17/2021 0724 by Zabrina Maharaj RN  Outcome: Ongoing     Problem: Discharge Planning:  Goal: Patients continuum of care needs are met  Description: Patients continuum of care needs are met  9/17/2021 2122 by Efrain Bumpers, RN  Outcome: Ongoing  Note: Patients continuum of care needs will be met this shift    9/17/2021 0724 by Edwige Smith RN  Outcome: Ongoing     Problem:  Activity Intolerance:  Goal: Ability to tolerate increased activity will improve  Description: Ability to tolerate increased activity will improve  9/17/2021 2122 by Efrain Bumpers, RN  Outcome: Ongoing  Note: Patients activity level will improve    9/17/2021 0724 by Edwige Smith RN  Outcome: Ongoing     Problem: Airway Clearance - Ineffective:  Goal: Ability to maintain a clear airway will improve  Description: Ability to maintain a clear airway will improve  9/17/2021 2122 by Efrain Bumpers, RN  Outcome: Ongoing  Note: Will continue to monitor patients airway    9/17/2021 0724 by Edwige Smith RN  Outcome: Ongoing     Problem: Breathing Pattern - Ineffective:  Goal: Ability to achieve and maintain a regular respiratory rate will improve  Description: Ability to achieve and maintain a regular respiratory rate will improve  9/17/2021 2122 by Efrain Bumpers, RN  Outcome: Ongoing  Note: Will continue to monitor patients respiratory status    9/17/2021 0724 by Edwige Smith RN  Outcome: Ongoing     Problem: Gas Exchange - Impaired:  Goal: Levels of oxygenation will improve  Description: Levels of oxygenation will improve  9/17/2021 2122 by Efrain Bumpers, RN  Outcome: Ongoing  Note: Patients oxygenation will improve    9/17/2021 0724 by Edwige Smith RN  Outcome: Ongoing     Problem: Health Behavior:  Goal: Identification of resources available to assist in meeting health care needs will improve  Description: Identification of resources available to assist in meeting health care needs will improve  9/17/2021 2122 by Efrain Bumpers, RN  Outcome: Ongoing  Note: Patients healthcare needs will improve    9/17/2021 0724 by Marquez Frost RN  Outcome: Ongoing     Problem: Physical Regulation:  Goal: Complications related to the disease process, condition or treatment will be avoided or minimized  Description: Complications related to the disease process, condition or treatment will be avoided or minimized  9/17/2021 2122 by Dee Burns RN  Outcome: Ongoing  Note: Complications rt to the disease process will be minimized    9/17/2021 0724 by Marquez Frost RN  Outcome: Ongoing   Electronically signed by Dee Burns RN on 9/17/2021 at 9:25 PM

## 2021-09-18 NOTE — RT PROTOCOL NOTE

## 2021-09-18 NOTE — PROGRESS NOTES
[facility]  Oncology Hematology Care  Progress Note      SUBJECTIVE:     Doing well. Denies any shortness of breath PND orthopnea. Mildly dyspneic with exertion. No significant pain. He Initiated C1 carbo/etoposide on 9/11/21. He has tolerated his chemo well. He is now day 8.  He is currently on Granix for neutropenic prophylaxis    OBJECTIVE      Medications    Current Facility-Administered Medications: insulin glargine (LANTUS) injection vial 10 Units, 10 Units, SubCUTAneous, Daily  potassium & sodium phosphates (PHOS-NAK) 280-160-250 MG packet 250 mg, 1 packet, Oral, BID  ipratropium-albuterol (DUONEB) nebulizer solution 1 ampule, 1 ampule, Inhalation, BID  insulin lispro (HUMALOG) injection vial 0-12 Units, 0-12 Units, SubCUTAneous, TID WC  insulin lispro (HUMALOG) injection vial 0-6 Units, 0-6 Units, SubCUTAneous, Nightly  potassium chloride (KLOR-CON) extended release tablet 40 mEq, 40 mEq, Oral, TID  Tbo-Filgrastim (GRANIX) injection 300 mcg, 300 mcg, SubCUTAneous, QPM  prochlorperazine (COMPAZINE) tablet 10 mg, 10 mg, Oral, Q6H PRN  furosemide (LASIX) injection 40 mg, 40 mg, IntraVENous, BID  doxazosin (CARDURA) tablet 4 mg, 4 mg, Oral, 2 times per day  sodium chloride (Inhalant) 3 % nebulizer solution 4 mL, 4 mL, Nebulization, BID  spironolactone (ALDACTONE) tablet 100 mg, 100 mg, Oral, BID  ondansetron (ZOFRAN) 8 mg in dextrose 5 % 50 mL IVPB, 8 mg, IntraVENous, Q8H PRN  enoxaparin (LOVENOX) injection 40 mg, 40 mg, SubCUTAneous, Daily  cloNIDine (CATAPRES) tablet 0.2 mg, 0.2 mg, Oral, Q4H PRN  hydrALAZINE (APRESOLINE) injection 10 mg, 10 mg, IntraVENous, Q4H PRN  acetaminophen (TYLENOL) tablet 650 mg, 650 mg, Oral, Q4H PRN  NIFEdipine (PROCARDIA XL) extended release tablet 60 mg, 60 mg, Oral, BID  lisinopril (PRINIVIL;ZESTRIL) tablet 40 mg, 40 mg, Oral, Daily  glucose (GLUTOSE) 40 % oral gel 15 g, 15 g, Oral, PRN  dextrose 50 % IV solution, 12.5 g, IntraVENous, PRN  glucagon (rDNA) injection 1 mg, 1 mg, IntraMUSCular, PRN  dextrose 5 % solution, 100 mL/hr, IntraVENous, PRN  budesonide-formoterol (SYMBICORT) 160-4.5 MCG/ACT inhaler 2 puff, 2 puff, Inhalation, BID  montelukast (SINGULAIR) tablet 10 mg, 10 mg, Oral, Nightly  sodium chloride flush 0.9 % injection 5-40 mL, 5-40 mL, IntraVENous, 2 times per day  sodium chloride flush 0.9 % injection 5-40 mL, 5-40 mL, IntraVENous, PRN  0.9 % sodium chloride infusion, 25 mL, IntraVENous, PRN  ondansetron (ZOFRAN-ODT) disintegrating tablet 4 mg, 4 mg, Oral, Q8H PRN **OR** ondansetron (ZOFRAN) injection 4 mg, 4 mg, IntraVENous, Q6H PRN  polyethylene glycol (GLYCOLAX) packet 17 g, 17 g, Oral, Daily PRN  [DISCONTINUED] acetaminophen (TYLENOL) tablet 650 mg, 650 mg, Oral, Q6H PRN **OR** acetaminophen (TYLENOL) suppository 650 mg, 650 mg, Rectal, Q6H PRN  nicotine (NICODERM CQ) 21 MG/24HR 1 patch, 1 patch, TransDERmal, Daily     Physical    VITALS:  /76   Pulse 92   Temp 97.8 °F (36.6 °C) (Oral)   Resp 16   Ht 6' (1.829 m)   Wt 226 lb 10.1 oz (102.8 kg)   SpO2 96%   BMI 30.74 kg/m²   TEMPERATURE:  Current - Temp: 97.8 °F (36.6 °C); Max - Temp  Av °F (36.7 °C)  Min: 97.6 °F (36.4 °C)  Max: 98.6 °F (37 °C)  PULSE OXIMETRY RANGE: SpO2  Av.1 %  Min: 93 %  Max: 97 %  24HR INTAKE/OUTPUT:      Intake/Output Summary (Last 24 hours) at 2021 0904  Last data filed at 2021 1528  Gross per 24 hour   Intake 530 ml   Output 4900 ml   Net -4370 ml       CONSTITUTIONAL: Well-appearing, no acute distress  EYES: Sclera clear, conjunctiva normal  ENT: Oral pharynx unremarkable with moist mucus membranes. Multiple dental caries  LUNGS: Diminished bilaterally to auscultation. No increased labor with breathing. CARDIOVASCULAR: Regular rate and rhythm, normal S1 and S2, no S3 or S4, and no murmur noted. ABDOMEN: Soft, non-distended, non-tender  MUSCULOSKELETAL: There is no redness, warmth, or swelling of the joints. NEUROLOGIC: Awake, alert.  Grossly non-focal.  SKIN: No bruising or bleeding. PSYCH: Normal affect. Data      Recent Labs     09/17/21  0500   WBC 5.9   HGB 11.7*   HCT 34.2*   PLT 75*   MCV 98.5        Recent Labs     09/16/21  0530 09/17/21  0500 09/18/21  0500    139 136   K 3.1* 3.5 4.0    102 101   CO2 30 30 27   PHOS 2.7 2.2* 2.2*   BUN 19 16 14   CREATININE 0.5* <0.5* 0.5*     No results for input(s): AST, ALT, ALB, BILIDIR, BILITOT, ALKPHOS in the last 72 hours. Magnesium:    Lab Results   Component Value Date    MG 1.80 09/18/2021    MG 1.70 09/17/2021    MG 1.90 09/16/2021       Imaging ECHO Complete 2D W Doppler W Color    Result Date: 9/3/2021  Transthoracic Echocardiography Report (TTE)  Demographics   Patient Name      Antonieta Marin   Date of Study     09/03/2021          Gender              Male   Patient Number    [id number]          Date of Birth       1970   Visit Number      [id number]           Age                 46 year(s)   Accession Number  [id number]          Room Number         1407   Corporate ID      [id number]             Sonographer         Juan Montalvo RVT, RD   Ordering          Jaydon Smith,             [address] [place] [address]     Physician           Dolly Alvarez MD  Procedure Type of Study   TTE procedure:ECHOCARDIOGRAM COMPLETE 2D W DOPPLER W COLOR. Procedure Date Date: 09/03/2021 Start: 02:20 PM Study Location: [facility] - Echo Lab Technical Quality: Limited visualization due to poor acoustical window. Additional Indications:Lower extremity swelling, evaluate the right heart and LV; HTN, HLD, COPD.  Patient Status: Routine Height: 72 inches Weight: 247.01 pounds BSA: 2.33 m2 BMI: 33.5 kg/m2 Rhythm: Within normal limits HR: 70 bpm BP: 183/87 mmHg  Conclusions Summary  Normal left ventricle size, wall thickness, and systolic function with an  estimated ejection fraction of 60-65%. No regional wall motion abnormalities  are seen. Normal diastolic function. Normal right ventricular size and function. No significant valve abnormalities noted. Signature   ------------------------------------------------------------------  Electronically signed by Sussy Matta MD  (Interpreting physician) on 09/03/2021 at 03:46 PM  ------------------------------------------------------------------   Findings   Left Ventricle  Normal left ventricle size, wall thickness, and systolic function with an  estimated ejection fraction of 60-65%. No regional wall motion abnormalities  are seen. Normal diastolic function. Mitral Valve  Mitral valve leaflets appear mildly thickened. No evidence of mitral regurgitation or stenosis. Left Atrium  The left atrium is normal in size. Aortic Valve  The aortic valve leaflets are not well visualized. No evidence of aortic valve regurgitation or stenosis. Aorta  The aortic root is normal in size. Right Ventricle  Normal right ventricular size and function. TAPSE 2.8 cm   Tricuspid Valve  The tricuspid valve was not well visualized. No evidence of tricuspid stenosis. Right Atrium  The right atrial size is normal.   Pulmonic Valve  The pulmonic valve is not well visualized. No evidence of pulmonic valve regurgitation. No evidence of pulmonic valve stenosis. Pericardial Effusion  No pericardial effusion noted. Pleural Effusion  No pleural effusion. Miscellaneous  IVC not well visualized.   M-Mode/2D Measurements (cm)   LV Diastolic Dimension: 2.51 cm LV Systolic Dimension: 4.59 cm  LV Septum Diastolic: 0.9 cm  LV PW Diastolic: 4.76 cm        AO Root Dimension: 3.4 cm                                   LA Area: 23.6 cm2  LVOT: 2.1 cm                    LA volume/Index: 70.5 ml /30 ml/m2  Doppler Measurements   AV Peak Velocity: 184 cm/s     MV Peak E-Wave: 106 cm/s  AV Peak Gradient: 13.54 mmHg   MV Peak A-Wave: 87.5 cm/s  AV Mean Gradient: 6 mmHg       MV E/A Ratio: 1.21  LVOT Peak Velocity: 160 cm/s   MV Mean Gradient: 2 mmHg  AV Area (Continuity):3.58 cm2  MV Max P mmHg                                 MV Vmax:114 cm/s                                 MV VTI:32.9 cm/s   E' Septal Velocity: 10.9 cm/s  MV Area (continuity): 3.61 cm2  E' Lateral Velocity: 15.5 cm/s MV Deceleration Time: 259 msec  PV Peak Velocity: 131 cm/s  PV Peak Gradient: 6.86 mmHg   Aortic Valve   Peak Velocity: 184 cm/s     Mean Velocity: 114 cm/s  Peak Gradient: 13.54 mmHg   Mean Gradient: 6 mmHg  Area (continuity): 3.58 cm2  AV VTI: 33.2 cm  Aorta   Aortic Root: 3.4 cm  Ascending Aorta: 3.3 cm  LVOT Diameter: 2.1 cm      XR CHEST (2 VW)    Result Date: 2021  EXAMINATION: TWO XRAY VIEWS OF THE CHEST 2021 8:48 am COMPARISON: None. HISTORY: ORDERING SYSTEM PROVIDED HISTORY: WEEMS (dyspnea on exertion) TECHNOLOGIST PROVIDED HISTORY: Reason for Exam: WEEMS, asthma Acuity: Unknown Type of Exam: Initial FINDINGS: Clear lungs. No pleural effusion or pneumothorax. Cardiomediastinal silhouette is unremarkable. Degenerative changes of the visualized osseous structures. Clear lungs. CT GUIDED NEEDLE PLACEMENT    Result Date: 2021  PROCEDURE: CT GUIDED CORE BIOPSY OF LEFT HEPATIC LOBE MASS MODERATE CONSCIOUS SEDATION 2021 HISTORY: ORDERING SYSTEM PROVIDED HISTORY: lesion TECHNOLOGIST PROVIDED HISTORY: Reason for exam:->lesion Reason for Exam: lesion Acuity: Acute Type of Exam: Initial; ORDERING SYSTEM PROVIDED HISTORY: bx TECHNOLOGIST PROVIDED HISTORY: Reason for exam:->bx Reason for Exam: lesion Acuity: Acute Type of Exam: Initial PHYSICIANS: Amilcar Simpsontal SEDATION: 0.5 mgversed and 25 mcg fentanyl were titrated intravenously for moderate sedation monitored under my direction. Total intraservice time of sedation was 15 minutes.   The patient's vital signs were monitored throughout the procedure and recorded in the patient's medical record by the nurse. TECHNIQUE: Informed consent was obtained after a detailed discussion about the procedure including the risk, benefits, and alternatives. Universal protocol was followed. Axial images were obtained through the liver and a suitable skin site was prepped and draped in sterile fashion. Local anesthesia was achieved with lidocaine. Under intermittent CT guidance, a 17 gauge coaxial needle was inserted into the a left hepatic lobe mass. Through this, 1 18 gauge core biopsy specimens were obtained using coaxial technique and submitted to pathology. Gel-Foam slurry was administered as the needle was removed. A sterile bandage was then placed. The patient tolerated the procedure well. Estimated blood loss: Less than 5 cc Dose modulation, iterative reconstruction, and/or weight based adjustment of the mA/kV was utilized to reduce the radiation dose to as low as reasonably achievable. DLP: 2943.15 mGy-cm     Successful CT guided core biopsy of a left hepatic lobe mass. CT ABDOMEN PELVIS W IV CONTRAST Additional Contrast? Oral    Result Date: 9/5/2021  EXAMINATION: CT OF THE ABDOMEN AND PELVIS WITH CONTRAST 9/3/2021 5:13 pm TECHNIQUE: CT of the abdomen and pelvis was performed with the administration of intravenous contrast. Multiplanar reformatted images are provided for review. Dose modulation, iterative reconstruction, and/or weight based adjustment of the mA/kV was utilized to reduce the radiation dose to as low as reasonably achievable. COMPARISON: CT pulmonary angiogram performed September 2, 2021. HISTORY: ORDERING SYSTEM PROVIDED HISTORY: Metastatic disease, evaluate for primary. Bilateral lower ext edema, eval for IVC thrombosis/compression TECHNOLOGIST PROVIDED HISTORY: Reason for exam:->Metastatic disease, evaluate for primary.   Bilateral lower ext edema, eval for IVC thrombosis/compression Additional Contrast?->Oral FINDINGS: Images are degraded by motion artifact. Lower Chest: Respiratory motion artifact. Visualized pulmonary nodules and enlarged mediastinal lymph node, unchanged from prior CT chest. Organs: There are multifocal hypoattenuating lesions throughout the liver. Lesion within the left hepatic lobe measures 6.1 x 5.7 cm (axial image 35). Lesion within the right hepatic lobe measures 3.6 x 2.6 cm (axial image 51). The gallbladder is normally distended with layering hyperintensity suggestive of sludge. No calcified stones. No gallbladder wall thickening or pericholecystic fluid. The spleen and pancreas are grossly unremarkable. Right adrenal gland nodule measures up to 1.6 cm. There is mild thickening of the left adrenal gland without definite nodularity. There is symmetric renal cortical enhancement. Simple appearing right renal cysts demonstrated. No hydronephrosis. GI/Bowel: There is a hypoattenuating nodule in the region of the 2/3 portion of the duodenum in the region of the ampulla which measures up to 1.3 cm (axial image 83). There is diffuse fold thickening involving the majority of the jejunum. No evidence of obstruction. Sigmoid diverticulosis without evidence of acute diverticulitis. The appendix is normal. Pelvis: Prostate and seminal vesicles are grossly unremarkable. The urinary bladder is within normal limits. Peritoneum/Retroperitoneum: No significant lymphadenopathy. The abdominal aorta is normal in course and caliber with scattered atherosclerotic disease. No definite filling defects within the IVC, the iliac veins, or visualized femoral veins to suggest thrombus. Bones/Soft Tissues: There is asymmetric thickening of the inferior right rectus abdominus musculature with mild surrounding stranding. Visualization is partially obscured by streak artifact from dense oral contrast in the adjacent small bowel. Body wall edema demonstrated. No focal drainable collections. Multilevel moderate degenerative changes in the visualized spine. No destructive osseous lesions. 1. Multiple hypoattenuating liver lesions which are most suggestive of metastatic disease. 2. Right adrenal gland nodule measuring up to 1.6 cm, indeterminate. 3. Hypoattenuating nodule in the region of the 2/3 portion of the duodenum in the region of the ampulla measuring up to 1.3 cm. Findings could be artifactual or related to fold thickening; however, a small ampullary or possibly pancreatic lesion cannot be entirely excluded. 4. Diffuse fold thickening involving the majority of the jejunum, nonspecific but can be related to an infectious etiology or lymphatic obstruction. 5. No definite filling defects in the IVC, iliac veins, or visualized femoral veins to suggest thrombus. 6. Asymmetric thickening of the inferior right rectus abdominus musculature with surrounding stranding which is partially obscured by adjacent streak artifact from dense oral contrast in the small bowel. No definite focal lesion is demonstrated; however, an underlying lesion cannot be entirely excluded. Hematoma is also a consideration. CT CHEST PULMONARY EMBOLISM W CONTRAST    Result Date: 9/5/2021  EXAMINATION: CTA OF THE CHEST 9/2/2021 12:47 pm TECHNIQUE: CTA of the chest was performed after the administration of intravenous contrast.  Multiplanar reformatted images are provided for review. MIP images are provided for review. Dose modulation, iterative reconstruction, and/or weight based adjustment of the mA/kV was utilized to reduce the radiation dose to as low as reasonably achievable. COMPARISON: None.  HISTORY: ORDERING SYSTEM PROVIDED HISTORY: leg swelling sob new o2 requirement TECHNOLOGIST PROVIDED HISTORY: Reason for exam:->leg swelling sob new o2 requirement Decision Support Exception - unselect if not a suspected or confirmed emergency medical condition->Emergency Medical Condition (MA) Reason for Exam: leg swelling sob new o2 requirement Acuity: Acute Type of Exam: Initial FINDINGS: Pulmonary Arteries:  No central, lobar, or segmental pulmonary embolus. Narrowing of the right lower lobe pulmonary artery by the adenopathy. Mediastinum: Heart size is normal.  No pericardial effusion. Thoracic aorta is normal caliber. Extensive mediastinal and hilar lymphadenopathy is present. Subcarinal node measures 32 x 37 mm. Right hilar node measures 30 x 29 mm. Lungs/pleura: Moderate emphysema. Multiple 3 mm nodules are present in the right middle lobe and left lower lobe. Superimposed in the posterior right lower lobe, there is a dominant nodule measuring 17 x 17 mm. An additional right lower lobe nodule measures 11 mm. No pleural effusion or pneumothorax. Narrowing of bronchus intermedius due to the right hilar adenopathy. Upper Abdomen: Multiple low-attenuation liver lesions are present measuring up to 48 x 40 mm and 26 x 28 mm. At least 30 lesions are noted in the visualized portions of the liver. Soft Tissues/Bones: No lytic or blastic osseous lesion. 1. No acute pulmonary embolus. 2. Extensive mediastinal and hilar lymphadenopathy compatible with metastatic neoplasm. 3. Multiple new indeterminate 3 mm nodules in the right middle lobe and left lower lobe. These could be infectious or neoplastic. 4. Right lower lobe 17 mm and 11 mm nodules are nonspecific, but metastatic disease not excluded. 5. Multiple liver metastases measuring up to 48 x 40 mm.  RECOMMENDATIONS:     CT NEEDLE BIOPSY LIVER PERCUTANEOUS    Result Date: 9/8/2021  PROCEDURE: CT GUIDED CORE BIOPSY OF LEFT HEPATIC LOBE MASS MODERATE CONSCIOUS SEDATION 9/7/2021 HISTORY: ORDERING SYSTEM PROVIDED HISTORY: lesion TECHNOLOGIST PROVIDED HISTORY: Reason for exam:->lesion Reason for Exam: lesion Acuity: Acute Type of Exam: Initial; ORDERING SYSTEM PROVIDED HISTORY: bx TECHNOLOGIST PROVIDED HISTORY: Reason for exam:->bx Reason for Exam: lesion Acuity: Acute Type of Exam: Initial PHYSICIANS: Amilcar Decker SEDATION: 0.5 mgversed and 25 mcg fentanyl were titrated intravenously for moderate sedation monitored under my direction. Total intraservice time of sedation was 15 minutes. The patient's vital signs were monitored throughout the procedure and recorded in the patient's medical record by the nurse. TECHNIQUE: Informed consent was obtained after a detailed discussion about the procedure including the risk, benefits, and alternatives. Universal protocol was followed. Axial images were obtained through the liver and a suitable skin site was prepped and draped in sterile fashion. Local anesthesia was achieved with lidocaine. Under intermittent CT guidance, a 17 gauge coaxial needle was inserted into the a left hepatic lobe mass. Through this, 1 18 gauge core biopsy specimens were obtained using coaxial technique and submitted to pathology. Gel-Foam slurry was administered as the needle was removed. A sterile bandage was then placed. The patient tolerated the procedure well. Estimated blood loss: Less than 5 cc Dose modulation, iterative reconstruction, and/or weight based adjustment of the mA/kV was utilized to reduce the radiation dose to as low as reasonably achievable. DLP: 0810.02 mGy-cm     Successful CT guided core biopsy of a left hepatic lobe mass.      VL Extremity Venous Bilateral    Result Date: 9/8/2021  Lower Extremities DVT Study  Demographics   Patient Name       Jon Swanson   Date of Study      09/07/2021         Gender              Male   Patient Number     [id number]         Date of Birth       1970   Visit Number       [id number]          Age                 46 year(s)   Accession Number   [id number]         Room Number         9036   Corporate ID       [id number]            Carlos Hunter RVT   Ordering Physician MD Physician CARLOS Bailey Procedure Type of Study:   Veins:Lower Extremities DVT Study, VASC EXTREMITY VENOUS DUPLEX BILATERAL. Vascular Sonographer Report  Indications for Study:Edema. Impressions Right Impression No evidence of deep vein or superficial vein thrombosis involving the right lower extremity in vessels clearly visualized. Proximal and Mid Posterior Tibial veins and Peroneal veins were not visualized due to swelling. Calf and ankle edema noted. Left Impression No evidence of deep vein or superficial vein thrombosis involving the left lower extremity in vessels clearly visualized. Proximal Posterior Tibial veins and Soleal vein were not visualized due to swelling. Calf and ankle edema noted. Conclusions   Summary   No evidence of deep vein or superficial vein thrombosis involving the  bilateral lower extremities in vessels clearly visualized as detailed above. Signature   ------------------------------------------------------------------  Electronically signed by Meli Ramirez MD (Interpreting  physician) on 09/08/2021 at 08:09 AM  ------------------------------------------------------------------  Patient Status:Routine. Study [address] [facility]. Technical Quality:Limited visualization due to swelling. Risk Factors   - The patient's risk factor(s) include: dyslipidemia and arterial     hypertension.   - The patient has a current/recent (within 1 year) tobacco history. Velocities are measured in cm/s ; Diameters are measured in mm Right Lower Extremities DVT Study Measurements Right 2D Measurements +------------------------+----------+---------------+----------+ ! Location                ! Visualized! Compressibility! Thrombosis! +------------------------+----------+---------------+----------+ ! Sapheno Femoral Junction! Yes       ! Yes            ! None      ! +------------------------+----------+---------------+----------+ ! GSV Thigh               ! Yes       ! Yes            ! None      ! +------------------------+----------+---------------+----------+ ! Common Femoral          !Yes       ! Yes            ! None      ! +------------------------+----------+---------------+----------+ ! Prox Femoral            !Yes       ! Yes            ! None      ! +------------------------+----------+---------------+----------+ ! Mid Femoral             !Yes       ! Yes            ! None      ! +------------------------+----------+---------------+----------+ ! Dist Femoral            !Yes       ! Yes            ! None      ! +------------------------+----------+---------------+----------+ ! Deep Femoral            !Yes       ! Yes            ! None      ! +------------------------+----------+---------------+----------+ ! Popliteal               !Yes       ! Yes            ! None      ! +------------------------+----------+---------------+----------+ ! Gastroc                 ! Yes       ! Yes            ! None      ! +------------------------+----------+---------------+----------+ ! Soleal                  !Yes       ! Yes            ! None      ! +------------------------+----------+---------------+----------+ ! PTV                     ! Partial   !Yes            ! None      ! +------------------------+----------+---------------+----------+ ! ATV                     ! Yes       ! Yes            ! None      ! +------------------------+----------+---------------+----------+ ! Peroneal                !Partial   !Yes            ! None      ! +------------------------+----------+---------------+----------+ ! GSV Calf                ! Yes       ! Yes            ! None      ! +------------------------+----------+---------------+----------+ ! SSV                     ! Yes       ! Yes            ! None      ! +------------------------+----------+---------------+----------+ Right Doppler Measurements +--------------+------+------+------------+ ! Location      ! Signal!Reflux! Reflux (sec)! +--------------+------+------+------------+ ! Common Femoral!Phasic! No    ! +------------------------+----------+---------------+----------+ ! Peroneal                !Yes       ! Yes            ! None      ! +------------------------+----------+---------------+----------+ ! GSV Calf                ! Yes       ! Yes            ! None      ! +------------------------+----------+---------------+----------+ ! SSV                     ! Yes       ! Yes            ! None      ! +------------------------+----------+---------------+----------+ Left Doppler Measurements +--------------+------+------+------------+ ! Location      ! Signal!Reflux! Reflux (sec)! +--------------+------+------+------------+ ! Common Femoral!Phasic! No    !            ! +--------------+------+------+------------+ ! Popliteal     !Phasic! No    !            ! +--------------+------+------+------------+    EXAMINATION:   CTA OF THE CHEST 9/17/2021 8:45 am       TECHNIQUE:   CTA of the chest was performed after the administration of intravenous   contrast.  Multiplanar reformatted images are provided for review.  MIP   images are provided for review.  Dose modulation, iterative reconstruction,   and/or weight based adjustment of the mA/kV was utilized to reduce the   radiation dose to as low as reasonably achievable.       COMPARISON:   Portable chest 09/13/2021. Maryfrances Broccoli chest 09/02/2021.       HISTORY:   ORDERING SYSTEM PROVIDED HISTORY: small cell lung cancer, hypoxia, rule out PE   TECHNOLOGIST PROVIDED HISTORY:   Reason for exam:->small cell lung cancer, hypoxia, rule out PE       Current everyday smoker.       FINDINGS:   Pulmonary Arteries: Pulmonary arteries are adequately opacified for   evaluation.  No evidence of intraluminal filling defect to suggest pulmonary   embolism.  Main pulmonary artery is normal in caliber.       Mediastinum: Moderate mediastinal and bilateral hilar lymphadenopathy.  Mild   decrease in the size of the subcarinal and right hilar lymph nodes.  The   heart and pericardium demonstrate no acute abnormality. Deyvi Santana is no acute   abnormality of the thoracic aorta.       Lungs/pleura: Moderate to severe emphysematous changes present.  New 5.4 mm   nodule posteriorly in the left apex axial image 112 of series 3.  3 new   nodules in the right apex on axial images 105, 102, and 101.  Largest nodule   measures 10 x 12 mm.  Large new nodule in the anterior segment the left upper   lobe.  Nodule is partially cavitary.  On axial image 91, the nodules measured   at 37 x 25 mm.  On coronal image 65, the nodule measures 28 mm in height.  2   nodules again seen in the subpleural posterior right lower lobe with   connecting band of atelectasis present.  Nodules appear similar in size.  New   sub 5 mm nodules present posteriorly in the lower right upper lobe on axial   image 80, 85 and 62.  New 15 mm left lower lobe pulmonary nodule on axial   image 73.  New sub 5 mm nodule in the superior segment of the left lower lobe   on axial image 93.       Moderate amount of ground-glass opacity present in the lingula with small   patchy ground-glass opacities in the right upper lobe and left lower lobe.    Small amount of mucous plugging identified in each lower lobe.  Moderate   right basilar atelectasis.       Moderate narrowing of the right middle lobe bronchus by mediastinal   adenopathy.  Bronchus was previously occluded and right lower lobe bronchi   significantly narrowed.       Upper Abdomen: Several low-density lesions identified in the liver.  Lesions   do not appear as extensive as the prior study.       Soft Tissues/Bones: No acute bone or soft tissue abnormality.  No abnormal   soft tissue within the spinal canal.           Impression   No evidence of pulmonary embolism.  Moderate lingular opacity favored to be   due to acute bacterial pneumonia.  If the patient has had radiation to the   left chest, changes could be related to radiation pneumonitis.  COVID-19   pneumonia much less likely.  Significant worsening of bilateral pulmonary metastatic disease since the prior study.  Moderate right basilar   atelectasis.  Bibasilar mucous plugging.  Some reduction in the subcarinal   and right hilar adenopathy.  Suspected decrease in the size of the hepatic   metastatic lesions.  Moderate to severe emphysematous changes present. 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 This will be continued for total 7 days. No evidence to support any severe tumor lysis. Hypoxia-ctpa neg for PE. However there is severe worsening of her donell. pulm metastatic dz    Acth syndrome improving rather quickly     Patient wanted to make sure that I understood that he wanted to be treated very aggressively.     Rama Rebolledo MD, MPH

## 2021-09-19 NOTE — PLAN OF CARE
Problem: Infection:  Goal: Will remain free from infection  Description: Will remain free from infection  9/19/2021 0706 by Lori Valiente RN  Outcome: Ongoing  Note: Will remain free from infection. Electronically signed by Lori Valiente RN on 9/19/2021 at 7:06 AM    9/18/2021 2300 by Kirsty Garcia RN  Outcome: Ongoing  Note: Monitoring pt for signs and symptoms of infection. Will observe for any redness, warmth, or pus. Problem: Safety:  Goal: Free from accidental physical injury  Description: Free from accidental physical injury  9/19/2021 0706 by Lori Valiente RN  Outcome: Ongoing  Note: Free from accidental physical injury. Electronically signed by Lori Valiente RN on 9/19/2021 at 7:06 AM    9/18/2021 2300 by Kirsty Garcia RN  Outcome: Ongoing  Note: Bed in lowest position, wheels locked, call light within reach, and non skid footwear on. Walkway free of clutter. Pt alert and oriented and able to make needs known. Pt educated to use call light when needing to get up, and pt utilizes call light to make needs known. Will continue to monitor. Goal: Free from intentional harm  Description: Free from intentional harm  9/19/2021 0706 by Lori Valiente RN  Outcome: Ongoing  Note: Free from intentional harm. Electronically signed by Lori Valiente RN on 9/19/2021 at 7:07 AM    9/18/2021 2300 by Kirsty Garcia RN  Outcome: Ongoing     Problem: Daily Care:  Goal: Daily care needs are met  Description: Daily care needs are met  9/19/2021 0706 by Lori Valiente RN  Outcome: Ongoing  Note: Daily care needs are met. Electronically signed by Lori Valiente RN on 9/19/2021 at 7:07 AM    9/18/2021 2300 by Kirsty Garcia RN  Outcome: Ongoing  Note: Pt able to perform ADL's independently. Pt able to make needs known and calls out for assistance when needed. Will continue to monitor.       Problem: Pain:  Goal: Patient's pain/discomfort is manageable  Description: Patient's pain/discomfort is manageable  9/19/2021 0706 by Skyla Ellington RN  Outcome: Ongoing  Note: Patients pain/discomfort is manageable. Electronically signed by Skyla Ellington RN on 9/19/2021 at 7:07 AM    9/18/2021 2300 by Michael Cramer RN  Outcome: Ongoing  Note: Assessing and monitoring pt's pain. PRN pain medication being given if ordered. Educating pt on nonpharmacologic interventions for pain control. Will continue to monitor and reassess. Problem: Skin Integrity:  Goal: Skin integrity will stabilize  Description: Skin integrity will stabilize  9/19/2021 0706 by Skyla Ellington RN  Outcome: Ongoing  Note: Skin integrity will stabilize. Electronically signed by Skyla Ellington RN on 9/19/2021 at 7:07 AM    9/18/2021 2300 by Michael Cramer RN  Outcome: Ongoing  Note: Pt assessed for skin break down. Skin was warm and dry to touch. Will continue to monitor and assess. Problem: Discharge Planning:  Goal: Patients continuum of care needs are met  Description: Patients continuum of care needs are met  9/19/2021 0706 by Skyla Ellington RN  Outcome: Ongoing  Note: Patients continuum of care needs are met. Electronically signed by Skyla Ellington RN on 9/19/2021 at 7:13 AM    9/18/2021 2300 by Michael Cramer RN  Outcome: Ongoing  Note: Patient educated on discharge plan and assessed to determine that needs are being met. Questions answered for patient. Patient encouraged to ask questions and voice concerns/comments in regards to barriers for discharge and any other questions about the plan of care. Problem: Activity Intolerance:  Goal: Ability to tolerate increased activity will improve  Description: Ability to tolerate increased activity will improve  9/19/2021 0706 by Skyla Ellington RN  Outcome: Ongoing  Note: Ability to tolerate increased activity will improve.   Electronically signed by Skyla Ellington RN on 9/19/2021 at 7:14 AM    9/18/2021 2300 by Michael Cramer RN  Outcome: Ongoing  Note: Pt is able to tolerate ambulation and is increasing activity. Problem: Airway Clearance - Ineffective:  Goal: Ability to maintain a clear airway will improve  Description: Ability to maintain a clear airway will improve  9/19/2021 0706 by Stevenson Hutson RN  Outcome: Ongoing  Note: Ability to maintain a clear airway will improve. Electronically signed by Stevenson Hutson RN on 9/19/2021 at 7:14 AM    9/18/2021 2300 by Seven Villareal RN  Outcome: Ongoing  Note: Pt will remain able to maintain a clear airway. Problem: Breathing Pattern - Ineffective:  Goal: Ability to achieve and maintain a regular respiratory rate will improve  Description: Ability to achieve and maintain a regular respiratory rate will improve  9/19/2021 0706 by Stevenson Hutson RN  Outcome: Ongoing  Note: Ability to achieve and maintain a regular respiratory rate will improve. Electronically signed by Stevenson Hutson RN on 9/19/2021 at 7:14 AM    9/18/2021 2300 by Seven Villareal RN  Outcome: Ongoing  Note: Pt will be able to achieve a regular respiratory rate. Problem: Gas Exchange - Impaired:  Goal: Levels of oxygenation will improve  Description: Levels of oxygenation will improve  9/19/2021 0706 by Stevenson Hutson RN  Outcome: Ongoing  Note: Levels of oxygenation will improve. Electronically signed by Stevenson Hutson RN on 9/19/2021 at 7:14 AM    9/18/2021 2300 by Seven Villareal RN  Outcome: Ongoing  Note: Pt oxygenation remains stable. Problem: Health Behavior:  Goal: Identification of resources available to assist in meeting health care needs will improve  Description: Identification of resources available to assist in meeting health care needs will improve  9/19/2021 0706 by Stevenson Hutson RN  Outcome: Ongoing  Note: Identification or resourses available to assist in meeting health care needs will improve.   Electronically signed by Stevenson Hutson RN on 9/19/2021 at 7:15 AM    9/18/2021 2300 by Seven Villareal RN  Outcome: Ongoing     Problem: Physical Regulation:  Goal: Complications related to the disease process, condition or treatment will be avoided or minimized  Description: Complications related to the disease process, condition or treatment will be avoided or minimized  9/19/2021 0706 by Hayden Ch RN  Outcome: Ongoing  Note: Complication related to the disease process, condition or treatment will be avoided or minimized.   Electronically signed by Hayden Ch RN on 9/19/2021 at 7:16 AM    9/18/2021 2300 by Sudarshan Ngo RN  Outcome: Ongoing

## 2021-09-19 NOTE — PROGRESS NOTES
Pt sitting at edge of bed, no s/s distress, resp e/e, denies pain at this time, both picc lines have good blood return on both lumans have great blood return and flush w/o difficulty, alcohol caps places on lumans, call light in reach.   Electronically signed by Opal Mcguire RN on 9/19/2021 at 8:24 AM

## 2021-09-19 NOTE — PLAN OF CARE
Problem: Infection:  Goal: Will remain free from infection  Description: Will remain free from infection  Outcome: Ongoing  Note: Monitoring pt for signs and symptoms of infection. Will observe for any redness, warmth, or pus. Problem: Safety:  Goal: Free from accidental physical injury  Description: Free from accidental physical injury  Outcome: Ongoing  Note: Bed in lowest position, wheels locked, call light within reach, and non skid footwear on. Walkway free of clutter. Pt alert and oriented and able to make needs known. Pt educated to use call light when needing to get up, and pt utilizes call light to make needs known. Will continue to monitor. Goal: Free from intentional harm  Description: Free from intentional harm  Outcome: Ongoing     Problem: Daily Care:  Goal: Daily care needs are met  Description: Daily care needs are met  Outcome: Ongoing  Note: Pt able to perform ADL's independently. Pt able to make needs known and calls out for assistance when needed. Will continue to monitor. Problem: Pain:  Goal: Patient's pain/discomfort is manageable  Description: Patient's pain/discomfort is manageable  Outcome: Ongoing  Note: Assessing and monitoring pt's pain. PRN pain medication being given if ordered. Educating pt on nonpharmacologic interventions for pain control. Will continue to monitor and reassess. Problem: Skin Integrity:  Goal: Skin integrity will stabilize  Description: Skin integrity will stabilize  Outcome: Ongoing  Note: Pt assessed for skin break down. Skin was warm and dry to touch. Will continue to monitor and assess. Problem: Discharge Planning:  Goal: Patients continuum of care needs are met  Description: Patients continuum of care needs are met  Outcome: Ongoing  Note: Patient educated on discharge plan and assessed to determine that needs are being met. Questions answered for patient.  Patient encouraged to ask questions and voice concerns/comments in regards to barriers for discharge and any other questions about the plan of care. Problem: Activity Intolerance:  Goal: Ability to tolerate increased activity will improve  Description: Ability to tolerate increased activity will improve  Outcome: Ongoing  Note: Pt is able to tolerate ambulation and is increasing activity. Problem: Airway Clearance - Ineffective:  Goal: Ability to maintain a clear airway will improve  Description: Ability to maintain a clear airway will improve  Outcome: Ongoing  Note: Pt will remain able to maintain a clear airway. Problem: Breathing Pattern - Ineffective:  Goal: Ability to achieve and maintain a regular respiratory rate will improve  Description: Ability to achieve and maintain a regular respiratory rate will improve  Outcome: Ongoing  Note: Pt will be able to achieve a regular respiratory rate. Problem: Gas Exchange - Impaired:  Goal: Levels of oxygenation will improve  Description: Levels of oxygenation will improve  Outcome: Ongoing  Note: Pt oxygenation remains stable.       Problem: Health Behavior:  Goal: Identification of resources available to assist in meeting health care needs will improve  Description: Identification of resources available to assist in meeting health care needs will improve  Outcome: Ongoing     Problem: Physical Regulation:  Goal: Complications related to the disease process, condition or treatment will be avoided or minimized  Description: Complications related to the disease process, condition or treatment will be avoided or minimized  Outcome: Ongoing

## 2021-09-19 NOTE — PROGRESS NOTES
[facility]  Nephrology   Progress Note        We are following the patient for uncontrolled HTN Severe Hypokalemia  47 y/o WM, gray and heavy smoker admitted with severe Hypokalemia  He has h/o HTN for ~ 20 years that has been well controlled on Amlodipine  About 3 weeks ago he started to have leg swelling, weight gain fatigue and SOB  Out patient labs showed severe Hypokalemia  On admission K+ noted to be 2.2 meq CO2 40   Noted to have uncontrolled HTN with /108 mm  BP has remained resistant despite addition of multiple meds    SUBJECTIVE:  -pt seen and examined  -PMSHx and meds reviewed  - family at bedside  Wt /UOP noted . 215 lbs ? ROS: neg chest sob/monroe ,improved edema . OBJECTIVE:     PHYSICAL:    TEMPERATURE:  Current - Temp: 98 °F (36.7 °C);  Max - Temp  Av.2 °F (36.8 °C)  Min: 97.8 °F (36.6 °C)  Max: 98.6 °F (37 °C)  RESPIRATIONS RANGE: Resp  Av.7  Min: 14  Max: 20  PULSE RANGE: Pulse  Av.5  Min: 90  Max: 97  BLOOD PRESSURE RANGE:  Systolic (64HEC), VANDA:084 , Min:115 , GY   ; Diastolic (33NLD), SDF:28, Min:70, Max:88    PULSE OXIMETRY RANGE: SpO2  Av.7 %  Min: 91 %  Max: 95 %  24HR INTAKE/OUTPUT:      Intake/Output Summary (Last 24 hours) at 2021 1332  Last data filed at 2021 1156  Gross per 24 hour   Intake 600 ml   Output 2650 ml   Net -2050 ml       CONSTITUTIONAL:  awake, alert, cooperative, no apparent distress, and appears stated age  NECK:  Supple, symmetrical, trachea midline, no adenopathy  LUNGS: CTA  ABDOMEN:  No scars, normal bowel sounds, soft, non-distended  NEUROLOGIC:  alert, oriented, normal speech, no focal findings or movement disorder noted  SKIN: no bruising or bleeding  EXTREMITIES:  ++edema     Medications     dextrose      sodium chloride Stopped (09/10/21 0349)        Data      CBC:   Recent Labs     21  0500   WBC 5.9   RBC 3.47*   HGB 11.7*   HCT 34.2*   PLT 75*     BMP:    Recent Labs 09/17/21  0500 09/18/21  0500 09/19/21  0442    136 134*   K 3.5 4.0 4.5    101 99   CO2 30 27 27   BUN 16 14 17   CREATININE <0.5* 0.5* 0.6*   CALCIUM 8.2* 8.2* 8.5   GLUCOSE 93 99 115*     Phosphorus:    Recent Labs     09/17/21  0500 09/18/21  0500 09/19/21  0442   PHOS 2.2* 2.2* 2.5     Magnesium:    Recent Labs     09/17/21  0500 09/18/21  0500 09/19/21  0442   MG 1.70* 1.80 1.70*         ASSESSMENT     Patient Active Problem List   Diagnosis    Pure hypercholesterolemia    Anxiety    Essential hypertension    Moderate persistent asthma with acute exacerbation    [facility] ([facility] [address]    Hypokalemia    Acute respiratory failure with hypoxia (HCC)    Abnormal CT of the chest    COPD exacerbation (HCC)    Lung nodules    Tobacco abuse       PLAN    Hypertension resistant to treatment with multiple meds Concern for secondary HTN including Hypercortisolism ( Cortisol level 90 with adrenal nodule      24 hour urine for cortisol  [place] [address] !!  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     -Poorly differentiated small cell Lung CA started on carbo/etoposide/atezolizumab LDH stable  Remains on 3 L O2 CTA no evidence of PE      Hypokalemia profound  related to ectopic ACTH production and  Hypercortisolism and renal K+ wasting , started on Aldactone and K= supplements  Continue to monitor and supp accordingly . Edema  Suspect fluid retention due to hypercortisolism. Wt down , tolerating diuretics .        Rona Renteria MD,FACP Medical Necessity Information: It is in your best interest to select a reason for this procedure from the list below. All of these items fulfill various CMS LCD requirements except lesion extends to a margin. Include Z78.9 (Other Specified Conditions Influencing Health Status) As An Associated Diagnosis?: No Medical Necessity Clause: This procedure was medically necessary because the lesion that was treated was: Lab: 6 Lab Facility: 3 Size Of Lesion In Cm: 0.6 X Size Of Lesion In Cm (Optional): 0 Anesthesia Volume In Cc: 1 Excision Method: Excisional Biopsy Repair Type: None Suturegard Retention Suture: 2-0 Nylon Retention Suture Bite Size: 3 mm Length To Time In Minutes Device Was In Place: 10 Undermining Type: Entire Wound Debridement Text: The wound edges were debrided prior to proceeding with the closure to facilitate wound healing. Helical Rim Text: The closure involved the helical rim. Vermilion Border Text: The closure involved the vermilion border. Nostril Rim Text: The closure involved the nostril rim. Retention Suture Text: Retention sutures were placed to support the closure and prevent dehiscence. Suture Removal: 14 days Epidermal Closure Graft Donor Site (Optional): simple interrupted Graft Donor Site Bandage (Optional-Leave Blank If You Don't Want In Note): Steri-strips and a pressure bandage were applied to the donor site. Detail Level: Detailed Excision Depth: adipose tissue Scalpel Size: 15 blade Anesthesia Type: 1% lidocaine with epinephrine Hemostasis: Pressure and Electrodesiccation Estimated Blood Loss (Cc): minimal Epidermal Sutures: 3-0 Ethilon Wound Care: Bacitracin Dressing: pressure dressing Suturegard Intro: Intraoperative tissue expansion was performed, utilizing the SUTUREGARD device, in order to reduce wound tension. Suturegard Body: The suture ends were repeatedly re-tightened and re-clamped to achieve the desired tissue expansion. Hemigard Intro: Due to skin fragility and wound tension, it was decided to use HEMIGARD adhesive retention suture devices to permit a linear closure. The skin was cleaned and dried for a 6cm distance away from the wound. Excessive hair, if present, was removed to allow for adhesion. Hemigard Postcare Instructions: The HEMIGARD strips are to remain completely dry for at least 5-7 days. Complex Repair Preamble Text (Leave Blank If You Do Not Want): Extensive wide undermining was performed. Intermediate Repair Preamble Text (Leave Blank If You Do Not Want): Undermining was performed with blunt dissection. Fusiform Excision Additional Text (Leave Blank If You Do Not Want): The margin was drawn around the clinically apparent lesion.  A fusiform shape was then drawn on the skin incorporating the lesion and margins.  Incisions were then made along these lines to the appropriate tissue plane and the lesion was extirpated. Eliptical Excision Additional Text (Leave Blank If You Do Not Want): The margin was drawn around the clinically apparent lesion.  An elliptical shape was then drawn on the skin incorporating the lesion and margins.  Incisions were then made along these lines to the appropriate tissue plane and the lesion was extirpated. Saucerization Excision Additional Text (Leave Blank If You Do Not Want): The margin was drawn around the clinically apparent lesion.  Incisions were then made along these lines, in a tangential fashion, to the appropriate tissue plane and the lesion was extirpated. Slit Excision Additional Text (Leave Blank If You Do Not Want): A linear line was drawn on the skin overlying the lesion. An incision was made slowly until the lesion was visualized.  Once visualized, the lesion was removed with blunt dissection. Excisional Biopsy Additional Text (Leave Blank If You Do Not Want): The margin was drawn around the clinically apparent lesion. An elliptical shape was then drawn on the skin incorporating the lesion and margins.  Incisions were then made along these lines to the appropriate tissue plane and the lesion was extirpated. Perilesional Excision Additional Text (Leave Blank If You Do Not Want): The margin was drawn around the clinically apparent lesion. Incisions were then made along these lines to the appropriate tissue plane and the lesion was extirpated. Repair Performed By Another Provider Text (Leave Blank If You Do Not Want): After the tissue was excised the defect was repaired by another provider. No Repair - Repaired With Adjacent Surgical Defect Text (Leave Blank If You Do Not Want): After the excision the defect was repaired concurrently with another surgical defect which was in close approximation. Advancement Flap (Single) Text: The defect edges were debeveled with a #15 scalpel blade.  Given the location of the defect and the proximity to free margins a single advancement flap was deemed most appropriate.  Using a sterile surgical marker, an appropriate advancement flap was drawn incorporating the defect and placing the expected incisions within the relaxed skin tension lines where possible.    The area thus outlined was incised deep to adipose tissue with a #15 scalpel blade.  The skin margins were undermined to an appropriate distance in all directions utilizing iris scissors. Advancement Flap (Double) Text: The defect edges were debeveled with a #15 scalpel blade.  Given the location of the defect and the proximity to free margins a double advancement flap was deemed most appropriate.  Using a sterile surgical marker, the appropriate advancement flaps were drawn incorporating the defect and placing the expected incisions within the relaxed skin tension lines where possible.    The area thus outlined was incised deep to adipose tissue with a #15 scalpel blade.  The skin margins were undermined to an appropriate distance in all directions utilizing iris scissors. Burow's Advancement Flap Text: The defect edges were debeveled with a #15 scalpel blade.  Given the location of the defect and the proximity to free margins a Burow's advancement flap was deemed most appropriate.  Using a sterile surgical marker, the appropriate advancement flap was drawn incorporating the defect and placing the expected incisions within the relaxed skin tension lines where possible.    The area thus outlined was incised deep to adipose tissue with a #15 scalpel blade.  The skin margins were undermined to an appropriate distance in all directions utilizing iris scissors. Chonodrocutaneous Helical Advancement Flap Text: The defect edges were debeveled with a #15 scalpel blade.  Given the location of the defect and the proximity to free margins a chondrocutaneous helical advancement flap was deemed most appropriate.  Using a sterile surgical marker, the appropriate advancement flap was drawn incorporating the defect and placing the expected incisions within the relaxed skin tension lines where possible.    The area thus outlined was incised deep to adipose tissue with a #15 scalpel blade.  The skin margins were undermined to an appropriate distance in all directions utilizing iris scissors. Crescentic Advancement Flap Text: The defect edges were debeveled with a #15 scalpel blade.  Given the location of the defect and the proximity to free margins a crescentic advancement flap was deemed most appropriate.  Using a sterile surgical marker, the appropriate advancement flap was drawn incorporating the defect and placing the expected incisions within the relaxed skin tension lines where possible.    The area thus outlined was incised deep to adipose tissue with a #15 scalpel blade.  The skin margins were undermined to an appropriate distance in all directions utilizing iris scissors. A-T Advancement Flap Text: The defect edges were debeveled with a #15 scalpel blade.  Given the location of the defect, shape of the defect and the proximity to free margins an A-T advancement flap was deemed most appropriate.  Using a sterile surgical marker, an appropriate advancement flap was drawn incorporating the defect and placing the expected incisions within the relaxed skin tension lines where possible.    The area thus outlined was incised deep to adipose tissue with a #15 scalpel blade.  The skin margins were undermined to an appropriate distance in all directions utilizing iris scissors. O-T Advancement Flap Text: The defect edges were debeveled with a #15 scalpel blade.  Given the location of the defect, shape of the defect and the proximity to free margins an O-T advancement flap was deemed most appropriate.  Using a sterile surgical marker, an appropriate advancement flap was drawn incorporating the defect and placing the expected incisions within the relaxed skin tension lines where possible.    The area thus outlined was incised deep to adipose tissue with a #15 scalpel blade.  The skin margins were undermined to an appropriate distance in all directions utilizing iris scissors. O-L Flap Text: The defect edges were debeveled with a #15 scalpel blade.  Given the location of the defect, shape of the defect and the proximity to free margins an O-L flap was deemed most appropriate.  Using a sterile surgical marker, an appropriate advancement flap was drawn incorporating the defect and placing the expected incisions within the relaxed skin tension lines where possible.    The area thus outlined was incised deep to adipose tissue with a #15 scalpel blade.  The skin margins were undermined to an appropriate distance in all directions utilizing iris scissors. O-Z Flap Text: The defect edges were debeveled with a #15 scalpel blade.  Given the location of the defect, shape of the defect and the proximity to free margins an O-Z flap was deemed most appropriate.  Using a sterile surgical marker, an appropriate transposition flap was drawn incorporating the defect and placing the expected incisions within the relaxed skin tension lines where possible. The area thus outlined was incised deep to adipose tissue with a #15 scalpel blade.  The skin margins were undermined to an appropriate distance in all directions utilizing iris scissors. Double O-Z Flap Text: The defect edges were debeveled with a #15 scalpel blade.  Given the location of the defect, shape of the defect and the proximity to free margins a Double O-Z flap was deemed most appropriate.  Using a sterile surgical marker, an appropriate transposition flap was drawn incorporating the defect and placing the expected incisions within the relaxed skin tension lines where possible. The area thus outlined was incised deep to adipose tissue with a #15 scalpel blade.  The skin margins were undermined to an appropriate distance in all directions utilizing iris scissors. V-Y Flap Text: The defect edges were debeveled with a #15 scalpel blade.  Given the location of the defect, shape of the defect and the proximity to free margins a V-Y flap was deemed most appropriate.  Using a sterile surgical marker, an appropriate advancement flap was drawn incorporating the defect and placing the expected incisions within the relaxed skin tension lines where possible.    The area thus outlined was incised deep to adipose tissue with a #15 scalpel blade.  The skin margins were undermined to an appropriate distance in all directions utilizing iris scissors. Mercedes Flap Text: The defect edges were debeveled with a #15 scalpel blade.  Given the location of the defect, shape of the defect and the proximity to free margins a Mercedes flap was deemed most appropriate.  Using a sterile surgical marker, an appropriate advancement flap was drawn incorporating the defect and placing the expected incisions within the relaxed skin tension lines where possible. The area thus outlined was incised deep to adipose tissue with a #15 scalpel blade.  The skin margins were undermined to an appropriate distance in all directions utilizing iris scissors. Modified Advancement Flap Text: The defect edges were debeveled with a #15 scalpel blade.  Given the location of the defect, shape of the defect and the proximity to free margins a modified advancement flap was deemed most appropriate.  Using a sterile surgical marker, an appropriate advancement flap was drawn incorporating the defect and placing the expected incisions within the relaxed skin tension lines where possible.    The area thus outlined was incised deep to adipose tissue with a #15 scalpel blade.  The skin margins were undermined to an appropriate distance in all directions utilizing iris scissors. Mucosal Advancement Flap Text: Given the location of the defect, shape of the defect and the proximity to free margins a mucosal advancement flap was deemed most appropriate. Incisions were made with a 15 blade scalpel in the appropriate fashion along the cutaneous vermillion border and the mucosal lip. The remaining actinically damaged mucosal tissue was excised.  The mucosal advancement flap was then elevated to the gingival sulcus with care taken to preserve the neurovascular structures and advanced into the primary defect. Care was taken to ensure that precise realignment of the vermilion border was achieved. Hatchet Flap Text: The defect edges were debeveled with a #15 scalpel blade.  Given the location of the defect, shape of the defect and the proximity to free margins a hatchet flap was deemed most appropriate.  Using a sterile surgical marker, an appropriate hatchet flap was drawn incorporating the defect and placing the expected incisions within the relaxed skin tension lines where possible.    The area thus outlined was incised deep to adipose tissue with a #15 scalpel blade.  The skin margins were undermined to an appropriate distance in all directions utilizing iris scissors. Rotation Flap Text: The defect edges were debeveled with a #15 scalpel blade.  Given the location of the defect, shape of the defect and the proximity to free margins a rotation flap was deemed most appropriate.  Using a sterile surgical marker, an appropriate rotation flap was drawn incorporating the defect and placing the expected incisions within the relaxed skin tension lines where possible.    The area thus outlined was incised deep to adipose tissue with a #15 scalpel blade.  The skin margins were undermined to an appropriate distance in all directions utilizing iris scissors. Spiral Flap Text: The defect edges were debeveled with a #15 scalpel blade.  Given the location of the defect, shape of the defect and the proximity to free margins a spiral flap was deemed most appropriate.  Using a sterile surgical marker, an appropriate rotation flap was drawn incorporating the defect and placing the expected incisions within the relaxed skin tension lines where possible. The area thus outlined was incised deep to adipose tissue with a #15 scalpel blade.  The skin margins were undermined to an appropriate distance in all directions utilizing iris scissors. Star Wedge Flap Text: The defect edges were debeveled with a #15 scalpel blade.  Given the location of the defect, shape of the defect and the proximity to free margins a star wedge flap was deemed most appropriate.  Using a sterile surgical marker, an appropriate rotation flap was drawn incorporating the defect and placing the expected incisions within the relaxed skin tension lines where possible. The area thus outlined was incised deep to adipose tissue with a #15 scalpel blade.  The skin margins were undermined to an appropriate distance in all directions utilizing iris scissors. Transposition Flap Text: The defect edges were debeveled with a #15 scalpel blade.  Given the location of the defect and the proximity to free margins a transposition flap was deemed most appropriate.  Using a sterile surgical marker, an appropriate transposition flap was drawn incorporating the defect.    The area thus outlined was incised deep to adipose tissue with a #15 scalpel blade.  The skin margins were undermined to an appropriate distance in all directions utilizing iris scissors. Muscle Hinge Flap Text: The defect edges were debeveled with a #15 scalpel blade.  Given the size, depth and location of the defect and the proximity to free margins a muscle hinge flap was deemed most appropriate.  Using a sterile surgical marker, an appropriate hinge flap was drawn incorporating the defect. The area thus outlined was incised with a #15 scalpel blade.  The skin margins were undermined to an appropriate distance in all directions utilizing iris scissors. Melolabial Transposition Flap Text: The defect edges were debeveled with a #15 scalpel blade.  Given the location of the defect and the proximity to free margins a melolabial flap was deemed most appropriate.  Using a sterile surgical marker, an appropriate melolabial transposition flap was drawn incorporating the defect.    The area thus outlined was incised deep to adipose tissue with a #15 scalpel blade.  The skin margins were undermined to an appropriate distance in all directions utilizing iris scissors. Rhombic Flap Text: The defect edges were debeveled with a #15 scalpel blade.  Given the location of the defect and the proximity to free margins a rhombic flap was deemed most appropriate.  Using a sterile surgical marker, an appropriate rhombic flap was drawn incorporating the defect.    The area thus outlined was incised deep to adipose tissue with a #15 scalpel blade.  The skin margins were undermined to an appropriate distance in all directions utilizing iris scissors. Rhomboid Transposition Flap Text: The defect edges were debeveled with a #15 scalpel blade.  Given the location of the defect and the proximity to free margins a rhomboid transposition flap was deemed most appropriate.  Using a sterile surgical marker, an appropriate rhomboid flap was drawn incorporating the defect.    The area thus outlined was incised deep to adipose tissue with a #15 scalpel blade.  The skin margins were undermined to an appropriate distance in all directions utilizing iris scissors. Bi-Rhombic Flap Text: The defect edges were debeveled with a #15 scalpel blade.  Given the location of the defect and the proximity to free margins a bi-rhombic flap was deemed most appropriate.  Using a sterile surgical marker, an appropriate rhombic flap was drawn incorporating the defect. The area thus outlined was incised deep to adipose tissue with a #15 scalpel blade.  The skin margins were undermined to an appropriate distance in all directions utilizing iris scissors. Helical Rim Advancement Flap Text: The defect edges were debeveled with a #15 blade scalpel.  Given the location of the defect and the proximity to free margins (helical rim) a double helical rim advancement flap was deemed most appropriate.  Using a sterile surgical marker, the appropriate advancement flaps were drawn incorporating the defect and placing the expected incisions between the helical rim and antihelix where possible.  The area thus outlined was incised through and through with a #15 scalpel blade.  With a skin hook and iris scissors, the flaps were gently and sharply undermined and freed up. Bilateral Helical Rim Advancement Flap Text: The defect edges were debeveled with a #15 blade scalpel.  Given the location of the defect and the proximity to free margins (helical rim) a bilateral helical rim advancement flap was deemed most appropriate.  Using a sterile surgical marker, the appropriate advancement flaps were drawn incorporating the defect and placing the expected incisions between the helical rim and antihelix where possible.  The area thus outlined was incised through and through with a #15 scalpel blade.  With a skin hook and iris scissors, the flaps were gently and sharply undermined and freed up. Ear Star Wedge Flap Text: The defect edges were debeveled with a #15 blade scalpel.  Given the location of the defect and the proximity to free margins (helical rim) an ear star wedge flap was deemed most appropriate.  Using a sterile surgical marker, the appropriate flap was drawn incorporating the defect and placing the expected incisions between the helical rim and antihelix where possible.  The area thus outlined was incised through and through with a #15 scalpel blade. Banner Transposition Flap Text: The defect edges were debeveled with a #15 scalpel blade.  Given the location of the defect and the proximity to free margins a Banner transposition flap was deemed most appropriate.  Using a sterile surgical marker, an appropriate flap drawn around the defect. The area thus outlined was incised deep to adipose tissue with a #15 scalpel blade.  The skin margins were undermined to an appropriate distance in all directions utilizing iris scissors. Bilobed Flap Text: The defect edges were debeveled with a #15 scalpel blade.  Given the location of the defect and the proximity to free margins a bilobe flap was deemed most appropriate.  Using a sterile surgical marker, an appropriate bilobe flap drawn around the defect.    The area thus outlined was incised deep to adipose tissue with a #15 scalpel blade.  The skin margins were undermined to an appropriate distance in all directions utilizing iris scissors. Bilobed Transposition Flap Text: The defect edges were debeveled with a #15 scalpel blade.  Given the location of the defect and the proximity to free margins a bilobed transposition flap was deemed most appropriate.  Using a sterile surgical marker, an appropriate bilobe flap drawn around the defect.    The area thus outlined was incised deep to adipose tissue with a #15 scalpel blade.  The skin margins were undermined to an appropriate distance in all directions utilizing iris scissors. Trilobed Flap Text: The defect edges were debeveled with a #15 scalpel blade.  Given the location of the defect and the proximity to free margins a trilobed flap was deemed most appropriate.  Using a sterile surgical marker, an appropriate trilobed flap drawn around the defect.    The area thus outlined was incised deep to adipose tissue with a #15 scalpel blade.  The skin margins were undermined to an appropriate distance in all directions utilizing iris scissors. Dorsal Nasal Flap Text: The defect edges were debeveled with a #15 scalpel blade.  Given the location of the defect and the proximity to free margins a dorsal nasal flap was deemed most appropriate.  Using a sterile surgical marker, an appropriate dorsal nasal flap was drawn around the defect.    The area thus outlined was incised deep to adipose tissue with a #15 scalpel blade.  The skin margins were undermined to an appropriate distance in all directions utilizing iris scissors. Island Pedicle Flap Text: The defect edges were debeveled with a #15 scalpel blade.  Given the location of the defect, shape of the defect and the proximity to free margins an island pedicle advancement flap was deemed most appropriate.  Using a sterile surgical marker, an appropriate advancement flap was drawn incorporating the defect, outlining the appropriate donor tissue and placing the expected incisions within the relaxed skin tension lines where possible.    The area thus outlined was incised deep to adipose tissue with a #15 scalpel blade.  The skin margins were undermined to an appropriate distance in all directions around the primary defect and laterally outward around the island pedicle utilizing iris scissors.  There was minimal undermining beneath the pedicle flap. Island Pedicle Flap With Canthal Suspension Text: The defect edges were debeveled with a #15 scalpel blade.  Given the location of the defect, shape of the defect and the proximity to free margins an island pedicle advancement flap was deemed most appropriate.  Using a sterile surgical marker, an appropriate advancement flap was drawn incorporating the defect, outlining the appropriate donor tissue and placing the expected incisions within the relaxed skin tension lines where possible. The area thus outlined was incised deep to adipose tissue with a #15 scalpel blade.  The skin margins were undermined to an appropriate distance in all directions around the primary defect and laterally outward around the island pedicle utilizing iris scissors.  There was minimal undermining beneath the pedicle flap. A suspension suture was placed in the canthal tendon to prevent tension and prevent ectropion. Alar Island Pedicle Flap Text: The defect edges were debeveled with a #15 scalpel blade.  Given the location of the defect, shape of the defect and the proximity to the alar rim an island pedicle advancement flap was deemed most appropriate.  Using a sterile surgical marker, an appropriate advancement flap was drawn incorporating the defect, outlining the appropriate donor tissue and placing the expected incisions within the nasal ala running parallel to the alar rim. The area thus outlined was incised with a #15 scalpel blade.  The skin margins were undermined minimally to an appropriate distance in all directions around the primary defect and laterally outward around the island pedicle utilizing iris scissors.  There was minimal undermining beneath the pedicle flap. Double Island Pedicle Flap Text: The defect edges were debeveled with a #15 scalpel blade.  Given the location of the defect, shape of the defect and the proximity to free margins a double island pedicle advancement flap was deemed most appropriate.  Using a sterile surgical marker, an appropriate advancement flap was drawn incorporating the defect, outlining the appropriate donor tissue and placing the expected incisions within the relaxed skin tension lines where possible.    The area thus outlined was incised deep to adipose tissue with a #15 scalpel blade.  The skin margins were undermined to an appropriate distance in all directions around the primary defect and laterally outward around the island pedicle utilizing iris scissors.  There was minimal undermining beneath the pedicle flap. Island Pedicle Flap-Requiring Vessel Identification Text: The defect edges were debeveled with a #15 scalpel blade.  Given the location of the defect, shape of the defect and the proximity to free margins an island pedicle advancement flap was deemed most appropriate.  Using a sterile surgical marker, an appropriate advancement flap was drawn, based on the axial vessel mentioned above, incorporating the defect, outlining the appropriate donor tissue and placing the expected incisions within the relaxed skin tension lines where possible.    The area thus outlined was incised deep to adipose tissue with a #15 scalpel blade.  The skin margins were undermined to an appropriate distance in all directions around the primary defect and laterally outward around the island pedicle utilizing iris scissors.  There was minimal undermining beneath the pedicle flap. Keystone Flap Text: The defect edges were debeveled with a #15 scalpel blade.  Given the location of the defect, shape of the defect a keystone flap was deemed most appropriate.  Using a sterile surgical marker, an appropriate keystone flap was drawn incorporating the defect, outlining the appropriate donor tissue and placing the expected incisions within the relaxed skin tension lines where possible. The area thus outlined was incised deep to adipose tissue with a #15 scalpel blade.  The skin margins were undermined to an appropriate distance in all directions around the primary defect and laterally outward around the flap utilizing iris scissors. O-T Plasty Text: The defect edges were debeveled with a #15 scalpel blade.  Given the location of the defect, shape of the defect and the proximity to free margins an O-T plasty was deemed most appropriate.  Using a sterile surgical marker, an appropriate O-T plasty was drawn incorporating the defect and placing the expected incisions within the relaxed skin tension lines where possible.    The area thus outlined was incised deep to adipose tissue with a #15 scalpel blade.  The skin margins were undermined to an appropriate distance in all directions utilizing iris scissors. O-Z Plasty Text: The defect edges were debeveled with a #15 scalpel blade.  Given the location of the defect, shape of the defect and the proximity to free margins an O-Z plasty (double transposition flap) was deemed most appropriate.  Using a sterile surgical marker, the appropriate transposition flaps were drawn incorporating the defect and placing the expected incisions within the relaxed skin tension lines where possible.    The area thus outlined was incised deep to adipose tissue with a #15 scalpel blade.  The skin margins were undermined to an appropriate distance in all directions utilizing iris scissors.  Hemostasis was achieved with electrocautery.  The flaps were then transposed into place, one clockwise and the other counterclockwise, and anchored with interrupted buried subcutaneous sutures. Double O-Z Plasty Text: The defect edges were debeveled with a #15 scalpel blade.  Given the location of the defect, shape of the defect and the proximity to free margins a Double O-Z plasty (double transposition flap) was deemed most appropriate.  Using a sterile surgical marker, the appropriate transposition flaps were drawn incorporating the defect and placing the expected incisions within the relaxed skin tension lines where possible. The area thus outlined was incised deep to adipose tissue with a #15 scalpel blade.  The skin margins were undermined to an appropriate distance in all directions utilizing iris scissors.  Hemostasis was achieved with electrocautery.  The flaps were then transposed into place, one clockwise and the other counterclockwise, and anchored with interrupted buried subcutaneous sutures. V-Y Plasty Text: The defect edges were debeveled with a #15 scalpel blade.  Given the location of the defect, shape of the defect and the proximity to free margins an V-Y advancement flap was deemed most appropriate.  Using a sterile surgical marker, an appropriate advancement flap was drawn incorporating the defect and placing the expected incisions within the relaxed skin tension lines where possible.    The area thus outlined was incised deep to adipose tissue with a #15 scalpel blade.  The skin margins were undermined to an appropriate distance in all directions utilizing iris scissors. H Plasty Text: Given the location of the defect, shape of the defect and the proximity to free margins a H-plasty was deemed most appropriate for repair.  Using a sterile surgical marker, the appropriate advancement arms of the H-plasty were drawn incorporating the defect and placing the expected incisions within the relaxed skin tension lines where possible. The area thus outlined was incised deep to adipose tissue with a #15 scalpel blade. The skin margins were undermined to an appropriate distance in all directions utilizing iris scissors.  The opposing advancement arms were then advanced into place in opposite direction and anchored with interrupted buried subcutaneous sutures. W Plasty Text: The lesion was extirpated to the level of the fat with a #15 scalpel blade.  Given the location of the defect, shape of the defect and the proximity to free margins a W-plasty was deemed most appropriate for repair.  Using a sterile surgical marker, the appropriate transposition arms of the W-plasty were drawn incorporating the defect and placing the expected incisions within the relaxed skin tension lines where possible.    The area thus outlined was incised deep to adipose tissue with a #15 scalpel blade.  The skin margins were undermined to an appropriate distance in all directions utilizing iris scissors.  The opposing transposition arms were then transposed into place in opposite direction and anchored with interrupted buried subcutaneous sutures. Z Plasty Text: The lesion was extirpated to the level of the fat with a #15 scalpel blade.  Given the location of the defect, shape of the defect and the proximity to free margins a Z-plasty was deemed most appropriate for repair.  Using a sterile surgical marker, the appropriate transposition arms of the Z-plasty were drawn incorporating the defect and placing the expected incisions within the relaxed skin tension lines where possible.    The area thus outlined was incised deep to adipose tissue with a #15 scalpel blade.  The skin margins were undermined to an appropriate distance in all directions utilizing iris scissors.  The opposing transposition arms were then transposed into place in opposite direction and anchored with interrupted buried subcutaneous sutures. Zygomaticofacial Flap Text: Given the location of the defect, shape of the defect and the proximity to free margins a zygomaticofacial flap was deemed most appropriate for repair.  Using a sterile surgical marker, the appropriate flap was drawn incorporating the defect and placing the expected incisions within the relaxed skin tension lines where possible. The area thus outlined was incised deep to adipose tissue with a #15 scalpel blade with preservation of a vascular pedicle.  The skin margins were undermined to an appropriate distance in all directions utilizing iris scissors.  The flap was then placed into the defect and anchored with interrupted buried subcutaneous sutures. Cheek Interpolation Flap Text: A decision was made to reconstruct the defect utilizing an interpolation axial flap and a staged reconstruction.  A telfa template was made of the defect.  This telfa template was then used to outline the Cheek Interpolation flap.  The donor area for the pedicle flap was then injected with anesthesia.  The flap was excised through the skin and subcutaneous tissue down to the layer of the underlying musculature.  The interpolation flap was carefully excised within this deep plane to maintain its blood supply.  The edges of the donor site were undermined.   The donor site was closed in a primary fashion.  The pedicle was then rotated into position and sutured.  Once the tube was sutured into place, adequate blood supply was confirmed with blanching and refill.  The pedicle was then wrapped with xeroform gauze and dressed appropriately with a telfa and gauze bandage to ensure continued blood supply and protect the attached pedicle. Cheek-To-Nose Interpolation Flap Text: A decision was made to reconstruct the defect utilizing an interpolation axial flap and a staged reconstruction.  A telfa template was made of the defect.  This telfa template was then used to outline the Cheek-To-Nose Interpolation flap.  The donor area for the pedicle flap was then injected with anesthesia.  The flap was excised through the skin and subcutaneous tissue down to the layer of the underlying musculature.  The interpolation flap was carefully excised within this deep plane to maintain its blood supply.  The edges of the donor site were undermined.   The donor site was closed in a primary fashion.  The pedicle was then rotated into position and sutured.  Once the tube was sutured into place, adequate blood supply was confirmed with blanching and refill.  The pedicle was then wrapped with xeroform gauze and dressed appropriately with a telfa and gauze bandage to ensure continued blood supply and protect the attached pedicle. Interpolation Flap Text: A decision was made to reconstruct the defect utilizing an interpolation axial flap and a staged reconstruction.  A telfa template was made of the defect.  This telfa template was then used to outline the interpolation flap.  The donor area for the pedicle flap was then injected with anesthesia.  The flap was excised through the skin and subcutaneous tissue down to the layer of the underlying musculature.  The interpolation flap was carefully excised within this deep plane to maintain its blood supply.  The edges of the donor site were undermined.   The donor site was closed in a primary fashion.  The pedicle was then rotated into position and sutured.  Once the tube was sutured into place, adequate blood supply was confirmed with blanching and refill.  The pedicle was then wrapped with xeroform gauze and dressed appropriately with a telfa and gauze bandage to ensure continued blood supply and protect the attached pedicle. Melolabial Interpolation Flap Text: A decision was made to reconstruct the defect utilizing an interpolation axial flap and a staged reconstruction.  A telfa template was made of the defect.  This telfa template was then used to outline the melolabial interpolation flap.  The donor area for the pedicle flap was then injected with anesthesia.  The flap was excised through the skin and subcutaneous tissue down to the layer of the underlying musculature.  The pedicle flap was carefully excised within this deep plane to maintain its blood supply.  The edges of the donor site were undermined.   The donor site was closed in a primary fashion.  The pedicle was then rotated into position and sutured.  Once the tube was sutured into place, adequate blood supply was confirmed with blanching and refill.  The pedicle was then wrapped with xeroform gauze and dressed appropriately with a telfa and gauze bandage to ensure continued blood supply and protect the attached pedicle. Mastoid Interpolation Flap Text: A decision was made to reconstruct the defect utilizing an interpolation axial flap and a staged reconstruction.  A telfa template was made of the defect.  This telfa template was then used to outline the mastoid interpolation flap.  The donor area for the pedicle flap was then injected with anesthesia.  The flap was excised through the skin and subcutaneous tissue down to the layer of the underlying musculature.  The pedicle flap was carefully excised within this deep plane to maintain its blood supply.  The edges of the donor site were undermined.   The donor site was closed in a primary fashion.  The pedicle was then rotated into position and sutured.  Once the tube was sutured into place, adequate blood supply was confirmed with blanching and refill.  The pedicle was then wrapped with xeroform gauze and dressed appropriately with a telfa and gauze bandage to ensure continued blood supply and protect the attached pedicle. Posterior Auricular Interpolation Flap Text: A decision was made to reconstruct the defect utilizing an interpolation axial flap and a staged reconstruction.  A telfa template was made of the defect.  This telfa template was then used to outline the posterior auricular interpolation flap.  The donor area for the pedicle flap was then injected with anesthesia.  The flap was excised through the skin and subcutaneous tissue down to the layer of the underlying musculature.  The pedicle flap was carefully excised within this deep plane to maintain its blood supply.  The edges of the donor site were undermined.   The donor site was closed in a primary fashion.  The pedicle was then rotated into position and sutured.  Once the tube was sutured into place, adequate blood supply was confirmed with blanching and refill.  The pedicle was then wrapped with xeroform gauze and dressed appropriately with a telfa and gauze bandage to ensure continued blood supply and protect the attached pedicle. Paramedian Forehead Flap Text: A decision was made to reconstruct the defect utilizing an interpolation axial flap and a staged reconstruction.  A telfa template was made of the defect.  This telfa template was then used to outline the paramedian forehead pedicle flap.  The donor area for the pedicle flap was then injected with anesthesia.  The flap was excised through the skin and subcutaneous tissue down to the layer of the underlying musculature.  The pedicle flap was carefully excised within this deep plane to maintain its blood supply.  The edges of the donor site were undermined.   The donor site was closed in a primary fashion.  The pedicle was then rotated into position and sutured.  Once the tube was sutured into place, adequate blood supply was confirmed with blanching and refill.  The pedicle was then wrapped with xeroform gauze and dressed appropriately with a telfa and gauze bandage to ensure continued blood supply and protect the attached pedicle. Lip Wedge Excision Repair Text: Given the location of the defect and the proximity to free margins a full thickness wedge repair was deemed most appropriate.  Using a sterile surgical marker, the appropriate repair was drawn incorporating the defect and placing the expected incisions perpendicular to the vermilion border.  The vermilion border was also meticulously outlined to ensure appropriate reapproximation during the repair.  The area thus outlined was incised through and through with a #15 scalpel blade.  The muscularis and dermis were reaproximated with deep sutures following hemostasis. Care was taken to realign the vermilion border before proceeding with the superficial closure.  Once the vermilion was realigned the superfical and mucosal closure was finished. Ftsg Text: The defect edges were debeveled with a #15 scalpel blade.  Given the location of the defect, shape of the defect and the proximity to free margins a full thickness skin graft was deemed most appropriate.  Using a sterile surgical marker, the primary defect shape was transferred to the donor site. The area thus outlined was incised deep to adipose tissue with a #15 scalpel blade.  The harvested graft was then trimmed of adipose tissue until only dermis and epidermis was left.  The skin margins of the secondary defect were undermined to an appropriate distance in all directions utilizing iris scissors.  The secondary defect was closed with interrupted buried subcutaneous sutures.  The skin edges were then re-apposed with running  sutures.  The skin graft was then placed in the primary defect and oriented appropriately. Split-Thickness Skin Graft Text: The defect edges were debeveled with a #15 scalpel blade.  Given the location of the defect, shape of the defect and the proximity to free margins a split thickness skin graft was deemed most appropriate.  Using a sterile surgical marker, the primary defect shape was transferred to the donor site. The split thickness graft was then harvested.  The skin graft was then placed in the primary defect and oriented appropriately. Burow's Graft Text: The defect edges were debeveled with a #15 scalpel blade.  Given the location of the defect, shape of the defect, the proximity to free margins and the presence of a standing cone deformity a Burow's skin graft was deemed most appropriate. The standing cone was removed and this tissue was then trimmed to the shape of the primary defect. The adipose tissue was also removed until only dermis and epidermis were left.  The skin margins of the secondary defect were undermined to an appropriate distance in all directions utilizing iris scissors.  The secondary defect was closed with interrupted buried subcutaneous sutures.  The skin edges were then re-apposed with running  sutures.  The skin graft was then placed in the primary defect and oriented appropriately. Cartilage Graft Text: The defect edges were debeveled with a #15 scalpel blade.  Given the location of the defect, shape of the defect, the fact the defect involved a full thickness cartilage defect a cartilage graft was deemed most appropriate.  An appropriate donor site was identified, cleansed, and anesthetized. The cartilage graft was then harvested and transferred to the recipient site, oriented appropriately and then sutured into place.  The secondary defect was then repaired using a primary closure. Composite Graft Text: The defect edges were debeveled with a #15 scalpel blade.  Given the location of the defect, shape of the defect, the proximity to free margins and the fact the defect was full thickness a composite graft was deemed most appropriate.  The defect was outline and then transferred to the donor site.  A full thickness graft was then excised from the donor site. The graft was then placed in the primary defect, oriented appropriately and then sutured into place.  The secondary defect was then repaired using a primary closure. Epidermal Autograft Text: The defect edges were debeveled with a #15 scalpel blade.  Given the location of the defect, shape of the defect and the proximity to free margins an epidermal autograft was deemed most appropriate.  Using a sterile surgical marker, the primary defect shape was transferred to the donor site. The epidermal graft was then harvested.  The skin graft was then placed in the primary defect and oriented appropriately. Dermal Autograft Text: The defect edges were debeveled with a #15 scalpel blade.  Given the location of the defect, shape of the defect and the proximity to free margins a dermal autograft was deemed most appropriate.  Using a sterile surgical marker, the primary defect shape was transferred to the donor site. The area thus outlined was incised deep to adipose tissue with a #15 scalpel blade.  The harvested graft was then trimmed of adipose and epidermal tissue until only dermis was left.  The skin graft was then placed in the primary defect and oriented appropriately. Skin Substitute Text: The defect edges were debeveled with a #15 scalpel blade.  Given the location of the defect, shape of the defect and the proximity to free margins a skin substitute graft was deemed most appropriate.  The graft material was trimmed to fit the size of the defect. The graft was then placed in the primary defect and oriented appropriately. Tissue Cultured Epidermal Autograft Text: The defect edges were debeveled with a #15 scalpel blade.  Given the location of the defect, shape of the defect and the proximity to free margins a tissue cultured epidermal autograft was deemed most appropriate.  The graft was then trimmed to fit the size of the defect.  The graft was then placed in the primary defect and oriented appropriately. Xenograft Text: The defect edges were debeveled with a #15 scalpel blade.  Given the location of the defect, shape of the defect and the proximity to free margins a xenograft was deemed most appropriate.  The graft was then trimmed to fit the size of the defect.  The graft was then placed in the primary defect and oriented appropriately. Purse String (Intermediate) Text: Given the location of the defect and the characteristics of the surrounding skin a purse string intermediate closure was deemed most appropriate.  Undermining was performed circumferentially around the surgical defect.  A purse string suture was then placed and tightened. Purse String (Simple) Text: Given the location of the defect and the characteristics of the surrounding skin a purse string simple closure was deemed most appropriate.  Undermining was performed circumferentially around the surgical defect.  A purse string suture was then placed and tightened. Complex Repair And Single Advancement Flap Text: The defect edges were debeveled with a #15 scalpel blade.  The primary defect was closed partially with a complex linear closure.  Given the location of the remaining defect, shape of the defect and the proximity to free margins a single advancement flap was deemed most appropriate for complete closure of the defect.  Using a sterile surgical marker, an appropriate advancement flap was drawn incorporating the defect and placing the expected incisions within the relaxed skin tension lines where possible.    The area thus outlined was incised deep to adipose tissue with a #15 scalpel blade.  The skin margins were undermined to an appropriate distance in all directions utilizing iris scissors. Complex Repair And Double Advancement Flap Text: The defect edges were debeveled with a #15 scalpel blade.  The primary defect was closed partially with a complex linear closure.  Given the location of the remaining defect, shape of the defect and the proximity to free margins a double advancement flap was deemed most appropriate for complete closure of the defect.  Using a sterile surgical marker, an appropriate advancement flap was drawn incorporating the defect and placing the expected incisions within the relaxed skin tension lines where possible.    The area thus outlined was incised deep to adipose tissue with a #15 scalpel blade.  The skin margins were undermined to an appropriate distance in all directions utilizing iris scissors. Complex Repair And Modified Advancement Flap Text: The defect edges were debeveled with a #15 scalpel blade.  The primary defect was closed partially with a complex linear closure.  Given the location of the remaining defect, shape of the defect and the proximity to free margins a modified advancement flap was deemed most appropriate for complete closure of the defect.  Using a sterile surgical marker, an appropriate advancement flap was drawn incorporating the defect and placing the expected incisions within the relaxed skin tension lines where possible.    The area thus outlined was incised deep to adipose tissue with a #15 scalpel blade.  The skin margins were undermined to an appropriate distance in all directions utilizing iris scissors. Complex Repair And A-T Advancement Flap Text: The defect edges were debeveled with a #15 scalpel blade.  The primary defect was closed partially with a complex linear closure.  Given the location of the remaining defect, shape of the defect and the proximity to free margins an A-T advancement flap was deemed most appropriate for complete closure of the defect.  Using a sterile surgical marker, an appropriate advancement flap was drawn incorporating the defect and placing the expected incisions within the relaxed skin tension lines where possible.    The area thus outlined was incised deep to adipose tissue with a #15 scalpel blade.  The skin margins were undermined to an appropriate distance in all directions utilizing iris scissors. Complex Repair And O-T Advancement Flap Text: The defect edges were debeveled with a #15 scalpel blade.  The primary defect was closed partially with a complex linear closure.  Given the location of the remaining defect, shape of the defect and the proximity to free margins an O-T advancement flap was deemed most appropriate for complete closure of the defect.  Using a sterile surgical marker, an appropriate advancement flap was drawn incorporating the defect and placing the expected incisions within the relaxed skin tension lines where possible.    The area thus outlined was incised deep to adipose tissue with a #15 scalpel blade.  The skin margins were undermined to an appropriate distance in all directions utilizing iris scissors. Complex Repair And O-L Flap Text: The defect edges were debeveled with a #15 scalpel blade.  The primary defect was closed partially with a complex linear closure.  Given the location of the remaining defect, shape of the defect and the proximity to free margins an O-L flap was deemed most appropriate for complete closure of the defect.  Using a sterile surgical marker, an appropriate flap was drawn incorporating the defect and placing the expected incisions within the relaxed skin tension lines where possible.    The area thus outlined was incised deep to adipose tissue with a #15 scalpel blade.  The skin margins were undermined to an appropriate distance in all directions utilizing iris scissors. Complex Repair And Bilobe Flap Text: The defect edges were debeveled with a #15 scalpel blade.  The primary defect was closed partially with a complex linear closure.  Given the location of the remaining defect, shape of the defect and the proximity to free margins a bilobe flap was deemed most appropriate for complete closure of the defect.  Using a sterile surgical marker, an appropriate advancement flap was drawn incorporating the defect and placing the expected incisions within the relaxed skin tension lines where possible.    The area thus outlined was incised deep to adipose tissue with a #15 scalpel blade.  The skin margins were undermined to an appropriate distance in all directions utilizing iris scissors. Complex Repair And Melolabial Flap Text: The defect edges were debeveled with a #15 scalpel blade.  The primary defect was closed partially with a complex linear closure.  Given the location of the remaining defect, shape of the defect and the proximity to free margins a melolabial flap was deemed most appropriate for complete closure of the defect.  Using a sterile surgical marker, an appropriate advancement flap was drawn incorporating the defect and placing the expected incisions within the relaxed skin tension lines where possible.    The area thus outlined was incised deep to adipose tissue with a #15 scalpel blade.  The skin margins were undermined to an appropriate distance in all directions utilizing iris scissors. Complex Repair And Rotation Flap Text: The defect edges were debeveled with a #15 scalpel blade.  The primary defect was closed partially with a complex linear closure.  Given the location of the remaining defect, shape of the defect and the proximity to free margins a rotation flap was deemed most appropriate for complete closure of the defect.  Using a sterile surgical marker, an appropriate advancement flap was drawn incorporating the defect and placing the expected incisions within the relaxed skin tension lines where possible.    The area thus outlined was incised deep to adipose tissue with a #15 scalpel blade.  The skin margins were undermined to an appropriate distance in all directions utilizing iris scissors. Complex Repair And Rhombic Flap Text: The defect edges were debeveled with a #15 scalpel blade.  The primary defect was closed partially with a complex linear closure.  Given the location of the remaining defect, shape of the defect and the proximity to free margins a rhombic flap was deemed most appropriate for complete closure of the defect.  Using a sterile surgical marker, an appropriate advancement flap was drawn incorporating the defect and placing the expected incisions within the relaxed skin tension lines where possible.    The area thus outlined was incised deep to adipose tissue with a #15 scalpel blade.  The skin margins were undermined to an appropriate distance in all directions utilizing iris scissors. Complex Repair And Transposition Flap Text: The defect edges were debeveled with a #15 scalpel blade.  The primary defect was closed partially with a complex linear closure.  Given the location of the remaining defect, shape of the defect and the proximity to free margins a transposition flap was deemed most appropriate for complete closure of the defect.  Using a sterile surgical marker, an appropriate advancement flap was drawn incorporating the defect and placing the expected incisions within the relaxed skin tension lines where possible.    The area thus outlined was incised deep to adipose tissue with a #15 scalpel blade.  The skin margins were undermined to an appropriate distance in all directions utilizing iris scissors. Complex Repair And V-Y Plasty Text: The defect edges were debeveled with a #15 scalpel blade.  The primary defect was closed partially with a complex linear closure.  Given the location of the remaining defect, shape of the defect and the proximity to free margins a V-Y plasty was deemed most appropriate for complete closure of the defect.  Using a sterile surgical marker, an appropriate advancement flap was drawn incorporating the defect and placing the expected incisions within the relaxed skin tension lines where possible.    The area thus outlined was incised deep to adipose tissue with a #15 scalpel blade.  The skin margins were undermined to an appropriate distance in all directions utilizing iris scissors. Complex Repair And M Plasty Text: The defect edges were debeveled with a #15 scalpel blade.  The primary defect was closed partially with a complex linear closure.  Given the location of the remaining defect, shape of the defect and the proximity to free margins an M plasty was deemed most appropriate for complete closure of the defect.  Using a sterile surgical marker, an appropriate advancement flap was drawn incorporating the defect and placing the expected incisions within the relaxed skin tension lines where possible.    The area thus outlined was incised deep to adipose tissue with a #15 scalpel blade.  The skin margins were undermined to an appropriate distance in all directions utilizing iris scissors. Complex Repair And Double M Plasty Text: The defect edges were debeveled with a #15 scalpel blade.  The primary defect was closed partially with a complex linear closure.  Given the location of the remaining defect, shape of the defect and the proximity to free margins a double M plasty was deemed most appropriate for complete closure of the defect.  Using a sterile surgical marker, an appropriate advancement flap was drawn incorporating the defect and placing the expected incisions within the relaxed skin tension lines where possible.    The area thus outlined was incised deep to adipose tissue with a #15 scalpel blade.  The skin margins were undermined to an appropriate distance in all directions utilizing iris scissors. Complex Repair And W Plasty Text: The defect edges were debeveled with a #15 scalpel blade.  The primary defect was closed partially with a complex linear closure.  Given the location of the remaining defect, shape of the defect and the proximity to free margins a W plasty was deemed most appropriate for complete closure of the defect.  Using a sterile surgical marker, an appropriate advancement flap was drawn incorporating the defect and placing the expected incisions within the relaxed skin tension lines where possible.    The area thus outlined was incised deep to adipose tissue with a #15 scalpel blade.  The skin margins were undermined to an appropriate distance in all directions utilizing iris scissors. Complex Repair And Z Plasty Text: The defect edges were debeveled with a #15 scalpel blade.  The primary defect was closed partially with a complex linear closure.  Given the location of the remaining defect, shape of the defect and the proximity to free margins a Z plasty was deemed most appropriate for complete closure of the defect.  Using a sterile surgical marker, an appropriate advancement flap was drawn incorporating the defect and placing the expected incisions within the relaxed skin tension lines where possible.    The area thus outlined was incised deep to adipose tissue with a #15 scalpel blade.  The skin margins were undermined to an appropriate distance in all directions utilizing iris scissors. Complex Repair And Dorsal Nasal Flap Text: The defect edges were debeveled with a #15 scalpel blade.  The primary defect was closed partially with a complex linear closure.  Given the location of the remaining defect, shape of the defect and the proximity to free margins a dorsal nasal flap was deemed most appropriate for complete closure of the defect.  Using a sterile surgical marker, an appropriate flap was drawn incorporating the defect and placing the expected incisions within the relaxed skin tension lines where possible.    The area thus outlined was incised deep to adipose tissue with a #15 scalpel blade.  The skin margins were undermined to an appropriate distance in all directions utilizing iris scissors. Complex Repair And Ftsg Text: The defect edges were debeveled with a #15 scalpel blade.  The primary defect was closed partially with a complex linear closure.  Given the location of the defect, shape of the defect and the proximity to free margins a full thickness skin graft was deemed most appropriate to repair the remaining defect.  The graft was trimmed to fit the size of the remaining defect.  The graft was then placed in the primary defect, oriented appropriately, and sutured into place. Complex Repair And Burow's Graft Text: The defect edges were debeveled with a #15 scalpel blade.  The primary defect was closed partially with a complex linear closure.  Given the location of the defect, shape of the defect, the proximity to free margins and the presence of a standing cone deformity a Burow's graft was deemed most appropriate to repair the remaining defect.  The graft was trimmed to fit the size of the remaining defect.  The graft was then placed in the primary defect, oriented appropriately, and sutured into place. Complex Repair And Split-Thickness Skin Graft Text: The defect edges were debeveled with a #15 scalpel blade.  The primary defect was closed partially with a complex linear closure.  Given the location of the defect, shape of the defect and the proximity to free margins a split thickness skin graft was deemed most appropriate to repair the remaining defect.  The graft was trimmed to fit the size of the remaining defect.  The graft was then placed in the primary defect, oriented appropriately, and sutured into place. Complex Repair And Epidermal Autograft Text: The defect edges were debeveled with a #15 scalpel blade.  The primary defect was closed partially with a complex linear closure.  Given the location of the defect, shape of the defect and the proximity to free margins an epidermal autograft was deemed most appropriate to repair the remaining defect.  The graft was trimmed to fit the size of the remaining defect.  The graft was then placed in the primary defect, oriented appropriately, and sutured into place. Complex Repair And Dermal Autograft Text: The defect edges were debeveled with a #15 scalpel blade.  The primary defect was closed partially with a complex linear closure.  Given the location of the defect, shape of the defect and the proximity to free margins an dermal autograft was deemed most appropriate to repair the remaining defect.  The graft was trimmed to fit the size of the remaining defect.  The graft was then placed in the primary defect, oriented appropriately, and sutured into place. Complex Repair And Tissue Cultured Epidermal Autograft Text: The defect edges were debeveled with a #15 scalpel blade.  The primary defect was closed partially with a complex linear closure.  Given the location of the defect, shape of the defect and the proximity to free margins an tissue cultured epidermal autograft was deemed most appropriate to repair the remaining defect.  The graft was trimmed to fit the size of the remaining defect.  The graft was then placed in the primary defect, oriented appropriately, and sutured into place. Complex Repair And Xenograft Text: The defect edges were debeveled with a #15 scalpel blade.  The primary defect was closed partially with a complex linear closure.  Given the location of the defect, shape of the defect and the proximity to free margins a xenograft was deemed most appropriate to repair the remaining defect.  The graft was trimmed to fit the size of the remaining defect.  The graft was then placed in the primary defect, oriented appropriately, and sutured into place. Complex Repair And Skin Substitute Graft Text: The defect edges were debeveled with a #15 scalpel blade.  The primary defect was closed partially with a complex linear closure.  Given the location of the remaining defect, shape of the defect and the proximity to free margins a skin substitute graft was deemed most appropriate to repair the remaining defect.  The graft was trimmed to fit the size of the remaining defect.  The graft was then placed in the primary defect, oriented appropriately, and sutured into place. Path Notes (To The Dermatopathologist): Please check margins. No previous biopsy to site Consent was obtained from the patient. The risks and benefits to therapy were discussed in detail. Specifically, the risks of infection, scarring, bleeding, prolonged wound healing, incomplete removal, allergy to anesthesia, nerve injury and recurrence were addressed. Prior to the procedure, the treatment site was clearly identified and confirmed by the patient. All components of Universal Protocol/PAUSE Rule completed. Render Post-Care Instructions In Note?: yes Post-Care Instructions: I reviewed with the patient in detail post-care instructions. Patient is not to engage in any heavy lifting, exercise, or swimming for the next 14 days. Should the patient develop any fevers, chills, bleeding, severe pain patient will contact the office immediately. Home Suture Removal Text: Patient was provided a home suture removal kit and will remove their sutures at home.  If they have any questions or difficulties they will call the office. Where Do You Want The Question To Include Opioid Counseling Located?: Case Summary Tab Billing Type: Third-Party Bill Information: Selecting Yes will display possible errors in your note based on the variables you have selected. This validation is only offered as a suggestion for you. PLEASE NOTE THAT THE VALIDATION TEXT WILL BE REMOVED WHEN YOU FINALIZE YOUR NOTE. IF YOU WANT TO FAX A PRELIMINARY NOTE YOU WILL NEED TO TOGGLE THIS TO 'NO' IF YOU DO NOT WANT IT IN YOUR FAXED NOTE.

## 2021-09-19 NOTE — PROGRESS NOTES
[facility]  Oncology Hematology Care  Progress Note      SUBJECTIVE:     Doing well. Denies any shortness of breath PND orthopnea. Mildly dyspneic with exertion. No significant pain. He Initiated C1 carbo/etoposide on 9/11/21. He has tolerated his chemo well. He is now day 9.  He is currently on Granix for neutropenic prophylaxis    OBJECTIVE      Medications    Current Facility-Administered Medications: furosemide (LASIX) injection 40 mg, 40 mg, IntraVENous, Daily  insulin glargine (LANTUS) injection vial 5 Units, 5 Units, SubCUTAneous, Daily  insulin lispro (HUMALOG) injection vial 0-6 Units, 0-6 Units, SubCUTAneous, TID WC  insulin lispro (HUMALOG) injection vial 0-3 Units, 0-3 Units, SubCUTAneous, Nightly  albuterol (PROVENTIL) nebulizer solution 2.5 mg, 2.5 mg, Nebulization, Q6H PRN  ipratropium-albuterol (DUONEB) nebulizer solution 1 ampule, 1 ampule, Inhalation, BID  potassium chloride (KLOR-CON) extended release tablet 40 mEq, 40 mEq, Oral, TID  Tbo-Filgrastim (GRANIX) injection 300 mcg, 300 mcg, SubCUTAneous, QPM  prochlorperazine (COMPAZINE) tablet 10 mg, 10 mg, Oral, Q6H PRN  doxazosin (CARDURA) tablet 4 mg, 4 mg, Oral, 2 times per day  spironolactone (ALDACTONE) tablet 100 mg, 100 mg, Oral, BID  ondansetron (ZOFRAN) 8 mg in dextrose 5 % 50 mL IVPB, 8 mg, IntraVENous, Q8H PRN  enoxaparin (LOVENOX) injection 40 mg, 40 mg, SubCUTAneous, Daily  cloNIDine (CATAPRES) tablet 0.2 mg, 0.2 mg, Oral, Q4H PRN  hydrALAZINE (APRESOLINE) injection 10 mg, 10 mg, IntraVENous, Q4H PRN  acetaminophen (TYLENOL) tablet 650 mg, 650 mg, Oral, Q4H PRN  NIFEdipine (PROCARDIA XL) extended release tablet 60 mg, 60 mg, Oral, BID  lisinopril (PRINIVIL;ZESTRIL) tablet 40 mg, 40 mg, Oral, Daily  glucose (GLUTOSE) 40 % oral gel 15 g, 15 g, Oral, PRN  dextrose 50 % IV solution, 12.5 g, IntraVENous, PRN  glucagon (rDNA) injection 1 mg, 1 mg, IntraMUSCular, PRN  dextrose 5 % solution, 100 mL/hr, IntraVENous, PRN  budesonide-formoterol (SYMBICORT) 160-4.5 MCG/ACT inhaler 2 puff, 2 puff, Inhalation, BID  montelukast (SINGULAIR) tablet 10 mg, 10 mg, Oral, Nightly  sodium chloride flush 0.9 % injection 5-40 mL, 5-40 mL, IntraVENous, 2 times per day  sodium chloride flush 0.9 % injection 5-40 mL, 5-40 mL, IntraVENous, PRN  0.9 % sodium chloride infusion, 25 mL, IntraVENous, PRN  ondansetron (ZOFRAN-ODT) disintegrating tablet 4 mg, 4 mg, Oral, Q8H PRN **OR** ondansetron (ZOFRAN) injection 4 mg, 4 mg, IntraVENous, Q6H PRN  polyethylene glycol (GLYCOLAX) packet 17 g, 17 g, Oral, Daily PRN  [DISCONTINUED] acetaminophen (TYLENOL) tablet 650 mg, 650 mg, Oral, Q6H PRN **OR** acetaminophen (TYLENOL) suppository 650 mg, 650 mg, Rectal, Q6H PRN  nicotine (NICODERM CQ) 21 MG/24HR 1 patch, 1 patch, TransDERmal, Daily     Physical    VITALS:  /70   Pulse 90   Temp 97.8 °F (36.6 °C) (Oral)   Resp 18   Ht 6' (1.829 m)   Wt 215 lb 6.2 oz (97.7 kg)   SpO2 95%   BMI 29.21 kg/m²   TEMPERATURE:  Current - Temp: 97.8 °F (36.6 °C); Max - Temp  Av.1 °F (36.7 °C)  Min: 97.8 °F (36.6 °C)  Max: 98.6 °F (37 °C)  PULSE OXIMETRY RANGE: SpO2  Av.1 %  Min: 93 %  Max: 95 %  24HR INTAKE/OUTPUT:      Intake/Output Summary (Last 24 hours) at 2021 0902  Last data filed at 2021 4686  Gross per 24 hour   Intake 1080 ml   Output 2600 ml   Net -1520 ml       CONSTITUTIONAL: Well-appearing, no acute distress  EYES: Sclera clear, conjunctiva normal  ENT: Oral pharynx unremarkable with moist mucus membranes. Multiple dental caries  LUNGS: Diminished bilaterally to auscultation. No increased labor with breathing. CARDIOVASCULAR: Regular rate and rhythm, normal S1 and S2, no S3 or S4, and no murmur noted. ABDOMEN: Soft, non-distended, non-tender  MUSCULOSKELETAL: There is no redness, warmth, or swelling of the joints. NEUROLOGIC: Awake, alert. Grossly non-focal.  SKIN: No bruising or bleeding. PSYCH: Normal affect.     Data      Recent Labs     21  0500 WBC 5.9   HGB 11.7*   HCT 34.2*   PLT 75*   MCV 98.5        Recent Labs     09/17/21  0500 09/18/21  0500 09/19/21  0442    136 134*   K 3.5 4.0 4.5    101 99   CO2 30 27 27   PHOS 2.2* 2.2* 2.5   BUN 16 14 17   CREATININE <0.5* 0.5* 0.6*     No results for input(s): AST, ALT, ALB, BILIDIR, BILITOT, ALKPHOS in the last 72 hours. Magnesium:    Lab Results   Component Value Date    MG 1.70 09/19/2021    MG 1.80 09/18/2021    MG 1.70 09/17/2021       Imaging ECHO Complete 2D W Doppler W Color    Result Date: 9/3/2021  Transthoracic Echocardiography Report (TTE)  Demographics   Patient Name      Gloria Abrhaam   Date of Study     09/03/2021          Gender              Male   Patient Number    [id number]          Date of Birth       1970   Visit Number      [id number]           Age                 46 year(s)   Accession Number  [id number]          Room Number         9848   Corporate ID      [id number]             Sonographer         Juan Montalvo RVT,                                                            RDCS   Ordering          Joyce [place],             [address] [place] [address]     Physician           Uriel Meade MD  Procedure Type of Study   TTE procedure:ECHOCARDIOGRAM COMPLETE 2D W DOPPLER W COLOR. Procedure Date Date: 09/03/2021 Start: 02:20 PM Study Location: [address] [place] [address],[address] - Echo Lab Technical Quality: Limited visualization due to poor acoustical window. Additional Indications:Lower extremity swelling, evaluate the right heart and LV; HTN, HLD, COPD.  Patient Status: Routine Height: 72 inches Weight: 247.01 pounds BSA: 2.33 m2 BMI: 33.5 kg/m2 Rhythm: Within normal limits HR: 70 bpm BP: 183/87 mmHg  Conclusions   Summary  Normal left ventricle size, wall thickness, and systolic function with an  estimated ejection fraction of 60-65%. No regional wall motion abnormalities  are seen. Normal diastolic function. Normal right ventricular size and function. No significant valve abnormalities noted. Signature   ------------------------------------------------------------------  Electronically signed by Rosalie Santoro MD  (Interpreting physician) on 09/03/2021 at 03:46 PM  ------------------------------------------------------------------   Findings   Left Ventricle  Normal left ventricle size, wall thickness, and systolic function with an  estimated ejection fraction of 60-65%. No regional wall motion abnormalities  are seen. Normal diastolic function. Mitral Valve  Mitral valve leaflets appear mildly thickened. No evidence of mitral regurgitation or stenosis. Left Atrium  The left atrium is normal in size. Aortic Valve  The aortic valve leaflets are not well visualized. No evidence of aortic valve regurgitation or stenosis. Aorta  The aortic root is normal in size. Right Ventricle  Normal right ventricular size and function. TAPSE 2.8 cm   Tricuspid Valve  The tricuspid valve was not well visualized. No evidence of tricuspid stenosis. Right Atrium  The right atrial size is normal.   Pulmonic Valve  The pulmonic valve is not well visualized. No evidence of pulmonic valve regurgitation. No evidence of pulmonic valve stenosis. Pericardial Effusion  No pericardial effusion noted. Pleural Effusion  No pleural effusion. Miscellaneous  IVC not well visualized.   M-Mode/2D Measurements (cm)   LV Diastolic Dimension: 1.71 cm LV Systolic Dimension: 9.77 cm  LV Septum Diastolic: 0.9 cm  LV PW Diastolic: 0.56 cm        AO Root Dimension: 3.4 cm                                   LA Area: 23.6 cm2  LVOT: 2.1 cm                    LA volume/Index: 70.5 ml /30 ml/m2  Doppler Measurements   AV Peak Velocity: 184 cm/s     MV Peak E-Wave: 106 cm/s  AV Peak Gradient: 13.54 mmHg   MV Peak A-Wave: 87.5 cm/s AV Mean Gradient: 6 mmHg       MV E/A Ratio: 1.21  LVOT Peak Velocity: 160 cm/s   MV Mean Gradient: 2 mmHg  AV Area (Continuity):3.58 cm2  MV Max P mmHg                                 MV Vmax:114 cm/s                                 MV VTI:32.9 cm/s   E' Septal Velocity: 10.9 cm/s  MV Area (continuity): 3.61 cm2  E' Lateral Velocity: 15.5 cm/s MV Deceleration Time: 259 msec  PV Peak Velocity: 131 cm/s  PV Peak Gradient: 6.86 mmHg   Aortic Valve   Peak Velocity: 184 cm/s     Mean Velocity: 114 cm/s  Peak Gradient: 13.54 mmHg   Mean Gradient: 6 mmHg  Area (continuity): 3.58 cm2  AV VTI: 33.2 cm  Aorta   Aortic Root: 3.4 cm  Ascending Aorta: 3.3 cm  LVOT Diameter: 2.1 cm      XR CHEST (2 VW)    Result Date: 2021  EXAMINATION: TWO XRAY VIEWS OF THE CHEST 2021 8:48 am COMPARISON: None. HISTORY: ORDERING SYSTEM PROVIDED HISTORY: WEEMS (dyspnea on exertion) TECHNOLOGIST PROVIDED HISTORY: Reason for Exam: WEEMS, asthma Acuity: Unknown Type of Exam: Initial FINDINGS: Clear lungs. No pleural effusion or pneumothorax. Cardiomediastinal silhouette is unremarkable. Degenerative changes of the visualized osseous structures. Clear lungs. CT GUIDED NEEDLE PLACEMENT    Result Date: 2021  PROCEDURE: CT GUIDED CORE BIOPSY OF LEFT HEPATIC LOBE MASS MODERATE CONSCIOUS SEDATION 2021 HISTORY: ORDERING SYSTEM PROVIDED HISTORY: lesion TECHNOLOGIST PROVIDED HISTORY: Reason for exam:->lesion Reason for Exam: lesion Acuity: Acute Type of Exam: Initial; ORDERING SYSTEM PROVIDED HISTORY: bx TECHNOLOGIST PROVIDED HISTORY: Reason for exam:->bx Reason for Exam: lesion Acuity: Acute Type of Exam: Initial PHYSICIANS: Amilcar Brianne SEDATION: 0.5 mgversed and 25 mcg fentanyl were titrated intravenously for moderate sedation monitored under my direction. Total intraservice time of sedation was 15 minutes.   The patient's vital signs were monitored throughout the procedure and recorded in the patient's medical record by the nurse. TECHNIQUE: Informed consent was obtained after a detailed discussion about the procedure including the risk, benefits, and alternatives. Universal protocol was followed. Axial images were obtained through the liver and a suitable skin site was prepped and draped in sterile fashion. Local anesthesia was achieved with lidocaine. Under intermittent CT guidance, a 17 gauge coaxial needle was inserted into the a left hepatic lobe mass. Through this, 1 18 gauge core biopsy specimens were obtained using coaxial technique and submitted to pathology. Gel-Foam slurry was administered as the needle was removed. A sterile bandage was then placed. The patient tolerated the procedure well. Estimated blood loss: Less than 5 cc Dose modulation, iterative reconstruction, and/or weight based adjustment of the mA/kV was utilized to reduce the radiation dose to as low as reasonably achievable. DLP: 9837.28 mGy-cm     Successful CT guided core biopsy of a left hepatic lobe mass. CT ABDOMEN PELVIS W IV CONTRAST Additional Contrast? Oral    Result Date: 9/5/2021  EXAMINATION: CT OF THE ABDOMEN AND PELVIS WITH CONTRAST 9/3/2021 5:13 pm TECHNIQUE: CT of the abdomen and pelvis was performed with the administration of intravenous contrast. Multiplanar reformatted images are provided for review. Dose modulation, iterative reconstruction, and/or weight based adjustment of the mA/kV was utilized to reduce the radiation dose to as low as reasonably achievable. COMPARISON: CT pulmonary angiogram performed September 2, 2021. HISTORY: ORDERING SYSTEM PROVIDED HISTORY: Metastatic disease, evaluate for primary. Bilateral lower ext edema, eval for IVC thrombosis/compression TECHNOLOGIST PROVIDED HISTORY: Reason for exam:->Metastatic disease, evaluate for primary. Bilateral lower ext edema, eval for IVC thrombosis/compression Additional Contrast?->Oral FINDINGS: Images are degraded by motion artifact.  Lower Chest: Respiratory motion artifact. Visualized pulmonary nodules and enlarged mediastinal lymph node, unchanged from prior CT chest. Organs: There are multifocal hypoattenuating lesions throughout the liver. Lesion within the left hepatic lobe measures 6.1 x 5.7 cm (axial image 35). Lesion within the right hepatic lobe measures 3.6 x 2.6 cm (axial image 51). The gallbladder is normally distended with layering hyperintensity suggestive of sludge. No calcified stones. No gallbladder wall thickening or pericholecystic fluid. The spleen and pancreas are grossly unremarkable. Right adrenal gland nodule measures up to 1.6 cm. There is mild thickening of the left adrenal gland without definite nodularity. There is symmetric renal cortical enhancement. Simple appearing right renal cysts demonstrated. No hydronephrosis. GI/Bowel: There is a hypoattenuating nodule in the region of the 2/3 portion of the duodenum in the region of the ampulla which measures up to 1.3 cm (axial image 83). There is diffuse fold thickening involving the majority of the jejunum. No evidence of obstruction. Sigmoid diverticulosis without evidence of acute diverticulitis. The appendix is normal. Pelvis: Prostate and seminal vesicles are grossly unremarkable. The urinary bladder is within normal limits. Peritoneum/Retroperitoneum: No significant lymphadenopathy. The abdominal aorta is normal in course and caliber with scattered atherosclerotic disease. No definite filling defects within the IVC, the iliac veins, or visualized femoral veins to suggest thrombus. Bones/Soft Tissues: There is asymmetric thickening of the inferior right rectus abdominus musculature with mild surrounding stranding. Visualization is partially obscured by streak artifact from dense oral contrast in the adjacent small bowel. Body wall edema demonstrated. No focal drainable collections. Multilevel moderate degenerative changes in the visualized spine. No destructive osseous lesions. 1. Multiple hypoattenuating liver lesions which are most suggestive of metastatic disease. 2. Right adrenal gland nodule measuring up to 1.6 cm, indeterminate. 3. Hypoattenuating nodule in the region of the 2/3 portion of the duodenum in the region of the ampulla measuring up to 1.3 cm. Findings could be artifactual or related to fold thickening; however, a small ampullary or possibly pancreatic lesion cannot be entirely excluded. 4. Diffuse fold thickening involving the majority of the jejunum, nonspecific but can be related to an infectious etiology or lymphatic obstruction. 5. No definite filling defects in the IVC, iliac veins, or visualized femoral veins to suggest thrombus. 6. Asymmetric thickening of the inferior right rectus abdominus musculature with surrounding stranding which is partially obscured by adjacent streak artifact from dense oral contrast in the small bowel. No definite focal lesion is demonstrated; however, an underlying lesion cannot be entirely excluded. Hematoma is also a consideration. CT CHEST PULMONARY EMBOLISM W CONTRAST    Result Date: 9/5/2021  EXAMINATION: CTA OF THE CHEST 9/2/2021 12:47 pm TECHNIQUE: CTA of the chest was performed after the administration of intravenous contrast.  Multiplanar reformatted images are provided for review. MIP images are provided for review. Dose modulation, iterative reconstruction, and/or weight based adjustment of the mA/kV was utilized to reduce the radiation dose to as low as reasonably achievable. COMPARISON: None.  HISTORY: ORDERING SYSTEM PROVIDED HISTORY: leg swelling sob new o2 requirement TECHNOLOGIST PROVIDED HISTORY: Reason for exam:->leg swelling sob new o2 requirement Decision Support Exception - unselect if not a suspected or confirmed emergency medical condition->Emergency Medical Condition (MA) Reason for Exam: leg swelling sob new o2 requirement Acuity: Acute Type of Exam: Initial FINDINGS: Pulmonary Arteries:  No central, lobar, or segmental pulmonary embolus. Narrowing of the right lower lobe pulmonary artery by the adenopathy. Mediastinum: Heart size is normal.  No pericardial effusion. Thoracic aorta is normal caliber. Extensive mediastinal and hilar lymphadenopathy is present. Subcarinal node measures 32 x 37 mm. Right hilar node measures 30 x 29 mm. Lungs/pleura: Moderate emphysema. Multiple 3 mm nodules are present in the right middle lobe and left lower lobe. Superimposed in the posterior right lower lobe, there is a dominant nodule measuring 17 x 17 mm. An additional right lower lobe nodule measures 11 mm. No pleural effusion or pneumothorax. Narrowing of bronchus intermedius due to the right hilar adenopathy. Upper Abdomen: Multiple low-attenuation liver lesions are present measuring up to 48 x 40 mm and 26 x 28 mm. At least 30 lesions are noted in the visualized portions of the liver. Soft Tissues/Bones: No lytic or blastic osseous lesion. 1. No acute pulmonary embolus. 2. Extensive mediastinal and hilar lymphadenopathy compatible with metastatic neoplasm. 3. Multiple new indeterminate 3 mm nodules in the right middle lobe and left lower lobe. These could be infectious or neoplastic. 4. Right lower lobe 17 mm and 11 mm nodules are nonspecific, but metastatic disease not excluded. 5. Multiple liver metastases measuring up to 48 x 40 mm.  RECOMMENDATIONS:     CT NEEDLE BIOPSY LIVER PERCUTANEOUS    Result Date: 9/8/2021  PROCEDURE: CT GUIDED CORE BIOPSY OF LEFT HEPATIC LOBE MASS MODERATE CONSCIOUS SEDATION 9/7/2021 HISTORY: ORDERING SYSTEM PROVIDED HISTORY: lesion TECHNOLOGIST PROVIDED HISTORY: Reason for exam:->lesion Reason for Exam: lesion Acuity: Acute Type of Exam: Initial; ORDERING SYSTEM PROVIDED HISTORY: bx TECHNOLOGIST PROVIDED HISTORY: Reason for exam:->bx Reason for Exam: lesion Acuity: Acute Type of Exam: Initial PHYSICIANS: Amilcar Brianne SEDATION: 0.5 mgversed and 25 mcg fentanyl were titrated intravenously for moderate sedation monitored under my direction. Total intraservice time of sedation was 15 minutes. The patient's vital signs were monitored throughout the procedure and recorded in the patient's medical record by the nurse. TECHNIQUE: Informed consent was obtained after a detailed discussion about the procedure including the risk, benefits, and alternatives. Universal protocol was followed. Axial images were obtained through the liver and a suitable skin site was prepped and draped in sterile fashion. Local anesthesia was achieved with lidocaine. Under intermittent CT guidance, a 17 gauge coaxial needle was inserted into the a left hepatic lobe mass. Through this, 1 18 gauge core biopsy specimens were obtained using coaxial technique and submitted to pathology. Gel-Foam slurry was administered as the needle was removed. A sterile bandage was then placed. The patient tolerated the procedure well. Estimated blood loss: Less than 5 cc Dose modulation, iterative reconstruction, and/or weight based adjustment of the mA/kV was utilized to reduce the radiation dose to as low as reasonably achievable. DLP: 1765.67 mGy-cm     Successful CT guided core biopsy of a left hepatic lobe mass.      VL Extremity Venous Bilateral    Result Date: 9/8/2021  Lower Extremities DVT Study  Demographics   Patient Name       Salena Slater   Date of Study      09/07/2021         Gender              Male   Patient Number     [id number]         Date of Birth       1970   Visit Number       [id number]          Age                 46 year(s)   Accession Number   [id number]         Room Number         7652   Corporate ID       [id number]            Angelica Buenrostro RVT   Ordering Physician Wilmar Ying MD         Physician           MD  Procedure Type of Study:   Veins:Lower Extremities DVT Study, VASC EXTREMITY VENOUS DUPLEX BILATERAL. Vascular Sonographer Report  Indications for Study:Edema. Impressions Right Impression No evidence of deep vein or superficial vein thrombosis involving the right lower extremity in vessels clearly visualized. Proximal and Mid Posterior Tibial veins and Peroneal veins were not visualized due to swelling. Calf and ankle edema noted. Left Impression No evidence of deep vein or superficial vein thrombosis involving the left lower extremity in vessels clearly visualized. Proximal Posterior Tibial veins and Soleal vein were not visualized due to swelling. Calf and ankle edema noted. Conclusions   Summary   No evidence of deep vein or superficial vein thrombosis involving the  bilateral lower extremities in vessels clearly visualized as detailed above. Signature   ------------------------------------------------------------------  Electronically signed by Shiva Arce MD (Interpreting  physician) on 09/08/2021 at 08:09 AM  ------------------------------------------------------------------  Patient Status:Routine. Study [address] Vascular Lab. Technical Quality:Limited visualization due to swelling. Risk Factors   - The patient's risk factor(s) include: dyslipidemia and arterial     hypertension.   - The patient has a current/recent (within 1 year) tobacco history. Velocities are measured in cm/s ; Diameters are measured in mm Right Lower Extremities DVT Study Measurements Right 2D Measurements +------------------------+----------+---------------+----------+ ! Location                ! Visualized! Compressibility! Thrombosis! +------------------------+----------+---------------+----------+ ! Sapheno Femoral Junction! Yes       ! Yes            ! None      ! +------------------------+----------+---------------+----------+ ! GSV Thigh               ! Yes       ! Yes            ! None      ! +------------------------+----------+---------------+----------+ ! Common Femoral          !Yes       ! Yes !None      ! +------------------------+----------+---------------+----------+ ! Prox Femoral            !Yes       ! Yes            ! None      ! +------------------------+----------+---------------+----------+ ! Mid Femoral             !Yes       ! Yes            ! None      ! +------------------------+----------+---------------+----------+ ! Dist Femoral            !Yes       ! Yes            ! None      ! +------------------------+----------+---------------+----------+ ! Deep Femoral            !Yes       ! Yes            ! None      ! +------------------------+----------+---------------+----------+ ! Popliteal               !Yes       ! Yes            ! None      ! +------------------------+----------+---------------+----------+ ! Gastroc                 ! Yes       ! Yes            ! None      ! +------------------------+----------+---------------+----------+ ! Soleal                  !Yes       ! Yes            ! None      ! +------------------------+----------+---------------+----------+ ! PTV                     ! Partial   !Yes            ! None      ! +------------------------+----------+---------------+----------+ ! ATV                     ! Yes       ! Yes            ! None      ! +------------------------+----------+---------------+----------+ ! Peroneal                !Partial   !Yes            ! None      ! +------------------------+----------+---------------+----------+ ! GSV Calf                ! Yes       ! Yes            ! None      ! +------------------------+----------+---------------+----------+ ! SSV                     ! Yes       ! Yes            ! None      ! +------------------------+----------+---------------+----------+ Right Doppler Measurements +--------------+------+------+------------+ ! Location      ! Signal!Reflux! Reflux (sec)! +--------------+------+------+------------+ ! Common Femoral!Phasic! No    !            ! +--------------+------+------+------------+ ! Popliteal     !Phasic! No    !            ! +--------------+------+------+------------+ Left Lower Extremities DVT Study Measurements Left 2D Measurements +------------------------+----------+---------------+----------+ ! Location                ! Visualized! Compressibility! Thrombosis! +------------------------+----------+---------------+----------+ ! Sapheno Femoral Junction! Yes       ! Yes            ! None      ! +------------------------+----------+---------------+----------+ ! GSV Thigh               ! Yes       ! Yes            ! None      ! +------------------------+----------+---------------+----------+ ! Common Femoral          !Yes       ! Yes            ! None      ! +------------------------+----------+---------------+----------+ ! Prox Femoral            !Yes       ! Yes            ! None      ! +------------------------+----------+---------------+----------+ ! Mid Femoral             !Yes       ! Yes            ! None      ! +------------------------+----------+---------------+----------+ ! Dist Femoral            !Yes       ! Yes            ! None      ! +------------------------+----------+---------------+----------+ ! Deep Femoral            !Yes       ! Yes            ! None      ! +------------------------+----------+---------------+----------+ ! Popliteal               !Yes       ! Yes            ! None      ! +------------------------+----------+---------------+----------+ ! Gastroc                 ! Yes       ! Yes            ! None      ! +------------------------+----------+---------------+----------+ ! Soleal                  !No        !               !          ! +------------------------+----------+---------------+----------+ ! PTV                     ! Partial   !Yes            ! None      ! +------------------------+----------+---------------+----------+ ! ATV                     ! Yes       ! Yes            ! None      ! +------------------------+----------+---------------+----------+ ! Peroneal                !Yes       ! Yes            ! None      ! nodule posteriorly in the left apex axial image 112 of series 3.  3 new   nodules in the right apex on axial images 105, 102, and 101.  Largest nodule   measures 10 x 12 mm.  Large new nodule in the anterior segment the left upper   lobe.  Nodule is partially cavitary.  On axial image 91, the nodules measured   at 37 x 25 mm.  On coronal image 65, the nodule measures 28 mm in height.  2   nodules again seen in the subpleural posterior right lower lobe with   connecting band of atelectasis present.  Nodules appear similar in size.  New   sub 5 mm nodules present posteriorly in the lower right upper lobe on axial   image 80, 85 and 62.  New 15 mm left lower lobe pulmonary nodule on axial   image 73.  New sub 5 mm nodule in the superior segment of the left lower lobe   on axial image 93.       Moderate amount of ground-glass opacity present in the lingula with small   patchy ground-glass opacities in the right upper lobe and left lower lobe.    Small amount of mucous plugging identified in each lower lobe.  Moderate   right basilar atelectasis.       Moderate narrowing of the right middle lobe bronchus by mediastinal   adenopathy.  Bronchus was previously occluded and right lower lobe bronchi   significantly narrowed.       Upper Abdomen: Several low-density lesions identified in the liver.  Lesions   do not appear as extensive as the prior study.       Soft Tissues/Bones: No acute bone or soft tissue abnormality.  No abnormal   soft tissue within the spinal canal.           Impression   No evidence of pulmonary embolism.  Moderate lingular opacity favored to be   due to acute bacterial pneumonia.  If the patient has had radiation to the   left chest, changes could be related to radiation pneumonitis.  COVID-19   pneumonia much less likely.  Significant worsening of bilateral pulmonary   metastatic disease since the prior study.  Moderate right basilar   atelectasis.  Bibasilar mucous plugging.  Some reduction in the subcarinal   and right hilar adenopathy.  Suspected decrease in the size of the hepatic   metastatic lesions.  Moderate to severe emphysematous changes present. 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 This will be continued for total 7 days. Will likely D/C tomorrow  No evidence to support any severe tumor lysis. Hypoxia-ctpa neg for PE. However there is severe worsening of her donell. pulm metastatic dz    Acth syndrome improving rather quickly. Home soon. Electrolytes improved    Patient again wanted to make sure that I understood that he wanted to be treated very aggressively.       Shanice Swanson MD, MPH

## 2021-09-19 NOTE — PROGRESS NOTES
Pt is alert and oriented x4, resting quietly in bed. Nightly medications and intake tolerated well. No complaints of nausea, vomiting, or pain at this time. Call light within reach. No other needs made known at this time. Fall precautions in place. Will continue to monitor.      Electronically signed by Margareth Lamb RN on 9/18/2021 at 11:00 PM

## 2021-09-19 NOTE — PROGRESS NOTES
Name: Keith Covarrubias  /Age/Sex: 1970 (46 y.o. male)   MRN & CSN: [id number] & [id number]  Admission Date/Time: 2021 10:52 AM   Location: [id number]  Current Hospital Day: Hospital Day: 18   Principal Problem: Hypokalemia  HPI     Patient seen and examined. No issues overnight. He does not report any complaints today. He is now on 2 L NC. Reported 2.4 L output yesterday. All other review of systems negative unless noted above. VITALS VITALS RANGE (24 hours)      INS/OUTS WEIGHTS    Intake/Output Summary (Last 24 hours) at 2021 0916  Last data filed at 2021 0826  Gross per 24 hour   Intake 1080 ml   Output 2600 ml   Net -1520 ml      I/O this shift:  In: -   Out: 600 [Urine:600] @       PHYSICAL EXAM     General: Well-appearing  HEENT: Moist oral mucosa  Cardiac: Regular rate and rhythm, no murmurs  Lungs: Clear to auscultation bilaterally  Abdomen: Soft, nontender, nondistended  Extremities: +edema, warm  Neuro: Nonfocal    LABS   BMP  Recent Labs     21  0500 21  0500 21  0442    136 134*   K 3.5 4.0 4.5    101 99   CO2 30 27 27   BUN 16 14 17   CREATININE <0.5* 0.5* 0.6*   CALCIUM 8.2* 8.2* 8.5   PHOS 2.2* 2.2* 2.5   MG 1.70* 1.80 1.70*     CBC/COAGS  Recent Labs     21  0500   WBC 5.9   HCT 34.2*   PLT 75*     Liver & Pancreas  No results for input(s): AST, ALT, ALKPHOS, BILIDIR, ALBUMIN, AMYLASE, LIPASE in the last 72 hours. Invalid input(s): BILI       IMAGING    CT chest: No PE, moderate lingular opacity.   Significant worsening of bilateral pulmonary metastatic disease, bibasilar mucous plugging   Above studies and images were personally reviewed by myself    MEDS   Scheduled Meds:   furosemide  40 mg IntraVENous Daily    insulin glargine  5 Units SubCUTAneous Daily    insulin lispro  0-6 Units SubCUTAneous TID WC    insulin lispro  0-3 Units SubCUTAneous Nightly    ipratropium-albuterol  1 ampule Inhalation BID    potassium chloride  40 mEq Oral TID    tbo-filgrastim  300 mcg SubCUTAneous QPM    doxazosin  4 mg Oral 2 times per day    spironolactone  100 mg Oral BID    enoxaparin  40 mg SubCUTAneous Daily    NIFEdipine  60 mg Oral BID    lisinopril  40 mg Oral Daily    budesonide-formoterol  2 puff Inhalation BID    montelukast  10 mg Oral Nightly    sodium chloride flush  5-40 mL IntraVENous 2 times per day    nicotine  1 patch TransDERmal Daily     Continuous Infusions:   dextrose      sodium chloride Stopped (09/10/21 0349)     PRN Meds:albuterol, prochlorperazine, ondansetron (ZOFRAN) IVPB, cloNIDine, hydrALAZINE, acetaminophen, glucose, dextrose, glucagon (rDNA), dextrose, sodium chloride flush, sodium chloride, ondansetron **OR** ondansetron, polyethylene glycol, [DISCONTINUED] acetaminophen **OR** acetaminophen     CURRENT HOSPITAL PROBLEMS   42-year-old man found to have metastatic small cell cancer with ACTH dependent Cushing's disease.     Acute hypoxic respiratory failure  Emphysematous changes of lungs  -Multifactorial, bibasilar mucous plugging, extensive metastatic disease and lingular opacity  -Continue supplemental oxygen  -Duo nebs  -Hold nebulized saline     Metastatic small cell lung cancer   -Oncology following  -Carbo/etoposide/atezolizumab    ACTH dependent Cushing's syndrome  -Nephrology following  -Continue Aldactone    Hypokalemia  -3 times daily replacement   -Aldactone    Secondary hypertension   -Blood pressure controlled  -Doxazosin 4 mg twice daily  -Nifedipine 60 mg twice daily  Lisinopril 40 mg  -Lasix 40 mg IV daily    HLD  -Holding statin    Hyperglycemia  -Lantus 5 units daily (decreased 9/18)  -Decreased to low dose Sliding scale    Hypomagnesemia  -Replete        DVT prophylaxis: Enoxaparin  -Dispo: Pending improvement in respiratory status       Siva Munguia MD 9/19/2021 9:16 AM

## 2021-09-20 NOTE — PLAN OF CARE
Problem: Infection:  Goal: Will remain free from infection  Description: Will remain free from infection  Note: Pt is free of signs and symptoms of infection. Vital signs stable. Will monitor. Problem: Safety:  Goal: Free from accidental physical injury  Description: Free from accidental physical injury  Note: Pt is free of injury. No injury noted. Fall precautions in place. Call light within reach. Will monitor. Goal: Free from intentional harm  Description: Free from intentional harm  Note: Patient is free from intentional harm. Problem: Daily Care:  Goal: Daily care needs are met  Description: Daily care needs are met  Note:  Daily care needs have been met by staff and patient. Will continue to monitor needs. Problem: Pain:  Goal: Patient's pain/discomfort is manageable  Description: Patient's pain/discomfort is manageable  Note: Patient complains of no pain. Will continue to monitor     Problem: Skin Integrity:  Goal: Skin integrity will stabilize  Description: Skin integrity will stabilize  Note: Skin is clean, dry, and intact. Will continue to monitor. Problem: Discharge Planning:  Goal: Patients continuum of care needs are met  Description: Patients continuum of care needs are met  Note: Patient continuum of care needs are met by staff.

## 2021-09-20 NOTE — PROGRESS NOTES
[facility]  Progress Note      SUBJECTIVE:   He is doing well  Lbas were pending when I saw him     Medications    Current Facility-Administered Medications: doxazosin (CARDURA) tablet 2 mg, 2 mg, Oral, 2 times per day  potassium chloride (KLOR-CON) extended release tablet 40 mEq, 40 mEq, Oral, Daily  furosemide (LASIX) injection 40 mg, 40 mg, IntraVENous, Daily  insulin glargine (LANTUS) injection vial 5 Units, 5 Units, SubCUTAneous, Daily  insulin lispro (HUMALOG) injection vial 0-6 Units, 0-6 Units, SubCUTAneous, TID WC  insulin lispro (HUMALOG) injection vial 0-3 Units, 0-3 Units, SubCUTAneous, Nightly  albuterol (PROVENTIL) nebulizer solution 2.5 mg, 2.5 mg, Nebulization, Q6H PRN  ipratropium-albuterol (DUONEB) nebulizer solution 1 ampule, 1 ampule, Inhalation, BID  Tbo-Filgrastim (GRANIX) injection 300 mcg, 300 mcg, SubCUTAneous, QPM  prochlorperazine (COMPAZINE) tablet 10 mg, 10 mg, Oral, Q6H PRN  spironolactone (ALDACTONE) tablet 100 mg, 100 mg, Oral, BID  ondansetron (ZOFRAN) 8 mg in dextrose 5 % 50 mL IVPB, 8 mg, IntraVENous, Q8H PRN  enoxaparin (LOVENOX) injection 40 mg, 40 mg, SubCUTAneous, Daily  cloNIDine (CATAPRES) tablet 0.2 mg, 0.2 mg, Oral, Q4H PRN  hydrALAZINE (APRESOLINE) injection 10 mg, 10 mg, IntraVENous, Q4H PRN  acetaminophen (TYLENOL) tablet 650 mg, 650 mg, Oral, Q4H PRN  NIFEdipine (PROCARDIA XL) extended release tablet 60 mg, 60 mg, Oral, BID  lisinopril (PRINIVIL;ZESTRIL) tablet 40 mg, 40 mg, Oral, Daily  glucose (GLUTOSE) 40 % oral gel 15 g, 15 g, Oral, PRN  dextrose 50 % IV solution, 12.5 g, IntraVENous, PRN  glucagon (rDNA) injection 1 mg, 1 mg, IntraMUSCular, PRN  dextrose 5 % solution, 100 mL/hr, IntraVENous, PRN  budesonide-formoterol (SYMBICORT) 160-4.5 MCG/ACT inhaler 2 puff, 2 puff, Inhalation, BID  montelukast (SINGULAIR) tablet 10 mg, 10 mg, Oral, Nightly  sodium chloride flush 0.9 % injection 5-40 mL, 5-40 mL, IntraVENous, 2 times per day  sodium chloride flush 0.9 % injection 5-40 mL, 5-40 mL, IntraVENous, PRN  0.9 % sodium chloride infusion, 25 mL, IntraVENous, PRN  ondansetron (ZOFRAN-ODT) disintegrating tablet 4 mg, 4 mg, Oral, Q8H PRN **OR** ondansetron (ZOFRAN) injection 4 mg, 4 mg, IntraVENous, Q6H PRN  polyethylene glycol (GLYCOLAX) packet 17 g, 17 g, Oral, Daily PRN  [DISCONTINUED] acetaminophen (TYLENOL) tablet 650 mg, 650 mg, Oral, Q6H PRN **OR** acetaminophen (TYLENOL) suppository 650 mg, 650 mg, Rectal, Q6H PRN  nicotine (NICODERM CQ) 21 MG/24HR 1 patch, 1 patch, TransDERmal, Daily  Physical    VITALS:  /77   Pulse 91   Temp 97.9 °F (36.6 °C) (Oral)   Resp 18   Ht 6' (1.829 m)   Wt 211 lb 10.3 oz (96 kg)   SpO2 96%   BMI 28.70 kg/m²   TEMPERATURE:  Current - Temp: 97.9 °F (36.6 °C); Max - Temp  Av.2 °F (36.8 °C)  Min: 97.9 °F (36.6 °C)  Max: 98.6 °F (37 °C)  PULSE OXIMETRY RANGE: SpO2  Av %  Min: 91 %  Max: 96 %  24HR INTAKE/OUTPUT:      Intake/Output Summary (Last 24 hours) at 2021 0946  Last data filed at 2021 0609  Gross per 24 hour   Intake 240 ml   Output 3900 ml   Net -3660 ml       CONSTITUTIONAL:  awake, alert, cooperative, no apparent distress, HEENT oral pharynx , no scleral icterus  HEMATOLOGIC/LYMPHATICS:  no cervical lymphadenopathy, no supraclavicular lymphadenopathy,LUNGS:overall diminished g  CARDIOVASCULAR:  , regular rate and rhythm, normal S1 and S2, no S3 or S4, and no murmur noted  ABDOMEN:  No scars, normal bowel sounds, soft, non-distended, non-tender, no masses palpated, no hepatosplenomegally  MUSCULOSKELETAL:  There is no redness, warmth, or swelling of the joints. EXTREMETIES:s+ edema   NEUROLOGIC:  Awake, alert, oriented to name, place and time. =SKIN:  no bruising or bleeding      Data      No results for input(s): WBC, HGB, HCT, PLT, MCV in the last 72 hours.      Recent Labs     21  0500 21  0442 21  0545    134* 129*   K 4.0 4.5 4.4    99 96*   CO2 27 27 21   PHOS 2.2* 2.5 2.7   BUN 14 17 17   CREATININE 0.5* 0.6* 0.7*     No results for input(s): AST, ALT, ALB, BILIDIR, BILITOT, ALKPHOS in the last 72 hours. Magnesium:    Lab Results   Component Value Date    MG 2.00 09/20/2021    MG 1.70 09/19/2021    MG 1.80 09/18/2021       Imaging ECHO Complete 2D W Doppler W Color    Result Date: 9/3/2021  Transthoracic Echocardiography Report (TTE)  Demographics   Patient Name      Roman Alvarez   Date of Study     09/03/2021          Gender              Male   Patient Number    [id number]          Date of Birth       1970   Visit Number      [id number]           Age                 46 year(s)   Accession Number  [id number]          Room Number         9233   Corporate ID      [id number]             Sonographer         Juan Montalvo RVT,                                                            [facility]   Ordering          Leah Bruno,             [address] [place] [address]     Physician           Shawanda Vergara MD  Procedure Type of Study   TTE procedure:ECHOCARDIOGRAM COMPLETE 2D W DOPPLER W COLOR. Procedure Date Date: 09/03/2021 Start: 02:20 PM Study Location: [address] [place] [address],[address] - Echo Lab Technical Quality: Limited visualization due to poor acoustical window. Additional Indications:Lower extremity swelling, evaluate the right heart and LV; HTN, HLD, COPD. Patient Status: Routine Height: 72 inches Weight: 247.01 pounds BSA: 2.33 m2 BMI: 33.5 kg/m2 Rhythm: Within normal limits HR: 70 bpm BP: 183/87 mmHg  Conclusions   Summary  Normal left ventricle size, wall thickness, and systolic function with an  estimated ejection fraction of 60-65%. No regional wall motion abnormalities  are seen. Normal diastolic function. Normal right ventricular size and function.   No significant valve abnormalities noted.   Signature   ------------------------------------------------------------------  Electronically signed by Dru Yang MD  (Interpreting physician) on 2021 at 03:46 PM  ------------------------------------------------------------------   Findings   Left Ventricle  Normal left ventricle size, wall thickness, and systolic function with an  estimated ejection fraction of 60-65%. No regional wall motion abnormalities  are seen. Normal diastolic function. Mitral Valve  Mitral valve leaflets appear mildly thickened. No evidence of mitral regurgitation or stenosis. Left Atrium  The left atrium is normal in size. Aortic Valve  The aortic valve leaflets are not well visualized. No evidence of aortic valve regurgitation or stenosis. Aorta  The aortic root is normal in size. Right Ventricle  Normal right ventricular size and function. TAPSE 2.8 cm   Tricuspid Valve  The tricuspid valve was not well visualized. No evidence of tricuspid stenosis. Right Atrium  The right atrial size is normal.   Pulmonic Valve  The pulmonic valve is not well visualized. No evidence of pulmonic valve regurgitation. No evidence of pulmonic valve stenosis. Pericardial Effusion  No pericardial effusion noted. Pleural Effusion  No pleural effusion. Miscellaneous  IVC not well visualized.   M-Mode/2D Measurements (cm)   LV Diastolic Dimension: 5.85 cm LV Systolic Dimension: 2.70 cm  LV Septum Diastolic: 0.9 cm  LV PW Diastolic: 9.06 cm        AO Root Dimension: 3.4 cm                                   LA Area: 23.6 cm2  LVOT: 2.1 cm                    LA volume/Index: 70.5 ml /30 ml/m2  Doppler Measurements   AV Peak Velocity: 184 cm/s     MV Peak E-Wave: 106 cm/s  AV Peak Gradient: 13.54 mmHg   MV Peak A-Wave: 87.5 cm/s  AV Mean Gradient: 6 mmHg       MV E/A Ratio: 1.21  LVOT Peak Velocity: 160 cm/s   MV Mean Gradient: 2 mmHg  AV Area (Continuity):3.58 cm2  MV Max P mmHg MV Vmax:114 cm/s                                 MV VTI:32.9 cm/s   E' Septal Velocity: 10.9 cm/s  MV Area (continuity): 3.61 cm2  E' Lateral Velocity: 15.5 cm/s MV Deceleration Time: 259 msec  PV Peak Velocity: 131 cm/s  PV Peak Gradient: 6.86 mmHg   Aortic Valve   Peak Velocity: 184 cm/s     Mean Velocity: 114 cm/s  Peak Gradient: 13.54 mmHg   Mean Gradient: 6 mmHg  Area (continuity): 3.58 cm2  AV VTI: 33.2 cm  Aorta   Aortic Root: 3.4 cm  Ascending Aorta: 3.3 cm  LVOT Diameter: 2.1 cm      XR CHEST (2 VW)    Result Date: 9/2/2021  EXAMINATION: TWO XRAY VIEWS OF THE CHEST 9/2/2021 8:48 am COMPARISON: None. HISTORY: ORDERING SYSTEM PROVIDED HISTORY: WEEMS (dyspnea on exertion) TECHNOLOGIST PROVIDED HISTORY: Reason for Exam: WEEMS, asthma Acuity: Unknown Type of Exam: Initial FINDINGS: Clear lungs. No pleural effusion or pneumothorax. Cardiomediastinal silhouette is unremarkable. Degenerative changes of the visualized osseous structures. Clear lungs. CT GUIDED NEEDLE PLACEMENT    Result Date: 9/8/2021  PROCEDURE: CT GUIDED CORE BIOPSY OF LEFT HEPATIC LOBE MASS MODERATE CONSCIOUS SEDATION 9/7/2021 HISTORY: ORDERING SYSTEM PROVIDED HISTORY: lesion TECHNOLOGIST PROVIDED HISTORY: Reason for exam:->lesion Reason for Exam: lesion Acuity: Acute Type of Exam: Initial; ORDERING SYSTEM PROVIDED HISTORY: bx TECHNOLOGIST PROVIDED HISTORY: Reason for exam:->bx Reason for Exam: lesion Acuity: Acute Type of Exam: Initial PHYSICIANS: Amilcar Brianne SEDATION: 0.5 mgversed and 25 mcg fentanyl were titrated intravenously for moderate sedation monitored under my direction. Total intraservice time of sedation was 15 minutes. The patient's vital signs were monitored throughout the procedure and recorded in the patient's medical record by the nurse. TECHNIQUE: Informed consent was obtained after a detailed discussion about the procedure including the risk, benefits, and alternatives. Universal protocol was followed.   Axial images were obtained through the liver and a suitable skin site was prepped and draped in sterile fashion. Local anesthesia was achieved with lidocaine. Under intermittent CT guidance, a 17 gauge coaxial needle was inserted into the a left hepatic lobe mass. Through this, 1 18 gauge core biopsy specimens were obtained using coaxial technique and submitted to pathology. Gel-Foam slurry was administered as the needle was removed. A sterile bandage was then placed. The patient tolerated the procedure well. Estimated blood loss: Less than 5 cc Dose modulation, iterative reconstruction, and/or weight based adjustment of the mA/kV was utilized to reduce the radiation dose to as low as reasonably achievable. DLP: 7199.41 mGy-cm     Successful CT guided core biopsy of a left hepatic lobe mass. CT ABDOMEN PELVIS W IV CONTRAST Additional Contrast? Oral    Result Date: 9/5/2021  EXAMINATION: CT OF THE ABDOMEN AND PELVIS WITH CONTRAST 9/3/2021 5:13 pm TECHNIQUE: CT of the abdomen and pelvis was performed with the administration of intravenous contrast. Multiplanar reformatted images are provided for review. Dose modulation, iterative reconstruction, and/or weight based adjustment of the mA/kV was utilized to reduce the radiation dose to as low as reasonably achievable. COMPARISON: CT pulmonary angiogram performed September 2, 2021. HISTORY: ORDERING SYSTEM PROVIDED HISTORY: Metastatic disease, evaluate for primary. Bilateral lower ext edema, eval for IVC thrombosis/compression TECHNOLOGIST PROVIDED HISTORY: Reason for exam:->Metastatic disease, evaluate for primary. Bilateral lower ext edema, eval for IVC thrombosis/compression Additional Contrast?->Oral FINDINGS: Images are degraded by motion artifact. Lower Chest: Respiratory motion artifact. Visualized pulmonary nodules and enlarged mediastinal lymph node, unchanged from prior CT chest. Organs: There are multifocal hypoattenuating lesions throughout the liver. aorta is normal caliber. Extensive mediastinal and hilar lymphadenopathy is present. Subcarinal node measures 32 x 37 mm. Right hilar node measures 30 x 29 mm. Lungs/pleura: Moderate emphysema. Multiple 3 mm nodules are present in the right middle lobe and left lower lobe. Superimposed in the posterior right lower lobe, there is a dominant nodule measuring 17 x 17 mm. An additional right lower lobe nodule measures 11 mm. No pleural effusion or pneumothorax. Narrowing of bronchus intermedius due to the right hilar adenopathy. Upper Abdomen: Multiple low-attenuation liver lesions are present measuring up to 48 x 40 mm and 26 x 28 mm. At least 30 lesions are noted in the visualized portions of the liver. Soft Tissues/Bones: No lytic or blastic osseous lesion. 1. No acute pulmonary embolus. 2. Extensive mediastinal and hilar lymphadenopathy compatible with metastatic neoplasm. 3. Multiple new indeterminate 3 mm nodules in the right middle lobe and left lower lobe. These could be infectious or neoplastic. 4. Right lower lobe 17 mm and 11 mm nodules are nonspecific, but metastatic disease not excluded. 5. Multiple liver metastases measuring up to 48 x 40 mm. RECOMMENDATIONS:     CT NEEDLE BIOPSY LIVER PERCUTANEOUS    Result Date: 9/8/2021  PROCEDURE: CT GUIDED CORE BIOPSY OF LEFT HEPATIC LOBE MASS MODERATE CONSCIOUS SEDATION 9/7/2021 HISTORY: ORDERING SYSTEM PROVIDED HISTORY: lesion TECHNOLOGIST PROVIDED HISTORY: Reason for exam:->lesion Reason for Exam: lesion Acuity: Acute Type of Exam: Initial; ORDERING SYSTEM PROVIDED HISTORY: bx TECHNOLOGIST PROVIDED HISTORY: Reason for exam:->bx Reason for Exam: lesion Acuity: Acute Type of Exam: Initial PHYSICIANS: Amilcar Brianne SEDATION: 0.5 mgversed and 25 mcg fentanyl were titrated intravenously for moderate sedation monitored under my direction. Total intraservice time of sedation was 15 minutes.   The patient's vital signs were monitored throughout the procedure extremity in vessels clearly visualized. Proximal and Mid Posterior Tibial veins and Peroneal veins were not visualized due to swelling. Calf and ankle edema noted. Left Impression No evidence of deep vein or superficial vein thrombosis involving the left lower extremity in vessels clearly visualized. Proximal Posterior Tibial veins and Soleal vein were not visualized due to swelling. Calf and ankle edema noted. Conclusions   Summary   No evidence of deep vein or superficial vein thrombosis involving the  bilateral lower extremities in vessels clearly visualized as detailed above. Signature   ------------------------------------------------------------------  Electronically signed by Kalani Vee MD (Interpreting  physician) on 09/08/2021 at 08:09 AM  ------------------------------------------------------------------  Patient Status:Routine. Study [place] [id number] - Vascular Lab. Technical Quality:Limited visualization due to swelling. Risk Factors   - The patient's risk factor(s) include: dyslipidemia and arterial     hypertension.   - The patient has a current/recent (within 1 year) tobacco history. Velocities are measured in cm/s ; Diameters are measured in mm Right Lower Extremities DVT Study Measurements Right 2D Measurements +------------------------+----------+---------------+----------+ ! Location                ! Visualized! Compressibility! Thrombosis! +------------------------+----------+---------------+----------+ ! Sapheno Femoral Junction! Yes       ! Yes            ! None      ! +------------------------+----------+---------------+----------+ ! GSV Thigh               ! Yes       ! Yes            ! None      ! +------------------------+----------+---------------+----------+ ! Common Femoral          !Yes       ! Yes            ! None      ! +------------------------+----------+---------------+----------+ ! Prox Femoral            !Yes       ! Yes            ! None      ! +------------------------+----------+---------------+----------+ ! Mid Femoral             !Yes       ! Yes            ! None      ! +------------------------+----------+---------------+----------+ ! Dist Femoral            !Yes       ! Yes            ! None      ! +------------------------+----------+---------------+----------+ ! Deep Femoral            !Yes       ! Yes            ! None      ! +------------------------+----------+---------------+----------+ ! Popliteal               !Yes       ! Yes            ! None      ! +------------------------+----------+---------------+----------+ ! Gastroc                 ! Yes       ! Yes            ! None      ! +------------------------+----------+---------------+----------+ ! Soleal                  !Yes       ! Yes            ! None      ! +------------------------+----------+---------------+----------+ ! PTV                     ! Partial   !Yes            ! None      ! +------------------------+----------+---------------+----------+ ! ATV                     ! Yes       ! Yes            ! None      ! +------------------------+----------+---------------+----------+ ! Peroneal                !Partial   !Yes            ! None      ! +------------------------+----------+---------------+----------+ ! GSV Calf                ! Yes       ! Yes            ! None      ! +------------------------+----------+---------------+----------+ ! SSV                     ! Yes       ! Yes            ! None      ! +------------------------+----------+---------------+----------+ Right Doppler Measurements +--------------+------+------+------------+ ! Location      ! Signal!Reflux! Reflux (sec)! +--------------+------+------+------------+ ! Common Femoral!Phasic! No    !            ! +--------------+------+------+------------+ ! Popliteal     !Phasic! No    !            ! +--------------+------+------+------------+ Left Lower Extremities DVT Study Measurements Left 2D Measurements +------------------------+----------+---------------+----------+ ! Location                ! Visualized! Compressibility! Thrombosis! +------------------------+----------+---------------+----------+ ! Sapheno Femoral Junction! Yes       ! Yes            ! None      ! +------------------------+----------+---------------+----------+ ! GSV Thigh               ! Yes       ! Yes            ! None      ! +------------------------+----------+---------------+----------+ ! Common Femoral          !Yes       ! Yes            ! None      ! +------------------------+----------+---------------+----------+ ! Prox Femoral            !Yes       ! Yes            ! None      ! +------------------------+----------+---------------+----------+ ! Mid Femoral             !Yes       ! Yes            ! None      ! +------------------------+----------+---------------+----------+ ! Dist Femoral            !Yes       ! Yes            ! None      ! +------------------------+----------+---------------+----------+ ! Deep Femoral            !Yes       ! Yes            ! None      ! +------------------------+----------+---------------+----------+ ! Popliteal               !Yes       ! Yes            ! None      ! +------------------------+----------+---------------+----------+ ! Gastroc                 ! Yes       ! Yes            ! None      ! +------------------------+----------+---------------+----------+ ! Soleal                  !No        !               !          ! +------------------------+----------+---------------+----------+ ! PTV                     ! Partial   !Yes            ! None      ! +------------------------+----------+---------------+----------+ ! ATV                     ! Yes       ! Yes            ! None      ! +------------------------+----------+---------------+----------+ ! Peroneal                !Yes       ! Yes            ! None      ! +------------------------+----------+---------------+----------+ ! GSV Calf                ! Yes       ! Yes            ! None      ! +------------------------+----------+---------------+----------+ ! SSV                     ! Yes       ! Yes            ! None      ! +------------------------+----------+---------------+----------+ Left Doppler Measurements +--------------+------+------+------------+ ! Location      ! Signal!Reflux! Reflux (sec)! +--------------+------+------+------------+ ! Common Femoral!Phasic! No    !            ! +--------------+------+------+------------+ ! Popliteal     !Phasic! No    !            ! +--------------+------+------+------------+      Problem List  Patient Active Problem List   Diagnosis    Pure hypercholesterolemia    Anxiety    Essential hypertension    Moderate persistent asthma with acute exacerbation    Metastatic cancer ([address]    Hypokalemia    Acute respiratory failure with hypoxia (HCC)    Abnormal CT of the chest    COPD exacerbation (HCC)    Lung nodules    Tobacco abuse       ASSESSMENT AND PLAN    Extensive stage small cell  Ectopic acth   Rare paraneoplastic syndrome   HE is day  7carbo etoposide atezolizumab  tx is every 3 weeks   Today is last day of granix       Hypoxia-ctpa negative  He is being diuresed       Acth syndrome improving rather quickly ongoing   im not sure what to make of the read for worsening pulmonary disaese at this point-too hard to interpret       Otf Boyle MD, MD

## 2021-09-20 NOTE — PLAN OF CARE
Problem: Infection:  Goal: Will remain free from infection  Description: Will remain free from infection  9/20/2021 1936 by Az Madrid RN  Outcome: Ongoing  Note: Will monitor for infection. 9/20/2021 1652 by Skinny Lakhani  Note: Pt is free of signs and symptoms of infection. Vital signs stable. Will monitor. Problem: Safety:  Goal: Free from accidental physical injury  Description: Free from accidental physical injury  9/20/2021 1936 by Az Madrid RN  Outcome: Ongoing  Note: Fall risk assessment completed. Fall precautions in place. Call light within reach. Pt educated on calling for assistance before getting up. Walkway free of clutter. Will continue to monitor. 9/20/2021 1652 by Skinny Lakhani  Note: Pt is free of injury. No injury noted. Fall precautions in place. Call light within reach. Will monitor. Goal: Free from intentional harm  Description: Free from intentional harm  9/20/2021 1936 by Az Madrid RN  Outcome: Ongoing  9/20/2021 1652 by Skinny Lakhani  Note: Patient is free from intentional harm. Problem: Daily Care:  Goal: Daily care needs are met  Description: Daily care needs are met  9/20/2021 1936 by Az Madrid RN  Outcome: Ongoing  Note: Will assist with ADLs. 9/20/2021 1652 by Skinny Lakhani  Note:  Daily care needs have been met by staff and patient. Will continue to monitor needs. Problem: Pain:  Goal: Patient's pain/discomfort is manageable  Description: Patient's pain/discomfort is manageable  9/20/2021 1936 by Az Madrid RN  Outcome: Ongoing  Note: Pt assessed for pain. Pt in pain and assessed with 0-10 pain rating scale. Pt given prescribed analgesic for pain. (See eMar) Pt satisfied with pain relief thus far. Will reassess and continue to monitor. 9/20/2021 1652 by Skinny Lakhani  Note: Patient complains of no pain.  Will continue to monitor     Problem: Skin Integrity:  Goal: Skin integrity will stabilize  Description: Skin integrity will stabilize  9/20/2021 1936 by Az Madrid RN  Outcome: Ongoing  Note: Will monitor skin and mucous members. Will turn patient every 2 hours, monitor for friction and sheering, and change dressings as needed. Will preform skin assessment every shift. 9/20/2021 1652 by Skinny Lakhani  Note: Skin is clean, dry, and intact. Will continue to monitor. Problem: Discharge Planning:  Goal: Patients continuum of care needs are met  Description: Patients continuum of care needs are met  9/20/2021 1936 by Az Madrid RN  Outcome: Ongoing  Note: Will assist with D/c planning. 9/20/2021 1652 by Skinny Lakhani  Note: Patient continuum of care needs are met by staff. Problem:  Activity Intolerance:  Goal: Ability to tolerate increased activity will improve  Description: Ability to tolerate increased activity will improve  Outcome: Ongoing     Problem: Airway Clearance - Ineffective:  Goal: Ability to maintain a clear airway will improve  Description: Ability to maintain a clear airway will improve  Outcome: Ongoing     Problem: Breathing Pattern - Ineffective:  Goal: Ability to achieve and maintain a regular respiratory rate will improve  Description: Ability to achieve and maintain a regular respiratory rate will improve  Outcome: Ongoing     Problem: Gas Exchange - Impaired:  Goal: Levels of oxygenation will improve  Description: Levels of oxygenation will improve  Outcome: Ongoing     Problem: Health Behavior:  Goal: Identification of resources available to assist in meeting health care needs will improve  Description: Identification of resources available to assist in meeting health care needs will improve  Outcome: Ongoing     Problem: Physical Regulation:  Goal: Complications related to the disease process, condition or treatment will be avoided or minimized  Description: Complications related to the disease process, condition or treatment will be avoided or minimized  Outcome: Ongoing

## 2021-09-20 NOTE — PROGRESS NOTES
Patient is alert and oriented x4. Patient is resting comfortably in bed, no complaints of pain, nausea, or vomiting. Tolerating PO intake and meds well. Call light is within reach, bed is locked in the lowest position, and gripper socks on. Will continue to monitor.  Electronically signed by Filemon Molina on 9/20/2021 at 11:14 AM

## 2021-09-20 NOTE — PROGRESS NOTES
[facility] Internal Medicine Note      Chief Complaint: My swelling is much better    Subjective/Interval History:    Patient looks better today. Swelling is much better. Eating and drinking well. Oxygen requirement down to 2 L. No new problems overnight. Weight is down dramatically now. Recorded I's/O's state a 19 L negative fluid balance since admission although intake may not be well recorded. Brisk diuresis continues despite reduced Lasix dosing    No chest pain. No cough or sputum. No nausea, vomiting, diarrhea. No abdominal pain. No dysuria. The remainder of the review of systems is negative. PMH, PSH, FH/SH reviewed and unchanged as documented in the H&P personally documented at admission 21    Medication list reviewed    Objective:    /77   Pulse 91   Temp 97.9 °F (36.6 °C) (Oral)   Resp 20   Ht 6' (1.829 m)   Wt 211 lb 10.3 oz (96 kg)   SpO2 96%   BMI 28.70 kg/m²   Temp  Av.2 °F (36.8 °C)  Min: 97.9 °F (36.6 °C)  Max: 98.6 °F (37 °C)    RRR  Chest-respirations easy. The chest is clear today. Respirations are easy. Abd- BS+, soft, nondistended  Ext-1-2+ edema bilaterally. Markedly improved. The Following Labs Were Reviewed Today:    Recent Results (from the past 24 hour(s))   POCT Glucose    Collection Time: 21 11:07 AM   Result Value Ref Range    POC Glucose 125 (H) 70 - 99 mg/dl    Performed on ACCU-CHEK    POCT Glucose    Collection Time: 21  4:03 PM   Result Value Ref Range    POC Glucose 117 (H) 70 - 99 mg/dl    Performed on ACCU-CHEK    POCT Glucose    Collection Time: 21  7:38 PM   Result Value Ref Range    POC Glucose 196 (H) 70 - 99 mg/dl    Performed on ACCU-CHEK        ASSESSMENT/PLAN:      Principal Problem:    Hypokalemia-potassium pending for today. Given reduced Lasix dosing, potassium supplement reduced to once daily pending lab report today  Active Problems:    Hypoxia-improved oxygenation.   CT scan of the chest from Friday reviewed. Increase metastatic disease without evidence of pulmonary embolus. Metastatic small cell lung cancer, poorly differentiated-blood pressure and sugars improving with reduced ACTH production. Appreciate oncology input. Moderate persistent asthma with acute exacerbation-continues to be without asthma. No wheezing. Pure hypercholesterolemia-continue to hold Lipitor given abnormal LFTs. Continue to monitor. Essential hypertension-blood pressure continues to trend better and better. Reduce doxazosin dosing. Bilateral leg edema-markedly improved. Now on Lasix once daily. Potassium reduced as noted above. Await labs today. Hyperglycemia-Lantus reduced to 5 units daily. Continue to monitor. Hypomagnesemia- continue to monitor and supplement as needed.       Carlos Odom MD, FACP  8:02 AM  9/20/2021

## 2021-09-20 NOTE — PROGRESS NOTES
[facility]  Nephrology   Progress Note        We are following the patient for uncontrolled HTN Severe Hypokalemia  47 y/o WM, gray and heavy smoker admitted with severe Hypokalemia  He has h/o HTN for ~ 20 years that has been well controlled on Amlodipine  About 3 weeks ago he started to have leg swelling, weight gain fatigue and SOB  Out patient labs showed severe Hypokalemia  On admission K+ noted to be 2.2 meq CO2 40   Noted to have uncontrolled HTN with /108 mm  BP has remained resistant despite addition of multiple meds    SUBJECTIVE:  -pt seen and examined    HPI:  Breathing comfortably. No CP.  ROS:  In bed. [address]:  medications reviewed. OBJECTIVE:     PHYSICAL:    /71   Pulse 94   Temp 97.9 °F (36.6 °C) (Oral)   Resp 22   Ht 6' (1.829 m)   Wt 211 lb 10.3 oz (96 kg)   SpO2 95%   BMI 28.70 kg/m²   24HR INTAKE/OUTPUT:      Intake/Output Summary (Last 24 hours) at 9/20/2021 1701  Last data filed at 9/20/2021 1217  Gross per 24 hour   Intake 240 ml   Output 3250 ml   Net -3010 ml       CONSTITUTIONAL:  awake, alert, cooperative, no apparent distress, and appears stated age  LUNGS: CTA  ABDOMEN:  No scars, normal bowel sounds, soft, non-distended  NEUROLOGIC:  alert, oriented, normal speech, no focal findings or movement disorder noted  SKIN: no bruising or bleeding  EXTREMITIES:  ++ edema     Medications     dextrose      sodium chloride Stopped (09/10/21 0349)        Data      CBC:   No results for input(s): WBC, RBC, HGB, HCT, PLT in the last 72 hours.   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 Edema  Suspect fluid retention due to hypercortisolism. Wt down , tolerating diuretics .      hyponatremia  - serum sodium level lower  - tighten fluid restriction to 1 liter/day

## 2021-09-20 NOTE — PROGRESS NOTES
Patient is resting in bed. No complaints of pain, nausea, or vomiting. Family member at bedside. Call light is within reach and bed is locked in the lowest position. Tolerating PO intake well. Will continue to monitor.  Electronically signed by Leonid Webb on 9/20/2021 at 6:55 PM

## 2021-09-21 NOTE — PROGRESS NOTES
[facility]  Nephrology   Progress Note        We are following the patient for uncontrolled HTN Severe Hypokalemia  47 y/o WM, gray and heavy smoker admitted with severe Hypokalemia  He has h/o HTN for ~ 20 years that has been well controlled on Amlodipine  About 3 weeks ago he started to have leg swelling, weight gain fatigue and SOB  Out patient labs showed severe Hypokalemia  On admission K+ noted to be 2.2 meq CO2 40   Noted to have uncontrolled HTN with /108 mm  BP has remained resistant despite addition of multiple meds    SUBJECTIVE:  -pt seen and examined    HPI:  Breathing comfortably. No CP. Serum sodium is better. ROS:  In bed. [address]:  medications reviewed.     OBJECTIVE:     PHYSICAL:    /65   Pulse 95   Temp 97.7 °F (36.5 °C) (Oral)   Resp 18   Ht 6' (1.829 m)   Wt 207 lb 10.8 oz (94.2 kg)   SpO2 94%   BMI 28.17 kg/m²   24HR INTAKE/OUTPUT:      Intake/Output Summary (Last 24 hours) at 9/21/2021 1309  Last data filed at 9/21/2021 0926  Gross per 24 hour   Intake 860 ml   Output 2725 ml   Net -1865 ml       CONSTITUTIONAL:  awake, alert, cooperative, no apparent distress, and appears stated age  LUNGS: CTA  ABDOMEN:  No scars, normal bowel sounds, soft, non-distended  NEUROLOGIC:  alert, oriented, normal speech, no focal findings or movement disorder noted  SKIN: no bruising or bleeding  EXTREMITIES:  ++ edema     Medications     dextrose      sodium chloride Stopped (09/10/21 0349)        Data      CBC:   Recent Labs     09/21/21  0518   WBC 1.7*   RBC 3.43*   HGB 11.4*   HCT 33.8*   PLT 62*     BMP:    Recent Labs     09/19/21 0442 09/20/21  0545 09/21/21  0518   * 129* 132*   K 4.5 4.4 4.3   CL 99 96* 99   CO2 27 21 25   BUN 17 17 15   CREATININE 0.6* 0.7* 0.7*   CALCIUM 8.5 8.3 8.5   GLUCOSE 115* 121* 112*     Phosphorus:    Recent Labs     09/19/21 0442 09/20/21  0545   PHOS 2.5 2.7     Magnesium:    Recent Labs     09/19/21 0442 09/20/21  0545 09/21/21  0518   MG 1.70* 2.00 1.70*         ASSESSMENT/PLAN    Hypertension resistant to treatment with multiple meds Concern for secondary HTN including Hypercortisolism ( Cortisol level 90 with adrenal nodule      24 hour urine for cortisol  [place]levated at [address] !!  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 Edema  Suspect fluid retention due to hypercortisolism. Wt down , tolerating diuretics .      hyponatremia  - serum sodium level better  - fluid restriction to 1 liter/day

## 2021-09-21 NOTE — CARE COORDINATION
Met with patient; anticipates going home tomorrow without oxygen. States he will not be returning to work. He has no vacation pay or short term disability pay coming in but says he has a few months of savings to rely on. He has already applied for SSD. Historically he has just used [employer] or [employer] savings cards. Reports the most expensive medication is his inhaler. Explained we can help with any d/c medications. Will also provide info on [facility] pharmacy at [address]. Asked hospital financial counselor to follow up with him re: medicaid application process. Provided cancer community support info. He reports he plans to give up smoking and is confident he can do this. He reports having a good support system between family and friends who live nearby.    Electronically signed by [facility] on 9/21/2021 at 3:52 PM

## 2021-09-21 NOTE — PROGRESS NOTES
Report received from [facility]. Patient in bed, resting with eyes closed at this time. Call light and other belongings within reach.

## 2021-09-21 NOTE — PROGRESS NOTES
Patient resting in bed this morning, denies pain, nausea, and/or vomiting. Scheduled morning medications administered w/o complication. See eMAR for documentation. PICC line to CYNTHIA remains in place, flushes well and is infusing IV magnesium at this time. Patient remains on 2L of O2 via NC to maintain oxygen saturation > 90%. Patient denies physical/emotional needs at this time. Call light, telephone, and bed side table are within reach. Fall precautions in place. Will continue to monitor and assess.

## 2021-09-21 NOTE — PROGRESS NOTES
[facility] Internal Medicine Note      Chief Complaint: I am doing better    Subjective/Interval History:    Patient sitting up in the bed. States he feels well. Down to 2 L of oxygen. Edema nearly resolved. No new problems overnight. Blood pressure and blood sugars markedly improved. Oncology note reviewed and appreciated. Nephrology note from yesterday reviewed. No chest pain. No cough or sputum. No nausea, vomiting, diarrhea. No abdominal pain. No dysuria. The remainder of the review of systems is negative. PMH, PSH, FH/SH reviewed and unchanged as documented in the H&P personally documented at admission 21    Medication list reviewed    Objective:    /65   Pulse 95   Temp 97.7 °F (36.5 °C) (Oral)   Resp 18   Ht 6' (1.829 m)   Wt 207 lb 10.8 oz (94.2 kg)   SpO2 96%   BMI 28.17 kg/m²   Temp  Av °F (36.7 °C)  Min: 97.7 °F (36.5 °C)  Max: 98.4 °F (36.9 °C)    RRR  Chest-respirations easy. The chest is clear today. Respirations are easy. Abd- BS+, soft, nondistended  Ext-no pretibial edema today.     The Following Labs Were Reviewed Today:    Recent Results (from the past 24 hour(s))   POCT Glucose    Collection Time: 21 10:54 AM   Result Value Ref Range    POC Glucose 124 (H) 70 - 99 mg/dl    Performed on ACCU-CHEK    POCT Glucose    Collection Time: 21  4:18 PM   Result Value Ref Range    POC Glucose 123 (H) 70 - 99 mg/dl    Performed on ACCU-CHEK    POCT Glucose    Collection Time: 21  7:40 PM   Result Value Ref Range    POC Glucose 129 (H) 70 - 99 mg/dl    Performed on ACCU-CHEK    Basic Metabolic Panel    Collection Time: 21  5:18 AM   Result Value Ref Range    Sodium 132 (L) 136 - 145 mmol/L    Potassium 4.3 3.5 - 5.1 mmol/L    Chloride 99 99 - 110 mmol/L    CO2 25 21 - 32 mmol/L    Anion Gap 8 3 - 16    Glucose 112 (H) 70 - 99 mg/dL    BUN 15 7 - 20 mg/dL    CREATININE 0.7 (L) 0.9 - 1.3 mg/dL    GFR Non-African American >60 >60    GFR  American >60 >60    Calcium 8.5 8.3 - 10.6 mg/dL   CBC Auto Differential    Collection Time: 09/21/21  5:18 AM   Result Value Ref Range    WBC 1.7 (LL) 4.0 - 11.0 K/uL    RBC 3.43 (L) 4.20 - 5.90 M/uL    Hemoglobin 11.4 (L) 13.5 - 17.5 g/dL    Hematocrit 33.8 (L) 40.5 - 52.5 %    MCV 98.3 80.0 - 100.0 fL    MCH 33.3 26.0 - 34.0 pg    MCHC 33.9 31.0 - 36.0 g/dL    RDW 13.7 12.4 - 15.4 %    Platelets 62 (L) 866 - 450 K/uL    MPV 7.9 5.0 - 10.5 fL    PLATELET SLIDE REVIEW Decreased     SLIDE REVIEW see below     Path Consult Yes     Neutrophils % 37.0 %    Lymphocytes % 32.0 %    Monocytes % 9.0 %    Eosinophils % 0.0 %    Basophils % 0.0 %    Neutrophils Absolute 1.0 (L) 1.7 - 7.7 K/uL    Lymphocytes Absolute 0.5 (L) 1.0 - 5.1 K/uL    Monocytes Absolute 0.2 0.0 - 1.3 K/uL    Eosinophils Absolute 0.0 0.0 - 0.6 K/uL    Basophils Absolute 0.0 0.0 - 0.2 K/uL    Bands Relative 22 (H) 0 - 7 %    nRBC 1 (A) /100 WBC    Toxic Granulation Present (A)     Dohle Bodies Present (A)    Magnesium    Collection Time: 09/21/21  5:18 AM   Result Value Ref Range    Magnesium 1.70 (L) 1.80 - 2.40 mg/dL   POCT Glucose    Collection Time: 09/21/21  6:10 AM   Result Value Ref Range    POC Glucose 123 (H) 70 - 99 mg/dl    Performed on ACCU-CHEK        ASSESSMENT/PLAN:      Principal Problem:    Hypokalemia-potassium is better. Stop oral potassium today as on high-dose Aldactone still and transitioning to oral Lasix. Active Problems:    Hypoxia-improved oxygenation. Down to 2 L of oxygen. Respiratory therapy in the room. Attempt to wean today. Metastatic small cell lung cancer, poorly differentiated-blood pressure and sugars continually improving with reduced ACTH production. Appreciate oncology input. Moderate persistent asthma with acute exacerbation-continues to be without asthma. No wheezing. Pure hypercholesterolemia-continue to hold Lipitor given abnormal LFTs. Continue to monitor.     Essential hypertension- blood pressure remains low. Lisinopril discontinued. Discontinue Cardura as well. Bilateral leg edema-Lasix transition to oral dosing today. Hyperglycemia-stop Lantus. Monitor today. Hypomagnesemia-supplement today and continue to monitor. Disposition-may be ready for discharge tomorrow.       Donna Hawley MD, Helga Rao  8:33 AM  9/21/2021

## 2021-09-21 NOTE — PROGRESS NOTES
[facility]  Oncology Hematology Care  Progress Note      SUBJECTIVE:   Wbc down   He is doing ok   No new issues     Medications    Current Facility-Administered Medications: doxazosin (CARDURA) tablet 2 mg, 2 mg, Oral, 2 times per day  potassium chloride (KLOR-CON) extended release tablet 40 mEq, 40 mEq, Oral, Daily  furosemide (LASIX) injection 40 mg, 40 mg, IntraVENous, Daily  insulin glargine (LANTUS) injection vial 5 Units, 5 Units, SubCUTAneous, Daily  insulin lispro (HUMALOG) injection vial 0-6 Units, 0-6 Units, SubCUTAneous, TID WC  insulin lispro (HUMALOG) injection vial 0-3 Units, 0-3 Units, SubCUTAneous, Nightly  albuterol (PROVENTIL) nebulizer solution 2.5 mg, 2.5 mg, Nebulization, Q6H PRN  ipratropium-albuterol (DUONEB) nebulizer solution 1 ampule, 1 ampule, Inhalation, BID  prochlorperazine (COMPAZINE) tablet 10 mg, 10 mg, Oral, Q6H PRN  spironolactone (ALDACTONE) tablet 100 mg, 100 mg, Oral, BID  ondansetron (ZOFRAN) 8 mg in dextrose 5 % 50 mL IVPB, 8 mg, IntraVENous, Q8H PRN  enoxaparin (LOVENOX) injection 40 mg, 40 mg, SubCUTAneous, Daily  cloNIDine (CATAPRES) tablet 0.2 mg, 0.2 mg, Oral, Q4H PRN  hydrALAZINE (APRESOLINE) injection 10 mg, 10 mg, IntraVENous, Q4H PRN  acetaminophen (TYLENOL) tablet 650 mg, 650 mg, Oral, Q4H PRN  NIFEdipine (PROCARDIA XL) extended release tablet 60 mg, 60 mg, Oral, BID  glucose (GLUTOSE) 40 % oral gel 15 g, 15 g, Oral, PRN  dextrose 50 % IV solution, 12.5 g, IntraVENous, PRN  glucagon (rDNA) injection 1 mg, 1 mg, IntraMUSCular, PRN  dextrose 5 % solution, 100 mL/hr, IntraVENous, PRN  budesonide-formoterol (SYMBICORT) 160-4.5 MCG/ACT inhaler 2 puff, 2 puff, Inhalation, BID  montelukast (SINGULAIR) tablet 10 mg, 10 mg, Oral, Nightly  sodium chloride flush 0.9 % injection 5-40 mL, 5-40 mL, IntraVENous, 2 times per day  sodium chloride flush 0.9 % injection 5-40 mL, 5-40 mL, IntraVENous, PRN  0.9 % sodium chloride infusion, 25 mL, IntraVENous, PRN  ondansetron (ZOFRAN-ODT) disintegrating tablet 4 mg, 4 mg, Oral, Q8H PRN **OR** ondansetron (ZOFRAN) injection 4 mg, 4 mg, IntraVENous, Q6H PRN  polyethylene glycol (GLYCOLAX) packet 17 g, 17 g, Oral, Daily PRN  [DISCONTINUED] acetaminophen (TYLENOL) tablet 650 mg, 650 mg, Oral, Q6H PRN **OR** acetaminophen (TYLENOL) suppository 650 mg, 650 mg, Rectal, Q6H PRN  nicotine (NICODERM CQ) 21 MG/24HR 1 patch, 1 patch, TransDERmal, Daily  Physical    VITALS:  /72   Pulse 91   Temp 98.4 °F (36.9 °C) (Oral)   Resp 14   Ht 6' (1.829 m)   Wt 207 lb 10.8 oz (94.2 kg)   SpO2 96%   BMI 28.17 kg/m²   TEMPERATURE:  Current - Temp: 98.4 °F (36.9 °C); Max - Temp  Av.1 °F (36.7 °C)  Min: 97.9 °F (36.6 °C)  Max: 98.4 °F (36.9 °C)  PULSE OXIMETRY RANGE: SpO2  Av.6 %  Min: 95 %  Max: 96 %  24HR INTAKE/OUTPUT:      Intake/Output Summary (Last 24 hours) at 2021 0738  Last data filed at 2021 0513  Gross per 24 hour   Intake 480 ml   Output 3125 ml   Net -2645 ml       CONSTITUTIONAL:  awake, alert, cooperative, no apparent distress, HEENT oral pharynx , no scleral icterus  HEMATOLOGIC/LYMPHATICS:  no cervical lymphadenopathy, no supraclavicular lymphadenopathy,LUNGS:overall diminished g  CARDIOVASCULAR:  , regular rate and rhythm, normal S1 and S2, no S3 or S4, and no murmur noted  ABDOMEN:  No scars, normal bowel sounds, soft, non-distended, non-tender, no masses palpated, no hepatosplenomegally  MUSCULOSKELETAL:  There is no redness, warmth, or swelling of the joints. EXTREMETIES:s+ edema   NEUROLOGIC:  Awake, alert, oriented to name, place and time.  =SKIN:  no bruising or bleeding      Data      Recent Labs     21  0518   WBC 1.7*   HGB 11.4*   HCT 33.8*   PLT 62*   MCV 98.3        Recent Labs     21  0442 21  0545 21  0518   * 129* 132*   K 4.5 4.4 4.3   CL 99 96* 99   CO2 27 21 25   PHOS 2.5 2.7  --    BUN 17 17 15   CREATININE 0.6* 0.7* 0.7*     No results for input(s): AST, ALT, ALB, BILIDIR, BILITOT, ALKPHOS in the last 72 hours. Magnesium:    Lab Results   Component Value Date    MG 1.70 09/21/2021    MG 2.00 09/20/2021    MG 1.70 09/19/2021       Imaging ECHO Complete 2D W Doppler W Color    Result Date: 9/3/2021  Transthoracic Echocardiography Report (TTE)  Demographics   Patient Name      Everton Ash   Date of Study     09/03/2021          Gender              Male   Patient Number    [id number]          Date of Birth       1970   Visit Number      [id number]           Age                 46 year(s)   Accession Number  [id number]          Room Number         9213   Corporate ID      [id number]             Sonographer         Juan Montalvo RVT,                                                            RDCS   Ordering          Calixto Seat,             [address]         Kraig Medal     Physician           Theresa Corley MD  Procedure Type of Study   TTE procedure:ECHOCARDIOGRAM COMPLETE 2D W DOPPLER W COLOR. Procedure Date Date: 09/03/2021 Start: 02:20 PM Study Location: [facility] Technical Quality: Limited visualization due to poor acoustical window. Additional Indications:Lower extremity swelling, evaluate the right heart and LV; HTN, HLD, COPD. Patient Status: Routine Height: 72 inches Weight: 247.01 pounds BSA: 2.33 m2 BMI: 33.5 kg/m2 Rhythm: Within normal limits HR: 70 bpm BP: 183/87 mmHg  Conclusions   Summary  Normal left ventricle size, wall thickness, and systolic function with an  estimated ejection fraction of 60-65%. No regional wall motion abnormalities  are seen. Normal diastolic function. Normal right ventricular size and function. No significant valve abnormalities noted.    Signature   ------------------------------------------------------------------  Electronically signed by Alma Girard Francesco Manning MD  (Interpreting physician) on 2021 at 03:46 PM  ------------------------------------------------------------------   Findings   Left Ventricle  Normal left ventricle size, wall thickness, and systolic function with an  estimated ejection fraction of 60-65%. No regional wall motion abnormalities  are seen. Normal diastolic function. Mitral Valve  Mitral valve leaflets appear mildly thickened. No evidence of mitral regurgitation or stenosis. Left Atrium  The left atrium is normal in size. Aortic Valve  The aortic valve leaflets are not well visualized. No evidence of aortic valve regurgitation or stenosis. Aorta  The aortic root is normal in size. Right Ventricle  Normal right ventricular size and function. TAPSE 2.8 cm   Tricuspid Valve  The tricuspid valve was not well visualized. No evidence of tricuspid stenosis. Right Atrium  The right atrial size is normal.   Pulmonic Valve  The pulmonic valve is not well visualized. No evidence of pulmonic valve regurgitation. No evidence of pulmonic valve stenosis. Pericardial Effusion  No pericardial effusion noted. Pleural Effusion  No pleural effusion. Miscellaneous  IVC not well visualized.   M-Mode/2D Measurements (cm)   LV Diastolic Dimension: 2.76 cm LV Systolic Dimension: 7.00 cm  LV Septum Diastolic: 0.9 cm  LV PW Diastolic: 6.79 cm        AO Root Dimension: 3.4 cm                                   LA Area: 23.6 cm2  LVOT: 2.1 cm                    LA volume/Index: 70.5 ml /30 ml/m2  Doppler Measurements   AV Peak Velocity: 184 cm/s     MV Peak E-Wave: 106 cm/s  AV Peak Gradient: 13.54 mmHg   MV Peak A-Wave: 87.5 cm/s  AV Mean Gradient: 6 mmHg       MV E/A Ratio: 1.21  LVOT Peak Velocity: 160 cm/s   MV Mean Gradient: 2 mmHg  AV Area (Continuity):3.58 cm2  MV Max P mmHg                                 MV Vmax:114 cm/s                                 MV VTI:32.9 cm/s   E' Septal Velocity: 10.9 cm/s  MV Area (continuity): 3.61 cm2  E' Lateral Velocity: 15.5 cm/s MV Deceleration Time: 259 msec  PV Peak Velocity: 131 cm/s  PV Peak Gradient: 6.86 mmHg   Aortic Valve   Peak Velocity: 184 cm/s     Mean Velocity: 114 cm/s  Peak Gradient: 13.54 mmHg   Mean Gradient: 6 mmHg  Area (continuity): 3.58 cm2  AV VTI: 33.2 cm  Aorta   Aortic Root: 3.4 cm  Ascending Aorta: 3.3 cm  LVOT Diameter: 2.1 cm      XR CHEST (2 VW)    Result Date: 9/2/2021  EXAMINATION: TWO XRAY VIEWS OF THE CHEST 9/2/2021 8:48 am COMPARISON: None. HISTORY: ORDERING SYSTEM PROVIDED HISTORY: WEEMS (dyspnea on exertion) TECHNOLOGIST PROVIDED HISTORY: Reason for Exam: WEEMS, asthma Acuity: Unknown Type of Exam: Initial FINDINGS: Clear lungs. No pleural effusion or pneumothorax. Cardiomediastinal silhouette is unremarkable. Degenerative changes of the visualized osseous structures. Clear lungs. CT GUIDED NEEDLE PLACEMENT    Result Date: 9/8/2021  PROCEDURE: CT GUIDED CORE BIOPSY OF LEFT HEPATIC LOBE MASS MODERATE CONSCIOUS SEDATION 9/7/2021 HISTORY: ORDERING SYSTEM PROVIDED HISTORY: lesion TECHNOLOGIST PROVIDED HISTORY: Reason for exam:->lesion Reason for Exam: lesion Acuity: Acute Type of Exam: Initial; ORDERING SYSTEM PROVIDED HISTORY: bx TECHNOLOGIST PROVIDED HISTORY: Reason for exam:->bx Reason for Exam: lesion Acuity: Acute Type of Exam: Initial PHYSICIANS: Amilcar Brianne SEDATION: 0.5 mgversed and 25 mcg fentanyl were titrated intravenously for moderate sedation monitored under my direction. Total intraservice time of sedation was 15 minutes. The patient's vital signs were monitored throughout the procedure and recorded in the patient's medical record by the nurse. TECHNIQUE: Informed consent was obtained after a detailed discussion about the procedure including the risk, benefits, and alternatives. Universal protocol was followed. Axial images were obtained through the liver and a suitable skin site was prepped and draped in sterile fashion.   Local anesthesia was achieved with lidocaine. Under intermittent CT guidance, a 17 gauge coaxial needle was inserted into the a left hepatic lobe mass. Through this, 1 18 gauge core biopsy specimens were obtained using coaxial technique and submitted to pathology. Gel-Foam slurry was administered as the needle was removed. A sterile bandage was then placed. The patient tolerated the procedure well. Estimated blood loss: Less than 5 cc Dose modulation, iterative reconstruction, and/or weight based adjustment of the mA/kV was utilized to reduce the radiation dose to as low as reasonably achievable. DLP: 3832.35 mGy-cm     Successful CT guided core biopsy of a left hepatic lobe mass. CT ABDOMEN PELVIS W IV CONTRAST Additional Contrast? Oral    Result Date: 9/5/2021  EXAMINATION: CT OF THE ABDOMEN AND PELVIS WITH CONTRAST 9/3/2021 5:13 pm TECHNIQUE: CT of the abdomen and pelvis was performed with the administration of intravenous contrast. Multiplanar reformatted images are provided for review. Dose modulation, iterative reconstruction, and/or weight based adjustment of the mA/kV was utilized to reduce the radiation dose to as low as reasonably achievable. COMPARISON: CT pulmonary angiogram performed September 2, 2021. HISTORY: ORDERING SYSTEM PROVIDED HISTORY: Metastatic disease, evaluate for primary. Bilateral lower ext edema, eval for IVC thrombosis/compression TECHNOLOGIST PROVIDED HISTORY: Reason for exam:->Metastatic disease, evaluate for primary. Bilateral lower ext edema, eval for IVC thrombosis/compression Additional Contrast?->Oral FINDINGS: Images are degraded by motion artifact. Lower Chest: Respiratory motion artifact. Visualized pulmonary nodules and enlarged mediastinal lymph node, unchanged from prior CT chest. Organs: There are multifocal hypoattenuating lesions throughout the liver. Lesion within the left hepatic lobe measures 6.1 x 5.7 cm (axial image 35).  Lesion within the right hepatic lobe measures 3.6 x 2.6 cm (axial image 51). The gallbladder is normally distended with layering hyperintensity suggestive of sludge. No calcified stones. No gallbladder wall thickening or pericholecystic fluid. The spleen and pancreas are grossly unremarkable. Right adrenal gland nodule measures up to 1.6 cm. There is mild thickening of the left adrenal gland without definite nodularity. There is symmetric renal cortical enhancement. Simple appearing right renal cysts demonstrated. No hydronephrosis. GI/Bowel: There is a hypoattenuating nodule in the region of the 2/3 portion of the duodenum in the region of the ampulla which measures up to 1.3 cm (axial image 83). There is diffuse fold thickening involving the majority of the jejunum. No evidence of obstruction. Sigmoid diverticulosis without evidence of acute diverticulitis. The appendix is normal. Pelvis: Prostate and seminal vesicles are grossly unremarkable. The urinary bladder is within normal limits. Peritoneum/Retroperitoneum: No significant lymphadenopathy. The abdominal aorta is normal in course and caliber with scattered atherosclerotic disease. No definite filling defects within the IVC, the iliac veins, or visualized femoral veins to suggest thrombus. Bones/Soft Tissues: There is asymmetric thickening of the inferior right rectus abdominus musculature with mild surrounding stranding. Visualization is partially obscured by streak artifact from dense oral contrast in the adjacent small bowel. Body wall edema demonstrated. No focal drainable collections. Multilevel moderate degenerative changes in the visualized spine. No destructive osseous lesions. 1. Multiple hypoattenuating liver lesions which are most suggestive of metastatic disease. 2. Right adrenal gland nodule measuring up to 1.6 cm, indeterminate. 3. Hypoattenuating nodule in the region of the 2/3 portion of the duodenum in the region of the ampulla measuring up to 1.3 cm. Findings could be artifactual or related to fold thickening; however, a small ampullary or possibly pancreatic lesion cannot be entirely excluded. 4. Diffuse fold thickening involving the majority of the jejunum, nonspecific but can be related to an infectious etiology or lymphatic obstruction. 5. No definite filling defects in the IVC, iliac veins, or visualized femoral veins to suggest thrombus. 6. Asymmetric thickening of the inferior right rectus abdominus musculature with surrounding stranding which is partially obscured by adjacent streak artifact from dense oral contrast in the small bowel. No definite focal lesion is demonstrated; however, an underlying lesion cannot be entirely excluded. Hematoma is also a consideration. CT CHEST PULMONARY EMBOLISM W CONTRAST    Result Date: 9/5/2021  EXAMINATION: CTA OF THE CHEST 9/2/2021 12:47 pm TECHNIQUE: CTA of the chest was performed after the administration of intravenous contrast.  Multiplanar reformatted images are provided for review. MIP images are provided for review. Dose modulation, iterative reconstruction, and/or weight based adjustment of the mA/kV was utilized to reduce the radiation dose to as low as reasonably achievable. COMPARISON: None. HISTORY: ORDERING SYSTEM PROVIDED HISTORY: leg swelling sob new o2 requirement TECHNOLOGIST PROVIDED HISTORY: Reason for exam:->leg swelling sob new o2 requirement Decision Support Exception - unselect if not a suspected or confirmed emergency medical condition->Emergency Medical Condition (MA) Reason for Exam: leg swelling sob new o2 requirement Acuity: Acute Type of Exam: Initial FINDINGS: Pulmonary Arteries:  No central, lobar, or segmental pulmonary embolus. Narrowing of the right lower lobe pulmonary artery by the adenopathy. Mediastinum: Heart size is normal.  No pericardial effusion. Thoracic aorta is normal caliber. Extensive mediastinal and hilar lymphadenopathy is present.   Subcarinal node measures detailed discussion about the procedure including the risk, benefits, and alternatives. Universal protocol was followed. Axial images were obtained through the liver and a suitable skin site was prepped and draped in sterile fashion. Local anesthesia was achieved with lidocaine. Under intermittent CT guidance, a 17 gauge coaxial needle was inserted into the a left hepatic lobe mass. Through this, 1 18 gauge core biopsy specimens were obtained using coaxial technique and submitted to pathology. Gel-Foam slurry was administered as the needle was removed. A sterile bandage was then placed. The patient tolerated the procedure well. Estimated blood loss: Less than 5 cc Dose modulation, iterative reconstruction, and/or weight based adjustment of the mA/kV was utilized to reduce the radiation dose to as low as reasonably achievable. DLP: 6272.32 mGy-cm     Successful CT guided core biopsy of a left hepatic lobe mass. VL Extremity Venous Bilateral    Result Date: 9/8/2021  Lower Extremities DVT Study  Demographics   Patient Name       [facility]   Date of Study      09/07/2021         Gender              Male   Patient Number     [id number]         Date of Birth       1970   Visit Number       [id number]          Age                 46 year(s)   Accession Number   [id number]         Room Number         7567   Corporate ID       [id number]            Minda Dandy, T   Ordering Physician Celena Boggs MD         Physician           MD  Procedure Type of Study:   Veins:Lower Extremities DVT Study, VASC EXTREMITY VENOUS DUPLEX BILATERAL. Vascular Sonographer Report  Indications for Study:Edema. Impressions Right Impression No evidence of deep vein or superficial vein thrombosis involving the right lower extremity in vessels clearly visualized.  Proximal and Mid Posterior Tibial veins and Peroneal veins were not +------------------------+----------+---------------+----------+ ! Dist Femoral            !Yes       ! Yes            ! None      ! +------------------------+----------+---------------+----------+ ! Deep Femoral            !Yes       ! Yes            ! None      ! +------------------------+----------+---------------+----------+ ! Popliteal               !Yes       ! Yes            ! None      ! +------------------------+----------+---------------+----------+ ! Gastroc                 ! Yes       ! Yes            ! None      ! +------------------------+----------+---------------+----------+ ! Soleal                  !Yes       ! Yes            ! None      ! +------------------------+----------+---------------+----------+ ! PTV                     ! Partial   !Yes            ! None      ! +------------------------+----------+---------------+----------+ ! ATV                     ! Yes       ! Yes            ! None      ! +------------------------+----------+---------------+----------+ ! Peroneal                !Partial   !Yes            ! None      ! +------------------------+----------+---------------+----------+ ! GSV Calf                ! Yes       ! Yes            ! None      ! +------------------------+----------+---------------+----------+ ! SSV                     ! Yes       ! Yes            ! None      ! +------------------------+----------+---------------+----------+ Right Doppler Measurements +--------------+------+------+------------+ ! Location      ! Signal!Reflux! Reflux (sec)! +--------------+------+------+------------+ ! Common Femoral!Phasic! No    !            ! +--------------+------+------+------------+ ! Popliteal     !Phasic! No    !            ! +--------------+------+------+------------+ Left Lower Extremities DVT Study Measurements Left 2D Measurements +------------------------+----------+---------------+----------+ ! Location                ! Visualized! Compressibility! Thrombosis! +------------------------+----------+---------------+----------+ ! Sapheno Femoral Junction! Yes       ! Yes            ! None      ! +------------------------+----------+---------------+----------+ ! GSV Thigh               ! Yes       ! Yes            ! None      ! +------------------------+----------+---------------+----------+ ! Common Femoral          !Yes       ! Yes            ! None      ! +------------------------+----------+---------------+----------+ ! Prox Femoral            !Yes       ! Yes            ! None      ! +------------------------+----------+---------------+----------+ ! Mid Femoral             !Yes       ! Yes            ! None      ! +------------------------+----------+---------------+----------+ ! Dist Femoral            !Yes       ! Yes            ! None      ! +------------------------+----------+---------------+----------+ ! Deep Femoral            !Yes       ! Yes            ! None      ! +------------------------+----------+---------------+----------+ ! Popliteal               !Yes       ! Yes            ! None      ! +------------------------+----------+---------------+----------+ ! Gastroc                 ! Yes       ! Yes            ! None      ! +------------------------+----------+---------------+----------+ ! Soleal                  !No        !               !          ! +------------------------+----------+---------------+----------+ ! PTV                     ! Partial   !Yes            ! None      ! +------------------------+----------+---------------+----------+ ! ATV                     ! Yes       ! Yes            ! None      ! +------------------------+----------+---------------+----------+ ! Peroneal                !Yes       ! Yes            ! None      ! +------------------------+----------+---------------+----------+ ! GSV Calf                ! Yes       ! Yes            ! None      ! +------------------------+----------+---------------+----------+ ! SSV                     ! Yes       ! Yes            ! None      !

## 2021-09-21 NOTE — PROGRESS NOTES
Patient A&O in the bed. Patient tolerating PO intake well. PM medications given without complications. Patient denies pain, nausea or vomiting. Call light within reach, able to make needs known, fall precautions in place. Will check on patient Q2 hours.     Electronically signed by Kya Gonzales RN on 9/20/2021 at 10:16 PM

## 2021-09-22 NOTE — PROGRESS NOTES
Patient is resting in bed. Alert and oriented X4. Denies pain at this time. Patient on 1 L nasal cannula. PICC in place. Assessment complete. All patient needs are met at this time. Fall precautions are in place. Call light is in reach. Will continue to monitor.    Electronically signed by Queta Mckeon RN on 9/21/2021 at 11:37 PM

## 2021-09-22 NOTE — PROGRESS NOTES
Patient A&O. Tolerating PO. Call light within reach. Will continue to monitor and reassess.  Electronically signed by Chris Rudolph RN on 9/22/2021

## 2021-09-22 NOTE — PROGRESS NOTES
CLINICAL PHARMACY NOTE: MEDS TO BEDS    Total # of Prescriptions Filled: 3   The following medications were delivered to the patient:  Current Discharge Medication List      START taking these medications    Details   NIFEdipine (ADALAT CC) 30 MG extended release tablet Take 1 tablet by mouth 2 times daily  Qty: 60 tablet, Refills: 3      spironolactone (ALDACTONE) 100 MG tablet Take 1 tablet by mouth 2 times daily  Qty: 60 tablet, Refills: 3      potassium chloride (KLOR-CON M) 20 MEQ TBCR extended release tablet Take 1 tablet by mouth daily  Qty: 60 tablet, Refills: 3      nicotine (NICODERM CQ) 14 MG/24HR Place 1 patch onto the skin daily for 14 days  Qty: 14 patch, Refills: 0         ·   · No nicotine patches given pt will get at local [facility]    Additional Documentation:

## 2021-09-22 NOTE — PLAN OF CARE
Problem: Infection:  Goal: Will remain free from infection  Description: Will remain free from infection  9/22/2021 1619 by Andreina Pacheco RN  Outcome: Completed     Problem: Safety:  Goal: Free from accidental physical injury  Description: Free from accidental physical injury  9/22/2021 1619 by Andreina Pacheco RN  Outcome: Completed     Problem: Safety:  Goal: Free from intentional harm  Description: Free from intentional harm  9/22/2021 1619 by Andreina Pacheco RN  Outcome: Completed     Problem: Daily Care:  Goal: Daily care needs are met  Description: Daily care needs are met  9/22/2021 1619 by Andreina Pacheco RN  Outcome: Completed     Problem: Pain:  Goal: Patient's pain/discomfort is manageable  Description: Patient's pain/discomfort is manageable  9/22/2021 1619 by Andreina Pacheco RN  Outcome: Completed     Problem: Skin Integrity:  Goal: Skin integrity will stabilize  Description: Skin integrity will stabilize  9/22/2021 1619 by Andreina Pacheco RN  Outcome: Completed     Problem: Discharge Planning:  Goal: Patients continuum of care needs are met  Description: Patients continuum of care needs are met  9/22/2021 1619 by Andreina Pacheco RN  Outcome: Completed     Problem:  Activity Intolerance:  Goal: Ability to tolerate increased activity will improve  Description: Ability to tolerate increased activity will improve  9/22/2021 1619 by Andreina Pacheco RN  Outcome: Completed     Problem: Airway Clearance - Ineffective:  Goal: Ability to maintain a clear airway will improve  Description: Ability to maintain a clear airway will improve  9/22/2021 1619 by Andreina Pacheco RN  Outcome: Completed     Problem: Breathing Pattern - Ineffective:  Goal: Ability to achieve and maintain a regular respiratory rate will improve  Description: Ability to achieve and maintain a regular respiratory rate will improve  9/22/2021 1619 by Andreina Pacheco RN  Outcome: Completed     Problem: Gas Exchange - Impaired:  Goal: Levels of oxygenation will improve  Description: Levels of oxygenation will improve  9/22/2021 1619 by Antonio Samuel RN  Outcome: Completed     Problem: Health Behavior:  Goal: Identification of resources available to assist in meeting health care needs will improve  Description: Identification of resources available to assist in meeting health care needs will improve  9/22/2021 1619 by Antonio Samuel RN  Outcome: Completed     Problem: Physical Regulation:  Goal: Complications related to the disease process, condition or treatment will be avoided or minimized  Description: Complications related to the disease process, condition or treatment will be avoided or minimized  9/22/2021 1619 by Antonio Samuel RN  Outcome: Completed

## 2021-09-22 NOTE — PROGRESS NOTES
Patient to discharge this afternoon. Ok to remove PICC prior to discharge per Dr. Arun Delgado. See new order.  Electronically signed by Nichole Montero RN on 9/22/2021

## 2021-09-22 NOTE — PLAN OF CARE
Problem: Infection:  Goal: Will remain free from infection  Description: Will remain free from infection  9/22/2021 1044 by Nadeem Nair RN  Outcome: Ongoing     Problem: Safety:  Goal: Free from accidental physical injury  Description: Free from accidental physical injury  9/22/2021 1044 by Nadeem Nair RN  Outcome: Ongoing     Problem: Safety:  Goal: Free from intentional harm  Description: Free from intentional harm  9/22/2021 1044 by Nadeem Nair RN  Outcome: Ongoing     Problem: Daily Care:  Goal: Daily care needs are met  Description: Daily care needs are met  9/22/2021 1044 by Nadeem Nair RN  Outcome: Ongoing     Problem: Pain:  Goal: Patient's pain/discomfort is manageable  Description: Patient's pain/discomfort is manageable  9/22/2021 1044 by Nadeem Nair RN  Outcome: Ongoing     Problem: Skin Integrity:  Goal: Skin integrity will stabilize  Description: Skin integrity will stabilize  9/22/2021 1044 by Nadeem Nair RN  Outcome: Ongoing     Problem: Discharge Planning:  Goal: Patients continuum of care needs are met  Description: Patients continuum of care needs are met  9/22/2021 1044 by Nadeem Nair RN  Outcome: Ongoing     Problem:  Activity Intolerance:  Goal: Ability to tolerate increased activity will improve  Description: Ability to tolerate increased activity will improve  9/22/2021 1044 by Nadeem Nair RN  Outcome: Ongoing     Problem: Airway Clearance - Ineffective:  Goal: Ability to maintain a clear airway will improve  Description: Ability to maintain a clear airway will improve  9/22/2021 1044 by Nadeem Nair RN  Outcome: Ongoing     Problem: Breathing Pattern - Ineffective:  Goal: Ability to achieve and maintain a regular respiratory rate will improve  Description: Ability to achieve and maintain a regular respiratory rate will improve  9/22/2021 1044 by Nadeem Nair RN  Outcome: Ongoing     Problem: Gas Exchange - Impaired:  Goal: Levels of oxygenation will improve  Description: Levels of oxygenation will improve  9/22/2021 1044 by Maria Elena Valdez RN  Outcome: Ongoing     Problem: Health Behavior:  Goal: Identification of resources available to assist in meeting health care needs will improve  Description: Identification of resources available to assist in meeting health care needs will improve  9/22/2021 1044 by Maria Elena Valdez RN  Outcome: Ongoing     Problem: Physical Regulation:  Goal: Complications related to the disease process, condition or treatment will be avoided or minimized  Description: Complications related to the disease process, condition or treatment will be avoided or minimized  9/22/2021 1044 by Maria Elena Valdez RN  Outcome: Ongoing

## 2021-09-22 NOTE — PLAN OF CARE
Problem: Infection:  Goal: Will remain free from infection  Description: Will remain free from infection  9/21/2021 2332 by Maycol Kaye RN  Outcome: Ongoing  Note: Pt is free of signs and symptoms of infection. Incision and dressing are clean, dry and intact. Vital signs stable. Will monitor. 9/21/2021 1052 by Yanet Singleton RN  Outcome: Ongoing  Note: Patient remains free from new signs and symptoms of infection during this shift. Infection prevention measures are in place. Will continue to monitor for alterations in patient condition throughout the shift. Problem: Safety:  Goal: Free from accidental physical injury  Description: Free from accidental physical injury  9/21/2021 2332 by Maycol Kaye RN  Outcome: Ongoing  Note: Pt is free of injury. No injury noted. Fall precautions in place. Call light within reach. Will monitor. 9/21/2021 1052 by Yanet Singleton RN  Outcome: Ongoing  Note: Patient remains free from physical harm during this shift. Will continue to monitor and assess patient. Goal: Free from intentional harm  Description: Free from intentional harm  9/21/2021 2332 by Maycol Kaye RN  Outcome: Ongoing  Note: Pt is free of intentional harm. No intentions noted. Will monitor. 9/21/2021 1052 by Yanet Singleton RN  Outcome: Ongoing  Note: Patient remains free from intentional harm during this shift. Will continue to monitor and assess patient.         Problem: Daily Care:  Goal: Daily care needs are met  Description: Daily care needs are met  9/21/2021 2332 by Maycol Kaye RN  Outcome: Ongoing  Note: Patients daily care needs are met at this time    9/21/2021 1052 by Yanet Singleton RN  Note: Patient reports needs are met at this time, will continue to monitor and assess      Problem: Pain:  Goal: Patient's pain/discomfort is manageable  Description: Patient's pain/discomfort is manageable  9/21/2021 2332 by Maycol Kaye RN  Outcome: Ongoing  Note: Pt assessed for pain. Pt denies pain and assessed with 0-10 pain rating scale. Pt has not requested prescribed analgesic. (See eMar) Pt satisfied thus far. Will reassess and continue to monitor. 9/21/2021 1052 by Agueda Mendosa RN  Outcome: Ongoing  Note: Patient declines pain during this shift. Pharmacologic and non-pharmacologic interventions will be implemented as needed. Will continue to monitor and assess patient. Problem: Skin Integrity:  Goal: Skin integrity will stabilize  Description: Skin integrity will stabilize  9/21/2021 2332 by Carolyne Harrison RN  Outcome: Ongoing  Note: Patient will be absent of new skin breakdown    9/21/2021 1052 by Agueda Mendosa RN  Outcome: Ongoing  Note: Patient skin condition and mucus membrane integrity remain unchanged during this shift. Skin breakdown prevention interventions are in place. Will continue to monitor and assess. Problem: Discharge Planning:  Goal: Patients continuum of care needs are met  Description: Patients continuum of care needs are met  9/21/2021 2332 by Carolyne Harrison RN  Outcome: Ongoing  Note: Patients continuum of care need will be met    9/21/2021 1052 by Agueda Mendosa RN  Outcome: Ongoing  Note: Patient reports needs are met at this time, will continue to monitor and assess     Problem: Activity Intolerance:  Goal: Ability to tolerate increased activity will improve  Description: Ability to tolerate increased activity will improve  9/21/2021 2332 by Carolyne Harrison RN  Outcome: Ongoing  Note: Patients activity will improve    9/21/2021 1052 by Agueda Mendosa RN  Outcome: Ongoing  Note: Patient tolerating ambulation w/o complication.  Patient UAL at this time w/ steady gait      Problem: Airway Clearance - Ineffective:  Goal: Ability to maintain a clear airway will improve  Description: Ability to maintain a clear airway will improve  9/21/2021 2332 by Carolyne Harrison RN  Outcome: Ongoing  Note: Will continue to monitor patients airway    9/21/2021 1052 by Magda Spencer RN  Outcome: Ongoing  Note: Patient airway clear during this shift, supported by 2L of O2      Problem: Breathing Pattern - Ineffective:  Goal: Ability to achieve and maintain a regular respiratory rate will improve  Description: Ability to achieve and maintain a regular respiratory rate will improve  9/21/2021 2332 by Jessica Bermeo RN  Outcome: Ongoing  Note: Will continue to monitor patients respiratory status    9/21/2021 1052 by Magda Spencer RN  Outcome: Ongoing     Problem: Gas Exchange - Impaired:  Goal: Levels of oxygenation will improve  Description: Levels of oxygenation will improve  9/21/2021 2332 by Jessica Bermeo RN  Outcome: Ongoing  Note: Will continue to monitor patients oxygen level    9/21/2021 1052 by Magda Spencer RN  Outcome: Ongoing  Note: Patient remains on 2L of O2 via NC this shift to maintain O2 > 90%      Problem: Health Behavior:  Goal: Identification of resources available to assist in meeting health care needs will improve  Description: Identification of resources available to assist in meeting health care needs will improve  9/21/2021 2332 by Jessica Bermeo RN  Outcome: Ongoing  9/21/2021 1052 by Magda Spencer RN  Outcome: Ongoing     Problem: Physical Regulation:  Goal: Complications related to the disease process, condition or treatment will be avoided or minimized  Description: Complications related to the disease process, condition or treatment will be avoided or minimized  9/21/2021 2332 by Jessica Bermeo RN  Outcome: Ongoing  Note: Complications related to the disease process will be minimized    9/21/2021 1052 by Magda Spencer RN  Outcome: Ongoing   Electronically signed by Jessica Bermeo RN on 9/21/2021 at 11:35 PM

## 2021-09-22 NOTE — PROGRESS NOTES
Patient sitting on the side of the bed. Patient intake of breakfast was 100%. Tolerating breakfast well. Patient states he's eagerly anticipating discharge. Pateint denies pain at this time. Patient is alert and oriented x 4. Call light is within reach. Will continue to monitor.     Electronically signed by Shannan Klinefelter on 9/22/2021 at 8:51 AM

## 2021-09-22 NOTE — PROGRESS NOTES
Pulmonary Progress Note    Date of Admission: 9/2/2021   LOS: 20 days     Chief Complaint   Patient presents with    Abnormal Lab     sent by Dr. Tessa Mina for low potassium levels        Assessment/Plan:       Acute hypoxemic respiratory failure resolved  -Tolerating room air during my visit  -I-S/Acapella, OOB, PT/OT    Acute exacerbation of COPD  --Wheezing essentially resolved. Unclear if this was due to COPD versus mass affect causing airflow obstruction  -Completed steroids, doxycycline  -Symbicort, scheduled duo nebs, Singulair daily    Extensive stage small cell lung cancer  -Staging biopsy liver consistent with  small cell  -OHC following    Tobacco abuse    No further inpatient recommendations, we will sign off at this time. Please let us know if we can be of any further assistance    24 Hour Events/Subjective  Holding room air. No complaints currently. ROS:   No nausea  No Vomiting  No chest pain      Intake/Output Summary (Last 24 hours) at 9/22/2021 0942  Last data filed at 9/22/2021 0757  Gross per 24 hour   Intake 720 ml   Output 1825 ml   Net -1105 ml         PHYSICAL EXAM:   Blood pressure 119/74, pulse 98, temperature 98 °F (36.7 °C), temperature source Oral, resp. rate 16, height 6' (1.829 m), weight 204 lb 12.9 oz (92.9 kg), SpO2 97 %.'  Gen:  No acute distress. Eyes: PERRL. Anicteric sclera. No conjunctival injection. ENT: No discharge. Posterior oropharynx clear. External appearance of ears and nose normal.  Neck: Trachea midline. No mass   Resp:  No crackles. No wheezes. No rhonchi. No dullness on percussion. CV: Regular rate. Regular rhythm. No murmur or rub. 2+ lower extremity bilateral edema. GI: Soft, Non-tender. Non-distended. +BS  Skin: Warm, dry, w/o erythema. Lymph: No cervical or supraclavicular LAD. M/S: No cyanosis. No clubbing. Neuro:  no focal neurologic deficit. Moves all extremities  Psych: Awake and alert, Oriented x 3. Judgement and insight appropriate. W Color  Transthoracic Echocardiography Report (TTE)     Demographics      Patient Name      Everton Ash      Date of Study     09/03/2021          Gender              Male      Patient Number    [id number]          Date of Birth       1970      Visit Number      [id number]           Age                 46 year(s)      Accession Number  [id number]          Room Number         2787      Corporate ID      [id number]             Sonographer         Juan Montalvo, KIM,                                                             [facility]      Ordering          [facility],             [address] [place] [address]     Physician           Maxi Lepe MD     Procedure    Type of Study      TTE procedure:ECHOCARDIOGRAM COMPLETE 2D W DOPPLER W COLOR. Procedure Date  Date: 09/03/2021 Start: 02:20 PM    Study Location: [facility] - Echo Lab  Technical Quality: Limited visualization due to poor acoustical window. Additional Indications:Lower extremity swelling, evaluate the right heart and  LV; HTN, HLD, COPD. Patient Status: Routine    Height: 72 inches Weight: 247.01 pounds BSA: 2.33 m2 BMI: 33.5 kg/m2    Rhythm: Within normal limits HR: 70 bpm BP: 183/87 mmHg     Conclusions      Summary   Normal left ventricle size, wall thickness, and systolic function with an   estimated ejection fraction of 60-65%. No regional wall motion abnormalities   are seen. Normal diastolic function. Normal right ventricular size and function. No significant valve abnormalities noted.       Signature      ------------------------------------------------------------------   Electronically signed by Juana Smith MD   (Interpreting physician) on 09/03/2021 at 03:46 PM   ------------------------------------------------------------------ Findings      Left Ventricle   Normal left ventricle size, wall thickness, and systolic function with an   estimated ejection fraction of 60-65%. No regional wall motion abnormalities   are seen. Normal diastolic function. Mitral Valve   Mitral valve leaflets appear mildly thickened. No evidence of mitral regurgitation or stenosis. Left Atrium   The left atrium is normal in size. Aortic Valve   The aortic valve leaflets are not well visualized. No evidence of aortic valve regurgitation or stenosis. Aorta   The aortic root is normal in size. Right Ventricle   Normal right ventricular size and function. TAPSE 2.8 cm      Tricuspid Valve   The tricuspid valve was not well visualized. No evidence of tricuspid stenosis. Right Atrium   The right atrial size is normal.      Pulmonic Valve   The pulmonic valve is not well visualized. No evidence of pulmonic valve regurgitation. No evidence of pulmonic valve stenosis. Pericardial Effusion   No pericardial effusion noted. Pleural Effusion   No pleural effusion. Miscellaneous   IVC not well visualized.      M-Mode/2D Measurements (cm)      LV Diastolic Dimension: 0.72 cm LV Systolic Dimension: 9.34 cm   LV Septum Diastolic: 0.9 cm   LV PW Diastolic: 1.35 cm        AO Root Dimension: 3.4 cm                                      LA Area: 23.6 cm2   LVOT: 2.1 cm                    LA volume/Index: 70.5 ml /30 ml/m2     Doppler Measurements      AV Peak Velocity: 184 cm/s     MV Peak E-Wave: 106 cm/s   AV Peak Gradient: 13.54 mmHg   MV Peak A-Wave: 87.5 cm/s   AV Mean Gradient: 6 mmHg       MV E/A Ratio: 1.21   LVOT Peak Velocity: 160 cm/s   MV Mean Gradient: 2 mmHg   AV Area (Continuity):3.58 cm2  MV Max P mmHg                                  MV Vmax:114 cm/s                                  MV VTI:32.9 cm/s      E' Septal Velocity: 10.9 cm/s  MV Area (continuity): 3.61 cm2   E' Lateral Velocity: 15.5 cm/s MV Deceleration Time: 259 msec   PV Peak Velocity: 131 cm/s   PV Peak Gradient: 6.86 mmHg      Aortic Valve      Peak Velocity: 184 cm/s     Mean Velocity: 114 cm/s   Peak Gradient: 13.54 mmHg   Mean Gradient: 6 mmHg   Area (continuity): 3.58 cm2   AV VTI: 33.2 cm     Aorta      Aortic Root: 3.4 cm   Ascending Aorta: 3.3 cm   LVOT Diameter: 2.1 cm                 This note was transcribed using [employer]. Please disregard any translational errors.     Thank you for this consult,    Lisa Mayers MD  [facility], Critical Care, and Sleep Medicine

## 2021-09-22 NOTE — PROGRESS NOTES
Patient resting in bed this evening w/o complaint, eager for d/c tomorrow. Shift uneventful. Patient remains on 2L O2 via NC. Call light, telephone, and bed side table are within reach. Fall precautions in place.

## 2021-09-22 NOTE — PROGRESS NOTES
[facility]  Nephrology   Progress Note        We are following the patient for uncontrolled HTN Severe Hypokalemia  45 y/o WM, gray and heavy smoker admitted with severe Hypokalemia  He has h/o HTN for ~ 20 years that has been well controlled on Amlodipine  About 3 weeks ago he started to have leg swelling, weight gain fatigue and SOB  Out patient labs showed severe Hypokalemia  On admission K+ noted to be 2.2 meq CO2 40   Noted to have uncontrolled HTN with /108 mm  BP has remained resistant despite addition of multiple meds    SUBJECTIVE:  -pt seen and examined    HPI:  Breathing comfortably. No CP. Serum sodium is better. ROS:  In bed. [address]:  medications reviewed.     OBJECTIVE:     PHYSICAL:    /74   Pulse 98   Temp 98 °F (36.7 °C) (Oral)   Resp 16   Ht 6' (1.829 m)   Wt 204 lb 12.9 oz (92.9 kg)   SpO2 97%   BMI 27.78 kg/m²   24HR INTAKE/OUTPUT:      Intake/Output Summary (Last 24 hours) at 9/22/2021 1318  Last data filed at 9/22/2021 0757  Gross per 24 hour   Intake 720 ml   Output 1825 ml   Net -1105 ml       CONSTITUTIONAL:  awake, alert, cooperative, no apparent distress, and appears stated age  LUNGS: CTA  ABDOMEN:  No scars, normal bowel sounds, soft, non-distended  NEUROLOGIC:  alert, oriented, normal speech, no focal findings or movement disorder noted  SKIN: no bruising or bleeding  EXTREMITIES:  ++ edema     Data      CBC:   Recent Labs     09/21/21  0518   WBC 1.7*   RBC 3.43*   HGB 11.4*   HCT 33.8*   PLT 62*     BMP:    Recent Labs     09/20/21  0545 09/21/21  0518 09/22/21  0500   * 132* 135*   K 4.4 4.3 4.2   CL 96* 99 98*   CO2 21 25 24   BUN 17 15 18   CREATININE 0.7* 0.7* 0.7*   CALCIUM 8.3 8.5 8.5   GLUCOSE 121* 112* 109*     Phosphorus:    Recent Labs     09/20/21  0545   PHOS 2.7     Magnesium:    Recent Labs     09/20/21  0545 09/21/21  0518 09/22/21  0500   MG 2.00 1.70* 2.10         ASSESSMENT/PLAN    Hypertension resistant to treatment with multiple meds Concern for secondary HTN including Hypercortisolism ( Cortisol level 90 with adrenal nodule      24 hour urine for cortisol  Elevated at [address] !!  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 Edema  Suspect fluid retention due to hypercortisolism. Wt down , tolerating diuretics .      hyponatremia  - serum sodium level improving  - fluid restriction 1 liter/day can be slightly liberalized after discharge

## 2021-09-22 NOTE — PROGRESS NOTES
[facility] Internal Medicine Note      Chief Complaint: I feel good    Subjective/Interval History: This morning the patient looks good. He is now on room air. Eating and drinking well. No new problems overnight. Weight is down >45 pounds. 19 L negative fluid volume since admission. No chest pain. No cough or sputum. No nausea, vomiting, diarrhea. No abdominal pain. No dysuria. The remainder of the review of systems is negative. PMH, PSH, FH/SH reviewed and unchanged as documented in the H&P personally documented at admission 21    Medication list reviewed     Objective:    /74   Pulse 98   Temp 98 °F (36.7 °C) (Oral)   Resp 16   Ht 6' (1.829 m)   Wt 204 lb 12.9 oz (92.9 kg)   SpO2 97%   BMI 27.78 kg/m²   Temp  Av.1 °F (36.7 °C)  Min: 98 °F (36.7 °C)  Max: 98.4 °F (36.9 °C)    RRR  Chest-respirations easy. The chest is clear today. Respirations are easy. Abd- BS+, soft, nondistended  Ext-minimal edema below the knees. The Following Labs Were Reviewed Today:    Recent Results (from the past 24 hour(s))   POCT Glucose    Collection Time: 21 11:05 AM   Result Value Ref Range    POC Glucose 107 (H) 70 - 99 mg/dl    Performed on ACCU-CHEK    POCT Glucose    Collection Time: 21  4:20 PM   Result Value Ref Range    POC Glucose 128 (H) 70 - 99 mg/dl    Performed on ACCU-CHEK    POCT Glucose    Collection Time: 21  7:50 PM   Result Value Ref Range    POC Glucose 99 70 - 99 mg/dl    Performed on ACCU-CHEK    Magnesium    Collection Time: 21  5:00 AM   Result Value Ref Range    Magnesium 2.10 1.80 - 2.40 mg/dL   POCT Glucose    Collection Time: 21  6:14 AM   Result Value Ref Range    POC Glucose 121 (H) 70 - 99 mg/dl    Performed on ACCU-CHEK        ASSESSMENT/PLAN:      Principal Problem:    Hypokalemia-potassium level pending for today. Continue to monitor. Active Problems:    Hypoxia-improved oxygenation. Now on room air.  Home oxygen evaluation prior to discharge    Metastatic small cell lung cancer, poorly differentiated-ectopic ACTH production. Clinically much better. Moderate persistent asthma with acute exacerbation-continues to be without asthma. No wheezing. Pure hypercholesterolemia-continue to hold Lipitor given abnormal LFTs. Continue to monitor. Consider restarting as an outpatient. Essential hypertension- blood pressure remains low. Lisinopril and Cardura discontinued. Reduce for the pain to 30 mg twice daily. Bilateral leg edema-continue Lasix 40 mg daily. Continues on Aldactone 100 mg twice daily. Monitor renal function as an outpatient. Hyperglycemia-only on sliding scale. Will not need insulin at home. Hypomagnesemia-better and stable. Disposition-await renal function today. Await nephrology input and home oxygen assessment. Plan discharge today with outpatient follow-up next week.     Time > 30 minutes reviewing chart and patient data, examining and interviewing patient, and discussing with nursing staff, family, etc.       Deirdre Resendiz MD, [address]  9:51 AM  9/22/2021

## 2021-09-22 NOTE — PROGRESS NOTES
Physician Progress Note      Connor Greenfield  CSN #:                  [id number]  :                       1970  ADMIT DATE:       2021 10:52 AM  [address] [place] DATE:  RESPONDING  PROVIDER #:        Peri Isabel MD          QUERY TEXT:    Dear Dr. Coty Lau,  Patient admitted with Metastatic small cell lung cancer, noted to have low   WBC, H/H and Plt. If possible, please document in progress notes and discharge   summary if you are evaluating and/or treating any of the following: The medical record reflects the following:  Risk Factors: Current Metastatic small cell lung cancer on chemo  Clinical Indicators:  WBC=1.7, H/H=11.4/33.8, Plt=62, [facility] notes \"HE is day    8carbo etoposide atezolizumab\" and \"Even though wbc low he has had full   course of granix This is expected\"  Treatment: Granix, monitor labs  Thank you,  Anum Rosenthal RN, [email or web address]  Options provided:  -- Antineoplastic (chemotherapy) induced pancytopenia  -- Pancytopenia due to bone marrow infiltration  -- Pancytopenia due to myelodysplastic syndrome  -- Other - I will add my own diagnosis  -- Disagree - Not applicable / Not valid  -- Disagree - Clinically unable to determine / Unknown  -- Refer to Clinical Documentation Reviewer    PROVIDER RESPONSE TEXT:    Patient has antineoplastic (chemotherapy) induced pancytopenia. Query created by:  Ana Lam [facility] on 2021 1:13 PM      Electronically signed by:  Peri Isabel MD 2021 10:53 PM

## 2021-09-22 NOTE — RT PROTOCOL NOTE
RT Inhaler-Nebulizer Bronchodilator Protocol Note    There is a bronchodilator order in the chart from a provider indicating to follow the RT Bronchodilator Protocol and there is an Initiate RT Inhaler-Nebulizer Bronchodilator Protocol order as well (see protocol at bottom of note). CXR Findings:  No results found. The findings from the last RT Protocol Assessment were as follows:   History Pulmonary Disease: Chronic pulmonary disease  Respiratory Pattern: Regular pattern and RR 12-20 bpm  Breath Sounds: Slightly diminished and/or crackles  Cough: Strong, spontaneous, non-productive  Indication for Bronchodilator Therapy: Decreased or absent breath sounds  Bronchodilator Assessment Score: 4    Aerosolized bronchodilator medication orders have been revised according to the RT Inhaler-Nebulizer Bronchodilator Protocol below. Respiratory Therapist to perform RT Therapy Protocol Assessment initially then follow the protocol. Repeat RT Therapy Protocol Assessment PRN for score 0-3 or on second treatment, BID, and PRN for scores above 3. No Indications - adjust the frequency to every 6 hours PRN wheezing or bronchospasm, if no treatments needed after 48 hours then discontinue using Per Protocol order mode. If indication present, adjust the RT bronchodilator orders based on the Bronchodilator Assessment Score as indicated below. Use Inhaler orders unless patient has one or more of the following: on home nebulizer, not able to hold breath for 10 seconds, is not alert and oriented, cannot activate and use MDI correctly, or respiratory rate 25 breaths per minute or more, then use the equivalent nebulizer order(s) with same Frequency and PRN reasons based on the score. If a patient is on this medication at home then do not decrease Frequency below that used at home.     0-3 - enter or revise RT bronchodilator order(s) to equivalent RT Bronchodilator order with Frequency of every 4 hours PRN for wheezing or increased work of breathing using Per Protocol order mode. 4-6 - enter or revise RT Bronchodilator order(s) to two equivalent RT bronchodilator orders with one order with BID Frequency and one order with Frequency of every 4 hours PRN wheezing or increased work of breathing using Per Protocol order mode. 7-10 - enter or revise RT Bronchodilator order(s) to two equivalent RT bronchodilator orders with one order with TID Frequency and one order with Frequency of every 4 hours PRN wheezing or increased work of breathing using Per Protocol order mode. 11-13 - enter or revise RT Bronchodilator order(s) to one equivalent RT bronchodilator order with QID Frequency and an Albuterol order with Frequency of every 4 hours PRN wheezing or increased work of breathing using Per Protocol order mode. Greater than 13 - enter or revise RT Bronchodilator order(s) to one equivalent RT bronchodilator order with every 4 hours Frequency and an Albuterol order with Frequency of every 2 hours PRN wheezing or increased work of breathing using Per Protocol order mode. RT to enter RT Home Evaluation for COPD & MDI Assessment order using Per Protocol order mode.     Electronically signed by Shawanda Chua RCP on 9/21/2021 at 9:19 PM

## 2021-09-28 PROBLEM — C34.90 SMALL CELL LUNG CANCER (HCC): Status: ACTIVE | Noted: 2021-01-01

## 2021-09-28 PROBLEM — E24.3: Status: ACTIVE | Noted: 2021-01-01

## 2021-10-06 NOTE — PLAN OF CARE
Problem: Infection:  Goal: Will remain free from infection  Description: Will remain free from infection  Outcome: Ongoing  Note: Patient will remain free from infection. Problem: Safety:  Goal: Free from accidental physical injury  Description: Free from accidental physical injury  Outcome: Ongoing  Note: Pt assessed for fall risk and fall precautions put into place. Bed in lowest position and wheels locked, call light within reach. Nonskid footwear in place. Patient educated on appropriate method of transfer and to call for assistance. Goal: Free from intentional harm  Description: Free from intentional harm  Outcome: Ongoing  Note: Patient remains free from intentional harm, no signs or symptoms of intention to harm noted. Will continue to monitor patient throughout shift. Problem: Daily Care:  Goal: Daily care needs are met  Description: Daily care needs are met  Outcome: Ongoing  Note: Patient assessed to determine ability to perform daily needs and ADLs. Patient assisted with any daily needs that were not able to be met individually by patient. Addison encouraged, although patient educated to ask for assistance when needed. Will continue to monitor and assist patient with meeting daily care needs as needed. Problem: Pain:  Goal: Patient's pain/discomfort is manageable  Description: Patient's pain/discomfort is manageable  Outcome: Ongoing  Note: Educated patient on pain management. Will assess patients pain level per unit protocol, and provide pain management measures as needed. Problem: Skin Integrity:  Goal: Skin integrity will stabilize  Description: Skin integrity will stabilize  Outcome: Ongoing  Note: Will monitor skin and mucous members. Will turn patient every 2 hours, monitor for friction and sheering, and change dressings as needed. Will preform skin assessment every shift.          Problem: Discharge Planning:  Goal: Patients continuum of care needs are met  Description: Patients continuum of care needs are met  Outcome: Ongoing  Note: Assessed patients knowledge of discharge. Will continue to work with patient on discharge planning and answer patient questions. Will consult case management and  as necessary. Problem: Activity Intolerance:  Goal: Ability to tolerate increased activity will improve  Description: Ability to tolerate increased activity will improve  Outcome: Ongoing  Note: Early and frequent ambulqtion encouraged. Educated patient on importance of early ambulation. Patient assisted with ambulation. Will continue to monitor. Problem: Airway Clearance - Ineffective:  Goal: Ability to maintain a clear airway will improve  Description: Ability to maintain a clear airway will improve  Outcome: Ongoing  Note: Patient will maintain patent airway. Problem: Breathing Pattern - Ineffective:  Goal: Ability to achieve and maintain a regular respiratory rate will improve  Description: Ability to achieve and maintain a regular respiratory rate will improve  Outcome: Ongoing  Note: Patient respiratory rate within normal limits. Will continue to monitor throughout the shift. Problem: Gas Exchange - Impaired:  Goal: Levels of oxygenation will improve  Description: Levels of oxygenation will improve  Outcome: Ongoing  Note: Patient will have improved levels of oxygenation. Problem: Health Behavior:  Goal: Identification of resources available to assist in meeting health care needs will improve  Description: Identification of resources available to assist in meeting health care needs will improve  Outcome: Ongoing  Note: Patient will be able to identify available resources in meeting health care needs.       Problem: Physical Regulation:  Goal: Complications related to the disease process, condition or treatment will be avoided or minimized  Description: Complications related to the disease process, condition or treatment will be avoided or minimized  Outcome: Ongoing  Note: Complications related to the disease process, condition and treatment will be avoided or minimized. [Home] : at home, [unfilled] , at the time of the visit. [Medical Office: ([facility])___] : at the medical office located in  [Follow-Up: _____] : a [unfilled] follow-up visit [Spouse] : spouse

## 2021-11-02 NOTE — DISCHARGE SUMMARY
neuroendocrine features. At that point, it was evident  that the patient had an ACTH-producing neuroendocrine small cell tumor. The patient was consulted on by Nephrology, Pulmonology and Oncology. HOSPITAL COURSE:  1. 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 The patient did receive his first cycle of  his chemotherapy while in the hospital.  Throughout this, his potassium  was supplemented and his blood pressure was treated and his diabetes was  treated aggressively. After his treatment, his blood pressure became  manageable and lisinopril and Cardura which had been added were able to  be discontinued. His leg edema was markedly improved after aggressive  diuresis and over the course of hospitalization, the patient diuresed  more than 45 pounds and 90 liters of fluid. He was planned to get  outpatient treatment with Dr. Gabriella [facility]. His metabolic derangements improved dramatically with treatment of his  cancer as well with marked improvement in his potassium level and blood  sugar as noted. 2.  Hypokalemia. The patient did leave with a normal potassium. He had  severe bilateral leg edema and he was continued to be treated with Lasix  40 mg daily as well as Aldactone 100 mg twice daily. His renal function  was to be monitored as an outpatient. 3.  Hyperglycemia. The patient's severe hyperglycemia was related to  his ACTH production. He was able to discharge without insulin at home  as his numbers had improved dramatically. 4.  Asthma. The patient's asthma improved throughout the  hospitalization and he was no longer on steroids. 5.  Hyperlipidemia. Lipitor is on hold given his abnormal LFTs. Please see the discharge med rec form for discharge medications. The  followup was to be with Oncology and with Dr. Eileen Mauricio.   Monitoring of  his electrolytes and blood sugar and blood pressure would be performed  on an ongoing

## 2022-01-01 ENCOUNTER — HOSPITAL ENCOUNTER (INPATIENT)
Age: 52
LOS: 12 days | DRG: 136 | End: 2022-07-27
Attending: INTERNAL MEDICINE | Admitting: INTERNAL MEDICINE
Payer: COMMERCIAL

## 2022-01-01 ENCOUNTER — APPOINTMENT (OUTPATIENT)
Dept: CT IMAGING | Age: 52
DRG: 136 | End: 2022-01-01
Payer: COMMERCIAL

## 2022-01-01 ENCOUNTER — APPOINTMENT (OUTPATIENT)
Dept: GENERAL RADIOLOGY | Age: 52
DRG: 136 | End: 2022-01-01
Payer: COMMERCIAL

## 2022-01-01 ENCOUNTER — HOSPITAL ENCOUNTER (OUTPATIENT)
Dept: NUCLEAR MEDICINE | Age: 52
Discharge: HOME | End: 2022-07-08
Payer: COMMERCIAL

## 2022-01-01 ENCOUNTER — HOSPITAL ENCOUNTER (OUTPATIENT)
Dept: CT IMAGING | Age: 52
Discharge: HOME | End: 2022-07-08
Payer: COMMERCIAL

## 2022-01-01 ENCOUNTER — APPOINTMENT (OUTPATIENT)
Dept: MRI IMAGING | Age: 52
DRG: 136 | End: 2022-01-01
Payer: COMMERCIAL

## 2022-01-01 ENCOUNTER — HOSPITAL ENCOUNTER (OUTPATIENT)
Dept: NUCLEAR MEDICINE | Age: 52
Discharge: HOME | End: 2022-03-18
Payer: COMMERCIAL

## 2022-01-01 ENCOUNTER — HOSPITAL ENCOUNTER (OUTPATIENT)
Dept: CT IMAGING | Age: 52
Discharge: HOME | End: 2022-03-18
Payer: COMMERCIAL

## 2022-01-01 VITALS
HEIGHT: 73 IN | TEMPERATURE: 99.1 F | RESPIRATION RATE: 17 BRPM | DIASTOLIC BLOOD PRESSURE: 36 MMHG | BODY MASS INDEX: 32.02 KG/M2 | SYSTOLIC BLOOD PRESSURE: 63 MMHG | WEIGHT: 241.62 LBS | OXYGEN SATURATION: 93 %

## 2022-01-01 DIAGNOSIS — J96.01 ACUTE RESPIRATORY FAILURE WITH HYPOXIA (HCC): Primary | ICD-10-CM

## 2022-01-01 DIAGNOSIS — C79.51 SECONDARY MALIGNANT NEOPLASM OF BONE (HCC): ICD-10-CM

## 2022-01-01 DIAGNOSIS — C34.90 SMALL CELL LUNG CANCER (HCC): ICD-10-CM

## 2022-01-01 DIAGNOSIS — C34.01 MALIGNANT NEOPLASM OF RIGHT MAIN BRONCHUS (HCC): ICD-10-CM

## 2022-01-01 DIAGNOSIS — E87.6 HYPOKALEMIA: ICD-10-CM

## 2022-01-01 LAB
A/G RATIO: 1.4 (ref 1.1–2.2)
A/G RATIO: 1.6 (ref 1.1–2.2)
ABO/RH: NORMAL
ALBUMIN SERPL-MCNC: 1.5 G/DL (ref 3.4–5)
ALBUMIN SERPL-MCNC: 2.9 G/DL (ref 3.4–5)
ALBUMIN SERPL-MCNC: 3.2 G/DL (ref 3.4–5)
ALP BLD-CCNC: 135 U/L (ref 40–129)
ALP BLD-CCNC: 327 U/L (ref 40–129)
ALP BLD-CCNC: 339 U/L (ref 40–129)
ALT SERPL-CCNC: 134 U/L (ref 10–40)
ALT SERPL-CCNC: 141 U/L (ref 10–40)
ALT SERPL-CCNC: 68 U/L (ref 10–40)
ANION GAP SERPL CALCULATED.3IONS-SCNC: 10 MMOL/L (ref 3–16)
ANION GAP SERPL CALCULATED.3IONS-SCNC: 12 MMOL/L (ref 3–16)
ANION GAP SERPL CALCULATED.3IONS-SCNC: 12 MMOL/L (ref 3–16)
ANION GAP SERPL CALCULATED.3IONS-SCNC: 13 MMOL/L (ref 3–16)
ANION GAP SERPL CALCULATED.3IONS-SCNC: 14 MMOL/L (ref 3–16)
ANION GAP SERPL CALCULATED.3IONS-SCNC: 15 MMOL/L (ref 3–16)
ANION GAP SERPL CALCULATED.3IONS-SCNC: 16 MMOL/L (ref 3–16)
ANION GAP SERPL CALCULATED.3IONS-SCNC: 17 MMOL/L (ref 3–16)
ANION GAP SERPL CALCULATED.3IONS-SCNC: 17 MMOL/L (ref 3–16)
ANION GAP SERPL CALCULATED.3IONS-SCNC: 19 MMOL/L (ref 3–16)
ANION GAP SERPL CALCULATED.3IONS-SCNC: 5 MMOL/L (ref 3–16)
ANION GAP SERPL CALCULATED.3IONS-SCNC: 8 MMOL/L (ref 3–16)
ANION GAP SERPL CALCULATED.3IONS-SCNC: 8 MMOL/L (ref 3–16)
ANTIBODY SCREEN: NORMAL
APPEARANCE BAL (LAVAGE): NORMAL
APTT: 29.3 SEC (ref 23–34.3)
AST SERPL-CCNC: 40 U/L (ref 15–37)
AST SERPL-CCNC: 69 U/L (ref 15–37)
AST SERPL-CCNC: 89 U/L (ref 15–37)
BASE EXCESS ARTERIAL: 2.7 MMOL/L (ref -3–3)
BASE EXCESS ARTERIAL: 4.2 MMOL/L (ref -3–3)
BASOPHILS ABSOLUTE: 0 K/UL (ref 0–0.2)
BASOPHILS RELATIVE PERCENT: 0.1 %
BASOPHILS RELATIVE PERCENT: 0.2 %
BASOPHILS RELATIVE PERCENT: 0.4 %
BASOPHILS RELATIVE PERCENT: 0.4 %
BASOPHILS RELATIVE PERCENT: 0.6 %
BETA-HYDROXYBUTYRATE: 3.79 MMOL/L (ref 0–0.27)
BILIRUB SERPL-MCNC: 1.1 MG/DL (ref 0–1)
BILIRUB SERPL-MCNC: 1.3 MG/DL (ref 0–1)
BILIRUB SERPL-MCNC: 1.6 MG/DL (ref 0–1)
BILIRUBIN DIRECT: 0.9 MG/DL (ref 0–0.3)
BILIRUBIN, INDIRECT: 0.2 MG/DL (ref 0–1)
BLOOD BANK DISPENSE STATUS: NORMAL
BLOOD BANK PRODUCT CODE: NORMAL
BPU ID: NORMAL
BUN BLDV-MCNC: 10 MG/DL (ref 7–20)
BUN BLDV-MCNC: 14 MG/DL (ref 7–20)
BUN BLDV-MCNC: 14 MG/DL (ref 7–20)
BUN BLDV-MCNC: 20 MG/DL (ref 7–20)
BUN BLDV-MCNC: 20 MG/DL (ref 7–20)
BUN BLDV-MCNC: 24 MG/DL (ref 7–20)
BUN BLDV-MCNC: 24 MG/DL (ref 7–20)
BUN BLDV-MCNC: 25 MG/DL (ref 7–20)
BUN BLDV-MCNC: 28 MG/DL (ref 7–20)
BUN BLDV-MCNC: 33 MG/DL (ref 7–20)
BUN BLDV-MCNC: 42 MG/DL (ref 7–20)
BUN BLDV-MCNC: 44 MG/DL (ref 7–20)
BUN BLDV-MCNC: 54 MG/DL (ref 7–20)
BUN BLDV-MCNC: 59 MG/DL (ref 7–20)
BUN BLDV-MCNC: 64 MG/DL (ref 7–20)
BUN BLDV-MCNC: 71 MG/DL (ref 7–20)
BUN BLDV-MCNC: 9 MG/DL (ref 7–20)
CALCIUM SERPL-MCNC: 6.6 MG/DL (ref 8.3–10.6)
CALCIUM SERPL-MCNC: 7.1 MG/DL (ref 8.3–10.6)
CALCIUM SERPL-MCNC: 7.2 MG/DL (ref 8.3–10.6)
CALCIUM SERPL-MCNC: 7.4 MG/DL (ref 8.3–10.6)
CALCIUM SERPL-MCNC: 7.4 MG/DL (ref 8.3–10.6)
CALCIUM SERPL-MCNC: 7.5 MG/DL (ref 8.3–10.6)
CALCIUM SERPL-MCNC: 7.7 MG/DL (ref 8.3–10.6)
CALCIUM SERPL-MCNC: 7.7 MG/DL (ref 8.3–10.6)
CALCIUM SERPL-MCNC: 7.9 MG/DL (ref 8.3–10.6)
CALCIUM SERPL-MCNC: 8 MG/DL (ref 8.3–10.6)
CALCIUM SERPL-MCNC: 8.1 MG/DL (ref 8.3–10.6)
CALCIUM SERPL-MCNC: 8.1 MG/DL (ref 8.3–10.6)
CALCIUM SERPL-MCNC: 8.2 MG/DL (ref 8.3–10.6)
CALCIUM SERPL-MCNC: 8.8 MG/DL (ref 8.3–10.6)
CARBOXYHEMOGLOBIN ARTERIAL: 1.4 % (ref 0–1.5)
CARBOXYHEMOGLOBIN ARTERIAL: 1.5 % (ref 0–1.5)
CHLORIDE BLD-SCNC: 78 MMOL/L (ref 99–110)
CHLORIDE BLD-SCNC: 85 MMOL/L (ref 99–110)
CHLORIDE BLD-SCNC: 93 MMOL/L (ref 99–110)
CHLORIDE BLD-SCNC: 94 MMOL/L (ref 99–110)
CHLORIDE BLD-SCNC: 95 MMOL/L (ref 99–110)
CHLORIDE BLD-SCNC: 96 MMOL/L (ref 99–110)
CHLORIDE BLD-SCNC: 97 MMOL/L (ref 99–110)
CHLORIDE BLD-SCNC: 97 MMOL/L (ref 99–110)
CHLORIDE BLD-SCNC: 99 MMOL/L (ref 99–110)
CLOT EVALUATION BAL: NORMAL
CO2: 23 MMOL/L (ref 21–32)
CO2: 25 MMOL/L (ref 21–32)
CO2: 25 MMOL/L (ref 21–32)
CO2: 26 MMOL/L (ref 21–32)
CO2: 28 MMOL/L (ref 21–32)
CO2: 30 MMOL/L (ref 21–32)
CO2: 33 MMOL/L (ref 21–32)
CO2: 33 MMOL/L (ref 21–32)
CO2: 34 MMOL/L (ref 21–32)
CO2: 34 MMOL/L (ref 21–32)
CO2: 36 MMOL/L (ref 21–32)
CO2: 36 MMOL/L (ref 21–32)
CO2: 38 MMOL/L (ref 21–32)
CO2: 40 MMOL/L (ref 21–32)
COLOR LAVAGE: NORMAL
CREAT SERPL-MCNC: 0.6 MG/DL (ref 0.9–1.3)
CREAT SERPL-MCNC: 0.6 MG/DL (ref 0.9–1.3)
CREAT SERPL-MCNC: 0.7 MG/DL (ref 0.9–1.3)
CREAT SERPL-MCNC: 0.8 MG/DL (ref 0.9–1.3)
CREAT SERPL-MCNC: 0.9 MG/DL (ref 0.9–1.3)
CREAT SERPL-MCNC: 1.1 MG/DL (ref 0.9–1.3)
CREAT SERPL-MCNC: 1.5 MG/DL (ref 0.9–1.3)
CREAT SERPL-MCNC: 1.6 MG/DL (ref 0.9–1.3)
CREAT SERPL-MCNC: 2 MG/DL (ref 0.9–1.3)
CREAT SERPL-MCNC: 2.3 MG/DL (ref 0.9–1.3)
CREAT SERPL-MCNC: 2.8 MG/DL (ref 0.9–1.3)
CREAT SERPL-MCNC: 2.9 MG/DL (ref 0.9–1.3)
CREAT SERPL-MCNC: 3.1 MG/DL (ref 0.9–1.3)
CREAT SERPL-MCNC: 3.7 MG/DL (ref 0.9–1.3)
CREAT SERPL-MCNC: <0.5 MG/DL (ref 0.9–1.3)
D DIMER: 1.31 UG/ML FEU (ref 0–0.6)
DESCRIPTION BLOOD BANK: NORMAL
EKG ATRIAL RATE: 76 BPM
EKG ATRIAL RATE: 78 BPM
EKG ATRIAL RATE: 97 BPM
EKG DIAGNOSIS: NORMAL
EKG P AXIS: 61 DEGREES
EKG P AXIS: 62 DEGREES
EKG P AXIS: 62 DEGREES
EKG P-R INTERVAL: 148 MS
EKG P-R INTERVAL: 148 MS
EKG P-R INTERVAL: 154 MS
EKG Q-T INTERVAL: 366 MS
EKG Q-T INTERVAL: 416 MS
EKG Q-T INTERVAL: 420 MS
EKG QRS DURATION: 88 MS
EKG QRS DURATION: 96 MS
EKG QRS DURATION: 96 MS
EKG QTC CALCULATION (BAZETT): 464 MS
EKG QTC CALCULATION (BAZETT): 468 MS
EKG QTC CALCULATION (BAZETT): 478 MS
EKG R AXIS: 30 DEGREES
EKG R AXIS: 39 DEGREES
EKG R AXIS: 40 DEGREES
EKG T AXIS: 37 DEGREES
EKG T AXIS: 37 DEGREES
EKG T AXIS: 42 DEGREES
EKG VENTRICULAR RATE: 76 BPM
EKG VENTRICULAR RATE: 78 BPM
EKG VENTRICULAR RATE: 97 BPM
EOSINOPHILS ABSOLUTE: 0 K/UL (ref 0–0.6)
EOSINOPHILS RELATIVE PERCENT: 0 %
EOSINOPHILS RELATIVE PERCENT: 0.1 %
EOSINOPHILS RELATIVE PERCENT: 0.1 %
ESTIMATED AVERAGE GLUCOSE: 211.6 MG/DL
GFR AFRICAN AMERICAN: 21
GFR AFRICAN AMERICAN: 26
GFR AFRICAN AMERICAN: 28
GFR AFRICAN AMERICAN: 29
GFR AFRICAN AMERICAN: 36
GFR AFRICAN AMERICAN: 42
GFR AFRICAN AMERICAN: 55
GFR AFRICAN AMERICAN: 59
GFR AFRICAN AMERICAN: >60
GFR NON-AFRICAN AMERICAN: 17
GFR NON-AFRICAN AMERICAN: 21
GFR NON-AFRICAN AMERICAN: 23
GFR NON-AFRICAN AMERICAN: 24
GFR NON-AFRICAN AMERICAN: 30
GFR NON-AFRICAN AMERICAN: 35
GFR NON-AFRICAN AMERICAN: 45
GFR NON-AFRICAN AMERICAN: 49
GFR NON-AFRICAN AMERICAN: >60
GLUCOSE BLD-MCNC: 103 MG/DL (ref 70–99)
GLUCOSE BLD-MCNC: 113 MG/DL (ref 70–99)
GLUCOSE BLD-MCNC: 121 MG/DL (ref 70–99)
GLUCOSE BLD-MCNC: 122 MG/DL (ref 70–99)
GLUCOSE BLD-MCNC: 130 MG/DL (ref 70–99)
GLUCOSE BLD-MCNC: 138 MG/DL (ref 70–99)
GLUCOSE BLD-MCNC: 150 MG/DL (ref 70–99)
GLUCOSE BLD-MCNC: 151 MG/DL (ref 70–99)
GLUCOSE BLD-MCNC: 153 MG/DL (ref 70–99)
GLUCOSE BLD-MCNC: 159 MG/DL (ref 70–99)
GLUCOSE BLD-MCNC: 159 MG/DL (ref 70–99)
GLUCOSE BLD-MCNC: 175 MG/DL (ref 70–99)
GLUCOSE BLD-MCNC: 179 MG/DL (ref 70–99)
GLUCOSE BLD-MCNC: 180 MG/DL (ref 70–99)
GLUCOSE BLD-MCNC: 192 MG/DL (ref 70–99)
GLUCOSE BLD-MCNC: 197 MG/DL (ref 70–99)
GLUCOSE BLD-MCNC: 199 MG/DL (ref 70–99)
GLUCOSE BLD-MCNC: 200 MG/DL (ref 70–99)
GLUCOSE BLD-MCNC: 201 MG/DL (ref 70–99)
GLUCOSE BLD-MCNC: 209 MG/DL (ref 70–99)
GLUCOSE BLD-MCNC: 219 MG/DL (ref 70–99)
GLUCOSE BLD-MCNC: 221 MG/DL (ref 70–99)
GLUCOSE BLD-MCNC: 240 MG/DL (ref 70–99)
GLUCOSE BLD-MCNC: 242 MG/DL (ref 70–99)
GLUCOSE BLD-MCNC: 246 MG/DL (ref 70–99)
GLUCOSE BLD-MCNC: 251 MG/DL (ref 70–99)
GLUCOSE BLD-MCNC: 256 MG/DL (ref 70–99)
GLUCOSE BLD-MCNC: 261 MG/DL (ref 70–99)
GLUCOSE BLD-MCNC: 263 MG/DL (ref 70–99)
GLUCOSE BLD-MCNC: 265 MG/DL (ref 70–99)
GLUCOSE BLD-MCNC: 267 MG/DL (ref 70–99)
GLUCOSE BLD-MCNC: 273 MG/DL (ref 70–99)
GLUCOSE BLD-MCNC: 286 MG/DL (ref 70–99)
GLUCOSE BLD-MCNC: 301 MG/DL (ref 70–99)
GLUCOSE BLD-MCNC: 302 MG/DL (ref 70–99)
GLUCOSE BLD-MCNC: 308 MG/DL (ref 70–99)
GLUCOSE BLD-MCNC: 309 MG/DL (ref 70–99)
GLUCOSE BLD-MCNC: 309 MG/DL (ref 70–99)
GLUCOSE BLD-MCNC: 311 MG/DL (ref 70–99)
GLUCOSE BLD-MCNC: 320 MG/DL (ref 70–99)
GLUCOSE BLD-MCNC: 320 MG/DL (ref 70–99)
GLUCOSE BLD-MCNC: 326 MG/DL (ref 70–99)
GLUCOSE BLD-MCNC: 326 MG/DL (ref 70–99)
GLUCOSE BLD-MCNC: 329 MG/DL (ref 70–99)
GLUCOSE BLD-MCNC: 330 MG/DL (ref 70–99)
GLUCOSE BLD-MCNC: 332 MG/DL (ref 70–99)
GLUCOSE BLD-MCNC: 333 MG/DL (ref 70–99)
GLUCOSE BLD-MCNC: 348 MG/DL (ref 70–99)
GLUCOSE BLD-MCNC: 349 MG/DL (ref 70–99)
GLUCOSE BLD-MCNC: 350 MG/DL (ref 70–99)
GLUCOSE BLD-MCNC: 363 MG/DL (ref 70–99)
GLUCOSE BLD-MCNC: 367 MG/DL (ref 70–99)
GLUCOSE BLD-MCNC: 372 MG/DL (ref 70–99)
GLUCOSE BLD-MCNC: 383 MG/DL (ref 70–99)
GLUCOSE BLD-MCNC: 388 MG/DL (ref 70–99)
GLUCOSE BLD-MCNC: 389 MG/DL (ref 70–99)
GLUCOSE BLD-MCNC: 389 MG/DL (ref 70–99)
GLUCOSE BLD-MCNC: 392 MG/DL (ref 70–99)
GLUCOSE BLD-MCNC: 408 MG/DL (ref 70–99)
GLUCOSE BLD-MCNC: 432 MG/DL (ref 70–99)
GLUCOSE BLD-MCNC: 469 MG/DL (ref 70–99)
GLUCOSE BLD-MCNC: 477 MG/DL (ref 70–99)
GLUCOSE BLD-MCNC: 53 MG/DL (ref 70–99)
GLUCOSE BLD-MCNC: 72 MG/DL (ref 70–99)
GLUCOSE BLD-MCNC: 74 MG/DL (ref 70–99)
GLUCOSE BLD-MCNC: 75 MG/DL (ref 70–99)
GLUCOSE BLD-MCNC: 76 MG/DL (ref 70–99)
GLUCOSE BLD-MCNC: 88 MG/DL (ref 70–99)
GLUCOSE BLD-MCNC: 92 MG/DL (ref 70–99)
GLUCOSE BLD-MCNC: 94 MG/DL (ref 70–99)
HBA1C MFR BLD: 9 %
HCO3 ARTERIAL: 28 MMOL/L (ref 21–29)
HCO3 ARTERIAL: 28.6 MMOL/L (ref 21–29)
HCT VFR BLD CALC: 14.4 % (ref 40.5–52.5)
HCT VFR BLD CALC: 14.7 % (ref 40.5–52.5)
HCT VFR BLD CALC: 15.6 % (ref 40.5–52.5)
HCT VFR BLD CALC: 17.2 % (ref 40.5–52.5)
HCT VFR BLD CALC: 17.4 % (ref 40.5–52.5)
HCT VFR BLD CALC: 18 % (ref 40.5–52.5)
HCT VFR BLD CALC: 18.6 % (ref 40.5–52.5)
HCT VFR BLD CALC: 19 % (ref 40.5–52.5)
HCT VFR BLD CALC: 20.9 % (ref 40.5–52.5)
HCT VFR BLD CALC: 24.9 % (ref 40.5–52.5)
HCT VFR BLD CALC: 29.1 % (ref 40.5–52.5)
HCT VFR BLD CALC: 30.8 % (ref 40.5–52.5)
HCT VFR BLD CALC: 31.1 % (ref 40.5–52.5)
HCT VFR BLD CALC: 31.7 % (ref 40.5–52.5)
HEMATOLOGY PATH CONSULT: NO
HEMATOLOGY PATH CONSULT: NORMAL
HEMOGLOBIN, ART, EXTENDED: 5.4 G/DL (ref 13.5–17.5)
HEMOGLOBIN, ART, EXTENDED: 7.3 G/DL (ref 13.5–17.5)
HEMOGLOBIN: 10.1 G/DL (ref 13.5–17.5)
HEMOGLOBIN: 10.7 G/DL (ref 13.5–17.5)
HEMOGLOBIN: 11 G/DL (ref 13.5–17.5)
HEMOGLOBIN: 11.2 G/DL (ref 13.5–17.5)
HEMOGLOBIN: 4.9 G/DL (ref 13.5–17.5)
HEMOGLOBIN: 5.1 G/DL (ref 13.5–17.5)
HEMOGLOBIN: 6.1 G/DL (ref 13.5–17.5)
HEMOGLOBIN: 6.3 G/DL (ref 13.5–17.5)
HEMOGLOBIN: 6.3 G/DL (ref 13.5–17.5)
HEMOGLOBIN: 7 G/DL (ref 13.5–17.5)
HEMOGLOBIN: 7.2 G/DL (ref 13.5–17.5)
HEMOGLOBIN: 7.2 G/DL (ref 13.5–17.5)
HEMOGLOBIN: 7.6 G/DL (ref 13.5–17.5)
HEMOGLOBIN: 8.7 G/DL (ref 13.5–17.5)
INR BLD: 1.39 (ref 0.87–1.14)
LV EF: 60 %
LVEF MODALITY: NORMAL
LYMPHOCYTES ABSOLUTE: 0.3 K/UL (ref 1–5.1)
LYMPHOCYTES ABSOLUTE: 0.3 K/UL (ref 1–5.1)
LYMPHOCYTES ABSOLUTE: 0.4 K/UL (ref 1–5.1)
LYMPHOCYTES ABSOLUTE: 0.5 K/UL (ref 1–5.1)
LYMPHOCYTES ABSOLUTE: 0.5 K/UL (ref 1–5.1)
LYMPHOCYTES RELATIVE PERCENT: 4.8 %
LYMPHOCYTES RELATIVE PERCENT: 7.1 %
LYMPHOCYTES RELATIVE PERCENT: 7.8 %
LYMPHOCYTES RELATIVE PERCENT: 7.8 %
LYMPHOCYTES RELATIVE PERCENT: 8.9 %
MAGNESIUM: 1.6 MG/DL (ref 1.8–2.4)
MAGNESIUM: 1.7 MG/DL (ref 1.8–2.4)
MAGNESIUM: 1.9 MG/DL (ref 1.8–2.4)
MAGNESIUM: 2 MG/DL (ref 1.8–2.4)
MAGNESIUM: 2.2 MG/DL (ref 1.8–2.4)
MAGNESIUM: 2.5 MG/DL (ref 1.8–2.4)
MAGNESIUM: 2.6 MG/DL (ref 1.8–2.4)
MAGNESIUM: 2.8 MG/DL (ref 1.8–2.4)
MCH RBC QN AUTO: 34 PG (ref 26–34)
MCH RBC QN AUTO: 35.3 PG (ref 26–34)
MCH RBC QN AUTO: 35.7 PG (ref 26–34)
MCH RBC QN AUTO: 35.9 PG (ref 26–34)
MCH RBC QN AUTO: 36 PG (ref 26–34)
MCH RBC QN AUTO: 36.1 PG (ref 26–34)
MCH RBC QN AUTO: 36.4 PG (ref 26–34)
MCH RBC QN AUTO: 36.7 PG (ref 26–34)
MCH RBC QN AUTO: 39.7 PG (ref 26–34)
MCHC RBC AUTO-ENTMCNC: 34.1 G/DL (ref 31–36)
MCHC RBC AUTO-ENTMCNC: 34.5 G/DL (ref 31–36)
MCHC RBC AUTO-ENTMCNC: 34.6 G/DL (ref 31–36)
MCHC RBC AUTO-ENTMCNC: 34.8 G/DL (ref 31–36)
MCHC RBC AUTO-ENTMCNC: 34.9 G/DL (ref 31–36)
MCHC RBC AUTO-ENTMCNC: 35.3 G/DL (ref 31–36)
MCHC RBC AUTO-ENTMCNC: 35.4 G/DL (ref 31–36)
MCHC RBC AUTO-ENTMCNC: 35.4 G/DL (ref 31–36)
MCHC RBC AUTO-ENTMCNC: 36.1 G/DL (ref 31–36)
MCHC RBC AUTO-ENTMCNC: 36.3 G/DL (ref 31–36)
MCHC RBC AUTO-ENTMCNC: 40.4 G/DL (ref 31–36)
MCV RBC AUTO: 103.3 FL (ref 80–100)
MCV RBC AUTO: 103.3 FL (ref 80–100)
MCV RBC AUTO: 103.5 FL (ref 80–100)
MCV RBC AUTO: 103.9 FL (ref 80–100)
MCV RBC AUTO: 104.6 FL (ref 80–100)
MCV RBC AUTO: 104.7 FL (ref 80–100)
MCV RBC AUTO: 106.7 FL (ref 80–100)
MCV RBC AUTO: 96 FL (ref 80–100)
MCV RBC AUTO: 97.8 FL (ref 80–100)
MCV RBC AUTO: 98.2 FL (ref 80–100)
MCV RBC AUTO: 98.4 FL (ref 80–100)
METHEMOGLOBIN ARTERIAL: 0.8 %
METHEMOGLOBIN ARTERIAL: 1 %
MONOCYTES ABSOLUTE: 0.1 K/UL (ref 0–1.3)
MONOCYTES ABSOLUTE: 0.2 K/UL (ref 0–1.3)
MONOCYTES ABSOLUTE: 0.2 K/UL (ref 0–1.3)
MONOCYTES ABSOLUTE: 0.3 K/UL (ref 0–1.3)
MONOCYTES ABSOLUTE: 0.3 K/UL (ref 0–1.3)
MONOCYTES RELATIVE PERCENT: 2.3 %
MONOCYTES RELATIVE PERCENT: 3.9 %
MONOCYTES RELATIVE PERCENT: 4.3 %
MONOCYTES RELATIVE PERCENT: 4.8 %
MONOCYTES RELATIVE PERCENT: 5.4 %
NEUTROPHILS ABSOLUTE: 4.1 K/UL (ref 1.7–7.7)
NEUTROPHILS ABSOLUTE: 4.2 K/UL (ref 1.7–7.7)
NEUTROPHILS ABSOLUTE: 4.8 K/UL (ref 1.7–7.7)
NEUTROPHILS ABSOLUTE: 5.1 K/UL (ref 1.7–7.7)
NEUTROPHILS ABSOLUTE: 5.1 K/UL (ref 1.7–7.7)
NEUTROPHILS RELATIVE PERCENT: 86.6 %
NEUTROPHILS RELATIVE PERCENT: 87 %
NEUTROPHILS RELATIVE PERCENT: 87.1 %
NEUTROPHILS RELATIVE PERCENT: 88 %
NEUTROPHILS RELATIVE PERCENT: 92.3 %
O2 SAT, ARTERIAL: 83.1 %
O2 SAT, ARTERIAL: 97 %
O2 THERAPY: ABNORMAL
O2 THERAPY: ABNORMAL
PCO2 ARTERIAL: 38 MMHG (ref 35–45)
PCO2 ARTERIAL: 52.7 MMHG (ref 35–45)
PDW BLD-RTO: 14.8 % (ref 12.4–15.4)
PDW BLD-RTO: 15.2 % (ref 12.4–15.4)
PDW BLD-RTO: 15.3 % (ref 12.4–15.4)
PDW BLD-RTO: 15.4 % (ref 12.4–15.4)
PDW BLD-RTO: 15.5 % (ref 12.4–15.4)
PDW BLD-RTO: 15.8 % (ref 12.4–15.4)
PDW BLD-RTO: 15.8 % (ref 12.4–15.4)
PDW BLD-RTO: 16.8 % (ref 12.4–15.4)
PDW BLD-RTO: 16.8 % (ref 12.4–15.4)
PDW BLD-RTO: 16.9 % (ref 12.4–15.4)
PDW BLD-RTO: 17.1 % (ref 12.4–15.4)
PERFORMED ON: ABNORMAL
PERFORMED ON: NORMAL
PH ARTERIAL: 7.34 (ref 7.35–7.45)
PH ARTERIAL: 7.48 (ref 7.35–7.45)
PLATELET # BLD: 10 K/UL (ref 135–450)
PLATELET # BLD: 101 K/UL (ref 135–450)
PLATELET # BLD: 109 K/UL (ref 135–450)
PLATELET # BLD: 11 K/UL (ref 135–450)
PLATELET # BLD: 11 K/UL (ref 135–450)
PLATELET # BLD: 112 K/UL (ref 135–450)
PLATELET # BLD: 13 K/UL (ref 135–450)
PLATELET # BLD: 15 K/UL (ref 135–450)
PLATELET # BLD: 29 K/UL (ref 135–450)
PLATELET # BLD: 80 K/UL (ref 135–450)
PLATELET # BLD: 94 K/UL (ref 135–450)
PLATELET SLIDE REVIEW: ABNORMAL
PLATELET SLIDE REVIEW: ABNORMAL
PMV BLD AUTO: 7.5 FL (ref 5–10.5)
PMV BLD AUTO: 7.7 FL (ref 5–10.5)
PMV BLD AUTO: 7.7 FL (ref 5–10.5)
PMV BLD AUTO: 8 FL (ref 5–10.5)
PMV BLD AUTO: 8.1 FL (ref 5–10.5)
PMV BLD AUTO: 8.3 FL (ref 5–10.5)
PMV BLD AUTO: 8.3 FL (ref 5–10.5)
PMV BLD AUTO: 8.4 FL (ref 5–10.5)
PMV BLD AUTO: 8.4 FL (ref 5–10.5)
PMV BLD AUTO: 8.5 FL (ref 5–10.5)
PMV BLD AUTO: 9.2 FL (ref 5–10.5)
PO2 ARTERIAL: 45.1 MMHG (ref 75–108)
PO2 ARTERIAL: 95.7 MMHG (ref 75–108)
POTASSIUM REFLEX MAGNESIUM: 2.6 MMOL/L (ref 3.5–5.1)
POTASSIUM SERPL-SCNC: 2.4 MMOL/L (ref 3.5–5.1)
POTASSIUM SERPL-SCNC: 2.7 MMOL/L (ref 3.5–5.1)
POTASSIUM SERPL-SCNC: 2.7 MMOL/L (ref 3.5–5.1)
POTASSIUM SERPL-SCNC: 3 MMOL/L (ref 3.5–5.1)
POTASSIUM SERPL-SCNC: 3 MMOL/L (ref 3.5–5.1)
POTASSIUM SERPL-SCNC: 3.1 MMOL/L (ref 3.5–5.1)
POTASSIUM SERPL-SCNC: 3.1 MMOL/L (ref 3.5–5.1)
POTASSIUM SERPL-SCNC: 3.3 MMOL/L (ref 3.5–5.1)
POTASSIUM SERPL-SCNC: 3.4 MMOL/L (ref 3.5–5.1)
POTASSIUM SERPL-SCNC: 3.5 MMOL/L (ref 3.5–5.1)
POTASSIUM SERPL-SCNC: 3.7 MMOL/L (ref 3.5–5.1)
POTASSIUM SERPL-SCNC: 3.8 MMOL/L (ref 3.5–5.1)
POTASSIUM SERPL-SCNC: 3.9 MMOL/L (ref 3.5–5.1)
POTASSIUM SERPL-SCNC: 3.9 MMOL/L (ref 3.5–5.1)
POTASSIUM SERPL-SCNC: 4 MMOL/L (ref 3.5–5.1)
POTASSIUM SERPL-SCNC: 4 MMOL/L (ref 3.5–5.1)
POTASSIUM SERPL-SCNC: 4.1 MMOL/L (ref 3.5–5.1)
POTASSIUM SERPL-SCNC: 4.2 MMOL/L (ref 3.5–5.1)
POTASSIUM SERPL-SCNC: 4.3 MMOL/L (ref 3.5–5.1)
POTASSIUM SERPL-SCNC: 4.3 MMOL/L (ref 3.5–5.1)
POTASSIUM SERPL-SCNC: 4.4 MMOL/L (ref 3.5–5.1)
POTASSIUM SERPL-SCNC: 4.5 MMOL/L (ref 3.5–5.1)
POTASSIUM SERPL-SCNC: 4.9 MMOL/L (ref 3.5–5.1)
POTASSIUM SERPL-SCNC: 4.9 MMOL/L (ref 3.5–5.1)
POTASSIUM SERPL-SCNC: 5.1 MMOL/L (ref 3.5–5.1)
POTASSIUM SERPL-SCNC: 5.7 MMOL/L (ref 3.5–5.1)
POTASSIUM SERPL-SCNC: 6.7 MMOL/L (ref 3.5–5.1)
PROTHROMBIN TIME: 17 SEC (ref 11.7–14.5)
RBC # BLD: 1.35 M/UL (ref 4.2–5.9)
RBC # BLD: 1.41 M/UL (ref 4.2–5.9)
RBC # BLD: 1.59 M/UL (ref 4.2–5.9)
RBC # BLD: 1.77 M/UL (ref 4.2–5.9)
RBC # BLD: 1.79 M/UL (ref 4.2–5.9)
RBC # BLD: 2.14 M/UL (ref 4.2–5.9)
RBC # BLD: 2.38 M/UL (ref 4.2–5.9)
RBC # BLD: 2.78 M/UL (ref 4.2–5.9)
RBC # BLD: 2.98 M/UL (ref 4.2–5.9)
RBC # BLD: 3.01 M/UL (ref 4.2–5.9)
RBC # BLD: 3.06 M/UL (ref 4.2–5.9)
RBC # BLD: NORMAL 10*6/UL
RBC # BLD: NORMAL 10*6/UL
RBC, BAL: 10 /CUMM
REASON FOR REJECTION: NORMAL
REJECTED TEST: NORMAL
REPORT: NORMAL
RESPIRATORY PANEL PCR: NORMAL
SLIDE REVIEW: ABNORMAL
SLIDE REVIEW: ABNORMAL
SODIUM BLD-SCNC: 132 MMOL/L (ref 136–145)
SODIUM BLD-SCNC: 133 MMOL/L (ref 136–145)
SODIUM BLD-SCNC: 135 MMOL/L (ref 136–145)
SODIUM BLD-SCNC: 135 MMOL/L (ref 136–145)
SODIUM BLD-SCNC: 136 MMOL/L (ref 136–145)
SODIUM BLD-SCNC: 137 MMOL/L (ref 136–145)
SODIUM BLD-SCNC: 139 MMOL/L (ref 136–145)
SODIUM BLD-SCNC: 139 MMOL/L (ref 136–145)
SODIUM BLD-SCNC: 140 MMOL/L (ref 136–145)
SODIUM BLD-SCNC: 140 MMOL/L (ref 136–145)
SODIUM BLD-SCNC: 141 MMOL/L (ref 136–145)
TCO2 ARTERIAL: 29.2 MMOL/L
TCO2 ARTERIAL: 30.2 MMOL/L
TOTAL PROTEIN: 5 G/DL (ref 6.4–8.2)
TOTAL PROTEIN: 5.1 G/DL (ref 6.4–8.2)
TOTAL PROTEIN: 5.2 G/DL (ref 6.4–8.2)
TROPONIN: 0.01 NG/ML
TROPONIN: 0.01 NG/ML
TROPONIN: <0.01 NG/ML
TROPONIN: <0.01 NG/ML
VOLUME LAVAGE: 15 ML
WBC # BLD: 0 K/UL (ref 4–11)
WBC # BLD: 0.1 K/UL (ref 4–11)
WBC # BLD: 4.7 K/UL (ref 4–11)
WBC # BLD: 4.9 K/UL (ref 4–11)
WBC # BLD: 5.2 K/UL (ref 4–11)
WBC # BLD: 5.8 K/UL (ref 4–11)
WBC # BLD: 5.9 K/UL (ref 4–11)
WBC/EPI CELLS BAL: 21 /CUMM

## 2022-01-01 PROCEDURE — 85025 COMPLETE CBC W/AUTO DIFF WBC: CPT

## 2022-01-01 PROCEDURE — 99233 SBSQ HOSP IP/OBS HIGH 50: CPT | Performed by: INTERNAL MEDICINE

## 2022-01-01 PROCEDURE — 36430 TRANSFUSION BLD/BLD COMPNT: CPT

## 2022-01-01 PROCEDURE — 6360000002 HC RX W HCPCS: Performed by: INTERNAL MEDICINE

## 2022-01-01 PROCEDURE — 80053 COMPREHEN METABOLIC PANEL: CPT

## 2022-01-01 PROCEDURE — 71260 CT THORAX DX C+: CPT | Performed by: INTERNAL MEDICINE

## 2022-01-01 PROCEDURE — 80048 BASIC METABOLIC PNL TOTAL CA: CPT

## 2022-01-01 PROCEDURE — 94761 N-INVAS EAR/PLS OXIMETRY MLT: CPT

## 2022-01-01 PROCEDURE — 2580000003 HC RX 258: Performed by: INTERNAL MEDICINE

## 2022-01-01 PROCEDURE — 6370000000 HC RX 637 (ALT 250 FOR IP): Performed by: INTERNAL MEDICINE

## 2022-01-01 PROCEDURE — 94640 AIRWAY INHALATION TREATMENT: CPT

## 2022-01-01 PROCEDURE — 85014 HEMATOCRIT: CPT

## 2022-01-01 PROCEDURE — 1200000000 HC SEMI PRIVATE

## 2022-01-01 PROCEDURE — 6370000000 HC RX 637 (ALT 250 FOR IP): Performed by: NURSE PRACTITIONER

## 2022-01-01 PROCEDURE — 93005 ELECTROCARDIOGRAM TRACING: CPT | Performed by: PHYSICIAN ASSISTANT

## 2022-01-01 PROCEDURE — 36415 COLL VENOUS BLD VENIPUNCTURE: CPT

## 2022-01-01 PROCEDURE — 99223 1ST HOSP IP/OBS HIGH 75: CPT | Performed by: INTERNAL MEDICINE

## 2022-01-01 PROCEDURE — 6360000004 HC RX CONTRAST MEDICATION: Performed by: INTERNAL MEDICINE

## 2022-01-01 PROCEDURE — 2500000003 HC RX 250 WO HCPCS: Performed by: INTERNAL MEDICINE

## 2022-01-01 PROCEDURE — 96366 THER/PROPH/DIAG IV INF ADDON: CPT

## 2022-01-01 PROCEDURE — 74177 CT ABD & PELVIS W/CONTRAST: CPT

## 2022-01-01 PROCEDURE — 83735 ASSAY OF MAGNESIUM: CPT

## 2022-01-01 PROCEDURE — 94760 N-INVAS EAR/PLS OXIMETRY 1: CPT

## 2022-01-01 PROCEDURE — 0202U NFCT DS 22 TRGT SARS-COV-2: CPT

## 2022-01-01 PROCEDURE — 6360000002 HC RX W HCPCS

## 2022-01-01 PROCEDURE — P9016 RBC LEUKOCYTES REDUCED: HCPCS

## 2022-01-01 PROCEDURE — 94003 VENT MGMT INPAT SUBQ DAY: CPT

## 2022-01-01 PROCEDURE — 3430000000 HC RX DIAGNOSTIC RADIOPHARMACEUTICAL: Performed by: INTERNAL MEDICINE

## 2022-01-01 PROCEDURE — 94002 VENT MGMT INPAT INIT DAY: CPT

## 2022-01-01 PROCEDURE — 93308 TTE F-UP OR LMTD: CPT

## 2022-01-01 PROCEDURE — 31500 INSERT EMERGENCY AIRWAY: CPT

## 2022-01-01 PROCEDURE — 36592 COLLECT BLOOD FROM PICC: CPT

## 2022-01-01 PROCEDURE — 84132 ASSAY OF SERUM POTASSIUM: CPT

## 2022-01-01 PROCEDURE — 2500000003 HC RX 250 WO HCPCS: Performed by: NURSE PRACTITIONER

## 2022-01-01 PROCEDURE — 86923 COMPATIBILITY TEST ELECTRIC: CPT

## 2022-01-01 PROCEDURE — 78306 BONE IMAGING WHOLE BODY: CPT

## 2022-01-01 PROCEDURE — 2580000003 HC RX 258: Performed by: NURSE PRACTITIONER

## 2022-01-01 PROCEDURE — 82010 KETONE BODYS QUAN: CPT

## 2022-01-01 PROCEDURE — 70553 MRI BRAIN STEM W/O & W/DYE: CPT

## 2022-01-01 PROCEDURE — 96365 THER/PROPH/DIAG IV INF INIT: CPT

## 2022-01-01 PROCEDURE — 85730 THROMBOPLASTIN TIME PARTIAL: CPT

## 2022-01-01 PROCEDURE — 93005 ELECTROCARDIOGRAM TRACING: CPT | Performed by: INTERNAL MEDICINE

## 2022-01-01 PROCEDURE — 71045 X-RAY EXAM CHEST 1 VIEW: CPT

## 2022-01-01 PROCEDURE — 2700000000 HC OXYGEN THERAPY PER DAY

## 2022-01-01 PROCEDURE — 82803 BLOOD GASES ANY COMBINATION: CPT

## 2022-01-01 PROCEDURE — 85018 HEMOGLOBIN: CPT

## 2022-01-01 PROCEDURE — 99232 SBSQ HOSP IP/OBS MODERATE 35: CPT | Performed by: INTERNAL MEDICINE

## 2022-01-01 PROCEDURE — A9577 INJ MULTIHANCE: HCPCS | Performed by: INTERNAL MEDICINE

## 2022-01-01 PROCEDURE — 74176 CT ABD & PELVIS W/O CONTRAST: CPT

## 2022-01-01 PROCEDURE — APPNB15 APP NON BILLABLE TIME 0-15 MINS: Performed by: NURSE PRACTITIONER

## 2022-01-01 PROCEDURE — 31622 DX BRONCHOSCOPE/WASH: CPT

## 2022-01-01 PROCEDURE — 86900 BLOOD TYPING SEROLOGIC ABO: CPT

## 2022-01-01 PROCEDURE — P9035 PLATELET PHERES LEUKOREDUCED: HCPCS

## 2022-01-01 PROCEDURE — 6370000000 HC RX 637 (ALT 250 FOR IP): Performed by: PHYSICIAN ASSISTANT

## 2022-01-01 PROCEDURE — 36600 WITHDRAWAL OF ARTERIAL BLOOD: CPT

## 2022-01-01 PROCEDURE — 85379 FIBRIN DEGRADATION QUANT: CPT

## 2022-01-01 PROCEDURE — 93306 TTE W/DOPPLER COMPLETE: CPT

## 2022-01-01 PROCEDURE — 84484 ASSAY OF TROPONIN QUANT: CPT

## 2022-01-01 PROCEDURE — 99291 CRITICAL CARE FIRST HOUR: CPT | Performed by: INTERNAL MEDICINE

## 2022-01-01 PROCEDURE — 99232 SBSQ HOSP IP/OBS MODERATE 35: CPT | Performed by: NURSE PRACTITIONER

## 2022-01-01 PROCEDURE — 78306 BONE IMAGING WHOLE BODY: CPT | Performed by: INTERNAL MEDICINE

## 2022-01-01 PROCEDURE — 2000000000 HC ICU R&B

## 2022-01-01 PROCEDURE — 80076 HEPATIC FUNCTION PANEL: CPT

## 2022-01-01 PROCEDURE — 51798 US URINE CAPACITY MEASURE: CPT

## 2022-01-01 PROCEDURE — 31624 DX BRONCHOSCOPE/LAVAGE: CPT | Performed by: INTERNAL MEDICINE

## 2022-01-01 PROCEDURE — 89050 BODY FLUID CELL COUNT: CPT

## 2022-01-01 PROCEDURE — 99222 1ST HOSP IP/OBS MODERATE 55: CPT | Performed by: NURSE PRACTITIONER

## 2022-01-01 PROCEDURE — 86850 RBC ANTIBODY SCREEN: CPT

## 2022-01-01 PROCEDURE — 86901 BLOOD TYPING SEROLOGIC RH(D): CPT

## 2022-01-01 PROCEDURE — 83036 HEMOGLOBIN GLYCOSYLATED A1C: CPT

## 2022-01-01 PROCEDURE — A9503 TC99M MEDRONATE: HCPCS | Performed by: INTERNAL MEDICINE

## 2022-01-01 PROCEDURE — 93010 ELECTROCARDIOGRAM REPORT: CPT | Performed by: INTERNAL MEDICINE

## 2022-01-01 PROCEDURE — 31500 INSERT EMERGENCY AIRWAY: CPT | Performed by: INTERNAL MEDICINE

## 2022-01-01 PROCEDURE — 6360000002 HC RX W HCPCS: Performed by: PHYSICIAN ASSISTANT

## 2022-01-01 PROCEDURE — 99285 EMERGENCY DEPT VISIT HI MDM: CPT

## 2022-01-01 PROCEDURE — 0BH18EZ INSERTION OF ENDOTRACHEAL AIRWAY INTO TRACHEA, VIA NATURAL OR ARTIFICIAL OPENING ENDOSCOPIC: ICD-10-PCS | Performed by: INTERNAL MEDICINE

## 2022-01-01 PROCEDURE — 5A1945Z RESPIRATORY VENTILATION, 24-96 CONSECUTIVE HOURS: ICD-10-PCS | Performed by: INTERNAL MEDICINE

## 2022-01-01 PROCEDURE — 0B9G8ZX DRAINAGE OF LEFT UPPER LUNG LOBE, VIA NATURAL OR ARTIFICIAL OPENING ENDOSCOPIC, DIAGNOSTIC: ICD-10-PCS | Performed by: INTERNAL MEDICINE

## 2022-01-01 PROCEDURE — 96375 TX/PRO/DX INJ NEW DRUG ADDON: CPT

## 2022-01-01 PROCEDURE — 85610 PROTHROMBIN TIME: CPT

## 2022-01-01 PROCEDURE — 2060000000 HC ICU INTERMEDIATE R&B

## 2022-01-01 PROCEDURE — 70450 CT HEAD/BRAIN W/O DYE: CPT

## 2022-01-01 RX ORDER — SODIUM CHLORIDE 9 MG/ML
25 INJECTION, SOLUTION INTRAVENOUS PRN
Status: DISCONTINUED | OUTPATIENT
Start: 2022-01-01 | End: 2022-01-01 | Stop reason: SDUPTHER

## 2022-01-01 RX ORDER — INSULIN GLARGINE 100 [IU]/ML
40 INJECTION, SOLUTION SUBCUTANEOUS
Status: DISCONTINUED | OUTPATIENT
Start: 2022-07-27 | End: 2022-07-27 | Stop reason: HOSPADM

## 2022-01-01 RX ORDER — FUROSEMIDE 10 MG/ML
40 INJECTION INTRAMUSCULAR; INTRAVENOUS 2 TIMES DAILY
Status: DISCONTINUED | OUTPATIENT
Start: 2022-01-01 | End: 2022-01-01

## 2022-01-01 RX ORDER — ONDANSETRON 2 MG/ML
4 INJECTION INTRAMUSCULAR; INTRAVENOUS EVERY 6 HOURS PRN
Status: DISCONTINUED | OUTPATIENT
Start: 2022-01-01 | End: 2022-07-27 | Stop reason: HOSPADM

## 2022-01-01 RX ORDER — ALBUTEROL SULFATE 90 UG/1
2 AEROSOL, METERED RESPIRATORY (INHALATION) EVERY 4 HOURS
Status: DISCONTINUED | OUTPATIENT
Start: 2022-01-01 | End: 2022-01-01

## 2022-01-01 RX ORDER — SPIRONOLACTONE 25 MG/1
100 TABLET ORAL 2 TIMES DAILY
Status: DISCONTINUED | OUTPATIENT
Start: 2022-01-01 | End: 2022-07-27 | Stop reason: HOSPADM

## 2022-01-01 RX ORDER — POTASSIUM CHLORIDE 7.45 MG/ML
10 INJECTION INTRAVENOUS
Status: COMPLETED | OUTPATIENT
Start: 2022-01-01 | End: 2022-01-01

## 2022-01-01 RX ORDER — INSULIN GLARGINE 100 [IU]/ML
10 INJECTION, SOLUTION SUBCUTANEOUS ONCE
Status: COMPLETED | OUTPATIENT
Start: 2022-01-01 | End: 2022-01-01

## 2022-01-01 RX ORDER — INSULIN GLARGINE 100 [IU]/ML
10 INJECTION, SOLUTION SUBCUTANEOUS DAILY
Status: CANCELLED | OUTPATIENT
Start: 2022-01-01

## 2022-01-01 RX ORDER — MAGNESIUM SULFATE IN WATER 40 MG/ML
2000 INJECTION, SOLUTION INTRAVENOUS ONCE
Status: COMPLETED | OUTPATIENT
Start: 2022-01-01 | End: 2022-01-01

## 2022-01-01 RX ORDER — INSULIN LISPRO 100 [IU]/ML
0-6 INJECTION, SOLUTION INTRAVENOUS; SUBCUTANEOUS
Status: DISCONTINUED | OUTPATIENT
Start: 2022-01-01 | End: 2022-01-01

## 2022-01-01 RX ORDER — CALCIUM GLUCONATE 20 MG/ML
1000 INJECTION, SOLUTION INTRAVENOUS ONCE
Status: COMPLETED | OUTPATIENT
Start: 2022-01-01 | End: 2022-01-01

## 2022-01-01 RX ORDER — POTASSIUM CHLORIDE 750 MG/1
40 TABLET, FILM COATED, EXTENDED RELEASE ORAL 2 TIMES DAILY
Status: DISCONTINUED | OUTPATIENT
Start: 2022-01-01 | End: 2022-01-01

## 2022-01-01 RX ORDER — ALBUTEROL SULFATE 90 UG/1
2 AEROSOL, METERED RESPIRATORY (INHALATION) EVERY 4 HOURS PRN
Status: DISCONTINUED | OUTPATIENT
Start: 2022-01-01 | End: 2022-07-27 | Stop reason: HOSPADM

## 2022-01-01 RX ORDER — CHLORHEXIDINE GLUCONATE 0.12 MG/ML
15 RINSE ORAL 2 TIMES DAILY
Status: DISCONTINUED | OUTPATIENT
Start: 2022-01-01 | End: 2022-07-27 | Stop reason: HOSPADM

## 2022-01-01 RX ORDER — TC 99M MEDRONATE 20 MG/10ML
25 INJECTION, POWDER, LYOPHILIZED, FOR SOLUTION INTRAVENOUS
Status: COMPLETED | OUTPATIENT
Start: 2022-01-01 | End: 2022-01-01

## 2022-01-01 RX ORDER — POTASSIUM CHLORIDE 750 MG/1
40 TABLET, FILM COATED, EXTENDED RELEASE ORAL 3 TIMES DAILY
Status: DISCONTINUED | OUTPATIENT
Start: 2022-01-01 | End: 2022-01-01

## 2022-01-01 RX ORDER — IPRATROPIUM BROMIDE AND ALBUTEROL SULFATE 2.5; .5 MG/3ML; MG/3ML
1 SOLUTION RESPIRATORY (INHALATION) EVERY 4 HOURS PRN
Status: DISCONTINUED | OUTPATIENT
Start: 2022-01-01 | End: 2022-07-27 | Stop reason: HOSPADM

## 2022-01-01 RX ORDER — DEXTROSE MONOHYDRATE 100 MG/ML
INJECTION, SOLUTION INTRAVENOUS CONTINUOUS
Status: DISCONTINUED | OUTPATIENT
Start: 2022-01-01 | End: 2022-01-01

## 2022-01-01 RX ORDER — ATORVASTATIN CALCIUM 40 MG/1
40 TABLET, FILM COATED ORAL DAILY
Status: DISCONTINUED | OUTPATIENT
Start: 2022-01-01 | End: 2022-07-27 | Stop reason: HOSPADM

## 2022-01-01 RX ORDER — INSULIN GLARGINE 100 [IU]/ML
35 INJECTION, SOLUTION SUBCUTANEOUS NIGHTLY
Status: DISCONTINUED | OUTPATIENT
Start: 2022-01-01 | End: 2022-01-01

## 2022-01-01 RX ORDER — LEVOFLOXACIN 5 MG/ML
750 INJECTION, SOLUTION INTRAVENOUS
Status: DISCONTINUED | OUTPATIENT
Start: 2022-01-01 | End: 2022-01-01

## 2022-01-01 RX ORDER — METOPROLOL TARTRATE 5 MG/5ML
5 INJECTION INTRAVENOUS EVERY 6 HOURS PRN
Status: DISCONTINUED | OUTPATIENT
Start: 2022-01-01 | End: 2022-07-27 | Stop reason: HOSPADM

## 2022-01-01 RX ORDER — LIDOCAINE HYDROCHLORIDE 10 MG/ML
5 INJECTION, SOLUTION EPIDURAL; INFILTRATION; INTRACAUDAL; PERINEURAL ONCE
Status: COMPLETED | OUTPATIENT
Start: 2022-01-01 | End: 2022-01-01

## 2022-01-01 RX ORDER — INSULIN LISPRO 100 [IU]/ML
0-3 INJECTION, SOLUTION INTRAVENOUS; SUBCUTANEOUS NIGHTLY
Status: DISCONTINUED | OUTPATIENT
Start: 2022-01-01 | End: 2022-01-01

## 2022-01-01 RX ORDER — ONDANSETRON 4 MG/1
4 TABLET, ORALLY DISINTEGRATING ORAL EVERY 8 HOURS PRN
Status: DISCONTINUED | OUTPATIENT
Start: 2022-01-01 | End: 2022-07-27 | Stop reason: HOSPADM

## 2022-01-01 RX ORDER — INSULIN GLARGINE 100 [IU]/ML
20 INJECTION, SOLUTION SUBCUTANEOUS
Status: DISCONTINUED | OUTPATIENT
Start: 2022-01-01 | End: 2022-01-01

## 2022-01-01 RX ORDER — SODIUM CHLORIDE 0.9 % (FLUSH) 0.9 %
5-40 SYRINGE (ML) INJECTION EVERY 12 HOURS SCHEDULED
Status: DISCONTINUED | OUTPATIENT
Start: 2022-01-01 | End: 2022-01-01 | Stop reason: SDUPTHER

## 2022-01-01 RX ORDER — IPRATROPIUM BROMIDE AND ALBUTEROL SULFATE 2.5; .5 MG/3ML; MG/3ML
1 SOLUTION RESPIRATORY (INHALATION)
Status: DISCONTINUED | OUTPATIENT
Start: 2022-01-01 | End: 2022-07-27 | Stop reason: HOSPADM

## 2022-01-01 RX ORDER — POTASSIUM CHLORIDE 750 MG/1
40 TABLET, FILM COATED, EXTENDED RELEASE ORAL ONCE
Status: COMPLETED | OUTPATIENT
Start: 2022-01-01 | End: 2022-01-01

## 2022-01-01 RX ORDER — FUROSEMIDE 20 MG/1
40 TABLET ORAL DAILY
Status: DISCONTINUED | OUTPATIENT
Start: 2022-01-01 | End: 2022-01-01

## 2022-01-01 RX ORDER — SODIUM CHLORIDE 9 MG/ML
INJECTION, SOLUTION INTRAVENOUS PRN
Status: DISCONTINUED | OUTPATIENT
Start: 2022-01-01 | End: 2022-01-01

## 2022-01-01 RX ORDER — PROPOFOL 10 MG/ML
INJECTION, EMULSION INTRAVENOUS
Status: COMPLETED
Start: 2022-01-01 | End: 2022-01-01

## 2022-01-01 RX ORDER — LEVOFLOXACIN 5 MG/ML
500 INJECTION, SOLUTION INTRAVENOUS EVERY 24 HOURS
Status: DISCONTINUED | OUTPATIENT
Start: 2022-01-01 | End: 2022-01-01

## 2022-01-01 RX ORDER — FUROSEMIDE 40 MG/1
40 TABLET ORAL 2 TIMES DAILY
Status: DISCONTINUED | OUTPATIENT
Start: 2022-01-01 | End: 2022-01-01

## 2022-01-01 RX ORDER — IPRATROPIUM BROMIDE AND ALBUTEROL SULFATE 2.5; .5 MG/3ML; MG/3ML
2 SOLUTION RESPIRATORY (INHALATION) ONCE
Status: COMPLETED | OUTPATIENT
Start: 2022-01-01 | End: 2022-01-01

## 2022-01-01 RX ORDER — INSULIN GLARGINE 100 [IU]/ML
30 INJECTION, SOLUTION SUBCUTANEOUS
Status: DISCONTINUED | OUTPATIENT
Start: 2022-01-01 | End: 2022-01-01

## 2022-01-01 RX ORDER — POTASSIUM CHLORIDE AND SODIUM CHLORIDE 900; 300 MG/100ML; MG/100ML
INJECTION, SOLUTION INTRAVENOUS CONTINUOUS
Status: DISCONTINUED | OUTPATIENT
Start: 2022-01-01 | End: 2022-01-01

## 2022-01-01 RX ORDER — DILTIAZEM HYDROCHLORIDE 5 MG/ML
10 INJECTION INTRAVENOUS ONCE
Status: COMPLETED | OUTPATIENT
Start: 2022-01-01 | End: 2022-01-01

## 2022-01-01 RX ORDER — FUROSEMIDE 10 MG/ML
60 INJECTION INTRAMUSCULAR; INTRAVENOUS 2 TIMES DAILY
Status: DISCONTINUED | OUTPATIENT
Start: 2022-01-01 | End: 2022-01-01

## 2022-01-01 RX ORDER — DIPHENHYDRAMINE HCL 25 MG
25 TABLET ORAL ONCE
Status: COMPLETED | OUTPATIENT
Start: 2022-01-01 | End: 2022-01-01

## 2022-01-01 RX ORDER — FENTANYL CITRATE-0.9 % NACL/PF 10 MCG/ML
25-200 PLASTIC BAG, INJECTION (ML) INTRAVENOUS CONTINUOUS
Status: DISCONTINUED | OUTPATIENT
Start: 2022-01-01 | End: 2022-01-01

## 2022-01-01 RX ORDER — METHYLPREDNISOLONE SODIUM SUCCINATE 40 MG/ML
40 INJECTION, POWDER, LYOPHILIZED, FOR SOLUTION INTRAMUSCULAR; INTRAVENOUS DAILY
Status: DISCONTINUED | OUTPATIENT
Start: 2022-01-01 | End: 2022-07-27 | Stop reason: HOSPADM

## 2022-01-01 RX ORDER — ASPIRIN 81 MG/1
81 TABLET ORAL DAILY
Status: DISCONTINUED | OUTPATIENT
Start: 2022-01-01 | End: 2022-01-01

## 2022-01-01 RX ORDER — FENTANYL CITRATE-0.9 % NACL/PF 10 MCG/ML
25-200 PLASTIC BAG, INJECTION (ML) INTRAVENOUS CONTINUOUS
Status: DISCONTINUED | OUTPATIENT
Start: 2022-01-01 | End: 2022-07-27 | Stop reason: HOSPADM

## 2022-01-01 RX ORDER — FUROSEMIDE 10 MG/ML
60 INJECTION INTRAMUSCULAR; INTRAVENOUS 3 TIMES DAILY
Status: DISCONTINUED | OUTPATIENT
Start: 2022-01-01 | End: 2022-01-01

## 2022-01-01 RX ORDER — POLYETHYLENE GLYCOL 3350 17 G/17G
17 POWDER, FOR SOLUTION ORAL DAILY PRN
Status: DISCONTINUED | OUTPATIENT
Start: 2022-01-01 | End: 2022-01-01

## 2022-01-01 RX ORDER — NIFEDIPINE 30 MG/1
30 TABLET, EXTENDED RELEASE ORAL 2 TIMES DAILY
Status: DISCONTINUED | OUTPATIENT
Start: 2022-01-01 | End: 2022-07-27 | Stop reason: HOSPADM

## 2022-01-01 RX ORDER — METOPROLOL TARTRATE 5 MG/5ML
5 INJECTION INTRAVENOUS EVERY 6 HOURS
Status: DISCONTINUED | OUTPATIENT
Start: 2022-01-01 | End: 2022-01-01

## 2022-01-01 RX ORDER — INSULIN LISPRO 100 [IU]/ML
0-18 INJECTION, SOLUTION INTRAVENOUS; SUBCUTANEOUS
Status: DISCONTINUED | OUTPATIENT
Start: 2022-01-01 | End: 2022-01-01

## 2022-01-01 RX ORDER — DEXTROSE MONOHYDRATE 50 MG/ML
100 INJECTION, SOLUTION INTRAVENOUS PRN
Status: DISCONTINUED | OUTPATIENT
Start: 2022-01-01 | End: 2022-07-27 | Stop reason: HOSPADM

## 2022-01-01 RX ORDER — POLYETHYLENE GLYCOL 3350 17 G/17G
17 POWDER, FOR SOLUTION ORAL DAILY
Status: DISCONTINUED | OUTPATIENT
Start: 2022-01-01 | End: 2022-07-27 | Stop reason: HOSPADM

## 2022-01-01 RX ORDER — SODIUM CHLORIDE 0.9 % (FLUSH) 0.9 %
5-40 SYRINGE (ML) INJECTION PRN
Status: DISCONTINUED | OUTPATIENT
Start: 2022-01-01 | End: 2022-01-01 | Stop reason: SDUPTHER

## 2022-01-01 RX ORDER — POTASSIUM CHLORIDE 750 MG/1
40 TABLET, FILM COATED, EXTENDED RELEASE ORAL 3 TIMES DAILY
Status: DISCONTINUED | OUTPATIENT
Start: 2022-01-01 | End: 2022-07-27 | Stop reason: HOSPADM

## 2022-01-01 RX ORDER — SODIUM CHLORIDE 9 MG/ML
INJECTION, SOLUTION INTRAVENOUS PRN
Status: DISCONTINUED | OUTPATIENT
Start: 2022-01-01 | End: 2022-07-27 | Stop reason: HOSPADM

## 2022-01-01 RX ORDER — INSULIN LISPRO 100 [IU]/ML
0-9 INJECTION, SOLUTION INTRAVENOUS; SUBCUTANEOUS NIGHTLY
Status: DISCONTINUED | OUTPATIENT
Start: 2022-01-01 | End: 2022-01-01

## 2022-01-01 RX ORDER — POTASSIUM CHLORIDE 750 MG/1
40 TABLET, FILM COATED, EXTENDED RELEASE ORAL DAILY
Status: DISCONTINUED | OUTPATIENT
Start: 2022-01-01 | End: 2022-01-01

## 2022-01-01 RX ORDER — INSULIN GLARGINE 100 [IU]/ML
40 INJECTION, SOLUTION SUBCUTANEOUS NIGHTLY
Status: DISCONTINUED | OUTPATIENT
Start: 2022-01-01 | End: 2022-01-01

## 2022-01-01 RX ORDER — LEVOFLOXACIN 5 MG/ML
500 INJECTION, SOLUTION INTRAVENOUS
Status: DISCONTINUED | OUTPATIENT
Start: 2022-07-27 | End: 2022-07-27 | Stop reason: HOSPADM

## 2022-01-01 RX ORDER — ACETAMINOPHEN 325 MG/1
650 TABLET ORAL EVERY 6 HOURS PRN
Status: DISCONTINUED | OUTPATIENT
Start: 2022-01-01 | End: 2022-07-27 | Stop reason: HOSPADM

## 2022-01-01 RX ORDER — INSULIN LISPRO 100 [IU]/ML
0-16 INJECTION, SOLUTION INTRAVENOUS; SUBCUTANEOUS EVERY 4 HOURS
Status: DISCONTINUED | OUTPATIENT
Start: 2022-01-01 | End: 2022-07-27 | Stop reason: HOSPADM

## 2022-01-01 RX ORDER — BUSPIRONE HYDROCHLORIDE 5 MG/1
5 TABLET ORAL 3 TIMES DAILY PRN
Status: DISCONTINUED | OUTPATIENT
Start: 2022-01-01 | End: 2022-07-27 | Stop reason: HOSPADM

## 2022-01-01 RX ORDER — FUROSEMIDE 10 MG/ML
80 INJECTION INTRAMUSCULAR; INTRAVENOUS 3 TIMES DAILY
Status: DISCONTINUED | OUTPATIENT
Start: 2022-01-01 | End: 2022-07-27 | Stop reason: HOSPADM

## 2022-01-01 RX ORDER — SENNA LEAF EXTRACT 176MG/5ML
10 SYRUP ORAL NIGHTLY
Status: DISCONTINUED | OUTPATIENT
Start: 2022-01-01 | End: 2022-07-27 | Stop reason: HOSPADM

## 2022-01-01 RX ORDER — ACETAMINOPHEN 325 MG/1
650 TABLET ORAL ONCE
Status: COMPLETED | OUTPATIENT
Start: 2022-01-01 | End: 2022-01-01

## 2022-01-01 RX ORDER — SODIUM CHLORIDE 0.9 % (FLUSH) 0.9 %
5-40 SYRINGE (ML) INJECTION EVERY 12 HOURS SCHEDULED
Status: DISCONTINUED | OUTPATIENT
Start: 2022-01-01 | End: 2022-07-27 | Stop reason: HOSPADM

## 2022-01-01 RX ORDER — TOPOTECAN HYDROCHLORIDE 4 MG/4ML
1.5 INJECTION, POWDER, LYOPHILIZED, FOR SOLUTION INTRAVENOUS DAILY
Status: DISCONTINUED | OUTPATIENT
Start: 2022-01-01 | End: 2022-01-01 | Stop reason: SDUPTHER

## 2022-01-01 RX ORDER — SODIUM CHLORIDE 0.9 % (FLUSH) 0.9 %
5-40 SYRINGE (ML) INJECTION PRN
Status: DISCONTINUED | OUTPATIENT
Start: 2022-01-01 | End: 2022-07-27 | Stop reason: HOSPADM

## 2022-01-01 RX ORDER — ENOXAPARIN SODIUM 100 MG/ML
30 INJECTION SUBCUTANEOUS 2 TIMES DAILY
Status: DISCONTINUED | OUTPATIENT
Start: 2022-01-01 | End: 2022-01-01

## 2022-01-01 RX ORDER — ACETAMINOPHEN 650 MG/1
650 SUPPOSITORY RECTAL EVERY 6 HOURS PRN
Status: DISCONTINUED | OUTPATIENT
Start: 2022-01-01 | End: 2022-07-27 | Stop reason: HOSPADM

## 2022-01-01 RX ORDER — PROPOFOL 10 MG/ML
5-50 INJECTION, EMULSION INTRAVENOUS CONTINUOUS
Status: DISCONTINUED | OUTPATIENT
Start: 2022-01-01 | End: 2022-07-27 | Stop reason: HOSPADM

## 2022-01-01 RX ADMIN — Medication 10 MEQ: at 21:12

## 2022-01-01 RX ADMIN — METOPROLOL TARTRATE 25 MG: 25 TABLET, FILM COATED ORAL at 08:07

## 2022-01-01 RX ADMIN — INSULIN GLARGINE 20 UNITS: 100 INJECTION, SOLUTION SUBCUTANEOUS at 08:54

## 2022-01-01 RX ADMIN — ENOXAPARIN SODIUM 30 MG: 100 INJECTION SUBCUTANEOUS at 21:11

## 2022-01-01 RX ADMIN — INSULIN LISPRO 9 UNITS: 100 INJECTION, SOLUTION INTRAVENOUS; SUBCUTANEOUS at 18:24

## 2022-01-01 RX ADMIN — MAGNESIUM SULFATE HEPTAHYDRATE 2000 MG: 40 INJECTION, SOLUTION INTRAVENOUS at 11:35

## 2022-01-01 RX ADMIN — POLYETHYLENE GLYCOL 3350 17 G: 17 POWDER, FOR SOLUTION ORAL at 11:10

## 2022-01-01 RX ADMIN — ASPIRIN 81 MG: 81 TABLET, COATED ORAL at 08:54

## 2022-01-01 RX ADMIN — NIFEDIPINE 30 MG: 30 TABLET, FILM COATED, EXTENDED RELEASE ORAL at 08:48

## 2022-01-01 RX ADMIN — PIPERACILLIN AND TAZOBACTAM 3375 MG: 3; .375 INJECTION, POWDER, FOR SOLUTION INTRAVENOUS at 00:48

## 2022-01-01 RX ADMIN — ASPIRIN 81 MG: 81 TABLET, COATED ORAL at 08:47

## 2022-01-01 RX ADMIN — ENOXAPARIN SODIUM 30 MG: 100 INJECTION SUBCUTANEOUS at 09:41

## 2022-01-01 RX ADMIN — NIFEDIPINE 30 MG: 30 TABLET, FILM COATED, EXTENDED RELEASE ORAL at 08:49

## 2022-01-01 RX ADMIN — INSULIN GLARGINE 30 UNITS: 100 INJECTION, SOLUTION SUBCUTANEOUS at 08:50

## 2022-01-01 RX ADMIN — PROPOFOL 20 MCG/KG/MIN: 10 INJECTION, EMULSION INTRAVENOUS at 15:22

## 2022-01-01 RX ADMIN — INSULIN LISPRO 6 UNITS: 100 INJECTION, SOLUTION INTRAVENOUS; SUBCUTANEOUS at 21:47

## 2022-01-01 RX ADMIN — NIFEDIPINE 30 MG: 30 TABLET, FILM COATED, EXTENDED RELEASE ORAL at 21:47

## 2022-01-01 RX ADMIN — POTASSIUM CHLORIDE 10 MEQ: 7.46 INJECTION, SOLUTION INTRAVENOUS at 07:51

## 2022-01-01 RX ADMIN — NIFEDIPINE 30 MG: 30 TABLET, FILM COATED, EXTENDED RELEASE ORAL at 21:13

## 2022-01-01 RX ADMIN — INSULIN LISPRO 12 UNITS: 100 INJECTION, SOLUTION INTRAVENOUS; SUBCUTANEOUS at 12:18

## 2022-01-01 RX ADMIN — ASPIRIN 81 MG: 81 TABLET, COATED ORAL at 08:06

## 2022-01-01 RX ADMIN — INSULIN LISPRO 3 UNITS: 100 INJECTION, SOLUTION INTRAVENOUS; SUBCUTANEOUS at 09:20

## 2022-01-01 RX ADMIN — SODIUM CHLORIDE, PRESERVATIVE FREE 10 ML: 5 INJECTION INTRAVENOUS at 08:43

## 2022-01-01 RX ADMIN — IPRATROPIUM BROMIDE AND ALBUTEROL SULFATE 1 AMPULE: .5; 3 SOLUTION RESPIRATORY (INHALATION) at 08:33

## 2022-01-01 RX ADMIN — INSULIN GLARGINE 40 UNITS: 100 INJECTION, SOLUTION SUBCUTANEOUS at 21:57

## 2022-01-01 RX ADMIN — FUROSEMIDE 40 MG: 20 TABLET ORAL at 09:03

## 2022-01-01 RX ADMIN — TC 99M MEDRONATE 25 MILLICURIE: 20 INJECTION, POWDER, LYOPHILIZED, FOR SOLUTION INTRAVENOUS at 07:52

## 2022-01-01 RX ADMIN — MOMETASONE FUROATE AND FORMOTEROL FUMARATE DIHYDRATE 2 PUFF: 200; 5 AEROSOL RESPIRATORY (INHALATION) at 19:42

## 2022-01-01 RX ADMIN — ATORVASTATIN CALCIUM 40 MG: 40 TABLET, FILM COATED ORAL at 09:19

## 2022-01-01 RX ADMIN — SPIRONOLACTONE 100 MG: 25 TABLET ORAL at 17:39

## 2022-01-01 RX ADMIN — SODIUM CHLORIDE, PRESERVATIVE FREE 10 ML: 5 INJECTION INTRAVENOUS at 20:40

## 2022-01-01 RX ADMIN — IPRATROPIUM BROMIDE AND ALBUTEROL SULFATE 1 AMPULE: .5; 3 SOLUTION RESPIRATORY (INHALATION) at 00:50

## 2022-01-01 RX ADMIN — SPIRONOLACTONE 100 MG: 25 TABLET ORAL at 09:41

## 2022-01-01 RX ADMIN — METOPROLOL TARTRATE 25 MG: 25 TABLET, FILM COATED ORAL at 10:45

## 2022-01-01 RX ADMIN — MOMETASONE FUROATE AND FORMOTEROL FUMARATE DIHYDRATE 2 PUFF: 200; 5 AEROSOL RESPIRATORY (INHALATION) at 19:51

## 2022-01-01 RX ADMIN — FUROSEMIDE 60 MG: 10 INJECTION, SOLUTION INTRAMUSCULAR; INTRAVENOUS at 17:46

## 2022-01-01 RX ADMIN — FUROSEMIDE 60 MG: 10 INJECTION, SOLUTION INTRAMUSCULAR; INTRAVENOUS at 10:19

## 2022-01-01 RX ADMIN — DEXTROSE MONOHYDRATE: 10 INJECTION, SOLUTION INTRAVENOUS at 20:12

## 2022-01-01 RX ADMIN — POTASSIUM BICARBONATE 40 MEQ: 782 TABLET, EFFERVESCENT ORAL at 19:50

## 2022-01-01 RX ADMIN — DOCUSATE SODIUM 100 MG: 50 LIQUID ORAL at 11:10

## 2022-01-01 RX ADMIN — ASPIRIN 81 MG: 81 TABLET, COATED ORAL at 09:42

## 2022-01-01 RX ADMIN — MOMETASONE FUROATE AND FORMOTEROL FUMARATE DIHYDRATE 2 PUFF: 200; 5 AEROSOL RESPIRATORY (INHALATION) at 07:35

## 2022-01-01 RX ADMIN — NIFEDIPINE 30 MG: 30 TABLET, FILM COATED, EXTENDED RELEASE ORAL at 21:24

## 2022-01-01 RX ADMIN — TOPOTECAN HYDROCHLORIDE 3.6 MG: 4 INJECTION, POWDER, LYOPHILIZED, FOR SOLUTION INTRAVENOUS at 15:53

## 2022-01-01 RX ADMIN — POTASSIUM CHLORIDE 40 MEQ: 750 TABLET, FILM COATED, EXTENDED RELEASE ORAL at 20:37

## 2022-01-01 RX ADMIN — MOMETASONE FUROATE AND FORMOTEROL FUMARATE DIHYDRATE 2 PUFF: 200; 5 AEROSOL RESPIRATORY (INHALATION) at 08:29

## 2022-01-01 RX ADMIN — METHYLPREDNISOLONE SODIUM SUCCINATE 40 MG: 40 INJECTION, POWDER, FOR SOLUTION INTRAMUSCULAR; INTRAVENOUS at 07:58

## 2022-01-01 RX ADMIN — METOPROLOL TARTRATE 25 MG: 25 TABLET, FILM COATED ORAL at 21:05

## 2022-01-01 RX ADMIN — CHLORHEXIDINE GLUCONATE 15 ML: 1.2 RINSE ORAL at 20:38

## 2022-01-01 RX ADMIN — IPRATROPIUM BROMIDE AND ALBUTEROL SULFATE 1 AMPULE: .5; 3 SOLUTION RESPIRATORY (INHALATION) at 04:26

## 2022-01-01 RX ADMIN — INSULIN LISPRO 16 UNITS: 100 INJECTION, SOLUTION INTRAVENOUS; SUBCUTANEOUS at 09:28

## 2022-01-01 RX ADMIN — INSULIN GLARGINE 30 UNITS: 100 INJECTION, SOLUTION SUBCUTANEOUS at 08:38

## 2022-01-01 RX ADMIN — MAGNESIUM SULFATE HEPTAHYDRATE 2000 MG: 40 INJECTION, SOLUTION INTRAVENOUS at 17:10

## 2022-01-01 RX ADMIN — FAMOTIDINE 20 MG: 10 INJECTION INTRAVENOUS at 20:36

## 2022-01-01 RX ADMIN — IPRATROPIUM BROMIDE AND ALBUTEROL SULFATE 1 AMPULE: .5; 3 SOLUTION RESPIRATORY (INHALATION) at 20:32

## 2022-01-01 RX ADMIN — ATORVASTATIN CALCIUM 40 MG: 40 TABLET, FILM COATED ORAL at 08:54

## 2022-01-01 RX ADMIN — DILTIAZEM HYDROCHLORIDE 10 MG: 5 INJECTION, SOLUTION INTRAVENOUS at 03:27

## 2022-01-01 RX ADMIN — INSULIN GLARGINE 40 UNITS: 100 INJECTION, SOLUTION SUBCUTANEOUS at 21:13

## 2022-01-01 RX ADMIN — INSULIN LISPRO 15 UNITS: 100 INJECTION, SOLUTION INTRAVENOUS; SUBCUTANEOUS at 18:22

## 2022-01-01 RX ADMIN — ATORVASTATIN CALCIUM 40 MG: 40 TABLET, FILM COATED ORAL at 08:42

## 2022-01-01 RX ADMIN — INSULIN LISPRO 12 UNITS: 100 INJECTION, SOLUTION INTRAVENOUS; SUBCUTANEOUS at 12:10

## 2022-01-01 RX ADMIN — SODIUM CHLORIDE, PRESERVATIVE FREE 10 ML: 5 INJECTION INTRAVENOUS at 08:39

## 2022-01-01 RX ADMIN — FUROSEMIDE 40 MG: 20 TABLET ORAL at 09:19

## 2022-01-01 RX ADMIN — PIPERACILLIN AND TAZOBACTAM 3375 MG: 3; .375 INJECTION, POWDER, FOR SOLUTION INTRAVENOUS at 00:59

## 2022-01-01 RX ADMIN — PROPOFOL 50 MCG/KG/MIN: 10 INJECTION, EMULSION INTRAVENOUS at 11:43

## 2022-01-01 RX ADMIN — ASPIRIN 81 MG: 81 TABLET, COATED ORAL at 08:42

## 2022-01-01 RX ADMIN — INSULIN GLARGINE 40 UNITS: 100 INJECTION, SOLUTION SUBCUTANEOUS at 21:48

## 2022-01-01 RX ADMIN — MOMETASONE FUROATE AND FORMOTEROL FUMARATE DIHYDRATE 2 PUFF: 200; 5 AEROSOL RESPIRATORY (INHALATION) at 20:26

## 2022-01-01 RX ADMIN — ONDANSETRON 16 MG: 2 INJECTION INTRAMUSCULAR; INTRAVENOUS at 13:18

## 2022-01-01 RX ADMIN — POTASSIUM CHLORIDE 40 MEQ: 750 TABLET, FILM COATED, EXTENDED RELEASE ORAL at 21:04

## 2022-01-01 RX ADMIN — PIPERACILLIN AND TAZOBACTAM 3375 MG: 3; .375 INJECTION, POWDER, FOR SOLUTION INTRAVENOUS at 17:00

## 2022-01-01 RX ADMIN — MOMETASONE FUROATE AND FORMOTEROL FUMARATE DIHYDRATE 2 PUFF: 200; 5 AEROSOL RESPIRATORY (INHALATION) at 08:32

## 2022-01-01 RX ADMIN — MOMETASONE FUROATE AND FORMOTEROL FUMARATE DIHYDRATE 2 PUFF: 200; 5 AEROSOL RESPIRATORY (INHALATION) at 07:26

## 2022-01-01 RX ADMIN — ATORVASTATIN CALCIUM 40 MG: 40 TABLET, FILM COATED ORAL at 09:35

## 2022-01-01 RX ADMIN — POTASSIUM CHLORIDE 40 MEQ: 750 TABLET, FILM COATED, EXTENDED RELEASE ORAL at 21:11

## 2022-01-01 RX ADMIN — POTASSIUM CHLORIDE 40 MEQ: 750 TABLET, FILM COATED, EXTENDED RELEASE ORAL at 21:24

## 2022-01-01 RX ADMIN — PIPERACILLIN AND TAZOBACTAM 3375 MG: 3; .375 INJECTION, POWDER, FOR SOLUTION INTRAVENOUS at 09:41

## 2022-01-01 RX ADMIN — PROPOFOL 45 MCG/KG/MIN: 10 INJECTION, EMULSION INTRAVENOUS at 17:38

## 2022-01-01 RX ADMIN — POTASSIUM CHLORIDE AND SODIUM CHLORIDE: 900; 300 INJECTION, SOLUTION INTRAVENOUS at 02:58

## 2022-01-01 RX ADMIN — POTASSIUM CHLORIDE 40 MEQ: 750 TABLET, FILM COATED, EXTENDED RELEASE ORAL at 22:13

## 2022-01-01 RX ADMIN — Medication 10 MEQ: at 19:58

## 2022-01-01 RX ADMIN — IPRATROPIUM BROMIDE AND ALBUTEROL SULFATE 1 AMPULE: .5; 3 SOLUTION RESPIRATORY (INHALATION) at 16:05

## 2022-01-01 RX ADMIN — TOPOTECAN HYDROCHLORIDE 3.6 MG: 4 INJECTION, POWDER, LYOPHILIZED, FOR SOLUTION INTRAVENOUS at 15:15

## 2022-01-01 RX ADMIN — ONDANSETRON 16 MG: 2 INJECTION INTRAMUSCULAR; INTRAVENOUS at 11:58

## 2022-01-01 RX ADMIN — Medication 10 MEQ: at 14:58

## 2022-01-01 RX ADMIN — INSULIN GLARGINE 10 UNITS: 100 INJECTION, SOLUTION SUBCUTANEOUS at 12:37

## 2022-01-01 RX ADMIN — SODIUM CHLORIDE, PRESERVATIVE FREE 10 ML: 5 INJECTION INTRAVENOUS at 09:42

## 2022-01-01 RX ADMIN — INSULIN LISPRO 7 UNITS: 100 INJECTION, SOLUTION INTRAVENOUS; SUBCUTANEOUS at 20:37

## 2022-01-01 RX ADMIN — ASPIRIN 81 MG: 81 TABLET, COATED ORAL at 09:03

## 2022-01-01 RX ADMIN — Medication 175 MCG/HR: at 04:39

## 2022-01-01 RX ADMIN — ENOXAPARIN SODIUM 30 MG: 100 INJECTION SUBCUTANEOUS at 20:37

## 2022-01-01 RX ADMIN — IOPAMIDOL 75 ML: 755 INJECTION, SOLUTION INTRAVENOUS at 07:47

## 2022-01-01 RX ADMIN — SODIUM CHLORIDE, PRESERVATIVE FREE 10 ML: 5 INJECTION INTRAVENOUS at 08:50

## 2022-01-01 RX ADMIN — Medication 75 MCG/HR: at 04:38

## 2022-01-01 RX ADMIN — MOMETASONE FUROATE AND FORMOTEROL FUMARATE DIHYDRATE 2 PUFF: 200; 5 AEROSOL RESPIRATORY (INHALATION) at 08:34

## 2022-01-01 RX ADMIN — NIFEDIPINE 30 MG: 30 TABLET, FILM COATED, EXTENDED RELEASE ORAL at 21:05

## 2022-01-01 RX ADMIN — INSULIN LISPRO 12 UNITS: 100 INJECTION, SOLUTION INTRAVENOUS; SUBCUTANEOUS at 18:18

## 2022-01-01 RX ADMIN — METHYLPREDNISOLONE SODIUM SUCCINATE 40 MG: 40 INJECTION, POWDER, FOR SOLUTION INTRAMUSCULAR; INTRAVENOUS at 17:54

## 2022-01-01 RX ADMIN — PROPOFOL 45 MCG/KG/MIN: 10 INJECTION, EMULSION INTRAVENOUS at 11:03

## 2022-01-01 RX ADMIN — MOMETASONE FUROATE AND FORMOTEROL FUMARATE DIHYDRATE 2 PUFF: 200; 5 AEROSOL RESPIRATORY (INHALATION) at 21:32

## 2022-01-01 RX ADMIN — INSULIN LISPRO 12 UNITS: 100 INJECTION, SOLUTION INTRAVENOUS; SUBCUTANEOUS at 12:05

## 2022-01-01 RX ADMIN — SPIRONOLACTONE 100 MG: 25 TABLET ORAL at 08:42

## 2022-01-01 RX ADMIN — POTASSIUM CHLORIDE 40 MEQ: 750 TABLET, FILM COATED, EXTENDED RELEASE ORAL at 14:09

## 2022-01-01 RX ADMIN — POTASSIUM CHLORIDE 40 MEQ: 750 TABLET, FILM COATED, EXTENDED RELEASE ORAL at 08:48

## 2022-01-01 RX ADMIN — SPIRONOLACTONE 100 MG: 25 TABLET ORAL at 08:54

## 2022-01-01 RX ADMIN — LIDOCAINE HYDROCHLORIDE 5 ML: 10 INJECTION, SOLUTION EPIDURAL; INFILTRATION; INTRACAUDAL; PERINEURAL at 07:52

## 2022-01-01 RX ADMIN — INSULIN LISPRO 6 UNITS: 100 INJECTION, SOLUTION INTRAVENOUS; SUBCUTANEOUS at 18:00

## 2022-01-01 RX ADMIN — METOPROLOL TARTRATE 5 MG: 5 INJECTION INTRAVENOUS at 00:15

## 2022-01-01 RX ADMIN — INSULIN GLARGINE 20 UNITS: 100 INJECTION, SOLUTION SUBCUTANEOUS at 06:36

## 2022-01-01 RX ADMIN — IPRATROPIUM BROMIDE AND ALBUTEROL SULFATE 1 AMPULE: .5; 3 SOLUTION RESPIRATORY (INHALATION) at 07:34

## 2022-01-01 RX ADMIN — NIFEDIPINE 30 MG: 30 TABLET, FILM COATED, EXTENDED RELEASE ORAL at 01:58

## 2022-01-01 RX ADMIN — INSULIN LISPRO 9 UNITS: 100 INJECTION, SOLUTION INTRAVENOUS; SUBCUTANEOUS at 21:56

## 2022-01-01 RX ADMIN — MOMETASONE FUROATE AND FORMOTEROL FUMARATE DIHYDRATE 2 PUFF: 200; 5 AEROSOL RESPIRATORY (INHALATION) at 20:35

## 2022-01-01 RX ADMIN — FUROSEMIDE 60 MG: 10 INJECTION, SOLUTION INTRAMUSCULAR; INTRAVENOUS at 17:39

## 2022-01-01 RX ADMIN — MOMETASONE FUROATE AND FORMOTEROL FUMARATE DIHYDRATE 2 PUFF: 200; 5 AEROSOL RESPIRATORY (INHALATION) at 08:26

## 2022-01-01 RX ADMIN — SODIUM ZIRCONIUM CYCLOSILICATE 10 G: 10 POWDER, FOR SUSPENSION ORAL at 14:36

## 2022-01-01 RX ADMIN — INSULIN LISPRO 3 UNITS: 100 INJECTION, SOLUTION INTRAVENOUS; SUBCUTANEOUS at 21:14

## 2022-01-01 RX ADMIN — METOPROLOL TARTRATE 5 MG: 5 INJECTION INTRAVENOUS at 17:41

## 2022-01-01 RX ADMIN — CALCIUM GLUCONATE 1000 MG: 20 INJECTION, SOLUTION INTRAVENOUS at 09:08

## 2022-01-01 RX ADMIN — PROPOFOL 50 MCG/KG/MIN: 10 INJECTION, EMULSION INTRAVENOUS at 20:41

## 2022-01-01 RX ADMIN — INSULIN LISPRO 8 UNITS: 100 INJECTION, SOLUTION INTRAVENOUS; SUBCUTANEOUS at 21:26

## 2022-01-01 RX ADMIN — METHYLPREDNISOLONE SODIUM SUCCINATE 40 MG: 40 INJECTION, POWDER, FOR SOLUTION INTRAMUSCULAR; INTRAVENOUS at 08:36

## 2022-01-01 RX ADMIN — IOPAMIDOL 75 ML: 755 INJECTION, SOLUTION INTRAVENOUS at 06:06

## 2022-01-01 RX ADMIN — IPRATROPIUM BROMIDE AND ALBUTEROL SULFATE 1 AMPULE: .5; 3 SOLUTION RESPIRATORY (INHALATION) at 19:41

## 2022-01-01 RX ADMIN — GADOBENATE DIMEGLUMINE 20 ML: 529 INJECTION, SOLUTION INTRAVENOUS at 10:21

## 2022-01-01 RX ADMIN — SODIUM CHLORIDE 25 ML: 9 INJECTION, SOLUTION INTRAVENOUS at 11:24

## 2022-01-01 RX ADMIN — MOMETASONE FUROATE AND FORMOTEROL FUMARATE DIHYDRATE 2 PUFF: 200; 5 AEROSOL RESPIRATORY (INHALATION) at 19:33

## 2022-01-01 RX ADMIN — FILGRASTIM-AAFI 480 MCG: 480 INJECTION, SOLUTION SUBCUTANEOUS at 11:01

## 2022-01-01 RX ADMIN — CHLORHEXIDINE GLUCONATE 15 ML: 1.2 RINSE ORAL at 20:34

## 2022-01-01 RX ADMIN — SPIRONOLACTONE 100 MG: 25 TABLET ORAL at 18:36

## 2022-01-01 RX ADMIN — DEXTROSE MONOHYDRATE 100 ML/HR: 50 INJECTION, SOLUTION INTRAVENOUS at 03:33

## 2022-01-01 RX ADMIN — LEVOFLOXACIN 500 MG: 5 INJECTION, SOLUTION INTRAVENOUS at 09:46

## 2022-01-01 RX ADMIN — DEXTROSE 125 ML: 10 SOLUTION INTRAVENOUS at 03:35

## 2022-01-01 RX ADMIN — PROPOFOL 50 MCG/KG/MIN: 10 INJECTION, EMULSION INTRAVENOUS at 06:48

## 2022-01-01 RX ADMIN — SODIUM CHLORIDE, PRESERVATIVE FREE 10 ML: 5 INJECTION INTRAVENOUS at 20:39

## 2022-01-01 RX ADMIN — TOPOTECAN HYDROCHLORIDE 3.6 MG: 4 INJECTION, POWDER, LYOPHILIZED, FOR SOLUTION INTRAVENOUS at 15:00

## 2022-01-01 RX ADMIN — MOMETASONE FUROATE AND FORMOTEROL FUMARATE DIHYDRATE 2 PUFF: 200; 5 AEROSOL RESPIRATORY (INHALATION) at 07:36

## 2022-01-01 RX ADMIN — SODIUM CHLORIDE, PRESERVATIVE FREE 10 ML: 5 INJECTION INTRAVENOUS at 20:34

## 2022-01-01 RX ADMIN — ENOXAPARIN SODIUM 30 MG: 100 INJECTION SUBCUTANEOUS at 08:54

## 2022-01-01 RX ADMIN — PROPOFOL 40 MCG/KG/MIN: 10 INJECTION, EMULSION INTRAVENOUS at 21:04

## 2022-01-01 RX ADMIN — ONDANSETRON 16 MG: 2 INJECTION INTRAMUSCULAR; INTRAVENOUS at 11:00

## 2022-01-01 RX ADMIN — MOMETASONE FUROATE AND FORMOTEROL FUMARATE DIHYDRATE 2 PUFF: 200; 5 AEROSOL RESPIRATORY (INHALATION) at 09:58

## 2022-01-01 RX ADMIN — IPRATROPIUM BROMIDE AND ALBUTEROL SULFATE 1 AMPULE: .5; 3 SOLUTION RESPIRATORY (INHALATION) at 23:22

## 2022-01-01 RX ADMIN — FUROSEMIDE 80 MG: 10 INJECTION, SOLUTION INTRAMUSCULAR; INTRAVENOUS at 20:38

## 2022-01-01 RX ADMIN — INSULIN GLARGINE 40 UNITS: 100 INJECTION, SOLUTION SUBCUTANEOUS at 20:37

## 2022-01-01 RX ADMIN — ATORVASTATIN CALCIUM 40 MG: 40 TABLET, FILM COATED ORAL at 08:47

## 2022-01-01 RX ADMIN — Medication 10 MEQ: at 17:20

## 2022-01-01 RX ADMIN — ENOXAPARIN SODIUM 30 MG: 100 INJECTION SUBCUTANEOUS at 20:08

## 2022-01-01 RX ADMIN — IPRATROPIUM BROMIDE AND ALBUTEROL SULFATE 1 AMPULE: .5; 3 SOLUTION RESPIRATORY (INHALATION) at 11:53

## 2022-01-01 RX ADMIN — Medication 10 MEQ: at 18:32

## 2022-01-01 RX ADMIN — PROPOFOL 40 MCG/KG/MIN: 10 INJECTION, EMULSION INTRAVENOUS at 00:32

## 2022-01-01 RX ADMIN — CHLORHEXIDINE GLUCONATE 15 ML: 1.2 RINSE ORAL at 08:54

## 2022-01-01 RX ADMIN — INSULIN LISPRO 5 UNITS: 100 INJECTION, SOLUTION INTRAVENOUS; SUBCUTANEOUS at 18:08

## 2022-01-01 RX ADMIN — SPIRONOLACTONE 100 MG: 25 TABLET ORAL at 17:08

## 2022-01-01 RX ADMIN — FUROSEMIDE 60 MG: 10 INJECTION, SOLUTION INTRAMUSCULAR; INTRAVENOUS at 08:05

## 2022-01-01 RX ADMIN — INSULIN LISPRO 8 UNITS: 100 INJECTION, SOLUTION INTRAVENOUS; SUBCUTANEOUS at 00:43

## 2022-01-01 RX ADMIN — SODIUM CHLORIDE, PRESERVATIVE FREE 20 ML: 5 INJECTION INTRAVENOUS at 21:25

## 2022-01-01 RX ADMIN — PIPERACILLIN AND TAZOBACTAM 4500 MG: 4; .5 INJECTION, POWDER, LYOPHILIZED, FOR SOLUTION INTRAVENOUS at 11:22

## 2022-01-01 RX ADMIN — Medication 10 MEQ: at 12:57

## 2022-01-01 RX ADMIN — ENOXAPARIN SODIUM 30 MG: 100 INJECTION SUBCUTANEOUS at 20:36

## 2022-01-01 RX ADMIN — NIFEDIPINE 30 MG: 30 TABLET, FILM COATED, EXTENDED RELEASE ORAL at 08:42

## 2022-01-01 RX ADMIN — INSULIN LISPRO 3 UNITS: 100 INJECTION, SOLUTION INTRAVENOUS; SUBCUTANEOUS at 08:54

## 2022-01-01 RX ADMIN — PIPERACILLIN AND TAZOBACTAM 3375 MG: 3; .375 INJECTION, POWDER, FOR SOLUTION INTRAVENOUS at 14:50

## 2022-01-01 RX ADMIN — INSULIN LISPRO 2 UNITS: 100 INJECTION, SOLUTION INTRAVENOUS; SUBCUTANEOUS at 09:04

## 2022-01-01 RX ADMIN — ONDANSETRON 16 MG: 2 INJECTION INTRAMUSCULAR; INTRAVENOUS at 13:00

## 2022-01-01 RX ADMIN — NIFEDIPINE 30 MG: 30 TABLET, FILM COATED, EXTENDED RELEASE ORAL at 08:13

## 2022-01-01 RX ADMIN — ONDANSETRON 16 MG: 2 INJECTION INTRAMUSCULAR; INTRAVENOUS at 11:25

## 2022-01-01 RX ADMIN — POTASSIUM CHLORIDE 40 MEQ: 750 TABLET, FILM COATED, EXTENDED RELEASE ORAL at 09:41

## 2022-01-01 RX ADMIN — POTASSIUM CHLORIDE AND SODIUM CHLORIDE: 900; 300 INJECTION, SOLUTION INTRAVENOUS at 05:05

## 2022-01-01 RX ADMIN — ASPIRIN 81 MG: 81 TABLET, COATED ORAL at 08:48

## 2022-01-01 RX ADMIN — ONDANSETRON 16 MG: 2 INJECTION INTRAMUSCULAR; INTRAVENOUS at 12:05

## 2022-01-01 RX ADMIN — INSULIN LISPRO 9 UNITS: 100 INJECTION, SOLUTION INTRAVENOUS; SUBCUTANEOUS at 21:12

## 2022-01-01 RX ADMIN — POTASSIUM CHLORIDE AND SODIUM CHLORIDE: 900; 300 INJECTION, SOLUTION INTRAVENOUS at 16:56

## 2022-01-01 RX ADMIN — ALBUTEROL SULFATE 2 PUFF: 90 AEROSOL, METERED RESPIRATORY (INHALATION) at 07:27

## 2022-01-01 RX ADMIN — ATORVASTATIN CALCIUM 40 MG: 40 TABLET, FILM COATED ORAL at 09:03

## 2022-01-01 RX ADMIN — INSULIN LISPRO 6 UNITS: 100 INJECTION, SOLUTION INTRAVENOUS; SUBCUTANEOUS at 12:31

## 2022-01-01 RX ADMIN — DILTIAZEM HYDROCHLORIDE 5 MG/HR: 5 INJECTION INTRAVENOUS at 03:28

## 2022-01-01 RX ADMIN — IPRATROPIUM BROMIDE AND ALBUTEROL SULFATE 1 AMPULE: .5; 3 SOLUTION RESPIRATORY (INHALATION) at 15:36

## 2022-01-01 RX ADMIN — POTASSIUM CHLORIDE 40 MEQ: 750 TABLET, FILM COATED, EXTENDED RELEASE ORAL at 09:34

## 2022-01-01 RX ADMIN — NIFEDIPINE 30 MG: 30 TABLET, FILM COATED, EXTENDED RELEASE ORAL at 08:54

## 2022-01-01 RX ADMIN — INSULIN LISPRO 12 UNITS: 100 INJECTION, SOLUTION INTRAVENOUS; SUBCUTANEOUS at 16:17

## 2022-01-01 RX ADMIN — Medication 175 MCG/HR: at 22:12

## 2022-01-01 RX ADMIN — FUROSEMIDE 60 MG: 10 INJECTION, SOLUTION INTRAMUSCULAR; INTRAVENOUS at 09:41

## 2022-01-01 RX ADMIN — FUROSEMIDE 40 MG: 20 TABLET ORAL at 08:42

## 2022-01-01 RX ADMIN — INSULIN GLARGINE 20 UNITS: 100 INJECTION, SOLUTION SUBCUTANEOUS at 06:24

## 2022-01-01 RX ADMIN — INSULIN LISPRO 12 UNITS: 100 INJECTION, SOLUTION INTRAVENOUS; SUBCUTANEOUS at 17:10

## 2022-01-01 RX ADMIN — NIFEDIPINE 30 MG: 30 TABLET, FILM COATED, EXTENDED RELEASE ORAL at 21:11

## 2022-01-01 RX ADMIN — ENOXAPARIN SODIUM 30 MG: 100 INJECTION SUBCUTANEOUS at 08:05

## 2022-01-01 RX ADMIN — FUROSEMIDE 40 MG: 20 TABLET ORAL at 08:47

## 2022-01-01 RX ADMIN — PROPOFOL 50 MCG/KG/MIN: 10 INJECTION, EMULSION INTRAVENOUS at 14:35

## 2022-01-01 RX ADMIN — METOPROLOL TARTRATE 25 MG: 25 TABLET, FILM COATED ORAL at 21:24

## 2022-01-01 RX ADMIN — MOMETASONE FUROATE AND FORMOTEROL FUMARATE DIHYDRATE 2 PUFF: 200; 5 AEROSOL RESPIRATORY (INHALATION) at 07:23

## 2022-01-01 RX ADMIN — FILGRASTIM-AAFI 480 MCG: 480 INJECTION, SOLUTION SUBCUTANEOUS at 08:54

## 2022-01-01 RX ADMIN — INSULIN LISPRO 15 UNITS: 100 INJECTION, SOLUTION INTRAVENOUS; SUBCUTANEOUS at 12:56

## 2022-01-01 RX ADMIN — POTASSIUM CHLORIDE 40 MEQ: 750 TABLET, FILM COATED, EXTENDED RELEASE ORAL at 20:08

## 2022-01-01 RX ADMIN — PIPERACILLIN AND TAZOBACTAM 3375 MG: 3; .375 INJECTION, POWDER, FOR SOLUTION INTRAVENOUS at 17:54

## 2022-01-01 RX ADMIN — NIFEDIPINE 30 MG: 30 TABLET, FILM COATED, EXTENDED RELEASE ORAL at 20:08

## 2022-01-01 RX ADMIN — FUROSEMIDE 60 MG: 10 INJECTION, SOLUTION INTRAMUSCULAR; INTRAVENOUS at 08:36

## 2022-01-01 RX ADMIN — ENOXAPARIN SODIUM 30 MG: 100 INJECTION SUBCUTANEOUS at 09:03

## 2022-01-01 RX ADMIN — ENOXAPARIN SODIUM 30 MG: 100 INJECTION SUBCUTANEOUS at 22:14

## 2022-01-01 RX ADMIN — CHLORHEXIDINE GLUCONATE 15 ML: 1.2 RINSE ORAL at 20:21

## 2022-01-01 RX ADMIN — MOMETASONE FUROATE AND FORMOTEROL FUMARATE DIHYDRATE 2 PUFF: 200; 5 AEROSOL RESPIRATORY (INHALATION) at 20:32

## 2022-01-01 RX ADMIN — SPIRONOLACTONE 100 MG: 25 TABLET ORAL at 08:47

## 2022-01-01 RX ADMIN — INSULIN GLARGINE 30 UNITS: 100 INJECTION, SOLUTION SUBCUTANEOUS at 10:20

## 2022-01-01 RX ADMIN — MOMETASONE FUROATE AND FORMOTEROL FUMARATE DIHYDRATE 2 PUFF: 200; 5 AEROSOL RESPIRATORY (INHALATION) at 19:37

## 2022-01-01 RX ADMIN — MOMETASONE FUROATE AND FORMOTEROL FUMARATE DIHYDRATE 2 PUFF: 200; 5 AEROSOL RESPIRATORY (INHALATION) at 19:32

## 2022-01-01 RX ADMIN — PROPOFOL 50 MCG/KG/MIN: 10 INJECTION, EMULSION INTRAVENOUS at 04:31

## 2022-01-01 RX ADMIN — IPRATROPIUM BROMIDE AND ALBUTEROL SULFATE 1 AMPULE: .5; 3 SOLUTION RESPIRATORY (INHALATION) at 20:38

## 2022-01-01 RX ADMIN — INSULIN LISPRO 16 UNITS: 100 INJECTION, SOLUTION INTRAVENOUS; SUBCUTANEOUS at 11:45

## 2022-01-01 RX ADMIN — MOMETASONE FUROATE AND FORMOTEROL FUMARATE DIHYDRATE 2 PUFF: 200; 5 AEROSOL RESPIRATORY (INHALATION) at 20:37

## 2022-01-01 RX ADMIN — POTASSIUM CHLORIDE 40 MEQ: 750 TABLET, FILM COATED, EXTENDED RELEASE ORAL at 10:24

## 2022-01-01 RX ADMIN — POTASSIUM CHLORIDE 40 MEQ: 750 TABLET, FILM COATED, EXTENDED RELEASE ORAL at 16:43

## 2022-01-01 RX ADMIN — NIFEDIPINE 30 MG: 30 TABLET, FILM COATED, EXTENDED RELEASE ORAL at 08:06

## 2022-01-01 RX ADMIN — ENOXAPARIN SODIUM 30 MG: 100 INJECTION SUBCUTANEOUS at 08:48

## 2022-01-01 RX ADMIN — ENOXAPARIN SODIUM 30 MG: 100 INJECTION SUBCUTANEOUS at 09:19

## 2022-01-01 RX ADMIN — Medication 10 MEQ: at 16:00

## 2022-01-01 RX ADMIN — INSULIN GLARGINE 35 UNITS: 100 INJECTION, SOLUTION SUBCUTANEOUS at 01:58

## 2022-01-01 RX ADMIN — MOMETASONE FUROATE AND FORMOTEROL FUMARATE DIHYDRATE 2 PUFF: 200; 5 AEROSOL RESPIRATORY (INHALATION) at 09:08

## 2022-01-01 RX ADMIN — PIPERACILLIN AND TAZOBACTAM 3375 MG: 3; .375 INJECTION, POWDER, FOR SOLUTION INTRAVENOUS at 01:28

## 2022-01-01 RX ADMIN — ACETAMINOPHEN 325MG 650 MG: 325 TABLET ORAL at 15:55

## 2022-01-01 RX ADMIN — ENOXAPARIN SODIUM 30 MG: 100 INJECTION SUBCUTANEOUS at 08:49

## 2022-01-01 RX ADMIN — POTASSIUM CHLORIDE 10 MEQ: 7.46 INJECTION, SOLUTION INTRAVENOUS at 05:47

## 2022-01-01 RX ADMIN — DEXTROSE MONOHYDRATE: 10 INJECTION, SOLUTION INTRAVENOUS at 09:35

## 2022-01-01 RX ADMIN — ALBUTEROL SULFATE 2 PUFF: 90 AEROSOL, METERED RESPIRATORY (INHALATION) at 09:08

## 2022-01-01 RX ADMIN — POTASSIUM CHLORIDE 40 MEQ: 750 TABLET, FILM COATED, EXTENDED RELEASE ORAL at 08:06

## 2022-01-01 RX ADMIN — SPIRONOLACTONE 100 MG: 25 TABLET ORAL at 18:17

## 2022-01-01 RX ADMIN — INSULIN LISPRO 12 UNITS: 100 INJECTION, SOLUTION INTRAVENOUS; SUBCUTANEOUS at 08:56

## 2022-01-01 RX ADMIN — TC 99M MEDRONATE 25 MILLICURIE: 20 INJECTION, POWDER, LYOPHILIZED, FOR SOLUTION INTRAVENOUS at 08:59

## 2022-01-01 RX ADMIN — IPRATROPIUM BROMIDE AND ALBUTEROL SULFATE 1 AMPULE: .5; 3 SOLUTION RESPIRATORY (INHALATION) at 03:25

## 2022-01-01 RX ADMIN — CHLORHEXIDINE GLUCONATE 15 ML: 1.2 RINSE ORAL at 08:37

## 2022-01-01 RX ADMIN — ENOXAPARIN SODIUM 30 MG: 100 INJECTION SUBCUTANEOUS at 21:13

## 2022-01-01 RX ADMIN — PROPOFOL 45 MCG/KG/MIN: 10 INJECTION, EMULSION INTRAVENOUS at 14:31

## 2022-01-01 RX ADMIN — NIFEDIPINE 30 MG: 30 TABLET, FILM COATED, EXTENDED RELEASE ORAL at 20:37

## 2022-01-01 RX ADMIN — POTASSIUM CHLORIDE AND SODIUM CHLORIDE: 900; 300 INJECTION, SOLUTION INTRAVENOUS at 20:36

## 2022-01-01 RX ADMIN — SPIRONOLACTONE 100 MG: 25 TABLET ORAL at 09:19

## 2022-01-01 RX ADMIN — SODIUM CHLORIDE, PRESERVATIVE FREE 10 ML: 5 INJECTION INTRAVENOUS at 21:00

## 2022-01-01 RX ADMIN — ENOXAPARIN SODIUM 30 MG: 100 INJECTION SUBCUTANEOUS at 21:05

## 2022-01-01 RX ADMIN — INSULIN LISPRO 18 UNITS: 100 INJECTION, SOLUTION INTRAVENOUS; SUBCUTANEOUS at 18:36

## 2022-01-01 RX ADMIN — INSULIN LISPRO 6 UNITS: 100 INJECTION, SOLUTION INTRAVENOUS; SUBCUTANEOUS at 09:01

## 2022-01-01 RX ADMIN — MAGNESIUM SULFATE HEPTAHYDRATE 2000 MG: 40 INJECTION, SOLUTION INTRAVENOUS at 06:51

## 2022-01-01 RX ADMIN — MOMETASONE FUROATE AND FORMOTEROL FUMARATE DIHYDRATE 2 PUFF: 200; 5 AEROSOL RESPIRATORY (INHALATION) at 20:38

## 2022-01-01 RX ADMIN — INSULIN LISPRO 12 UNITS: 100 INJECTION, SOLUTION INTRAVENOUS; SUBCUTANEOUS at 09:47

## 2022-01-01 RX ADMIN — FUROSEMIDE 40 MG: 40 TABLET ORAL at 18:22

## 2022-01-01 RX ADMIN — SODIUM CHLORIDE, PRESERVATIVE FREE 10 ML: 5 INJECTION INTRAVENOUS at 08:54

## 2022-01-01 RX ADMIN — FILGRASTIM-AAFI 480 MCG: 480 INJECTION, SOLUTION SUBCUTANEOUS at 08:37

## 2022-01-01 RX ADMIN — ATORVASTATIN CALCIUM 40 MG: 40 TABLET, FILM COATED ORAL at 09:41

## 2022-01-01 RX ADMIN — MOMETASONE FUROATE AND FORMOTEROL FUMARATE DIHYDRATE 2 PUFF: 200; 5 AEROSOL RESPIRATORY (INHALATION) at 02:00

## 2022-01-01 RX ADMIN — ENOXAPARIN SODIUM 30 MG: 100 INJECTION SUBCUTANEOUS at 21:48

## 2022-01-01 RX ADMIN — Medication 175 MCG/HR: at 16:46

## 2022-01-01 RX ADMIN — SODIUM CHLORIDE, PRESERVATIVE FREE 10 ML: 5 INJECTION INTRAVENOUS at 20:21

## 2022-01-01 RX ADMIN — INSULIN LISPRO 3 UNITS: 100 INJECTION, SOLUTION INTRAVENOUS; SUBCUTANEOUS at 01:58

## 2022-01-01 RX ADMIN — SPIRONOLACTONE 100 MG: 25 TABLET ORAL at 09:03

## 2022-01-01 RX ADMIN — POTASSIUM CHLORIDE 40 MEQ: 750 TABLET, FILM COATED, EXTENDED RELEASE ORAL at 21:47

## 2022-01-01 RX ADMIN — POTASSIUM CHLORIDE 40 MEQ: 750 TABLET, FILM COATED, EXTENDED RELEASE ORAL at 14:58

## 2022-01-01 RX ADMIN — Medication 175 MCG/HR: at 11:08

## 2022-01-01 RX ADMIN — PIPERACILLIN AND TAZOBACTAM 3375 MG: 3; .375 INJECTION, POWDER, FOR SOLUTION INTRAVENOUS at 09:12

## 2022-01-01 RX ADMIN — INSULIN GLARGINE 30 UNITS: 100 INJECTION, SOLUTION SUBCUTANEOUS at 08:21

## 2022-01-01 RX ADMIN — ALBUTEROL SULFATE 2 PUFF: 90 AEROSOL, METERED RESPIRATORY (INHALATION) at 19:38

## 2022-01-01 RX ADMIN — FUROSEMIDE 40 MG: 10 INJECTION, SOLUTION INTRAMUSCULAR; INTRAVENOUS at 08:49

## 2022-01-01 RX ADMIN — ASPIRIN 81 MG: 81 TABLET, COATED ORAL at 09:20

## 2022-01-01 RX ADMIN — PROPOFOL 50 MCG/KG/MIN: 10 INJECTION, EMULSION INTRAVENOUS at 23:29

## 2022-01-01 RX ADMIN — Medication 100 MCG/HR: at 15:13

## 2022-01-01 RX ADMIN — IPRATROPIUM BROMIDE AND ALBUTEROL SULFATE 1 AMPULE: .5; 3 SOLUTION RESPIRATORY (INHALATION) at 15:47

## 2022-01-01 RX ADMIN — POTASSIUM CHLORIDE 10 MEQ: 7.46 INJECTION, SOLUTION INTRAVENOUS at 06:36

## 2022-01-01 RX ADMIN — INSULIN LISPRO 8 UNITS: 100 INJECTION, SOLUTION INTRAVENOUS; SUBCUTANEOUS at 04:21

## 2022-01-01 RX ADMIN — PROPOFOL 50 MCG/KG/MIN: 10 INJECTION, EMULSION INTRAVENOUS at 17:15

## 2022-01-01 RX ADMIN — SODIUM CHLORIDE, PRESERVATIVE FREE 10 ML: 5 INJECTION INTRAVENOUS at 21:07

## 2022-01-01 RX ADMIN — FUROSEMIDE 80 MG: 10 INJECTION, SOLUTION INTRAMUSCULAR; INTRAVENOUS at 14:31

## 2022-01-01 RX ADMIN — TOPOTECAN HYDROCHLORIDE 3.6 MG: 4 INJECTION, POWDER, LYOPHILIZED, FOR SOLUTION INTRAVENOUS at 12:13

## 2022-01-01 RX ADMIN — Medication 10 MEQ: at 13:49

## 2022-01-01 RX ADMIN — POTASSIUM CHLORIDE 40 MEQ: 750 TABLET, FILM COATED, EXTENDED RELEASE ORAL at 08:42

## 2022-01-01 RX ADMIN — NIFEDIPINE 30 MG: 30 TABLET, FILM COATED, EXTENDED RELEASE ORAL at 09:03

## 2022-01-01 RX ADMIN — POTASSIUM CHLORIDE 40 MEQ: 750 TABLET, FILM COATED, EXTENDED RELEASE ORAL at 08:54

## 2022-01-01 RX ADMIN — Medication 20 MG: at 08:37

## 2022-01-01 RX ADMIN — SPIRONOLACTONE 100 MG: 25 TABLET ORAL at 18:22

## 2022-01-01 RX ADMIN — ONDANSETRON 16 MG: 2 INJECTION INTRAMUSCULAR; INTRAVENOUS at 15:13

## 2022-01-01 RX ADMIN — DIPHENHYDRAMINE HCL 25 MG: 25 TABLET ORAL at 15:55

## 2022-01-01 RX ADMIN — INSULIN LISPRO 6 UNITS: 100 INJECTION, SOLUTION INTRAVENOUS; SUBCUTANEOUS at 16:27

## 2022-01-01 RX ADMIN — ENOXAPARIN SODIUM 30 MG: 100 INJECTION SUBCUTANEOUS at 21:24

## 2022-01-01 RX ADMIN — SODIUM CHLORIDE: 9 INJECTION, SOLUTION INTRAVENOUS at 05:01

## 2022-01-01 RX ADMIN — Medication 10 MEQ: at 15:57

## 2022-01-01 RX ADMIN — METOPROLOL TARTRATE 25 MG: 25 TABLET, FILM COATED ORAL at 09:42

## 2022-01-01 RX ADMIN — IPRATROPIUM BROMIDE AND ALBUTEROL SULFATE 1 AMPULE: .5; 3 SOLUTION RESPIRATORY (INHALATION) at 12:19

## 2022-01-01 RX ADMIN — INSULIN GLARGINE 40 UNITS: 100 INJECTION, SOLUTION SUBCUTANEOUS at 21:07

## 2022-01-01 RX ADMIN — FUROSEMIDE 40 MG: 10 INJECTION, SOLUTION INTRAMUSCULAR; INTRAVENOUS at 18:24

## 2022-01-01 RX ADMIN — INSULIN LISPRO 6 UNITS: 100 INJECTION, SOLUTION INTRAVENOUS; SUBCUTANEOUS at 21:07

## 2022-01-01 RX ADMIN — NIFEDIPINE 30 MG: 30 TABLET, FILM COATED, EXTENDED RELEASE ORAL at 09:20

## 2022-01-01 RX ADMIN — INSULIN LISPRO 15 UNITS: 100 INJECTION, SOLUTION INTRAVENOUS; SUBCUTANEOUS at 11:58

## 2022-01-01 RX ADMIN — INSULIN LISPRO 5 UNITS: 100 INJECTION, SOLUTION INTRAVENOUS; SUBCUTANEOUS at 20:35

## 2022-01-01 RX ADMIN — ATORVASTATIN CALCIUM 40 MG: 40 TABLET, FILM COATED ORAL at 08:37

## 2022-01-01 RX ADMIN — POTASSIUM CHLORIDE 40 MEQ: 750 TABLET, FILM COATED, EXTENDED RELEASE ORAL at 14:45

## 2022-01-01 RX ADMIN — IOHEXOL 50 ML: 240 INJECTION, SOLUTION INTRATHECAL; INTRAVASCULAR; INTRAVENOUS; ORAL at 08:38

## 2022-01-01 RX ADMIN — ENOXAPARIN SODIUM 30 MG: 100 INJECTION SUBCUTANEOUS at 09:11

## 2022-01-01 RX ADMIN — Medication 10 MEQ: at 22:42

## 2022-01-01 RX ADMIN — FAMOTIDINE 20 MG: 10 INJECTION INTRAVENOUS at 07:58

## 2022-01-01 RX ADMIN — BUSPIRONE HYDROCHLORIDE 5 MG: 5 TABLET ORAL at 11:29

## 2022-01-01 RX ADMIN — FUROSEMIDE 60 MG: 10 INJECTION, SOLUTION INTRAMUSCULAR; INTRAVENOUS at 17:00

## 2022-01-01 RX ADMIN — FUROSEMIDE 40 MG: 20 TABLET ORAL at 08:54

## 2022-01-01 RX ADMIN — INSULIN GLARGINE 35 UNITS: 100 INJECTION, SOLUTION SUBCUTANEOUS at 21:12

## 2022-01-01 RX ADMIN — SPIRONOLACTONE 100 MG: 25 TABLET ORAL at 18:08

## 2022-01-01 RX ADMIN — INSULIN LISPRO 12 UNITS: 100 INJECTION, SOLUTION INTRAVENOUS; SUBCUTANEOUS at 13:22

## 2022-01-01 RX ADMIN — SODIUM CHLORIDE 25 ML: 9 INJECTION, SOLUTION INTRAVENOUS at 11:58

## 2022-01-01 RX ADMIN — SPIRONOLACTONE 100 MG: 25 TABLET ORAL at 18:25

## 2022-01-01 RX ADMIN — MAGNESIUM SULFATE HEPTAHYDRATE 2000 MG: 40 INJECTION, SOLUTION INTRAVENOUS at 19:54

## 2022-01-01 RX ADMIN — POTASSIUM CHLORIDE AND SODIUM CHLORIDE: 900; 300 INJECTION, SOLUTION INTRAVENOUS at 14:09

## 2022-01-01 RX ADMIN — Medication 25 MCG/HR: at 15:26

## 2022-01-01 RX ADMIN — SPIRONOLACTONE 100 MG: 25 TABLET ORAL at 17:00

## 2022-01-01 RX ADMIN — POTASSIUM CHLORIDE 40 MEQ: 750 TABLET, FILM COATED, EXTENDED RELEASE ORAL at 16:33

## 2022-01-01 RX ADMIN — SPIRONOLACTONE 100 MG: 25 TABLET ORAL at 08:07

## 2022-01-01 RX ADMIN — LEVOFLOXACIN 750 MG: 5 INJECTION, SOLUTION INTRAVENOUS at 21:25

## 2022-01-01 RX ADMIN — IOPAMIDOL 75 ML: 755 INJECTION, SOLUTION INTRAVENOUS at 08:38

## 2022-01-01 RX ADMIN — FILGRASTIM-AAFI 480 MCG: 480 INJECTION, SOLUTION SUBCUTANEOUS at 11:20

## 2022-01-01 RX ADMIN — POTASSIUM CHLORIDE 10 MEQ: 7.46 INJECTION, SOLUTION INTRAVENOUS at 09:04

## 2022-01-01 RX ADMIN — SPIRONOLACTONE 100 MG: 25 TABLET ORAL at 08:48

## 2022-01-01 RX ADMIN — IPRATROPIUM BROMIDE AND ALBUTEROL SULFATE 2 AMPULE: .5; 3 SOLUTION RESPIRATORY (INHALATION) at 05:53

## 2022-01-01 RX ADMIN — CALCIUM GLUCONATE 1000 MG: 20 INJECTION, SOLUTION INTRAVENOUS at 09:18

## 2022-01-01 RX ADMIN — POTASSIUM CHLORIDE 40 MEQ: 750 TABLET, FILM COATED, EXTENDED RELEASE ORAL at 12:17

## 2022-01-01 RX ADMIN — ATORVASTATIN CALCIUM 40 MG: 40 TABLET, FILM COATED ORAL at 08:48

## 2022-01-01 RX ADMIN — POTASSIUM CHLORIDE AND SODIUM CHLORIDE: 900; 300 INJECTION, SOLUTION INTRAVENOUS at 05:43

## 2022-01-01 RX ADMIN — ATORVASTATIN CALCIUM 40 MG: 40 TABLET, FILM COATED ORAL at 08:06

## 2022-01-01 RX ADMIN — SODIUM CHLORIDE, PRESERVATIVE FREE 10 ML: 5 INJECTION INTRAVENOUS at 08:07

## 2022-01-01 RX ADMIN — INSULIN GLARGINE 40 UNITS: 100 INJECTION, SOLUTION SUBCUTANEOUS at 21:23

## 2022-01-01 RX ADMIN — PROPOFOL 50 MCG/KG/MIN: 10 INJECTION, EMULSION INTRAVENOUS at 03:10

## 2022-01-01 RX ADMIN — MOMETASONE FUROATE AND FORMOTEROL FUMARATE DIHYDRATE 2 PUFF: 200; 5 AEROSOL RESPIRATORY (INHALATION) at 07:15

## 2022-01-01 RX ADMIN — LEVOFLOXACIN 500 MG: 5 INJECTION, SOLUTION INTRAVENOUS at 09:51

## 2022-01-01 RX ADMIN — INSULIN LISPRO 6 UNITS: 100 INJECTION, SOLUTION INTRAVENOUS; SUBCUTANEOUS at 12:17

## 2022-01-01 ASSESSMENT — PULMONARY FUNCTION TESTS
PIF_VALUE: 23
PIF_VALUE: 21
PIF_VALUE: 23
PIF_VALUE: 18
PIF_VALUE: 20
PIF_VALUE: 21
PIF_VALUE: 24
PIF_VALUE: 22
PIF_VALUE: 21
PIF_VALUE: 27
PIF_VALUE: 18
PIF_VALUE: 25
PIF_VALUE: 21
PIF_VALUE: 18
PIF_VALUE: 25
PIF_VALUE: 15
PIF_VALUE: 20
PIF_VALUE: 21
PIF_VALUE: 15
PIF_VALUE: 23
PIF_VALUE: 18
PIF_VALUE: 12
PIF_VALUE: 26
PIF_VALUE: 24
PIF_VALUE: 19
PIF_VALUE: 25
PIF_VALUE: 23
PIF_VALUE: 24
PIF_VALUE: 24
PIF_VALUE: 20
PIF_VALUE: 14
PIF_VALUE: 26
PIF_VALUE: 20
PIF_VALUE: 14
PIF_VALUE: 26
PIF_VALUE: 23
PIF_VALUE: 23
PIF_VALUE: 26
PIF_VALUE: 26
PIF_VALUE: 22
PIF_VALUE: 19
PIF_VALUE: 21
PIF_VALUE: 23
PIF_VALUE: 23
PIF_VALUE: 15
PIF_VALUE: 19
PIF_VALUE: 20
PIF_VALUE: 19
PIF_VALUE: 25
PIF_VALUE: 24
PIF_VALUE: 20
PIF_VALUE: 22
PIF_VALUE: 22
PIF_VALUE: 15
PIF_VALUE: 12
PIF_VALUE: 21
PIF_VALUE: 17
PIF_VALUE: 22
PIF_VALUE: 28
PIF_VALUE: 21
PIF_VALUE: 19
PIF_VALUE: 23
PIF_VALUE: 24
PIF_VALUE: 26
PIF_VALUE: 20
PIF_VALUE: 17
PIF_VALUE: 25
PIF_VALUE: 22
PIF_VALUE: 25
PIF_VALUE: 15
PIF_VALUE: 19
PIF_VALUE: 19
PIF_VALUE: 18
PIF_VALUE: 21
PIF_VALUE: 29
PIF_VALUE: 18
PIF_VALUE: 20
PIF_VALUE: 22
PIF_VALUE: 13
PIF_VALUE: 20
PIF_VALUE: 20
PIF_VALUE: 19
PIF_VALUE: 17
PIF_VALUE: 22
PIF_VALUE: 23
PIF_VALUE: 25
PIF_VALUE: 19
PIF_VALUE: 25
PIF_VALUE: 16
PIF_VALUE: 19
PIF_VALUE: 18
PIF_VALUE: 22
PIF_VALUE: 14
PIF_VALUE: 23
PIF_VALUE: 14
PIF_VALUE: 21
PIF_VALUE: 14
PIF_VALUE: 27
PIF_VALUE: 24
PIF_VALUE: 20
PIF_VALUE: 26
PIF_VALUE: 23
PIF_VALUE: 12
PIF_VALUE: 17
PIF_VALUE: 27
PIF_VALUE: 26
PIF_VALUE: 26
PIF_VALUE: 19
PIF_VALUE: 18
PIF_VALUE: 14
PIF_VALUE: 23
PIF_VALUE: 16
PIF_VALUE: 25
PIF_VALUE: 15
PIF_VALUE: 20
PIF_VALUE: 14
PIF_VALUE: 26
PIF_VALUE: 22
PIF_VALUE: 21
PIF_VALUE: 27
PIF_VALUE: 23
PIF_VALUE: 25
PIF_VALUE: 21
PIF_VALUE: 22
PIF_VALUE: 27
PIF_VALUE: 19
PIF_VALUE: 23
PIF_VALUE: 21
PIF_VALUE: 19
PIF_VALUE: 19
PIF_VALUE: 14
PIF_VALUE: 26
PIF_VALUE: 28
PIF_VALUE: 25
PIF_VALUE: 22
PIF_VALUE: 20
PIF_VALUE: 24
PIF_VALUE: 18
PIF_VALUE: 20
PIF_VALUE: 21
PIF_VALUE: 22
PIF_VALUE: 19
PIF_VALUE: 34
PIF_VALUE: 18
PIF_VALUE: 17
PIF_VALUE: 23
PIF_VALUE: 17
PIF_VALUE: 23
PIF_VALUE: 20
PIF_VALUE: 20
PIF_VALUE: 26
PIF_VALUE: 12
PIF_VALUE: 19
PIF_VALUE: 28
PIF_VALUE: 27
PIF_VALUE: 27
PIF_VALUE: 23
PIF_VALUE: 14
PIF_VALUE: 13
PIF_VALUE: 22
PIF_VALUE: 24
PIF_VALUE: 28
PIF_VALUE: 26
PIF_VALUE: 23
PIF_VALUE: 19
PIF_VALUE: 24
PIF_VALUE: 15
PIF_VALUE: 26
PIF_VALUE: 19
PIF_VALUE: 26
PIF_VALUE: 23
PIF_VALUE: 21
PIF_VALUE: 22
PIF_VALUE: 25
PIF_VALUE: 22
PIF_VALUE: 29
PIF_VALUE: 21
PIF_VALUE: 15
PIF_VALUE: 25
PIF_VALUE: 24
PIF_VALUE: 25
PIF_VALUE: 28
PIF_VALUE: 21
PIF_VALUE: 18
PIF_VALUE: 25
PIF_VALUE: 13
PIF_VALUE: 17
PIF_VALUE: 29
PIF_VALUE: 26
PIF_VALUE: 22
PIF_VALUE: 18
PIF_VALUE: 26
PIF_VALUE: 28
PIF_VALUE: 21
PIF_VALUE: 21
PIF_VALUE: 13
PIF_VALUE: 19
PIF_VALUE: 25
PIF_VALUE: 21
PIF_VALUE: 12
PIF_VALUE: 18
PIF_VALUE: 14
PIF_VALUE: 17
PIF_VALUE: 22
PIF_VALUE: 22
PIF_VALUE: 30
PIF_VALUE: 16
PIF_VALUE: 22
PIF_VALUE: 29
PIF_VALUE: 24
PIF_VALUE: 22
PIF_VALUE: 20
PIF_VALUE: 19
PIF_VALUE: 26
PIF_VALUE: 32
PIF_VALUE: 23
PIF_VALUE: 17
PIF_VALUE: 16
PIF_VALUE: 21
PIF_VALUE: 24
PIF_VALUE: 28
PIF_VALUE: 17
PIF_VALUE: 17
PIF_VALUE: 23
PIF_VALUE: 20
PIF_VALUE: 26
PIF_VALUE: 23
PIF_VALUE: 16
PIF_VALUE: 23
PIF_VALUE: 23
PIF_VALUE: 29
PIF_VALUE: 29
PIF_VALUE: 23
PIF_VALUE: 17
PIF_VALUE: 13
PIF_VALUE: 27
PIF_VALUE: 19
PIF_VALUE: 21
PIF_VALUE: 26

## 2022-01-01 ASSESSMENT — PAIN SCALES - GENERAL
PAINLEVEL_OUTOF10: 0
PAINLEVEL_OUTOF10: 4
PAINLEVEL_OUTOF10: 0

## 2022-01-01 ASSESSMENT — ENCOUNTER SYMPTOMS
SHORTNESS OF BREATH: 0
GASTROINTESTINAL NEGATIVE: 1
EYES NEGATIVE: 1
EYES NEGATIVE: 1
RESPIRATORY NEGATIVE: 1
GASTROINTESTINAL NEGATIVE: 1
RESPIRATORY NEGATIVE: 1

## 2022-01-01 ASSESSMENT — PAIN - FUNCTIONAL ASSESSMENT: PAIN_FUNCTIONAL_ASSESSMENT: 0-10

## 2022-06-21 NOTE — PROGRESS NOTES
- 52.5 %    MCV 99.0 80.0 - 100.0 fL    MCH 33.7 26.0 - 34.0 pg    MCHC 34.0 31.0 - 36.0 g/dL    RDW 13.9 12.4 - 15.4 %    Platelets 709 (L) 485 - 450 K/uL    MPV 7.7 5.0 - 10.5 fL    Neutrophils % 91.4 %    Lymphocytes % 4.4 %    Monocytes % 3.9 %    Eosinophils % 0.0 %    Basophils % 0.3 %    Neutrophils Absolute 6.7 1.7 - 7.7 K/uL    Lymphocytes Absolute 0.3 (L) 1.0 - 5.1 K/uL    Monocytes Absolute 0.3 0.0 - 1.3 K/uL    Eosinophils Absolute 0.0 0.0 - 0.6 K/uL    Basophils Absolute 0.0 0.0 - 0.2 K/uL   Hepatic Function Panel    Collection Time: 09/08/21  5:11 AM   Result Value Ref Range    Total Protein 5.4 (L) 6.4 - 8.2 g/dL    Albumin 3.4 3.4 - 5.0 g/dL    Alkaline Phosphatase 170 (H) 40 - 129 U/L     (H) 10 - 40 U/L    AST 64 (H) 15 - 37 U/L    Total Bilirubin 1.0 0.0 - 1.0 mg/dL    Bilirubin, Direct 0.4 (H) 0.0 - 0.3 mg/dL    Bilirubin, Indirect 0.6 0.0 - 1.0 mg/dL   Basic Metabolic Panel    Collection Time: 09/08/21  5:11 AM   Result Value Ref Range    Sodium 142 136 - 145 mmol/L    Potassium 3.3 (L) 3.5 - 5.1 mmol/L    Chloride 96 (L) 99 - 110 mmol/L    CO2 34 (H) 21 - 32 mmol/L    Anion Gap 12 3 - 16    Glucose 221 (H) 70 - 99 mg/dL    BUN 26 (H) 7 - 20 mg/dL    CREATININE 0.7 (L) 0.9 - 1.3 mg/dL    GFR Non-African American >60 >60    GFR African American >60 >60    Calcium 8.4 8.3 - 10.6 mg/dL   Magnesium    Collection Time: 09/08/21  5:11 AM   Result Value Ref Range    Magnesium 1.70 (L) 1.80 - 2.40 mg/dL   POCT Glucose    Collection Time: 09/08/21  7:58 AM   Result Value Ref Range    POC Glucose 211 (H) 70 - 99 mg/dl    Performed on ACCU-CHEK        ASSESSMENT/PLAN:      Principal Problem:    Hypokalemia-potassium low normal today. Increase potassium to 40 meq TID. Suspect due to elevated cortisol. Active Problems:    Metastatic cancer- Lovenox on hold for biopsy. Moderate persistent asthma with acute exacerbation-continues to improve. Prednisone 20 mg daily.     Pure hypercholesterolemia-continue to hold Lipitor given abnormal LFTs. Essential hypertension-Appreciate Nephrology input. Meds adjusted. Bilateral leg edema-Continue lasix 40 mg twice daily. Weight down today. Continue to follow. Steroid-induced hyperglycemia-increase lantus and change to high sliding scale. Hypomagnesemia- supplement today. D/W nursing at the bedside.       Dennis Chappell MD, FACP  10:30 AM  9/8/2021 Hide Include Location In Plan Question?: No Detail Level: Detailed

## 2022-07-15 PROBLEM — R73.9 HYPERGLYCEMIA: Status: ACTIVE | Noted: 2022-01-01

## 2022-07-15 NOTE — ED PROVIDER NOTES
EKG Interpretation    Interpreted by emergency department physician  Time performed: 4485  Time read: 1833    Rhythm: Sinus  Ventricular Rate: 78  QRS Axis: 40  Ectopy: None  Conduction: Normal sinus rhythm with biatrial abnormality  ST Segments: normal  T Waves: normal  Q Waves: None    Other findings: Motion artifact but EKG is readable    Compared to EKG on: 9/2/2021    Clinical Impression: Normal sinus rhythm with biatrial abnormality otherwise normal EKG. There is motion artifact but EKG is readable. This is compared to an EKG on 9/2/2021.     Gayle 149, [address] [facility] DO  07/15/22 [id number]

## 2022-07-16 NOTE — PLAN OF CARE
Problem: Discharge Planning  Goal: Discharge to home or other facility with appropriate resources  Outcome: Progressing  Flowsheets (Taken 7/16/2022 0227)  Discharge to home or other facility with appropriate resources: Identify barriers to discharge with patient and caregiver     Problem: Pain  Goal: Verbalizes/displays adequate comfort level or baseline comfort level  Outcome: Progressing  Flowsheets (Taken 7/16/2022 0227)  Verbalizes/displays adequate comfort level or baseline comfort level: Encourage patient to monitor pain and request assistance     Problem: Safety - Adult  Goal: Free from fall injury  Outcome: Progressing  Flowsheets (Taken 7/16/2022 0227)  Free From Fall Injury: Instruct family/caregiver on patient safety

## 2022-07-16 NOTE — PROGRESS NOTES
Patient resting in bed this morning w/o complaint. Scheduled morning medications and potassium replacement administered per orders, K noted to be 2.4 w/ AM lab draws. Patient tolerated all well. See eMAR for documentation. Head to toe assessment completed and charted. See flowsheets. Patient noted to have diminished lung sounds bilaterally. +2 pitting edema present to BLE. Patient denies physical/emotional needs at this time. Call light, telephone, and bed side table are within reach. Fall precautions in place. Will continue to monitor and assess.

## 2022-07-16 NOTE — ED PROVIDER NOTES
[address] [facility]        Pt Name: Stiven Monreal  MRN: [id number]  [place] 1970  Date of evaluation: 7/15/2022  Provider: Della Wong PA-C  PCP: Curt Sanchez MD  Note Started: 10:45 PM EDT      PARAM. I have evaluated this patient. My supervising physician was available for consultation. Triage CHIEF COMPLAINT       Chief Complaint   Patient presents with    Discuss Labs     Pt states that he has CA and  states is on a medication that decreases his K level states an NP in his PCP's office to have lab work, was notified to come here for IV KCL         HISTORY OF PRESENT ILLNESS   (Location/Symptom, Timing/Onset, Context/Setting, Quality, Duration, Modifying Factors, Severity)  Note limiting factors. Chief Complaint: Low potassium    Stiven Monreal is a 45 y.o. male who presents stating that he is being treated outpatient for small cell lung cancer that also has metastasized to the liver area. He states that he has been receiving chemotherapy for this and being treated for this for the last 9 months or so. As they have changed chemotherapy agents patient states that it has started to mess with his blood sugars driving them high and also his potassium driving at low. He just got a level for his BMP drawn this morning and was called and told to come to the ER because he would need to be admitted for hypokalemia. He also states that his magnesium he thinks was critically low. Patient states that he is not had any nausea or vomiting, no diarrhea or GI disturbance. No shortness of breath or chest pain compared to usual.  He does admit to having some increasing cramps in his muscles. Patient mentions that for the blood sugars that have been increasing he has just been placed on some insulin that he gives to himself before bedtime and he has been doing that the last few days or so.   Nursing Notes were all reviewed and agreed with or any disagreements were addressed in the HPI. REVIEW OF SYSTEMS    (2-9 systems for level 4, 10 or more for level 5)     Review of Systems  Positive history as above no fevers or chills, no headache vision change neck pain or stiffness. No shortness of breath or chest pain or heart palpitations. No syncope or near syncope dizziness confusion or mental status change. No abdominal pain vomiting or diarrhea, no burning on urination or Diffley passing urine. No extremity acute loss of sensation or movement positive for increasing spasms and cramps of the muscles. PAST MEDICAL HISTORY     Past Medical History:   Diagnosis Date    Anxiety     Asthma     Hyperlipidemia     Hypertension     Small cell lung cancer ([facility] [address] 09/2021    Metastatic with ectopic ACTH production       SURGICAL HISTORY     Past Surgical History:   Procedure Laterality Date    CT NEEDLE BIOPSY LIVER PERCUTANEOUS  9/8/2021    CT NEEDLE BIOPSY LIVER PERCUTANEOUS 9/8/2021 [facility] CT       CURRENTMEDICATIONS       Previous Medications    ALBUTEROL SULFATE  (90 BASE) MCG/ACT INHALER    Inhale 2 puffs into the lungs every 4 hours as needed for Wheezing    ASPIRIN 81 MG EC TABLET    Take 81 mg by mouth daily. ATORVASTATIN (LIPITOR) 40 MG TABLET    TAKE ONE TABLET BY MOUTH DAILY    BLOOD GLUCOSE MONITOR KIT AND SUPPLIES    Test blood sugars two times a day and as needed. BLOOD GLUCOSE MONITOR STRIPS    Test blood sugar two times a day and as needed.     BLOOD PRESSURE KIT    Take BP 3x weekly and PRN    BUDESONIDE-FORMOTEROL (SYMBICORT) 160-4.5 MCG/ACT AERO    Inhale 2 puffs into the lungs 2 times daily    FUROSEMIDE (LASIX) 40 MG TABLET    Take 1 tablet by mouth daily    INSULIN GLARGINE, 2 UNIT DIAL, (TOUJEO MAX SOLOSTAR) 300 UNIT/ML SOPN    Inject 10 Units into the skin at bedtime    INSULIN GLARGINE, 2 UNIT DIAL, 300 UNIT/ML SOPN    Inject 10 Units into the skin at bedtime And increase as indicated by BS    INSULIN PEN NEEDLE 32G X 4 MM MISC    Inject 1 each into the skin daily    MONTELUKAST (SINGULAIR) 10 MG TABLET    Take 1 tablet by mouth nightly    NIFEDIPINE (ADALAT CC) 30 MG EXTENDED RELEASE TABLET    Take 1 tablet by mouth 2 times daily    POTASSIUM CHLORIDE (KLOR-CON M) 20 MEQ TBCR EXTENDED RELEASE TABLET    Take 1 tablet by mouth daily    SPIRONOLACTONE (ALDACTONE) 100 MG TABLET    Take 1 tablet by mouth 2 times daily       ALLERGIES     Patient has no known allergies.     FAMILYHISTORY       Family History   Problem Relation Age of Onset    Lung Cancer Mother     Heart Attack Father         Myocardial Infarction    Stomach Cancer Brother     Heart Attack Sister 39        Myocardial Infarction    COPD Maternal Grandmother         SOCIAL HISTORY       Social History     Socioeconomic History    Marital status:      Spouse name: None    Number of children: None    Years of education: None    Highest education level: None   Tobacco Use    Smoking status: Former     Packs/day: 1.00     Years: 30.00     Pack years: 30.00     Types: Cigarettes     Quit date: 2021     Years since quittin.8    Smokeless tobacco: Never   Vaping Use    Vaping Use: Never used   Substance and Sexual Activity    Alcohol use: No     Alcohol/week: 0.0 standard drinks    Drug use: Yes     Types: Marijuana (Weed)     Social Determinants of Health     Financial Resource Strain: Low Risk     Difficulty of Paying Living Expenses: Not hard at all   Food Insecurity: No Food Insecurity    Worried About [address] in the Last Year: Never true    [facility] in the Last Year: Never true       SCREENINGS    Raymond Coma Scale  Eye Opening: Spontaneous  Best Verbal Response: Oriented  Best Motor Response: Obeys commands  Tevin Coma Scale Score: 15        PHYSICAL EXAM    (up to 7 for level 4, 8 or more for level 5)     ED Triage Vitals [07/15/22 1834]   BP Temp Temp src Heart Rate Resp SpO2 Height Weight   (!) 142/90 97.1 °F (36.2 °C) -- 87 15 91 % 6' 1\" (1.854 m) 237 lb 10.5 oz (107.8 kg)       Physical Exam  Vitals and nursing note reviewed. Constitutional:       Appearance: Normal appearance. He is not toxic-appearing or diaphoretic. HENT:      Head: Normocephalic and atraumatic. Right Ear: External ear normal.      Left Ear: External ear normal.      Nose: Nose normal.      Mouth/Throat:      Mouth: Mucous membranes are moist.      Pharynx: No posterior oropharyngeal erythema. Eyes:      General:         Right eye: No discharge. Left eye: No discharge. Conjunctiva/sclera: Conjunctivae normal.   Cardiovascular:      Rate and Rhythm: Normal rate and regular rhythm. Pulses: Normal pulses. Heart sounds: Normal heart sounds. No murmur heard. No gallop. Pulmonary:      Effort: Pulmonary effort is normal. No respiratory distress. Breath sounds: Normal breath sounds. No wheezing, rhonchi or rales. Abdominal:      General: Bowel sounds are normal. There is no distension. Palpations: Abdomen is soft. Tenderness: There is no abdominal tenderness. There is no right CVA tenderness or left CVA tenderness. Musculoskeletal:         General: No swelling, tenderness, deformity or signs of injury. Normal range of motion. Cervical back: Normal range of motion and neck supple. No rigidity or tenderness. Right lower leg: Edema present. Left lower leg: Edema present. Lymphadenopathy:      Cervical: No cervical adenopathy. Skin:     General: Skin is warm and dry. Capillary Refill: Capillary refill takes less than 2 seconds. Findings: No rash. Neurological:      Mental Status: He is alert and oriented to person, place, and time. Motor: No weakness.       Coordination: Coordination normal.   Psychiatric:         Mood and Affect: Mood normal.         Behavior: Behavior normal.       DIAGNOSTIC RESULTS   LABS:    Labs Reviewed   BETA-HYDROXYBUTYRATE - Abnormal; Notable for the following components:       Result Value    Beta-Hydroxybutyrate 3.79 (*)     All other components within normal limits    Narrative:     Catie [facility] tel. [phone],  Chemistry results called to and read back by Lanre Gomez RN, 07/15/2022  20:28, by Primitivo Garner W/ REFLEX TO MG FOR LOW K - Abnormal; Notable for the following components:    Sodium 132 (*)     Potassium reflex Magnesium 2.6 (*)     Chloride 78 (*)     CO2 40 (*)     Glucose 388 (*)     CREATININE 0.8 (*)     All other components within normal limits    Narrative:     Catie Ridge tel. [phone],  Chemistry results called to and read back by Lanre Gomez RN, 07/15/2022  20:28, by LUCINDA   CBC WITH AUTO DIFFERENTIAL - Abnormal; Notable for the following components:    RBC 2.78 (*)     Hemoglobin 10.1 (*)     Hematocrit 29.1 (*)     .6 (*)     MCH 36.1 (*)     Platelets 80 (*)     Lymphocytes Absolute 0.3 (*)     All other components within normal limits   SPECIMEN REJECTION   MAGNESIUM    Narrative:     Catie [facility] tel. [phone],  Chemistry results called to and read back by Alexia, 07/15/2022  20:28, by LUCINDA       When ordered, only abnormal lab results are displayed. All other labs were within normal range or not returned as of this dictation. EKG: When ordered, EKG's are interpreted by the Emergency Department Physician in the absence of a cardiologist.  Please see their note for interpretation of EKG. RADIOLOGY:   Non-plain film images such as CT, Ultrasound and MRI are read by the radiologist. Plain radiographic images are visualized andpreliminarily interpreted by the  ED Provider with the below findings:        Interpretation perthe Radiologist below, if available at the time of this note:    XR CHEST PORTABLE   Final Result   No acute process.            XR CHEST PORTABLE    Result Date: 7/15/2022  EXAMINATION: ONE XRAY VIEW OF THE CHEST 7/15/2022 7:22 pm COMPARISON: 07/08/2022, 09/13/2021 HISTORY: ORDERING SYSTEM PROVIDED HISTORY: other TECHNOLOGIST PROVIDED HISTORY: Reason for exam:->other Reason for Exam: abnormal labs FINDINGS: The lungs are without acute focal process. There is no effusion or pneumothorax. The cardiomediastinal silhouette is without acute process. The osseous structures are without acute process. No acute process. PROCEDURES   Unless otherwise noted below, none     Procedures    CRITICAL CARE TIME   Critical Care  There was a high probability of life-threatening clinical deterioration in the patient's condition requiring my urgent intervention. Total critical care time with the patient was greater than 20 minutes excluding separately reportable procedures. Critical care required due to patients critical level hyperkalemia requiring IV and p.o. supplementation. CONSULTS:  IP CONSULT TO PRIMARY CARE PROVIDER      EMERGENCY DEPARTMENT COURSE and DIFFERENTIAL DIAGNOSIS/MDM:   Vitals:    Vitals:    07/15/22 1834   BP: (!) 142/90   Pulse: 87   Resp: 15   Temp: 97.1 °F (36.2 °C)   SpO2: 91%   Weight: 237 lb 10.5 oz (107.8 kg)   Height: 6' 1\" (1.854 m)       Patient was given thefollowing medications:  Medications   0.9% NaCl with KCl 40 mEq infusion ( IntraVENous New Bag 7/15/22 2036)   magnesium sulfate 2000 mg in 50 mL IVPB premix (0 mg IntraVENous Stopped 7/15/22 2205)   potassium bicarb-citric acid (EFFER-K) effervescent tablet 40 mEq (40 mEq Oral Given 7/15/22 1950)         Is this patient to be included in the SEP-1 Core Measure due to severe sepsis or septic shock? No   Exclusion criteria - the patient is NOT to be included for SEP-1 Core Measure due to: Infection is not suspected  This patient presents as above and evaluation and treatment is begun here. Based on the lab values I can see in the system from earlier this morning, both p.o. and IV potassium supplementation and replenishment is begun. Also some magnesium is given.   When labs do come back for the patient, his potassium is critically low still at 2.6, sodium 132, chloride 78, CO2 40, and glucose is elevated at 388. Kidney function is good. CBC does show some ongoing anemia without acute worrisome signs. Patient is not having any abdominal pain or vomiting and I do not suspect diabetic ketoacidosis but rather some significant metabolic shifts that mimics this. Patient's magnesium is stable at this time. Consultation to hospitalist for admission is placed at this time and patient is in fair condition. FINAL IMPRESSION      1. Hypokalemia          DISPOSITION/PLAN   DISPOSITION Decision To Admit 07/15/2022 09:35:51 PM      PATIENT REFERREDTO:  No follow-up provider specified.     DISCHARGE MEDICATIONS:  New Prescriptions    No medications on file       DISCONTINUED MEDICATIONS:  Discontinued Medications    No medications on file              (Please note that portions ofthis note were completed with a voice recognition program.  Efforts were made to edit the dictations but occasionally words are mis-transcribed.)    Blaise Irizarry PA-C (electronically signed)              Blaise Irizarry PA-C  07/15/22 0723

## 2022-07-16 NOTE — PROGRESS NOTES
Call placed to MD [facility] regarding patient hypokalemia and hyperglycemia this shift. Order provided to increase patient's Humalog from low dose sliding scale to high sliding and to put in for K replacement to be given this evening. No further orders at this time.  Will continue to monitor and assess

## 2022-07-16 NOTE — PROGRESS NOTES
Medication Reconciliation     List of medications patient is currently taking is complete. Source of information:   1. Conversation with patient at bedside  2. EPIC records        Notes regarding home medications:  1. Patient received all of his morning home medications today. 2. Changed: increased dose on Toujeo according to patient. Now taking 35 units. 3. Updated AM and PM medication times  4. No additional OTC/supplement medications at this time.     Linus Williamson, Pharmacy Intern  7/15/2022 9:46 PM

## 2022-07-16 NOTE — CONSULTS
[facility] CONSULTATION     Date: 7/16/2022  Time: 2:39 PM    PCP: Rosa Lovelace MD  Referring Provider: No ref. provider found  Oncologist: Dr. Christ Ganser, M.D.; M.S.    Reason for Consult: Extensive stage small cell lung cancer    CHIEF COMPLAINT:     Chief Complaint   Patient presents with    Discuss Labs     Pt states that he has CA and  states is on a medication that decreases his K level states an NP in his PCP's office to have lab work, was notified to come here for IV KCL       HPI:     Mr. Arleth Avila is a 45 y.o. male was brought into the hospital for severe hypokalemia. He is known to have extensive stage small cell lung cancer. His recent scan showed progressive disease. He is under the care of Dr. Abelardo Galaviz. He was scheduled for third line of chemotherapy with topotecan next week. His potassium was found to be 2.4. Therefore he was admitted. He denies cough, hemoptysis, chest pain or shortness of breath. Overall he is feeling reasonably well. He denies abdominal pain, diarrhea or constipation.     HISTORY:     PAST MEDICAL HISTORY:  Past Medical History:   Diagnosis Date    Anxiety     Asthma     Hyperlipidemia     Hypertension     Small cell lung cancer ([facility] [address] 09/2021    Metastatic with ectopic ACTH production       PAST SURGICAL HISTORY:  Past Surgical History:   Procedure Laterality Date    CT NEEDLE BIOPSY LIVER PERCUTANEOUS  9/8/2021    CT NEEDLE BIOPSY LIVER PERCUTANEOUS 9/8/2021 [facility] CT       FAMILY HISTORY:  Family History   Problem Relation Age of Onset    Lung Cancer Mother     Heart Attack Father         Myocardial Infarction    Stomach Cancer Brother     Heart Attack Sister 39        Myocardial Infarction    COPD Maternal Grandmother        SOCIAL HISTORY:  Social History     Substance and Sexual Activity   Drug Use Yes    Types: Marijuana (Weed)     Social History     Substance and Sexual Activity   Alcohol Use No    Alcohol/week: 0.0 standard drinks     Social History     Substance and Sexual Activity   Sexual Activity Not on file     Social History     Tobacco Use   Smoking Status Former    Packs/day: 1.00    Years: 30.00    Pack years: 30.00    Types: Cigarettes    Quit date: 2021    Years since quittin.8   Smokeless Tobacco Never       ALLERGIES:     No Known Allergies    CURRENT MEDICATIONS:     Current Facility-Administered Medications   Medication Dose Route Frequency Provider Last Rate Last Admin    0.9% NaCl with KCl 40 mEq infusion   IntraVENous Continuous Cory Michaels  mL/hr at 22 0543 New Bag at 22 0543    aspirin EC tablet 81 mg  81 mg Oral Daily Cory Michaels MD   81 mg at 22 09    atorvastatin (LIPITOR) tablet 40 mg  40 mg Oral Daily Cory Michaels MD   40 mg at 22 09    albuterol sulfate HFA (PROVENTIL;VENTOLIN;PROAIR) 108 (90 Base) MCG/ACT inhaler 2 puff  2 puff Inhalation Q4H PRN Cory Michaels MD        mometasone-formoterol [facility]) 200-5 MCG/ACT inhaler 2 puff  2 puff Inhalation BID Cory Michaels MD   2 puff at 22 0723    furosemide (LASIX) tablet 40 mg  40 mg Oral Daily Cory Michaels MD   40 mg at 22 09    insulin glargine (LANTUS) injection vial 35 Units  35 Units SubCUTAneous Nightly Cory Michaels MD   35 Units at 22 0158    NIFEdipine (PROCARDIA XL) extended release tablet 30 mg  30 mg Oral BID Cory Michaels MD   30 mg at 22 7372    spironolactone (ALDACTONE) tablet 100 mg  100 mg Oral BID Cory Michaels MD   100 mg at 22 5724    sodium chloride flush 0.9 % injection 5-40 mL  5-40 mL IntraVENous 2 times per day Cory Michaels MD        sodium chloride flush 0.9 % injection 5-40 mL  5-40 mL IntraVENous PRN Cory Michaels MD        0.9 % sodium chloride infusion   IntraVENous PRN Cory Michaels MD        ondansetron (ZOFRAN-ODT) disintegrating tablet 4 mg  4 mg Oral Q8H PRN Cory Michaels MD        Or    ondansetron [facility]) injection 4 mg  4 mg IntraVENous Q6H PRN Tatum Genao MD        polyethylene glycol [facility]) packet 17 g  17 g Oral Daily PRN Tatum Genao MD        acetaminophen (TYLENOL) tablet 650 mg  650 mg Oral Q6H PRN Tatum Genao MD        Or    acetaminophen (TYLENOL) suppository 650 mg  650 mg Rectal Q6H PRN Tatum Genao MD        enoxaparin Sodium (LOVENOX) injection 30 mg  30 mg SubCUTAneous BID Tatum Genao MD   30 mg at 07/16/22 0903    glucose chewable tablet 16 g  4 tablet Oral PRN Tatum Genao MD        dextrose bolus 10% 125 mL  125 mL IntraVENous PRN Tatum Genao MD        Or    dextrose bolus 10% 250 mL  250 mL IntraVENous PRN Tatum Genao MD        glucagon (rDNA) injection 1 mg  1 mg IntraMUSCular PRN Tatum Genao MD        dextrose 5 % solution  100 mL/hr IntraVENous PRN Tatum Genao MD        insulin lispro (HUMALOG) injection vial 0-6 Units  0-6 Units SubCUTAneous TID WC Tatum Genao MD   6 Units at 07/16/22 1231    insulin lispro (HUMALOG) injection vial 0-3 Units  0-3 Units SubCUTAneous Nightly Tatum Genao MD   3 Units at 07/16/22 0158       PROBLEM LIST:     Patient Active Problem List   Diagnosis    Pure hypercholesterolemia    Anxiety    Essential hypertension    Moderate persistent asthma with acute exacerbation    Metastatic cancer (HCC)    Hypokalemia    Acute respiratory failure with hypoxia (HCC)    Abnormal CT of the chest    COPD exacerbation (HCC)    Lung nodules    Tobacco abuse    Small cell lung cancer ([address]    Ectopic ACTH syndrome ([facility] [address]    Hyperglycemia     Specialty Problems    None      REVIEW OF SYSTEMS:     CONSTITUTIONAL: Denies fever, sweats, weight loss     EYES: No visual changes or diplopia. No scleral icterus. ENT: No Headaches, hearing loss or vertigo. No mouth sores or sore throat. CARDIOVASCULAR: No chest pain, dyspnea on exertion, palpitations or loss of consciousness. RESPIRATORY: No cough or wheezing, no sputum production. No hemoptysis.     GASTROINTESTINAL: No abdominal pain, appetite loss, blood in stools. No change in bowel habits. GENITOURINARY: No dysuria, trouble voiding, or hematuria. MUSCULOSKELETAL: no generalized weakness. No joint complaints. INTEGUMENTARY: No rash or pruritus. NEUROLOGICAL: No headache, diplopia. No change in gait, balance, or coordination. No paresthesia. ENDOCRINE: No temperature intolerance. No excessive thirst, fluid intake, or urination. HEMATOLOGIC/LYMPHATIC: No abnormal bruising or ecchymosis, blood clots or swollen lymph nodes. ALLERGIC/IMMUNOLOGIC: No nasal congestion or hives.      PHYSICAL EXAM:     /83   Pulse 98   Temp 97.7 °F (36.5 °C) (Oral)   Resp 16   Ht 6' 1\" (1.854 m)   Wt 240 lb 4.8 oz (109 kg)   SpO2 90%   BMI 31.70 kg/m²     WEIGHT:   Wt Readings from Last 3 Encounters:   07/16/22 240 lb 4.8 oz (109 kg)   07/15/22 235 lb (106.6 kg)   07/07/22 245 lb 3.2 oz (111.2 kg)        GENERAL APPEARANCE: Alert and Cooperative  HEAD: Normocephalic, without obvious abnormality, atraumatic  NECK: No palpable lymphadenopathy in supraclavicular, axillary or cervical chains  LUNGS: Clear to auscultation bilaterally, no audible rales, wheezes or crackles  HEART: Regular rate and rhythm, S1, S2 normal  ABDOMEN: Soft, non-tender; bowel sounds normal; no masses, no organomegaly  EXTREMITIES: without cyanosis, clubbing, edema or asymmetry  SKIN: No jaundice, purpura or petechiae    LABS:     CBC:  Lab Results   Component Value Date    WBC 4.9 07/16/2022    HGB 10.7 (L) 07/16/2022    HCT 30.8 (L) 07/16/2022    .3 (H) 07/16/2022    PLT 94 (L) 07/16/2022    LYMPHOPCT 8.9 07/16/2022    RBC 2.98 (L) 07/16/2022    MCH 36.0 (H) 07/16/2022    MCHC 34.9 07/16/2022    RDW 15.8 (H) 07/16/2022       Lab Results   Component Value Date     (L) 07/16/2022    K 3.1 (L) 07/16/2022    CL 85 (L) 07/16/2022    CO2 38 (H) 07/16/2022    BUN 14 07/16/2022    CREATININE 0.6 (L) 07/16/2022    GLUCOSE 273 (H) 07/16/2022    CALCIUM 7.9 (L) 07/16/2022    PROT 5.0 (L) 07/16/2022    LABALBU 2.9 (L) 07/16/2022    BILITOT 1.3 (H) 07/16/2022    ALKPHOS 327 (H) 07/16/2022    AST 69 (H) 07/16/2022     (H) 07/16/2022    LABGLOM >60 07/16/2022    GFRAA >60 07/16/2022    AGRATIO 1.4 07/16/2022    GLOB 2.4 09/02/2021       No results found for: PTINR    TUMOR MARKERS:    Lab Results   Component Value Date/Time    PSA 0.60 11/19/2019 10:07 AM    .1 09/03/2021 04:17 AM     128 09/07/2021 05:04 AM       IMAGING:       XR CHEST PORTABLE    Result Date: 7/16/2022  EXAMINATION: ONE XRAY VIEW OF THE CHEST 7/15/2022 11:48 pm COMPARISON: 07/15/2022 HISTORY: ORDERING SYSTEM PROVIDED HISTORY: lung cancer TECHNOLOGIST PROVIDED HISTORY: Reason for exam:->lung cancer Reason for Exam: lung cancer FINDINGS: The lungs are without acute focal process. There is no effusion or pneumothorax. The cardiomediastinal silhouette is without acute process. The osseous structures are without acute process. No acute process. XR CHEST PORTABLE    Result Date: 7/15/2022  EXAMINATION: ONE XRAY VIEW OF THE CHEST 7/15/2022 7:22 pm COMPARISON: 07/08/2022, 09/13/2021 HISTORY: ORDERING SYSTEM PROVIDED HISTORY: other TECHNOLOGIST PROVIDED HISTORY: Reason for exam:->other Reason for Exam: abnormal labs FINDINGS: The lungs are without acute focal process. There is no effusion or pneumothorax. The cardiomediastinal silhouette is without acute process. The osseous structures are without acute process. No acute process. NM BONE SCAN WHOLE BODY    Result Date: 7/8/2022  EXAMINATION: WHOLE BODY BONE SCAN  7/8/2022 TECHNIQUE: The patient was injected intravenously with 27.5 mCi of 99 mTc MDP and scintigraphy of the entire skeleton was performed approximately three hours later.  COMPARISON: 03/18/2022 HISTORY: ORDERING SYSTEM PROVIDED HISTORY: Malignant neoplasm of right main bronchus [facility]) TECHNOLOGIST PROVIDED HISTORY: Order: Bone scan, total body Order Instructions: [facility] along with CT please lower back and hip pain Associated problems: Small cell carcinoma of lung (disorder) * (C34.01) Reason for Exam: staging prostate Ca Relevant Medical/Surgical History: ct today FINDINGS: New focal activity is demonstrated along the superior aspect of the right iliac crest.  Developing activity is seen at the lateral right 11th rib and posterior right 5th rib. Lesions are seen which are more conspicuous within the proximal right femur, anterior right iliac bone, medial left iliac bone, sacrum, lumbar spine, upper thoracic spine, proximal left humerus. Activity at the shoulders, sternoclavicular joints, hips, knees, ankles, feet, likely degenerative. Physiologic activity is seen within the kidneys and urinary bladder. Urinary contamination is seen between the upper thighs. Several new metastatic lesions are seen as compared to prior exam dated 03/18/2022. Additional lesions are more intense as compared to prior which could relate to flare effect if there has been intervening therapy. CT CHEST ABDOMEN PELVIS W CONTRAST    Result Date: 7/8/2022  EXAMINATION: CT OF THE CHEST, ABDOMEN, AND PELVIS WITH CONTRAST 7/8/2022 8:35 am TECHNIQUE: CT of the chest, abdomen and pelvis was performed with the administration of intravenous contrast. Multiplanar reformatted images are provided for review. Automated exposure control, iterative reconstruction, and/or weight based adjustment of the mA/kV was utilized to reduce the radiation dose to as low as reasonably achievable.  COMPARISON: 03/18/2022 HISTORY: ORDERING SYSTEM PROVIDED HISTORY: Malignant neoplasm of right main bronchus [facility]) TECHNOLOGIST PROVIDED HISTORY: Order: CT chest/abdomen/pelvis w/ contrast Order Instructions: STAT Creatinine & BUN prior to CT Scan per facility protocol [facility] Associated problems: Small cell carcinoma of lung (disorder) * (C34.01) Additional Contrast?->Oral STAT Creatinine as needed:->Yes Reason for Exam: Malignant neoplasm of right main bronchus FINDINGS: Chest: Mediastinum: The central airways are patent. There has been interval enlargement of several mediastinal lymph nodes. Index prevascular node measures 1.3 cm on the current exam compared with 9 mm prior. Right paratracheal node measures 1.5 cm on the current exam compared with 1.4 cm prior. Precarinal lymph node measures 1.4 cm on the current exam compared with 0.6 cm prior. AP window node measures 12 mm currently compared with 5 mm prior. There is a new 1.7 cm lymph node within the right infrahilar region. Lungs/pleura: Mild emphysematous changes are present. A few tiny non calcified right-sided pulmonary nodules are unchanged in size, number and appearance. The pleural spaces are clear. Soft Tissues/Bones: Numerous sclerotic lesions scattered throughout the thoracic spine and ribs are stable in size, number and appearance. Abdomen/Pelvis: Organs: There has been interval increase in size and number of numerous hypodense lesions scattered throughout the liver. Index lesion within the left hepatic lobe measures 3.2 cm on the current exam compared with 2.4 cm prior. Index lesion within the caudate lobe measures 2.4 cm compared with 1.8 cm prior. There has been slight interval enlargement of the left adrenal nodule which measures 1.6 cm on the current exam.  A few tiny bilateral nonobstructing intrarenal calculi are unchanged. The remainder of the GI/Bowel: There is no bowel dilatation, wall thickening or obstruction. Pelvis: The bladder and prostate are unremarkable. Peritoneum/Retroperitoneum: There is no free air or free fluid. There has been interval development of a 2 cm lymph node within the posterior gastrohepatic ligament. Bones/Soft Tissues: This numerous small osteoblastic lesions scattered throughout the lumbosacral spine and pelvis are unchanged in size, number and appearance.      1. Mild interval progression of right

## 2022-07-16 NOTE — PROGRESS NOTES
Pt arrived to floor from ER at 0130 via stretcher. Pt oriented to room, call light, policies and procedures, the menu and ordering. Call light within reach. Bed in lowest position, bed alarm on, and wheels locked. Pt verbalized understanding. No complaints, questions or concerns at this time.

## 2022-07-16 NOTE — H&P
Admit H&P dictated--IMP: Principal Problem:    Small cell lung cancer (HCC)--undergoing chemo--sees dr Cristy Frausto   Active Problems:    Hypokalemia--supplement iv and po    Ectopic ACTH syndrome (HCC)--assoc with incr BS and decr potassium     Hyperglycemia--lantus and SSI   Resolved Problems:    * No resolved hospital problems.  *  Admit--iv/po kcl--SSI and lantus--oncol to consult --Bs are improved with SSI---potass still with sig replacement requirements--iv and po     Jessica Siddiqui MD

## 2022-07-16 NOTE — H&P
[address] [address]                              HISTORY AND PHYSICAL    PATIENT NAME: Raya Mendieta                   :        1970  MED REC NO:   [id number]                          ROOM:       3105  ACCOUNT NO:   [de-identified]                           ADMIT DATE: 07/15/2022  PROVIDER:     Seamus Rodriguez MD    ATTENDING PROVIDER:  Susie Sharma MD    CHIEF COMPLAINT:  Small cell lung cancer and hypokalemia and  hyperglycemia. HISTORY OF PRESENT ILLNESS:  The patient is a 49-year-old white male  admitted through emergency. He was seen in our office complaining of  some weakness. He is known to have small cell lung cancer metastasized  to the liver. He has been getting chemotherapy. That chemotherapy has  been associated with low potassium and elevated blood sugar. These were  evaluated in our office as an outpatient, treated with increased dose of  insulin and increasing potassium supplementation. However, upon  evaluation in the ER, his potassium is still 2.4, his blood sugar was  505, and so he was admitted. PAST MEDICAL HISTORY:  Remarkable for anxiety, asthma, hypertension,  hyperlipidemia, small cell lung cancer that is metastatic with ectopic  ACTH production. PAST SURGICAL HISTORY:  Includes a needle biopsy of the liver. MEDICATIONS ON ADMISSION:  Include albuterol, aspirin, atorvastatin,  Symbicort, Lasix, insulin, nifedipine, Klor-Con, and spironolactone. ALLERGIES:  He has no known allergies. FAMILY HISTORY:  Remarkable for mother with lung cancer. SOCIAL HISTORY:  He is . He quit smoking in . He has about  a [id number] history. He does not drink alcohol. REVIEW OF SYSTEMS:  Positive for some weakness. Other than that, he  denies fever, chills, headache, vision change, palpitation, syncope,  nausea, vomiting, or any other focal neurologic deficit. No dysuria. Ten-point review of systems otherwise performed was negative. PHYSICAL EXAMINATION:  VITAL SIGNS:  On admission, blood pressure is 142/90, pulse 87,  respiration 15, O2 sat is 92% on room air, he is afebrile. GENERAL:  He is a well-developed, well-nourished white male when visited  by Dr. Ly Coley, sitting on the commode in no acute distress, conversant  without focal deficits. HEENT:  Head is normocephalic, atraumatic. Pupils are equal, round,  reactive to light and accommodation. Extraocular muscles intact. NECK:  Supple without JVD. CHEST:  Clear to auscultation and percussion. CARDIOVASCULAR:  Regular rate and rhythm. ABDOMEN:  Soft, nontender without mass or organomegaly. EXTREMITIES:  Trace of edema. Otherwise no DVT noted. NEUROLOGIC:  Alert, conversant. Moving all extremities without focal  neurologic deficits. LABORATORY DATA:  On admission, his potassium was 2.6, BUN and  creatinine were 14 and 0.8, blood sugar was 505. White count 4700,  hemoglobin 10.1, platelet count 00,455. Chest x-ray shows no acute process. IMPRESSION:  1. Small cell lung cancer undergoing chemo, normally follows with Dr. Bekah Mensah. 2.  Hypokalemia, significant at 2.3 to 2.4 on admission and 2.4 this  morning despite vigorous supplementation. 3.  Ectopic ACTH syndrome associated with increased blood sugar and  decreased potassium. 4.  Hyperglycemia, on Lantus insulin and we will add sliding scale  insulin. PLAN:  He is going to be admitted. He will be given IV and oral  potassium. Sliding scale insulin, Lantus insulin will be continued. Oncology will be consulted. We will continue vigorous potassium  supplementation and follow.         Andrew Duffy MD    D: 07/16/2022 10:16:53       T: 07/16/2022 14:22:20     [id number]  Job#: [id number]     Doc#: [id number]    CC:  Bill Olivarez MD

## 2022-07-16 NOTE — ED NOTES
Pt sat consistantly in mid 80.  Placed on o2/3l/nc at this time     Sebastian Zavala RN  07/15/22 4067

## 2022-07-17 NOTE — PROGRESS NOTES
[facility] Progress Note  7/17/2022 9:38 AM  Subjective:   Admit Date: 7/15/2022      Chief Complaint: I feel OK     Interval History: Persistent hypokalemia persists--getting Iv and oral--  BS are now under better control with high SSI   No N&V--bowels are moving   Diet: ADULT DIET; Regular; 4 carb choices (60 gm/meal)  Medications:   Scheduled Meds:   potassium chloride  40 mEq Oral TID    insulin lispro  0-18 Units SubCUTAneous TID WC    insulin lispro  0-9 Units SubCUTAneous Nightly    aspirin  81 mg Oral Daily    atorvastatin  40 mg Oral Daily    mometasone-formoterol  2 puff Inhalation BID    furosemide  40 mg Oral Daily    insulin glargine  35 Units SubCUTAneous Nightly    NIFEdipine  30 mg Oral BID    spironolactone  100 mg Oral BID    sodium chloride flush  5-40 mL IntraVENous 2 times per day    enoxaparin  30 mg SubCUTAneous BID     Continuous Infusions:   0.9% NaCl with KCl 40 mEq 100 mL/hr at 07/17/22 0258    sodium chloride      dextrose         Review of Systems -   General ROS: afebrile  Respiratory ROS: no cough, shortness of breath or wheezing  Cardiovascular ROS: no chest pain  Musculoskeletal ROS:positive for - :joint pain  Neurological ROS: no TIA or stroke symptoms    Objective:   Vitals: BP (!) 142/95   Pulse 86   Temp 97.5 °F (36.4 °C) (Oral)   Resp 18   Ht 6' 1\" (1.854 m)   Wt 244 lb 0.8 oz (110.7 kg)   SpO2 90%   BMI 32.20 kg/m²   General appearance: alert and cooperative with exam  HEENT: Head: Normocephalic, no lesions, without obvious abnormality.   Neck: no adenopathy, no carotid bruit, no JVD, supple, symmetrical, trachea midline, and thyroid not enlarged, symmetric, no tenderness/mass/nodules  Lungs: clear to auscultation bilaterally  Heart: regular rate and rhythm, S1, S2 normal, no murmur, click, rub or gallop  Abdomen: soft, non-tender; bowel sounds normal; no masses,  no organomegaly  Extremities: extremities normal, atraumatic, no cyanosis or edema  Neurologic: Mental status: Alert, oriented, thought content appropriate    Admission on 07/15/2022   Component Date Value Ref Range Status    Beta-Hydroxybutyrate 07/15/2022 3.79 (A) 0.00 - 0.27 mmol/L Final    Ventricular Rate 07/15/2022 78  BPM Final    Atrial Rate 07/15/2022 78  BPM Final    P-R Interval 07/15/2022 148  ms Final    QRS Duration 07/15/2022 96  ms Final    Q-T Interval 07/15/2022 420  ms Final    QTc Calculation (Bazett) 07/15/2022 478  ms Final    P Axis 07/15/2022 62  degrees Final    R Axis 07/15/2022 40  degrees Final    T Axis 07/15/2022 37  degrees Final    Diagnosis 07/15/2022 Normal sinus rhythmNormal ECGWhen compared with ECG of 02-SEP-2021 10:58,Nonspecific ST abnormality no evidentConfirmed by Serafin Seymour MD, ANNE MARIE ([id number]) on 7/16/2022 8:19:03 AM   Final    Sodium 07/15/2022 132 (A) 136 - 145 mmol/L Final    Potassium reflex Magnesium 07/15/2022 2.6 (A) 3.5 - 5.1 mmol/L Final    Chloride 07/15/2022 78 (A) 99 - 110 mmol/L Final    CO2 07/15/2022 40 (A) 21 - 32 mmol/L Final    Anion Gap 07/15/2022 14  3 - 16 Final    Glucose 07/15/2022 388 (A) 70 - 99 mg/dL Final    BUN 07/15/2022 14  7 - 20 mg/dL Final    CREATININE 07/15/2022 0.8 (A) 0.9 - 1.3 mg/dL Final    GFR Non- 07/15/2022 >60  >60 Final    Comment: >60 mL/min/1.73m2 EGFR, calc. for ages 25 and older using the  MDRD formula (not corrected for weight), is valid for stable  renal function. GFR  07/15/2022 >60  >60 Final    Comment: Chronic Kidney Disease: less than 60 ml/min/1.73 sq.m. Kidney Failure: less than 15 ml/min/1.73 sq.m. Results valid for patients 18 years and older. Calcium 07/15/2022 8.8  8.3 - 10.6 mg/dL Final    Rejected Test 07/15/2022 CBCWD   Final    Reason for Rejection 07/15/2022 see below   Final    Comment: Unable to perform testing; specimen clotted. To perform testing the specimen will need to be recollected.   Clotted      WBC 07/15/2022 4.7  4.0 - 11.0 K/uL Final    RBC 07/15/2022 2.78 (A) 4.20 - 5.90 M/uL Final    Hemoglobin 07/15/2022 10.1 (A) 13.5 - 17.5 g/dL Final    Hematocrit 07/15/2022 29.1 (A) 40.5 - 52.5 % Final    MCV 07/15/2022 104.6 (A) 80.0 - 100.0 fL Final    MCH 07/15/2022 36.1 (A) 26.0 - 34.0 pg Final    MCHC 07/15/2022 34.5  31.0 - 36.0 g/dL Final    RDW 07/15/2022 15.2  12.4 - 15.4 % Final    Platelets 52/48/5528 80 (A) 135 - 450 K/uL Final    Result consistent with patient history    MPV 07/15/2022 9.2  5.0 - 10.5 fL Final    Neutrophils % 07/15/2022 87.1  % Final    Lymphocytes % 07/15/2022 7.1  % Final    Monocytes % 07/15/2022 5.4  % Final    Eosinophils % 07/15/2022 0.0  % Final    Basophils % 07/15/2022 0.4  % Final    Neutrophils Absolute 07/15/2022 4.1  1.7 - 7.7 K/uL Final    Lymphocytes Absolute 07/15/2022 0.3 (A) 1.0 - 5.1 K/uL Final    Monocytes Absolute 07/15/2022 0.3  0.0 - 1.3 K/uL Final    Eosinophils Absolute 07/15/2022 0.0  0.0 - 0.6 K/uL Final    Basophils Absolute 07/15/2022 0.0  0.0 - 0.2 K/uL Final    Magnesium 07/15/2022 2.20  1.80 - 2.40 mg/dL Final    Hemoglobin A1C 07/16/2022 9.0  See comment % Final    Comment: Comment:  Diagnosis of Diabetes: > or = 6.5%  Increased risk of diabetes (Prediabetes): 5.7-6.4%  Glycemic Control: Nonpregnant Adults: <7.0%                    Pregnant: <6.0%        eAG 07/16/2022 211.6  mg/dL Final    WBC 07/16/2022 4.9  4.0 - 11.0 K/uL Final    RBC 07/16/2022 2.98 (A) 4.20 - 5.90 M/uL Final    Hemoglobin 07/16/2022 10.7 (A) 13.5 - 17.5 g/dL Final    Hematocrit 07/16/2022 30.8 (A) 40.5 - 52.5 % Final    MCV 07/16/2022 103.3 (A) 80.0 - 100.0 fL Final    MCH 07/16/2022 36.0 (A) 26.0 - 34.0 pg Final    MCHC 07/16/2022 34.9  31.0 - 36.0 g/dL Final    RDW 07/16/2022 15.8 (A) 12.4 - 15.4 % Final    Platelets 90/67/2859 94 (A) 135 - 450 K/uL Final    Result consistent with patient history    MPV 07/16/2022 8.4  5.0 - 10.5 fL Final    Neutrophils % 07/16/2022 86.6  % Final    Lymphocytes % 07/16/2022 8.9  % Final Monocytes % 07/16/2022 4.3  % Final    Eosinophils % 07/16/2022 0.1  % Final    Basophils % 07/16/2022 0.1  % Final    Neutrophils Absolute 07/16/2022 4.2  1.7 - 7.7 K/uL Final    Lymphocytes Absolute 07/16/2022 0.4 (A) 1.0 - 5.1 K/uL Final    Monocytes Absolute 07/16/2022 0.2  0.0 - 1.3 K/uL Final    Eosinophils Absolute 07/16/2022 0.0  0.0 - 0.6 K/uL Final    Basophils Absolute 07/16/2022 0.0  0.0 - 0.2 K/uL Final    Sodium 07/16/2022 133 (A) 136 - 145 mmol/L Final    Potassium 07/16/2022 2.4 (A) 3.5 - 5.1 mmol/L Final    Chloride 07/16/2022 85 (A) 99 - 110 mmol/L Final    CO2 07/16/2022 38 (A) 21 - 32 mmol/L Final    Anion Gap 07/16/2022 10  3 - 16 Final    Glucose 07/16/2022 273 (A) 70 - 99 mg/dL Final    BUN 07/16/2022 14  7 - 20 mg/dL Final    CREATININE 07/16/2022 0.6 (A) 0.9 - 1.3 mg/dL Final    GFR Non- 07/16/2022 >60  >60 Final    Comment: >60 mL/min/1.73m2 EGFR, calc. for ages 25 and older using the  MDRD formula (not corrected for weight), is valid for stable  renal function. GFR  07/16/2022 >60  >60 Final    Comment: Chronic Kidney Disease: less than 60 ml/min/1.73 sq.m. Kidney Failure: less than 15 ml/min/1.73 sq.m. Results valid for patients 18 years and older.       Calcium 07/16/2022 7.9 (A) 8.3 - 10.6 mg/dL Final    Total Protein 07/16/2022 5.0 (A) 6.4 - 8.2 g/dL Final    Albumin 07/16/2022 2.9 (A) 3.4 - 5.0 g/dL Final    Albumin/Globulin Ratio 07/16/2022 1.4  1.1 - 2.2 Final    Total Bilirubin 07/16/2022 1.3 (A) 0.0 - 1.0 mg/dL Final    Alkaline Phosphatase 07/16/2022 327 (A) 40 - 129 U/L Final    ALT 07/16/2022 134 (A) 10 - 40 U/L Final    AST 07/16/2022 69 (A) 15 - 37 U/L Final    Magnesium 07/16/2022 2.60 (A) 1.80 - 2.40 mg/dL Final    POC Glucose 07/16/2022 329 (A) 70 - 99 mg/dl Final    Performed on 07/16/2022 ACCU-CHEK   Final    POC Glucose 07/16/2022 246 (A) 70 - 99 mg/dl Final    Performed on 07/16/2022 ACCU-CHEK   Final    Potassium 07/16/2022 3.1 (A) 3.5 - 5.1 mmol/L Final    Potassium 07/16/2022 2.7 (A) 3.5 - 5.1 mmol/L Final    POC Glucose 07/16/2022 408 (A) 70 - 99 mg/dl Final    Performed on 07/16/2022 ACCU-CHEK   Final    POC Glucose 07/16/2022 392 (A) 70 - 99 mg/dl Final    Performed on 07/16/2022 ACCU-CHEK   Final    WBC 07/17/2022 5.8  4.0 - 11.0 K/uL Final    RBC 07/17/2022 3.06 (A) 4.20 - 5.90 M/uL Final    Hemoglobin 07/17/2022 11.2 (A) 13.5 - 17.5 g/dL Final    Hematocrit 07/17/2022 31.7 (A) 40.5 - 52.5 % Final    MCV 07/17/2022 103.5 (A) 80.0 - 100.0 fL Final    MCH 07/17/2022 36.7 (A) 26.0 - 34.0 pg Final    MCHC 07/17/2022 35.4  31.0 - 36.0 g/dL Final    RDW 07/17/2022 15.5 (A) 12.4 - 15.4 % Final    Platelets 89/74/0005 109 (A) 135 - 450 K/uL Final    MPV 07/17/2022 8.5  5.0 - 10.5 fL Final    Neutrophils % 07/17/2022 88.0  % Final    Lymphocytes % 07/17/2022 7.8  % Final    Monocytes % 07/17/2022 3.9  % Final    Eosinophils % 07/17/2022 0.1  % Final    Basophils % 07/17/2022 0.2  % Final    Neutrophils Absolute 07/17/2022 5.1  1.7 - 7.7 K/uL Final    Lymphocytes Absolute 07/17/2022 0.5 (A) 1.0 - 5.1 K/uL Final    Monocytes Absolute 07/17/2022 0.2  0.0 - 1.3 K/uL Final    Eosinophils Absolute 07/17/2022 0.0  0.0 - 0.6 K/uL Final    Basophils Absolute 07/17/2022 0.0  0.0 - 0.2 K/uL Final    Sodium 07/17/2022 139  136 - 145 mmol/L Final    Potassium 07/17/2022 3.0 (A) 3.5 - 5.1 mmol/L Final    Chloride 07/17/2022 93 (A) 99 - 110 mmol/L Final    CO2 07/17/2022 34 (A) 21 - 32 mmol/L Final    Anion Gap 07/17/2022 12  3 - 16 Final    Glucose 07/17/2022 151 (A) 70 - 99 mg/dL Final    BUN 07/17/2022 9  7 - 20 mg/dL Final    CREATININE 07/17/2022 <0.5 (A) 0.9 - 1.3 mg/dL Final    GFR Non- 07/17/2022 >60  >60 Final    Comment: >60 mL/min/1.73m2 EGFR, calc. for ages 25 and older using the  MDRD formula (not corrected for weight), is valid for stable  renal function.       GFR  07/17/2022 >60  >60 Final Comment: Chronic Kidney Disease: less than 60 ml/min/1.73 sq.m. Kidney Failure: less than 15 ml/min/1.73 sq.m. Results valid for patients 18 years and older. Calcium 07/17/2022 7.9 (A) 8.3 - 10.6 mg/dL Final    Total Protein 07/17/2022 5.2 (A) 6.4 - 8.2 g/dL Final    Albumin 07/17/2022 3.2 (A) 3.4 - 5.0 g/dL Final    Albumin/Globulin Ratio 07/17/2022 1.6  1.1 - 2.2 Final    Total Bilirubin 07/17/2022 1.6 (A) 0.0 - 1.0 mg/dL Final    Alkaline Phosphatase 07/17/2022 339 (A) 40 - 129 U/L Final    ALT 07/17/2022 141 (A) 10 - 40 U/L Final    AST 07/17/2022 89 (A) 15 - 37 U/L Final    Comment: Specimen hemolysis has exceeded the interference as defined by Roche. Value may be falsely increased. Suggest recollection if clinically  indicated. POC Glucose 07/16/2022 477 (A) 70 - 99 mg/dl Final    Performed on 07/16/2022 ACCU-CHEK   Final    POC Glucose 07/17/2022 159 (A) 70 - 99 mg/dl Final    Performed on 07/17/2022 ACCU-CHEK   Final    POC Glucose 07/17/2022 150 (A) 70 - 99 mg/dl Final    Performed on 07/17/2022 ACCU-CHEK   Final         Assessment & Plan:   Principal Problem:    Small cell lung cancer (HCC)-chemo as per dr Christianne Orozco--  Active Problems:    Hypokalemia--vigorous supplementation continues--ck labs q 8h     Ectopic ACTH syndrome ([address]    Hyperglycemia--improving   Resolved Problems:    * No resolved hospital problems.  *  Cont potass suppleements--also high SSI   Oncology planning chemo   Please note that over 35 minutes was spent in evaluating the patient, review of records and results, discussion with staff/family, etc.    Ashlee Mcnally MD

## 2022-07-17 NOTE — PROGRESS NOTES
Patient resting in bed upon assessment, A/Ox4 and vital signs are stable, has no complaints. Tele on and NSR, IV fluids infusing, scheduled medications given, will continue to monitor.

## 2022-07-17 NOTE — PROGRESS NOTES
Patient returned to unit from CT and was placed back into room 3105. Patient A&Ox4, reporting increased weakness to BLE. Moderate plantar/dorsi flexion noted - consistent w/ patient baseline. +2 pedal pulses present bilaterally. +2 swelling noted to BLE. Patient ambulating as a steady x1 now r/t increased weakness and unsteady gait.

## 2022-07-17 NOTE — PROGRESS NOTES
Message sent to MD Ag Martell, oncology, regarding plan for patient's chemotherapy administration - inpatient vs outpatient and whether port placement is necessary at this time. MD states starting chemo is not urgent and he would like to have patient's potassium stabilize prior to initiating chemotherapy. Chemo can be scheduled outpatient in the office per MD.     MD Chandrika Coleman states he will defer chemo start date to Dr. Lemuel Chirinos who will assume patient coverage tomorrow, 7/18. No further orders at this time. Patient updated. Will continue to monitor and assess.

## 2022-07-17 NOTE — PROGRESS NOTES
Call received back from STAT page to MD Mckay December. Orders provided for STAT CT of the head, STAT EKG, STAT glucose check, and STAT labs including potassium and troponin. Orders placed per MD direction. No further orders at this time.  Will continue to monitor and assess

## 2022-07-17 NOTE — PROGRESS NOTES
STAT EKG resulted abnormal, \"cannot rule out anterior infarct, age undetermined\". Page placed to MD Caitlin Washington.  Waiting for response at this time

## 2022-07-17 NOTE — PROGRESS NOTES
Lab called this RN to report potassium draw of 2.7. At this time patient just now received 40 mEq of potassium po and has started on 1 bag of potassium out of 2 IV. Will reassess potassium draw once all replacement has been given.

## 2022-07-17 NOTE — PLAN OF CARE
Problem: Discharge Planning  Goal: Discharge to home or other facility with appropriate resources  7/16/2022 2245 by Joni Austin RN  Outcome: Progressing  7/16/2022 1936 by Mino Jacobsen RN  Outcome: Progressing  Flowsheets (Taken 7/16/2022 1936)  Discharge to home or other facility with appropriate resources:   Identify barriers to discharge with patient and caregiver   Arrange for needed discharge resources and transportation as appropriate     Problem: Pain  Goal: Verbalizes/displays adequate comfort level or baseline comfort level  7/16/2022 2245 by Joni Austin RN  Outcome: Progressing  7/16/2022 1936 by Mino Jacobsen RN  Outcome: Progressing  Flowsheets (Taken 7/16/2022 1936)  Verbalizes/displays adequate comfort level or baseline comfort level:   Encourage patient to monitor pain and request assistance   Assess pain using appropriate pain scale  Note: Pain managed with pharmacologic and non-pharmacologic interventions during this shift. Will continue to monitor and assess for needs and change in patient condition. Problem: Safety - Adult  Goal: Free from fall injury  7/16/2022 2245 by Joni Austin RN  Outcome: Progressing  7/16/2022 1936 by Mino Jacobsen RN  Outcome: Progressing  Flowsheets (Taken 7/16/2022 1936)  Free From Fall Injury: Instruct family/caregiver on patient safety  Note: Patient remains free from falls during this shift. Fall precautions remain in place. Patient educated on the need to implement call light use prior to ambulation. Will continue to monitor and assess.        Problem: ABCDS Injury Assessment  Goal: Absence of physical injury  Outcome: Progressing

## 2022-07-17 NOTE — PROGRESS NOTES
POST FALL MANAGEMENT    [address] RECORD NUMBER:  [id number]  AGE: 45 y.o. GENDER: male  : 1970  TODAYS DATE:  2022    Details     Fall Occurred: Yes    Was the Fall Witnessed:  No    Brief Review of Event: Patient UAL throughout this admission, ambulated w/ steady gait w/o complication. Patient was up independently to the restroom this evening, states that he was walking back to the bed and his body \"gave out\". Patient reports blacking out and falling to the ground. Patient found on his bilateral knees at the side of the bed. Patient states he did not hit his head w/ the fall. Fall unwitnessed by staff. Patient assisted back to bed w/ staff assist x3. Vitals obtained, noted to be stable. Neuro exam consistent w/ patient's baseline. Patient placed back in bed, bed alarm on. Page placed to MD Tomer Holden to alert to fall and ask for additional orders. Date Fall Occurred:  2022  . Time Fall Occurred: 1800 p.m. Assessment     Post Fall Head to Toe Assessment Completed: Yes    Post Cynthia Walkerws and ABCDS Completed: Yes    Injury Occurred:  unknown    Did the Patient Experience:( Angel all that apply)    [x] Loss of consciousness  [] Change in mental status following the fall  [] Injuries exceeding minor hematoma and lacerations (restrictions in mobility, joint             range of motion or change in weight bearing status)  [x] Patient is on an anticoagulant medication  [x] Unknown if patient hit head    *If any of the boxes are checked, obtain a Head CT w/o Contrast & increase vital signs and neuro checks to q2h.        CT Performed:  Yes  Increased VS/Neuro checks to q2h:  Yes    Follow-up     Persons Notified of Fall:  Mar Martin all that apply and provide names of persons notified)   [x] Physician: General Canales   [] NP:  [x] Nursing Supervisior: Phylicia Francois   [] Manager:  [] Family:  [] Other:      Electronically signed by Aylin Rob RN 2022 at 6:14 PM

## 2022-07-17 NOTE — PLAN OF CARE
Problem: Discharge Planning  Goal: Discharge to home or other facility with appropriate resources  7/17/2022 0752 by Amarilys Pacheco RN  Outcome: Progressing  Flowsheets (Taken 7/17/2022 1735)  Discharge to home or other facility with appropriate resources:   Identify barriers to discharge with patient and caregiver   Arrange for needed discharge resources and transportation as appropriate  7/16/2022 2245 by Thelma Vargas RN  Outcome: Progressing  7/16/2022 1936 by Amarilys Pacheco RN  Outcome: Progressing  Flowsheets (Taken 7/16/2022 1936)  Discharge to home or other facility with appropriate resources:   Identify barriers to discharge with patient and caregiver   Arrange for needed discharge resources and transportation as appropriate     Problem: Pain  Goal: Verbalizes/displays adequate comfort level or baseline comfort level  7/17/2022 0752 by Amarilys Pacheco RN  Outcome: Progressing  Flowsheets (Taken 7/17/2022 0752)  Verbalizes/displays adequate comfort level or baseline comfort level:   Encourage patient to monitor pain and request assistance   Assess pain using appropriate pain scale  Note: Pain managed with pharmacologic and non-pharmacologic interventions during this shift. Will continue to monitor and assess for needs and change in patient condition. 7/16/2022 2245 by Thelma Vargas RN  Outcome: Progressing  7/16/2022 1936 by Amarilys Pacheco RN  Outcome: Progressing  Flowsheets (Taken 7/16/2022 1936)  Verbalizes/displays adequate comfort level or baseline comfort level:   Encourage patient to monitor pain and request assistance   Assess pain using appropriate pain scale  Note: Pain managed with pharmacologic and non-pharmacologic interventions during this shift. Will continue to monitor and assess for needs and change in patient condition.         Problem: Safety - Adult  Goal: Free from fall injury  7/17/2022 0752 by Amarilys Pacheco RN  Outcome: Progressing  Flowsheets (Taken 7/17/2022 4385)  Free From Fall Injury: Instruct family/caregiver on patient safety  Note: Patient remains free from falls during this shift. Fall precautions remain in place. Patient educated on the need to implement call light use prior to ambulation. Will continue to monitor and assess. 7/16/2022 2245 by Kelechi Falcon RN  Outcome: Progressing  7/16/2022 1936 by Saeid Boyd RN  Outcome: Progressing  Flowsheets (Taken 7/16/2022 1936)  Free From Fall Injury: Instruct family/caregiver on patient safety  Note: Patient remains free from falls during this shift. Fall precautions remain in place. Patient educated on the need to implement call light use prior to ambulation. Will continue to monitor and assess. Problem: ABCDS Injury Assessment  Goal: Absence of physical injury  7/17/2022 7764 by Saeid Boyd RN  Outcome: Progressing  Flowsheets (Taken 7/17/2022 1167)  Absence of Physical Injury: Implement safety measures based on patient assessment  Note: Patient remains free from physical harm during this shift. Will continue to monitor and assess patient.      7/16/2022 2245 by Kelechi Falcon RN  Outcome: Progressing

## 2022-07-17 NOTE — PROGRESS NOTES
Patient resting in bed this morning, c/o mild SOB. Oxygen checked and noted to be 95% on RA. Patient offered supplemental O2 for comfort, patient declined stating he is \"fine for now\". Scheduled morning medications administered per orders - see eMAR for documentation. Patient tolerated PO w/o complication. Head to toe assessment completed and charted. Patient noted to have diminished lung sounds + mild rhonchi bilaterally. +2 edema noted to patient's BLE, +1 edema noted to BUE. Swelling increased since yesterday. Extensive bruising noted to patient's RUE, patient states this is baseline and has been present for \"a while\". IV continues to infuse into R AC w/o complication. No tenderness/ pain noted near insertion site. Patient denies physical/emotional needs at this time. Call light, telephone, and bed side table are within reach. Fall precautions in place. Will continue to monitor and assess.

## 2022-07-17 NOTE — PROGRESS NOTES
Critical potassium called to this RN from the lab. K noted to be 2.7 w/ scheduled lab draw. Page placed to MD Juan Stallworth for further orders. Waiting for response at this time.

## 2022-07-17 NOTE — PROGRESS NOTES
Patient continues to rest in bed this shift, no acute events. Tele on and NSR, IVF continue to infuse, patient comfortable, all needs met.

## 2022-07-18 PROBLEM — R55 SYNCOPE: Status: ACTIVE | Noted: 2022-01-01

## 2022-07-18 NOTE — PROGRESS NOTES
no JVD, supple, symmetrical, trachea midline, and thyroid not enlarged, symmetric, no tenderness/mass/nodules  Lungs: rhonchi bilaterally  Heart: regular rate and rhythm, S1, S2 normal, no murmur, click, rub or gallop  Abdomen: soft, non-tender; bowel sounds normal; no masses,  no organomegaly  Extremities: extremities normal, atraumatic, no cyanosis or edema  Neurologic: Mental status: conversant--no focal deficits     No results displayed because visit has over 200 results. Assessment & Plan:   Principal Problem:    Small cell lung cancer (HCC)----dallas matamoros--cont deterioration--chemo being considered again --very poor prognosis   Active Problems:    Hypokalemia--cont vigorous supplementation     Ectopic ACTH syndrome (HCC)    Hyperglycemia--have incr lantus to bid     Syncope--?? Sec to low potass---cont supplementation   Resolved Problems:    * No resolved hospital problems.  *  Incr potass supplementation---incr insulin --defer chemo to oncology--will defer cardiol eval after dallas Simental   Please note that over 35 minutes was spent in evaluating the patient, review of records and results, discussion with staff/family, etc.    Debby Trevizo MD

## 2022-07-18 NOTE — PROGRESS NOTES
Patient UAL throughout this admission, ambulated w/ steady gait w/o complication. Patient was up independently to the restroom this evening, states that he was walking back to the bed and his body \"gave out\". Patient reports blacking out and falling to the ground. Patient found on his bilateral knees at the side of the bed. Patient states he did not hit his head w/ the fall. Fall unwitnessed by staff. Patient assisted back to bed w/ staff assist x3. Vitals obtained, noted to be stable. Neuro exam consistent w/ patient's baseline. Patient placed back in bed, bed alarm on. Page placed to MD Matthew Pac to alert to fall and ask for additional orders.

## 2022-07-18 NOTE — PROGRESS NOTES
Return call received from MD Tomer Holden. Orders provided for Q6H troponins x3, a repeat EKG in the morning, and an echo for tomorrow. Orders placed per MD request. No further orders at this time. Will continue to monitor and assess.

## 2022-07-18 NOTE — PROGRESS NOTES
Patient completed infusions of potassium as ordered. Tolerated without complaint. PIV flushed and continuous fluids of 0.9% NS with KCl 40 mEq now infusing. Patient to have K+ levels redrawn at 1800 per lab. Will monitor.  Electronically signed by Gary Michaud RN on 7/18/2022 at 5:07 PM

## 2022-07-18 NOTE — PROGRESS NOTES
Pt started on Topotecan chemo at 12:15pm. Informed consent signed, medication verified by second RN, Armani Anderson RN. Side effects discussed with pt, pt stated Dr. Jeanmarie Guerra had spoke with him earlier regarding the chemotherapy treatment. Blood returned noted before administration of IV medication. No s/sx of infiltration, phlebitis, or extravsation. VSS. No overt s/sx of distress. Pt tolerating well and without complications. Will continue to monitor and assess.  Electronically signed by Marcelle Lee RN on 7/18/2022 at 12:26 PM

## 2022-07-18 NOTE — PLAN OF CARE
Problem: Discharge Planning  Goal: Discharge to home or other facility with appropriate resources  Outcome: Progressing Towards Goal  Flowsheets (Taken 7/18/2022 1659)  Discharge to home or other facility with appropriate resources:   Identify barriers to discharge with patient and caregiver   Identify discharge learning needs (meds, wound care, etc)   Refer to discharge planning if patient needs post-hospital services based on physician order or complex needs related to functional status, cognitive ability or social support system   Arrange for needed discharge resources and transportation as appropriate     Problem: Pain  Goal: Verbalizes/displays adequate comfort level or baseline comfort level  Outcome: Progressing Towards Goal  Flowsheets (Taken 7/18/2022 1659)  Verbalizes/displays adequate comfort level or baseline comfort level:   Encourage patient to monitor pain and request assistance   Administer analgesics based on type and severity of pain and evaluate response   Consider cultural and social influences on pain and pain management   Assess pain using appropriate pain scale   Implement non-pharmacological measures as appropriate and evaluate response     Problem: Safety - Adult  Goal: Free from fall injury  Outcome: Progressing Towards Goal  Flowsheets (Taken 7/18/2022 1659)  Free From Fall Injury: Instruct family/caregiver on patient safety     Problem: ABCDS Injury Assessment  Goal: Absence of physical injury  Outcome: Progressing Towards Goal  Flowsheets (Taken 7/17/2022 0752 by Chanell Woodward RN)  Absence of Physical Injury: Implement safety measures based on patient assessment     Problem: Skin/Tissue Integrity  Goal: Absence of new skin breakdown  Description: 1. Monitor for areas of redness and/or skin breakdown  2. Assess vascular access sites hourly  3. Every 4-6 hours minimum:  Change oxygen saturation probe site  4.   Every 4-6 hours:  If on nasal continuous positive airway pressure, respiratory therapy assess nares and determine need for appliance change or resting period. Outcome: Progressing Towards Goal  Note: Patient skin condition and mucus membrane integrity remain unchanged during this shift. Skin breakdown prevention interventions are in place. Will continue to monitor and assess.

## 2022-07-18 NOTE — PROGRESS NOTES
Patient in bed A&O X4. VSS. IV flushes easily, dressing CDI, infusing without issue. Patient denies pain at this time. Tolerated AM medications. Patient aware he will receive a first dose of chemotherapy today. He has no questions at this time. Appetite adequate and tolerating PO intake. No c/o of N/V. No other needs verbalized. Bed in low and locked position. Call light within reach. Will monitor.  Electronically signed by Betzy Rosen RN on 7/18/2022 at [address] AM

## 2022-07-18 NOTE — PROGRESS NOTES
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  He seems mentally different today-almost robotic  He was completely with it last week -he seems a little confused   OBJECTIVE      Medications    Current Facility-Administered Medications: ondansetron (ZOFRAN) 16 mg in dextrose 5 % 50 mL IVPB, 16 mg, IntraVENous, Daily  topotecan (HYCAMTIN) 3.6 mg in dextrose 5 % 50 mL chemo IVPB, 1.5 mg/m2, IntraVENous, Daily  potassium chloride (KLOR-CON) extended release tablet 40 mEq, 40 mEq, Oral, TID  insulin glargine (LANTUS) injection vial 40 Units, 40 Units, SubCUTAneous, Nightly  insulin glargine (LANTUS) injection vial 20 Units, 20 Units, SubCUTAneous, QAM AC  insulin lispro (HUMALOG) injection vial 0-18 Units, 0-18 Units, SubCUTAneous, TID WC  insulin lispro (HUMALOG) injection vial 0-9 Units, 0-9 Units, SubCUTAneous, Nightly  0.9% NaCl with KCl 40 mEq infusion, , IntraVENous, Continuous  aspirin EC tablet 81 mg, 81 mg, Oral, Daily  atorvastatin (LIPITOR) tablet 40 mg, 40 mg, Oral, Daily  albuterol sulfate HFA (PROVENTIL;VENTOLIN;PROAIR) 108 (90 Base) MCG/ACT inhaler 2 puff, 2 puff, Inhalation, Q4H PRN  mometasone-formoterol (DULERA) 200-5 MCG/ACT inhaler 2 puff, 2 puff, Inhalation, BID  furosemide (LASIX) tablet 40 mg, 40 mg, Oral, Daily  NIFEdipine (PROCARDIA XL) extended release tablet 30 mg, 30 mg, Oral, BID  spironolactone (ALDACTONE) tablet 100 mg, 100 mg, Oral, BID  sodium chloride flush 0.9 % injection 5-40 mL, 5-40 mL, IntraVENous, 2 times per day  sodium chloride flush 0.9 % injection 5-40 mL, 5-40 mL, IntraVENous, PRN  0.9 % sodium chloride infusion, , IntraVENous, PRN  ondansetron (ZOFRAN-ODT) disintegrating tablet 4 mg, 4 mg, Oral, Q8H PRN **OR** ondansetron (ZOFRAN) injection 4 mg, 4 mg, IntraVENous, Q6H PRN  polyethylene glycol (GLYCOLAX) packet 17 g, 17 g, Oral, Daily PRN  acetaminophen (TYLENOL) tablet 650 mg, 650 mg, Oral, Q6H PRN **OR** acetaminophen (TYLENOL) suppository 650 mg, 650 mg, Rectal, Q6H PRN  enoxaparin Sodium (LOVENOX) injection 30 mg, 30 mg, SubCUTAneous, BID  glucose chewable tablet 16 g, 4 tablet, Oral, PRN  dextrose bolus 10% 125 mL, 125 mL, IntraVENous, PRN **OR** dextrose bolus 10% 250 mL, 250 mL, IntraVENous, PRN  glucagon (rDNA) injection 1 mg, 1 mg, IntraMUSCular, PRN  dextrose 5 % solution, 100 mL/hr, IntraVENous, PRN  Physical    VITALS:  BP (!) 143/95   Pulse 88   Temp 98.1 °F (36.7 °C) (Oral)   Resp 18   Ht 6' 1\" (1.854 m)   Wt 248 lb 0.3 oz (112.5 kg)   SpO2 94%   BMI 32.72 kg/m²   TEMPERATURE:  Current - Temp: 98.1 °F (36.7 °C); Max - Temp  Av.8 °F (36.6 °C)  Min: 97.4 °F (36.3 °C)  Max: 98.3 °F (36.8 °C)  PULSE OXIMETRY RANGE: SpO2  Av %  Min: 90 %  Max: 98 %  24HR INTAKE/OUTPUT:    Intake/Output Summary (Last 24 hours) at 2022 1822  Last data filed at 2022 1653  Gross per 24 hour   Intake 960 ml   Output --   Net 960 ml       CONSTITUTIONAL:  awake, alert, cooperative, no apparent distress, HEENT oral pharynx , no scleral icterus  HEMATOLOGIC/LYMPHATICS:  no cervical lymphadenopathy, no supraclavicular lymphadenopathy, n  LUNGS:  No increased work of breathing, good air exchange, clear to auscultation bilaterally, no crackles or wheezing  CARDIOVASCULAR:  , regular rate and rhythm, normal S1 and S2, no S3 or S4, and no murmur noted  ABDOMEN:  No scars, normal bowel sounds, soft, non-distended, non-tender, no masses palpated, no hepatosplenomegally  MUSCULOSKELETAL:  There is no redness, warmth, or swelling of the joints.   EXTREMETIES: No clubbing cynosis or edema  NEUROLOGIC:  Awake, alert,seems a little off   SKIN:  no bruising or bleeding      Data      Recent Labs     22  0437 22  0522 22  0416   WBC 4.9 5.8 5.9   HGB 10.7* 11.2* 11.0*   HCT 30.8* 31.7* 31.1*   PLT 94* 109* 112*   .3* 103.5* 103.3*        Recent Labs     07/16/22  0437 07/16/22  1134 07/17/22  0522 07/17/22  1420 07/17/22  1855 07/18/22  0416   *  --  139  --   --  141   K 2.4*   < > 3.0* 2.7* 3.3* 3.0*   CL 85*  --  93*  --   --  97*   CO2 38*  --  34*  --   --  34*   BUN 14  --  9  --   --  10   CREATININE 0.6*  --  <0.5*  --   --  0.6*    < > = values in this interval not displayed. Recent Labs     07/16/22 0437 07/17/22 0522   AST 69* 89*   * 141*   BILITOT 1.3* 1.6*   ALKPHOS 327* 339*       Magnesium:    Lab Results   Component Value Date/Time    MG 2.60 07/16/2022 04:37 AM    MG 2.20 07/15/2022 07:42 PM    MG 2.10 09/22/2021 05:00 AM       Imaging CT HEAD WO CONTRAST    Result Date: 7/17/2022  EXAMINATION: CT OF THE HEAD WITHOUT CONTRAST  7/17/2022 6:43 pm TECHNIQUE: CT of the head was performed without the administration of intravenous contrast. Automated exposure control, iterative reconstruction, and/or weight based adjustment of the mA/kV was utilized to reduce the radiation dose to as low as reasonably achievable. COMPARISON: MRI brain 09/11/2021 HISTORY: ORDERING SYSTEM PROVIDED HISTORY: Unwitnessed fall TECHNOLOGIST PROVIDED HISTORY: Reason for exam:->Unwitnessed fall Has a \"code stroke\" or \"stroke alert\" been called? ->No Reason for Exam: Unwitnessed fall FINDINGS: BRAIN/VENTRICLES: No evidence of acute infarct or acute intracranial hemorrhage. 2 new sub 5 mm subependymal nodules along the lateral margin of the right lateral ventricle. Scattered small parenchymal calcifications including a linear subcortical calcification involving the high posterior right frontal lobe. No hydrocephalus or extra-axial fluid collection. Midline maintained. Basal cisterns patent. ORBITS: The visualized portion of the orbits demonstrate no acute abnormality. SINUSES: The visualized paranasal sinuses and mastoid air cells demonstrate no acute abnormality. SOFT TISSUES/SKULL:  No acute abnormality of the visualized skull or soft tissues. 1. No acute intracranial abnormality. 2. Nonspecific subependymal nodules involving the right lateral ventricle and scattered parenchymal calcifications. MRI of the brain is recommended for further evaluation. XR CHEST PORTABLE    Result Date: 7/18/2022  EXAMINATION: ONE XRAY VIEW OF THE CHEST 7/18/2022 4:37 am COMPARISON: Chest radiograph 07/15/2022 at 11:47 p.m.; chest, abdomen, and pelvis CT 07/08/2022 HISTORY: ORDERING SYSTEM PROVIDED HISTORY: hypokalemia TECHNOLOGIST PROVIDED HISTORY: Reason for exam:->hypokalemia Reason for Exam: hypokalemia FINDINGS: Decreased lung volumes. Persistent linear opacities in each lung base. Suspected new right basilar airspace opacity with possible overlying trace right pleural effusion. Increased diffuse interstitial prominence with indistinct pulmonary vasculature no definite interlobular septal thickening. No definite findings of pneumothorax or left pleural effusion. Normal mediastinal and cardiac contours. 1. Suspected new right basilar airspace opacity potentially due in part to passive atelectasis given possible overlying trace right pleural effusion. Superimposed pneumonia and aspiration are not excluded. 2. Persistent bibasilar atelectasis and/or scarring. 3. Increased interstitial prominence due to pulmonary vascular congestion and/or central vascular crowding. XR CHEST PORTABLE    Result Date: 7/16/2022  EXAMINATION: ONE XRAY VIEW OF THE CHEST 7/15/2022 11:48 pm COMPARISON: 07/15/2022 HISTORY: ORDERING SYSTEM PROVIDED HISTORY: lung cancer TECHNOLOGIST PROVIDED HISTORY: Reason for exam:->lung cancer Reason for Exam: lung cancer FINDINGS: The lungs are without acute focal process. There is no effusion or pneumothorax. The cardiomediastinal silhouette is without acute process. The osseous structures are without acute process. No acute process.      XR CHEST PORTABLE    Result Date: 7/15/2022  EXAMINATION: ONE XRAY VIEW OF THE CHEST 7/15/2022 7:22 pm COMPARISON: 07/08/2022, 09/13/2021 HISTORY: ORDERING SYSTEM PROVIDED HISTORY: other TECHNOLOGIST PROVIDED HISTORY: Reason for exam:->other Reason for Exam: abnormal labs FINDINGS: The lungs are without acute focal process. There is no effusion or pneumothorax. The cardiomediastinal silhouette is without acute process. The osseous structures are without acute process. No acute process. NM BONE SCAN WHOLE BODY    Result Date: 7/8/2022  EXAMINATION: WHOLE BODY BONE SCAN  7/8/2022 TECHNIQUE: The patient was injected intravenously with 27.5 mCi of 99 mTc MDP and scintigraphy of the entire skeleton was performed approximately three hours later. COMPARISON: 03/18/2022 HISTORY: ORDERING SYSTEM PROVIDED HISTORY: Malignant neoplasm of right main bronchus [facility]) TECHNOLOGIST PROVIDED HISTORY: Order: Bone scan, total body Order Instructions: [facility] along with CT please lower back and hip pain Associated problems: Small cell carcinoma of lung (disorder) * (C34.01) Reason for Exam: staging prostate Ca Relevant Medical/Surgical History: ct today FINDINGS: New focal activity is demonstrated along the superior aspect of the right iliac crest.  Developing activity is seen at the lateral right 11th rib and posterior right 5th rib. Lesions are seen which are more conspicuous within the proximal right femur, anterior right iliac bone, medial left iliac bone, sacrum, lumbar spine, upper thoracic spine, proximal left humerus. Activity at the shoulders, sternoclavicular joints, hips, knees, ankles, feet, likely degenerative. Physiologic activity is seen within the kidneys and urinary bladder. Urinary contamination is seen between the upper thighs. Several new metastatic lesions are seen as compared to prior exam dated 03/18/2022. Additional lesions are more intense as compared to prior which could relate to flare effect if there has been intervening therapy.      CT CHEST ABDOMEN PELVIS W CONTRAST    Result Date: 7/8/2022  EXAMINATION: CT OF THE CHEST, ABDOMEN, AND PELVIS WITH CONTRAST 7/8/2022 8:35 am TECHNIQUE: CT of the chest, abdomen and pelvis was performed with the administration of intravenous contrast. Multiplanar reformatted images are provided for review. Automated exposure control, iterative reconstruction, and/or weight based adjustment of the mA/kV was utilized to reduce the radiation dose to as low as reasonably achievable. COMPARISON: 03/18/2022 HISTORY: ORDERING SYSTEM PROVIDED HISTORY: Malignant neoplasm of right main bronchus [facility]) TECHNOLOGIST PROVIDED HISTORY: Order: CT chest/abdomen/pelvis w/ contrast Order Instructions: STAT Creatinine & BUN prior to CT Scan per facility protocol [facility] Associated problems: Small cell carcinoma of lung (disorder) * (C34.01) Additional Contrast?->Oral STAT Creatinine as needed:->Yes Reason for Exam: Malignant neoplasm of right main bronchus FINDINGS: Chest: Mediastinum: The central airways are patent. There has been interval enlargement of several mediastinal lymph nodes. Index prevascular node measures 1.3 cm on the current exam compared with 9 mm prior. Right paratracheal node measures 1.5 cm on the current exam compared with 1.4 cm prior. Precarinal lymph node measures 1.4 cm on the current exam compared with 0.6 cm prior. AP window node measures 12 mm currently compared with 5 mm prior. There is a new 1.7 cm lymph node within the right infrahilar region. Lungs/pleura: Mild emphysematous changes are present. A few tiny non calcified right-sided pulmonary nodules are unchanged in size, number and appearance. The pleural spaces are clear. Soft Tissues/Bones: Numerous sclerotic lesions scattered throughout the thoracic spine and ribs are stable in size, number and appearance. Abdomen/Pelvis: Organs: There has been interval increase in size and number of numerous hypodense lesions scattered throughout the liver.   Index lesion within the left hepatic lobe measures 3.2 cm on the current exam compared with 2.4 cm prior. Index lesion within the caudate lobe measures 2.4 cm compared with 1.8 cm prior. There has been slight interval enlargement of the left adrenal nodule which measures 1.6 cm on the current exam.  A few tiny bilateral nonobstructing intrarenal calculi are unchanged. The remainder of the GI/Bowel: There is no bowel dilatation, wall thickening or obstruction. Pelvis: The bladder and prostate are unremarkable. Peritoneum/Retroperitoneum: There is no free air or free fluid. There has been interval development of a 2 cm lymph node within the posterior gastrohepatic ligament. Bones/Soft Tissues: This numerous small osteoblastic lesions scattered throughout the lumbosacral spine and pelvis are unchanged in size, number and appearance. 1. Mild interval progression of right hilar/mediastinal favian metastatic disease. 2. Mild interval progression of hepatic and left adrenal metastatic disease. 3. Developing upper abdominal favian metastatic disease. 4. Stable disseminated osteoblastic disease. MRI BRAIN W WO CONTRAST    Result Date: 7/18/2022  EXAMINATION: MRI OF THE BRAIN WITHOUT AND WITH CONTRAST  7/18/2022 9:56 am TECHNIQUE: Multiplanar multisequence MRI of the head/brain was performed without and with the administration of intravenous contrast. COMPARISON: MRI Brain 9/11/2021 and CT head 7/17/2022 HISTORY: ORDERING SYSTEM PROVIDED HISTORY: lesions seen on haed ct--known smll cell cancer TECHNOLOGIST PROVIDED HISTORY: Reason for exam:->lesions seen on head CT--known smll cell cancer Reason for Exam: lesions seen on head CT - known small cell cnacer FINDINGS: INTRACRANIAL STRUCTURES/VENTRICLES:  There is no acute infarct. No mass effect or midline shift. No evidence of an acute intracranial hemorrhage. The ventricles and sulci are normal in size and configuration. The sellar/suprasellar regions appear unremarkable.   The normal signal voids within the major intracranial vessels appear maintained. Multiple tiny enhancing nodules are identified within the periventricular and subcortical white matter of both cerebral hemispheres. The most conspicuous lesion is identified within the left frontal operculum measuring 5 mm. Additional nodules identified within the left cerebral peduncle measuring 5 mm. Small metastasis is suspected within the left side of the vermis. ORBITS: The visualized portion of the orbits demonstrate no acute abnormality. SINUSES: The visualized paranasal sinuses and mastoid air cells demonstrate no acute abnormality. BONES/SOFT TISSUES: The bone marrow signal intensity appears normal. The soft tissues demonstrate no acute abnormality. Multiple new small enhancing nodules within the periventricular and subcortical white matter of both cerebral hemispheres that is suspicious for metastatic disease. The largest lesion is located within the left frontal operculum and within the left cerebral peduncle measuring 5 mm. Additional small suspected metastasis within the left side of the cerebellar vermis.        Problem List  Patient Active Problem List   Diagnosis    Pure hypercholesterolemia    Anxiety    Essential hypertension    Moderate persistent asthma with acute exacerbation    Metastatic cancer (HCC)    Hypokalemia    Acute respiratory failure with hypoxia (HCC)    Abnormal CT of the chest    COPD exacerbation (HCC)    Lung nodules    Tobacco abuse    Small cell lung cancer (HCC)    Ectopic ACTH syndrome (HCC)    Hyperglycemia    Syncope       ASSESSMENT AND PLAN    Relapsed small cell   Was to start third line topotecan after failure of 2 other lines of therapy   Ordered topotecan to start today here inpt -ordered this am   HE seems confused to me today   I feared he would decline quickly   More complete note to follow     Late entry  Consult for  for healthcare poa-unfortunately has not donet his (we do  people to do this in our office but has not done so) and palliative care-  We are going to try whole brain and topotecan but highly concerned this wont go well   I would rec we work on dnr-I do think pt would agree to this-

## 2022-07-18 NOTE — PROGRESS NOTES
Pt headed out of room towards the main lobby. Stated he has an appt a [facility] (scheduled at 9:45am) and he wanted to make sure he gets there on time. Pt stated he did not want to miss the new treatments he is suppsed to start today. Informed pt that Dr. Dhruv Nielsen will come see him in the morning since he is hospitalized and will get everything situated with his appointment. It took two staff members to re-direct back to room.

## 2022-07-18 NOTE — PROGRESS NOTES
Topotecan Infusion ran over 30 mins at 100 mL/hour. Blood returned noted, before, during, and after administration of IV medication. No s/sx of infiltration, phlebitis, or extravsation. VSS. No overt s/sx of distress. Pt tolerating well and without complications. Will continue to monitor and assess.  Electronically signed by Vita Brown RN on 7/18/2022 at 1:18 PM

## 2022-07-19 NOTE — ACP (ADVANCE CARE PLANNING)
Advance Care Planning     Advance Care Planning Inpatient Note  [facility] Department    Today's Date: 7/19/2022  Unit: [facility] ORTHOPEDICS    Received request from IDT Member. Upon review of chart and communication with care team, patient's decision making abilities are not in question. . Patient and family  was/were present in the room during visit. Goals of ACP Conversation:  Discuss advance care planning documents    Health Care Decision Makers:       Primary Decision Maker: Sotero Harrell - Brother/Sister - [phone]  Summary:  Verified Healthcare Decision Maker    Advance Care Planning Documents (Patient Wishes):  Healthcare Power of /Advance Directive Appointment of Health Care Agent  Living Will/Advance Directive     Assessment:  Patient has living will and healthcare power of  completed at home. Patient said his sister Mikey Nam is his healthcare decision maker with no alternates. Patient was receiving treatment with his brother-in-law at bedside. He did not want any additional ACP discussion. Patient is [employer] and attends [address] [place] [address]. He appreciates receiving Holy Communion daily and has already received anointing of the the sick.     Interventions:  Requested patient/family to submit existing document for our records: Healthcare Power of /Advance Directive Appointment of [address] Directive  Discussed and provided education on state decision maker [address] [facility] [address]:   No    Outcomes/Plan:  ACP Discussion: Completed    Electronically signed by Erik Maradiaga, [address] on 7/19/2022 at 11:24 AM

## 2022-07-19 NOTE — PLAN OF CARE
Problem: Discharge Planning  Goal: Discharge to home or other facility with appropriate resources  7/19/2022 0754 by Summer Rojo RN  Outcome: Progressing Towards Goal  Flowsheets (Taken 7/19/2022 0230 by Travis Travis RN)  Discharge to home or other facility with appropriate resources: Identify barriers to discharge with patient and caregiver  7/19/2022 0230 by Travis Travis RN  Outcome: Progressing Towards Goal  Flowsheets (Taken 7/19/2022 0230)  Discharge to home or other facility with appropriate resources: Identify barriers to discharge with patient and caregiver     Problem: Pain  Goal: Verbalizes/displays adequate comfort level or baseline comfort level  7/19/2022 0754 by Summer Rojo RN  Outcome: Progressing Towards Goal  Flowsheets (Taken 7/19/2022 0230 by Travis Travis RN)  Verbalizes/displays adequate comfort level or baseline comfort level:   Encourage patient to monitor pain and request assistance   Assess pain using appropriate pain scale   Administer analgesics based on type and severity of pain and evaluate response   Implement non-pharmacological measures as appropriate and evaluate response  7/19/2022 0230 by Travis Travis RN  Outcome: Progressing Towards Goal  Flowsheets (Taken 7/19/2022 0230)  Verbalizes/displays adequate comfort level or baseline comfort level:   Encourage patient to monitor pain and request assistance   Assess pain using appropriate pain scale   Administer analgesics based on type and severity of pain and evaluate response   Implement non-pharmacological measures as appropriate and evaluate response     Problem: Safety - Adult  Goal: Free from fall injury  7/19/2022 0754 by Summer Rojo RN  Outcome: Progressing Towards Goal  Flowsheets (Taken 7/19/2022 0230 by Travis Travis RN)  Free From Fall Injury: Instruct family/caregiver on patient safety  7/19/2022 0230 by rTavis Travis RN  Outcome: Progressing Towards Goal  Flowsheets (Taken 7/19/2022 0230)  Free From Fall Injury: Instruct family/caregiver on patient safety     Problem: ABCDS Injury Assessment  Goal: Absence of physical injury  7/19/2022 0754 by Abiel Serna RN  Outcome: Progressing Towards Goal  Flowsheets (Taken 7/19/2022 0230 by Julia Ogden RN)  Absence of Physical Injury: Implement safety measures based on patient assessment  7/19/2022 0230 by Julia Ogden RN  Outcome: Progressing Towards Goal  Flowsheets (Taken 7/19/2022 0230)  Absence of Physical Injury: Implement safety measures based on patient assessment     Problem: Skin/Tissue Integrity  Goal: Absence of new skin breakdown  Description: 1. Monitor for areas of redness and/or skin breakdown  2. Assess vascular access sites hourly  3. Every 4-6 hours minimum:  Change oxygen saturation probe site  4. Every 4-6 hours:  If on nasal continuous positive airway pressure, respiratory therapy assess nares and determine need for appliance change or resting period. 7/19/2022 0754 by Abiel Serna RN  Outcome: Progressing Towards Goal  Note: Chad score assessed. Patient able to ambulate and turn self and repositioned patient Q2H and assessed skin. Educated patient on importance of repositioning to prevent skin issues.      7/19/2022 0230 by Julia Ogden RN  Outcome: Progressing Towards Goal

## 2022-07-19 NOTE — CONSULTS
RADIATION ONCOLOGY CONSULT     Patient: Beena Tovar  YOB: 1970    Date of Consultation: 7/18/2022    Reason for Consultation: small cell lung cancer with brain metastases    PCP: Ravi Mcguire MD  Requesting Provider: No ref. provider found    CHIEF COMPLAINT:     Chief Complaint   Patient presents with    Discuss Labs     Pt states that he has CA and  states is on a medication that decreases his K level states an NP in his PCP's office to have lab work, was notified to come here for IV KCL       HISTORY OF PRESENT ILLNESS:     Mr. Ramírez Zamora is a 45 y.o. male with a diagnosis of metastatic small cell lung cancer. On 9/2/2021, CTA chest demonstrated extensive mediastinal and hilar lymphadenopathy compatible with metastatic neoplasm. There were multiple new indeterminate 3 mm nodules in the right middle lobe and left lower lobe. There were 17 and 11 mm right lower lobe nodules as well as multiple liver metastases measuring up to 48 mm. On 9/3/2021, CT abdomen/pelvis demonstrated multiple hypoattenuating liver lesions most suggestive of metastatic disease, a 1.6 cm right adrenal gland nodule, a hypoattenuating nodule in the region of the second portion of the duodenum in the region of the ampulla measuring up to 1.3 cm, diffuse wall thickening involving the majority of the jejunum, and asymmetric thickening of the inferior right rectus abdominis musculature. On 9/8/2021, CT-guided liver biopsy demonstrated poorly differentiated neuroendocrine carcinoma, consistent with small cell carcinoma. On 9/11/2021, bone scan was negative for evidence of osseous metastatic disease. On 9/11/2021, MRI brain was negative for acute intracranial abnormality or convincing evidence of intracranial metastatic disease. The exam was limited by patient motion artifact. In September 2021, he began carboplatin/etoposide/atezolizumab.     On 12/6/2021, CT C/A/P demonstrated slight interval decrease peduncle measuring up to 5 mm. There were additional small suspected metastases within the left side of the cerebellar vermis    On 2022, he began topotecan. He feels fair on today's visit. He endorses some fatigue. He reports no headache, nausea, focal numbness/weakness of the extremities, bowel/bladder incontinence, seizure-like activity, vision changes, hearing changes, speech changes, or memory impairment. He reports no uncontrolled pain. He denies progressive dyspnea, cough, or chest pain. He has no swallowing issues.     PAST MEDICAL HISTORY:     Past Medical History:   Diagnosis Date    Anxiety     Asthma     Hyperlipidemia     Hypertension     Small cell lung cancer ([facility] [address] 2021    Metastatic with ectopic ACTH production       PAST SURGICAL HISTORY:     Past Surgical History:   Procedure Laterality Date    CT NEEDLE BIOPSY LIVER PERCUTANEOUS  2021    CT NEEDLE BIOPSY LIVER PERCUTANEOUS 2021 [facility] CT       SOCIAL HISTORY:     Social History     Socioeconomic History    Marital status:      Spouse name: None    Number of children: None    Years of education: None    Highest education level: None   Tobacco Use    Smoking status: Former     Packs/day: 1.00     Years: 30.00     Pack years: 30.00     Types: Cigarettes     Quit date: 2021     Years since quittin.8    Smokeless tobacco: Never   Vaping Use    Vaping Use: Never used   Substance and Sexual Activity    Alcohol use: No     Alcohol/week: 0.0 standard drinks    Drug use: Yes     Types: Marijuana Sterling Baker     Social Determinants of Health     Financial Resource Strain: Low Risk     Difficulty of Paying Living Expenses: Not hard at all   Food Insecurity: No Food Insecurity    Worried About Running Out of Food in the Last Year: Never true    Ran Out of Food in the Last Year: Never true       FAMILY HISTORY:     Family History   Problem Relation Age of Onset    Lung Cancer Mother     Heart Attack Father         Myocardial Infarction    Stomach Cancer Brother     Heart Attack Sister 39        Myocardial Infarction    COPD Maternal Grandmother        ALLERGIES:     No Known Allergies    MEDICATIONS:     Current Facility-Administered Medications   Medication Dose Route Frequency Provider Last Rate Last Admin    ondansetron (ZOFRAN) 16 mg in dextrose 5 % 50 mL IVPB  16 mg IntraVENous Daily Anabelle Awad MD   Stopped at 07/18/22 1201    topotecan (HYCAMTIN) 3.6 mg in dextrose 5 % 50 mL chemo IVPB  1.5 mg/m2 IntraVENous Daily Anabelle Awad MD   Stopped at 07/18/22 1249    potassium chloride (KLOR-CON) extended release tablet 40 mEq  40 mEq Oral TID Myrtle Goldmann, MD   40 mEq at 07/18/22 2147    insulin glargine (LANTUS) injection vial 40 Units  40 Units SubCUTAneous Nightly Myrtle Goldmann, MD   40 Units at 07/18/22 2148    insulin glargine (LANTUS) injection vial 20 Units  20 Units SubCUTAneous QAM AC Myrtle Goldmann, MD   20 Units at 07/18/22 0636    insulin lispro (HUMALOG) injection vial 0-18 Units  0-18 Units SubCUTAneous TID WC Myrtle Goldmann, MD   15 Units at 07/18/22 1158    insulin lispro (HUMALOG) injection vial 0-9 Units  0-9 Units SubCUTAneous Nightly Myrtle Goldmann, MD   6 Units at 07/18/22 2147    0.9% NaCl with KCl 40 mEq infusion   IntraVENous Continuous Myrtle Goldmann, MD 50 mL/hr at 07/18/22 0840 Rate Change at 07/18/22 0840    aspirin EC tablet 81 mg  81 mg Oral Daily Myrtle Goldmann, MD   81 mg at 07/18/22 0842    atorvastatin (LIPITOR) tablet 40 mg  40 mg Oral Daily Myrtle Goldmann, MD   40 mg at 07/18/22 0842    albuterol sulfate HFA (PROVENTIL;VENTOLIN;PROAIR) 108 (90 Base) MCG/ACT inhaler 2 puff  2 puff Inhalation Q4H PRN Myrtle Goldmann, MD        mometasone-formoterol [facility]) 200-5 MCG/ACT inhaler 2 puff  2 puff Inhalation BID Myrtle Goldmann, MD   2 puff at 07/18/22 2035    furosemide (LASIX) tablet 40 mg  40 mg Oral Daily Myrtle Goldmann, MD   40 mg at 07/18/22 0842    NIFEdipine (Adelene Formica XL) extended release tablet 30 mg  30 mg Oral BID Kelton Covarrubias MD   30 mg at 07/18/22 2147    spironolactone (ALDACTONE) tablet 100 mg  100 mg Oral BID Kelton Covarrubias MD   100 mg at 07/18/22 1817    sodium chloride flush 0.9 % injection 5-40 mL  5-40 mL IntraVENous 2 times per day Kelton Covarrubias MD   10 mL at 07/18/22 0843    sodium chloride flush 0.9 % injection 5-40 mL  5-40 mL IntraVENous PRN Kelton Covarrubias MD        0.9 % sodium chloride infusion   IntraVENous PRN Kelton Covarrubias  mL/hr at 07/18/22 1124 25 mL at 07/18/22 1124    ondansetron (ZOFRAN-ODT) disintegrating tablet 4 mg  4 mg Oral Q8H PRN Kelton Covarrubias MD        Or    ondansetron [facility]) injection 4 mg  4 mg IntraVENous Q6H PRN Kelton Covarrubias MD        polyethylene glycol (GLYCOLAX) packet 17 g  17 g Oral Daily PRN Kelton Covarrubias MD        acetaminophen (TYLENOL) tablet 650 mg  650 mg Oral Q6H PRN Kelton Covarrubias MD        Or    acetaminophen (TYLENOL) suppository 650 mg  650 mg Rectal Q6H PRN Kelton Covarrubias MD        enoxaparin Sodium (LOVENOX) injection 30 mg  30 mg SubCUTAneous BID Kelton Covarrubias MD   30 mg at 07/18/22 2148    glucose chewable tablet 16 g  4 tablet Oral PRN Kelton Covarrubias MD        dextrose bolus 10% 125 mL  125 mL IntraVENous PRN Kelton Covarrubias MD        Or    dextrose bolus 10% 250 mL  250 mL IntraVENous PRN Kelton Covarrubias MD        glucagon (rDNA) injection 1 mg  1 mg IntraMUSCular PRN Kelton Covarrubias MD        dextrose 5 % solution  100 mL/hr IntraVENous PRN Kelton Covarrubias MD           REVIEW OF SYSTEMS:       Constitutional: no fever, no chills, no night sweats  Eyes:  No impairment or change in vision  ENT / Mouth:  No pain, no ulceration, no bleeding, no change in voice, no hearing loss  Cardiovascular:  No chest pain, no palpitations, no new edema  Respiratory:  No pain with breathing, no hemoptysis, no change to breathing  Gastrointestinal:  No pain, no cramping, no change in swallowing, no nausea, no altered bowel habits  Urinary:  No pain, no bleeding, no change in continence  Musculoskeletal:  No redness, no pain, no edema  Skin:  No pruritus, no rash, no change to nodules or lesions  Neurologic:  No headache, no change in mental status, no speech changes, no altered sensory or motor activity  Endocrine:  No hot flashes, no increased thirst  Hematologic: No petechiae, no ecchymosis, no bleeding  Lymphatic:  No lymphadenopathy, no lymphedema  Allergy / Immunologic:  No rash, no nonhealing wounds    PHYSICAL EXAM:       Vitals:    07/18/22 2339   BP: (!) 144/95   Pulse: 90   Resp: 19   Temp: 97.8 °F (36.6 °C)   SpO2: 93%       ECOG PERFORMANCE STATUS: 2  CONSTITUTIONAL: Awake, alert, cooperative, no apparent distress  EYES: Sclera clear, conjunctiva normal, no obvious abnormality  HEAD: Normocephalic, atraumatic  ENT: Mucous membranes moist, no intraoral abnormalities noted  NECK: Symmetrical, no visible masses  HEMATOLOGIC/LYMPHATIC: No grossly abnormal lymphadenopathy  LUNGS: No increased work of breathing, no stridor  CARDIOVASCULAR: Regular rate  ABDOMEN: Non distended  MUSCULOSKELETAL: Normal strength, no impaired range of motion  NEUROLOGIC: Alert, oriented, gross motor skills intact  SKIN: Normal color, texture, and turgor of the exposed skin, without jaundice, bruising or bleeding  EXTREMITIES: Without cyanosis, clubbing, edema, or asymmetry    LABS:     CBC:  Lab Results   Component Value Date/Time    WBC 5.9 07/18/2022 04:16 AM    HGB 11.0 07/18/2022 04:16 AM    HCT 31.1 07/18/2022 04:16 AM    .3 07/18/2022 04:16 AM     07/18/2022 04:16 AM       BMP:   Lab Results   Component Value Date/Time     07/18/2022 04:16 AM    K 3.1 07/18/2022 07:45 PM    K 2.6 07/15/2022 07:42 PM    CL 97 07/18/2022 04:16 AM    CO2 34 07/18/2022 04:16 AM    BUN 10 07/18/2022 04:16 AM    CREATININE 0.6 07/18/2022 04:16 AM    GLUCOSE 201 07/18/2022 04:16 AM    CALCIUM 8.1 07/18/2022 04:16 AM       HEPATIC FUNCTION PANEL:   Lab Results   Component Value Date/Time    [facility] SOUTH 339 07/17/2022 05:22 AM     07/17/2022 05:22 AM    AST 89 07/17/2022 05:22 AM    PROT 5.2 07/17/2022 05:22 AM    PROT 7.1 02/19/2013 11:25 AM    BILITOT 1.6 07/17/2022 05:22 AM    BILIDIR 0.3 09/14/2021 04:50 AM    LABALBU 3.2 07/17/2022 05:22 AM       Lab Results   Component Value Date/Time    INR 1.02 09/11/2021 08:11 AM     No results found for: PTINR    TUMOR MARKERS:    Lab Results   Component Value Date/Time    PSA 0.60 11/19/2019 10:07 AM    .1 09/03/2021 04:17 AM     128 09/07/2021 05:04 AM       IMAGING:     I have reviewed all relevant imaging in detail, and discussed findings with Mr. Lisa Butler. CT HEAD WO CONTRAST    Result Date: 7/17/2022  EXAMINATION: CT OF THE HEAD WITHOUT CONTRAST  7/17/2022 6:43 pm TECHNIQUE: CT of the head was performed without the administration of intravenous contrast. Automated exposure control, iterative reconstruction, and/or weight based adjustment of the mA/kV was utilized to reduce the radiation dose to as low as reasonably achievable. COMPARISON: MRI brain 09/11/2021 HISTORY: ORDERING SYSTEM PROVIDED HISTORY: Unwitnessed fall TECHNOLOGIST PROVIDED HISTORY: Reason for exam:->Unwitnessed fall Has a \"code stroke\" or \"stroke alert\" been called? ->No Reason for Exam: Unwitnessed fall FINDINGS: BRAIN/VENTRICLES: No evidence of acute infarct or acute intracranial hemorrhage. 2 new sub 5 mm subependymal nodules along the lateral margin of the right lateral ventricle. Scattered small parenchymal calcifications including a linear subcortical calcification involving the high posterior right frontal lobe. No hydrocephalus or extra-axial fluid collection. Midline maintained. Basal cisterns patent. ORBITS: The visualized portion of the orbits demonstrate no acute abnormality. SINUSES: The visualized paranasal sinuses and mastoid air cells demonstrate no acute abnormality. SOFT TISSUES/SKULL:  No acute abnormality of the visualized skull or soft tissues. 1. No acute intracranial abnormality. 2. Nonspecific subependymal nodules involving the right lateral ventricle and scattered parenchymal calcifications. MRI of the brain is recommended for further evaluation. XR CHEST PORTABLE    Result Date: 7/18/2022  EXAMINATION: ONE XRAY VIEW OF THE CHEST 7/18/2022 4:37 am COMPARISON: Chest radiograph 07/15/2022 at 11:47 p.m.; chest, abdomen, and pelvis CT 07/08/2022 HISTORY: ORDERING SYSTEM PROVIDED HISTORY: hypokalemia TECHNOLOGIST PROVIDED HISTORY: Reason for exam:->hypokalemia Reason for Exam: hypokalemia FINDINGS: Decreased lung volumes. Persistent linear opacities in each lung base. Suspected new right basilar airspace opacity with possible overlying trace right pleural effusion. Increased diffuse interstitial prominence with indistinct pulmonary vasculature no definite interlobular septal thickening. No definite findings of pneumothorax or left pleural effusion. Normal mediastinal and cardiac contours. 1. Suspected new right basilar airspace opacity potentially due in part to passive atelectasis given possible overlying trace right pleural effusion. Superimposed pneumonia and aspiration are not excluded. 2. Persistent bibasilar atelectasis and/or scarring. 3. Increased interstitial prominence due to pulmonary vascular congestion and/or central vascular crowding. XR CHEST PORTABLE    Result Date: 7/16/2022  EXAMINATION: ONE XRAY VIEW OF THE CHEST 7/15/2022 11:48 pm COMPARISON: 07/15/2022 HISTORY: ORDERING SYSTEM PROVIDED HISTORY: lung cancer TECHNOLOGIST PROVIDED HISTORY: Reason for exam:->lung cancer Reason for Exam: lung cancer FINDINGS: The lungs are without acute focal process. There is no effusion or pneumothorax. The cardiomediastinal silhouette is without acute process. The osseous structures are without acute process. No acute process.      XR CHEST PORTABLE    Result Date: 7/15/2022  EXAMINATION: ONE XRAY VIEW OF THE CHEST 7/15/2022 7:22 pm COMPARISON: 07/08/2022, 09/13/2021 HISTORY: ORDERING SYSTEM PROVIDED HISTORY: other TECHNOLOGIST PROVIDED HISTORY: Reason for exam:->other Reason for Exam: abnormal labs FINDINGS: The lungs are without acute focal process. There is no effusion or pneumothorax. The cardiomediastinal silhouette is without acute process. The osseous structures are without acute process. No acute process. NM BONE SCAN WHOLE BODY    Result Date: 7/8/2022  EXAMINATION: WHOLE BODY BONE SCAN  7/8/2022 TECHNIQUE: The patient was injected intravenously with 27.5 mCi of 99 mTc MDP and scintigraphy of the entire skeleton was performed approximately three hours later. COMPARISON: 03/18/2022 HISTORY: ORDERING SYSTEM PROVIDED HISTORY: Malignant neoplasm of right main bronchus [facility]) TECHNOLOGIST PROVIDED HISTORY: Order: Bone scan, total body Order Instructions: [facility] along with CT please lower back and hip pain Associated problems: Small cell carcinoma of lung (disorder) * (C34.01) Reason for Exam: staging prostate Ca Relevant Medical/Surgical History: ct today FINDINGS: New focal activity is demonstrated along the superior aspect of the right iliac crest.  Developing activity is seen at the lateral right 11th rib and posterior right 5th rib. Lesions are seen which are more conspicuous within the proximal right femur, anterior right iliac bone, medial left iliac bone, sacrum, lumbar spine, upper thoracic spine, proximal left humerus. Activity at the shoulders, sternoclavicular joints, hips, knees, ankles, feet, likely degenerative. Physiologic activity is seen within the kidneys and urinary bladder. Urinary contamination is seen between the upper thighs. Several new metastatic lesions are seen as compared to prior exam dated 03/18/2022.   Additional lesions are more intense as compared to prior which could relate to flare effect if there has been intervening therapy. CT CHEST ABDOMEN PELVIS W CONTRAST    Result Date: 7/8/2022  EXAMINATION: CT OF THE CHEST, ABDOMEN, AND PELVIS WITH CONTRAST 7/8/2022 8:35 am TECHNIQUE: CT of the chest, abdomen and pelvis was performed with the administration of intravenous contrast. Multiplanar reformatted images are provided for review. Automated exposure control, iterative reconstruction, and/or weight based adjustment of the mA/kV was utilized to reduce the radiation dose to as low as reasonably achievable. COMPARISON: 03/18/2022 HISTORY: ORDERING SYSTEM PROVIDED HISTORY: Malignant neoplasm of right main bronchus [facility]) TECHNOLOGIST PROVIDED HISTORY: Order: CT chest/abdomen/pelvis w/ contrast Order Instructions: STAT Creatinine & BUN prior to CT Scan per facility protocol [facility] Associated problems: Small cell carcinoma of lung (disorder) * (C34.01) Additional Contrast?->Oral STAT Creatinine as needed:->Yes Reason for Exam: Malignant neoplasm of right main bronchus FINDINGS: Chest: Mediastinum: The central airways are patent. There has been interval enlargement of several mediastinal lymph nodes. Index prevascular node measures 1.3 cm on the current exam compared with 9 mm prior. Right paratracheal node measures 1.5 cm on the current exam compared with 1.4 cm prior. Precarinal lymph node measures 1.4 cm on the current exam compared with 0.6 cm prior. AP window node measures 12 mm currently compared with 5 mm prior. There is a new 1.7 cm lymph node within the right infrahilar region. Lungs/pleura: Mild emphysematous changes are present. A few tiny non calcified right-sided pulmonary nodules are unchanged in size, number and appearance. The pleural spaces are clear. Soft Tissues/Bones: Numerous sclerotic lesions scattered throughout the thoracic spine and ribs are stable in size, number and appearance. Abdomen/Pelvis: Organs:  There has been interval increase in size and number of numerous hypodense lesions scattered throughout the liver. Index lesion within the left hepatic lobe measures 3.2 cm on the current exam compared with 2.4 cm prior. Index lesion within the caudate lobe measures 2.4 cm compared with 1.8 cm prior. There has been slight interval enlargement of the left adrenal nodule which measures 1.6 cm on the current exam.  A few tiny bilateral nonobstructing intrarenal calculi are unchanged. The remainder of the GI/Bowel: There is no bowel dilatation, wall thickening or obstruction. Pelvis: The bladder and prostate are unremarkable. Peritoneum/Retroperitoneum: There is no free air or free fluid. There has been interval development of a 2 cm lymph node within the posterior gastrohepatic ligament. Bones/Soft Tissues: This numerous small osteoblastic lesions scattered throughout the lumbosacral spine and pelvis are unchanged in size, number and appearance. 1. Mild interval progression of right hilar/mediastinal favian metastatic disease. 2. Mild interval progression of hepatic and left adrenal metastatic disease. 3. Developing upper abdominal favian metastatic disease. 4. Stable disseminated osteoblastic disease. MRI BRAIN W WO CONTRAST    Result Date: 7/18/2022  EXAMINATION: MRI OF THE BRAIN WITHOUT AND WITH CONTRAST  7/18/2022 9:56 am TECHNIQUE: Multiplanar multisequence MRI of the head/brain was performed without and with the administration of intravenous contrast. COMPARISON: MRI Brain 9/11/2021 and CT head 7/17/2022 HISTORY: ORDERING SYSTEM PROVIDED HISTORY: lesions seen on haed ct--known smll cell cancer TECHNOLOGIST PROVIDED HISTORY: Reason for exam:->lesions seen on head CT--known smll cell cancer Reason for Exam: lesions seen on head CT - known small cell cnacer FINDINGS: INTRACRANIAL STRUCTURES/VENTRICLES:  There is no acute infarct. No mass effect or midline shift. No evidence of an acute intracranial hemorrhage. The ventricles and sulci are normal in size and configuration.   The sellar/suprasellar regions appear unremarkable. The normal signal voids within the major intracranial vessels appear maintained. Multiple tiny enhancing nodules are identified within the periventricular and subcortical white matter of both cerebral hemispheres. The most conspicuous lesion is identified within the left frontal operculum measuring 5 mm. Additional nodules identified within the left cerebral peduncle measuring 5 mm. Small metastasis is suspected within the left side of the vermis. ORBITS: The visualized portion of the orbits demonstrate no acute abnormality. SINUSES: The visualized paranasal sinuses and mastoid air cells demonstrate no acute abnormality. BONES/SOFT TISSUES: The bone marrow signal intensity appears normal. The soft tissues demonstrate no acute abnormality. Multiple new small enhancing nodules within the periventricular and subcortical white matter of both cerebral hemispheres that is suspicious for metastatic disease. The largest lesion is located within the left frontal operculum and within the left cerebral peduncle measuring 5 mm. Additional small suspected metastasis within the left side of the cerebellar vermis. ASSESSMENT:     Mr. Cruz Mayen is a 45 y.o. male with a diagnosis of metastatic small cell carcinoma of the lung. He was first diagnosed in September 2021 with extensive thoracic disease and biopsy-proven liver metastases. He received carbo/etoposide/atezolizumab. In March 2022, he had definitive evidence of progressive disease and began lurbinectedin. On July 8, 2022, CT C/A/P and bone scan again demonstrated progressive disease. On 7/15/2022, he was admitted primarily due to hypokalemia. He also was noted to have some potential mental status changes. MRI demonstrated disseminated subcentimeter brain metastases.     PLAN:      - I met with the patient today as well as several family members who were present both in person and by phone.  - We reviewed the results of his recent MRI brain demonstrating disseminated metastatic disease.  - He is also progressing systemically and has just started third line topotecan today. - We discussed potential management options in the setting of his newly diagnosed brain metastases. Given the nature of the presentation of innumerable small lesions, he would not be a candidate for radiosurgery. We discussed the possibility of whole brain radiotherapy including the rationale, potential outcomes, and acute/late side effects. We also discussed the possibility of best supportive care only which would be a reasonable consideration here. He remains very motivated to pursue treatment and opted to proceed with whole brain radiotherapy. - We will proceed with CT simulation tomorrow with initiation of treatment sometime this week. - I discussed the case as well with Dr. Edu Caballero today. Topotecan will be continued throughout the week and may overlap some with his whole brain radiation. Steroids will not be started given difficulties controlling his blood sugar as well as minimal edema seen on MRI. Thank you for the opportunity to see this patient and participate in his care. Pato Meek M.D. Radiation Oncology      [email or web address]  [phone]

## 2022-07-19 NOTE — CARE COORDINATION
INITIAL CASE MANAGEMENT ASSESSMENT     Reviewed chart, met with patient to assess possible discharge needs. Explained Case Management role/services. Living Situation:  Lives alone in a one story house with 4 steps to enter. Two sisters lives close by and assist as needed. ADLs:  Independent in all aspects of care. DME:  No needs at this time. PT/OT Recs: N/A     Active Services: No services at home at this time. Transportation:  Active  when feeling up to driving, sister transport when needed. Medications:  Uses [facility] pharmacy in [place]. PCP:  Dr. Natalie Montelongo      PLAN/COMMENTS:  Plans to return home alone. Patient tells cm his sisters will help him when he needs help. CM will follow and assist as need. No needs at this time.

## 2022-07-19 NOTE — PROGRESS NOTES
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  He seems to want to be full code-  HE does seem to understand he has brain mets   OBJECTIVE      Medications    Current Facility-Administered Medications: ondansetron (ZOFRAN) 16 mg in dextrose 5 % 50 mL IVPB, 16 mg, IntraVENous, Daily  topotecan (HYCAMTIN) 3.6 mg in dextrose 5 % 50 mL chemo IVPB, 1.5 mg/m2, IntraVENous, Daily  potassium chloride (KLOR-CON) extended release tablet 40 mEq, 40 mEq, Oral, TID  insulin glargine (LANTUS) injection vial 40 Units, 40 Units, SubCUTAneous, Nightly  insulin glargine (LANTUS) injection vial 20 Units, 20 Units, SubCUTAneous, QAM AC  insulin lispro (HUMALOG) injection vial 0-18 Units, 0-18 Units, SubCUTAneous, TID WC  insulin lispro (HUMALOG) injection vial 0-9 Units, 0-9 Units, SubCUTAneous, Nightly  aspirin EC tablet 81 mg, 81 mg, Oral, Daily  atorvastatin (LIPITOR) tablet 40 mg, 40 mg, Oral, Daily  albuterol sulfate HFA (PROVENTIL;VENTOLIN;PROAIR) 108 (90 Base) MCG/ACT inhaler 2 puff, 2 puff, Inhalation, Q4H PRN  mometasone-formoterol (DULERA) 200-5 MCG/ACT inhaler 2 puff, 2 puff, Inhalation, BID  furosemide (LASIX) tablet 40 mg, 40 mg, Oral, Daily  NIFEdipine (PROCARDIA XL) extended release tablet 30 mg, 30 mg, Oral, BID  spironolactone (ALDACTONE) tablet 100 mg, 100 mg, Oral, BID  sodium chloride flush 0.9 % injection 5-40 mL, 5-40 mL, IntraVENous, 2 times per day  sodium chloride flush 0.9 % injection 5-40 mL, 5-40 mL, IntraVENous, PRN  0.9 % sodium chloride infusion, , IntraVENous, PRN  ondansetron (ZOFRAN-ODT) disintegrating tablet 4 mg, 4 mg, Oral, Q8H PRN **OR** ondansetron (ZOFRAN) injection 4 mg, 4 mg, IntraVENous, Q6H PRN  polyethylene glycol (GLYCOLAX) packet 17 g, 17 g, Oral, Daily PRN  acetaminophen (TYLENOL) tablet 650 mg, 650 mg, Oral, Q6H PRN **OR** acetaminophen (TYLENOL) suppository 650 mg, 650 mg, Rectal, Q6H PRN  enoxaparin Sodium (LOVENOX) injection 30 mg, 30 mg, SubCUTAneous, BID  glucose chewable tablet 16 g, 4 tablet, Oral, PRN  dextrose bolus 10% 125 mL, 125 mL, IntraVENous, PRN **OR** dextrose bolus 10% 250 mL, 250 mL, IntraVENous, PRN  glucagon (rDNA) injection 1 mg, 1 mg, IntraMUSCular, PRN  dextrose 5 % solution, 100 mL/hr, IntraVENous, PRN  Physical    VITALS:  /89   Pulse (!) 109   Temp 97.8 °F (36.6 °C) (Oral)   Resp 20   Ht 6' 1\" (1.854 m)   Wt 248 lb 0.3 oz (112.5 kg)   SpO2 94%   BMI 32.72 kg/m²   TEMPERATURE:  Current - Temp: 97.8 °F (36.6 °C); Max - Temp  Av.9 °F (36.6 °C)  Min: 97.5 °F (36.4 °C)  Max: 98.2 °F (36.8 °C)  PULSE OXIMETRY RANGE: SpO2  Av.5 %  Min: 91 %  Max: 96 %  24HR INTAKE/OUTPUT:    Intake/Output Summary (Last 24 hours) at 2022 1252  Last data filed at 2022 2104  Gross per 24 hour   Intake 480 ml   Output --   Net 480 ml       CONSTITUTIONAL:  awake, alert, cooperative, no apparent distress, HEENT oral pharynx , no scleral icterus  HEMATOLOGIC/LYMPHATICS:  no cervical lymphadenopathy, no supraclavicular lymphadenopathy, n  LUNGS:  No increased work of breathing, good air exchange, clear to auscultation bilaterally, no crackles or wheezing  CARDIOVASCULAR:  , regular rate and rhythm, normal S1 and S2, no S3 or S4, and no murmur noted  ABDOMEN:  No scars, normal bowel sounds, soft, non-distended, non-tender, no masses palpated, no hepatosplenomegally  MUSCULOSKELETAL:  There is no redness, warmth, or swelling of the joints.   EXTREMETIES: No clubbing cynosis or edema  NEUROLOGIC:  Awake, alert,seems a little off   SKIN:  no bruising or bleeding      Data      Recent Labs     22  0522 22  0416   WBC 5.8 5.9   HGB 11.2* 11.0*   HCT 31.7* 31.1*   * 112*   .5* 103.3*        Recent Labs     22  0522 22  1420 22  0416 22  1743 22  1945 22  0454 22  1154     --  141  --   -- --   --    K 3.0*   < > 3.0*   < > 3.1* 3.3* 4.0   CL 93*  --  97*  --   --   --   --    CO2 34*  --  34*  --   --   --   --    BUN 9  --  10  --   --   --   --    CREATININE <0.5*  --  0.6*  --   --   --   --     < > = values in this interval not displayed. Recent Labs     07/17/22  0522   AST 89*   *   BILITOT 1.6*   ALKPHOS 339*       Magnesium:    Lab Results   Component Value Date/Time    MG 1.70 07/19/2022 04:54 AM    MG 2.60 07/16/2022 04:37 AM    MG 2.20 07/15/2022 07:42 PM       Imaging CT HEAD WO CONTRAST    Result Date: 7/17/2022  EXAMINATION: CT OF THE HEAD WITHOUT CONTRAST  7/17/2022 6:43 pm TECHNIQUE: CT of the head was performed without the administration of intravenous contrast. Automated exposure control, iterative reconstruction, and/or weight based adjustment of the mA/kV was utilized to reduce the radiation dose to as low as reasonably achievable. COMPARISON: MRI brain 09/11/2021 HISTORY: ORDERING SYSTEM PROVIDED HISTORY: Unwitnessed fall TECHNOLOGIST PROVIDED HISTORY: Reason for exam:->Unwitnessed fall Has a \"code stroke\" or \"stroke alert\" been called? ->No Reason for Exam: Unwitnessed fall FINDINGS: BRAIN/VENTRICLES: No evidence of acute infarct or acute intracranial hemorrhage. 2 new sub 5 mm subependymal nodules along the lateral margin of the right lateral ventricle. Scattered small parenchymal calcifications including a linear subcortical calcification involving the high posterior right frontal lobe. No hydrocephalus or extra-axial fluid collection. Midline maintained. Basal cisterns patent. ORBITS: The visualized portion of the orbits demonstrate no acute abnormality. SINUSES: The visualized paranasal sinuses and mastoid air cells demonstrate no acute abnormality. SOFT TISSUES/SKULL:  No acute abnormality of the visualized skull or soft tissues. 1. No acute intracranial abnormality.  2. Nonspecific subependymal nodules involving the right lateral ventricle and scattered parenchymal calcifications. MRI of the brain is recommended for further evaluation. XR CHEST PORTABLE    Result Date: 7/18/2022  EXAMINATION: ONE XRAY VIEW OF THE CHEST 7/18/2022 4:37 am COMPARISON: Chest radiograph 07/15/2022 at 11:47 p.m.; chest, abdomen, and pelvis CT 07/08/2022 HISTORY: ORDERING SYSTEM PROVIDED HISTORY: hypokalemia TECHNOLOGIST PROVIDED HISTORY: Reason for exam:->hypokalemia Reason for Exam: hypokalemia FINDINGS: Decreased lung volumes. Persistent linear opacities in each lung base. Suspected new right basilar airspace opacity with possible overlying trace right pleural effusion. Increased diffuse interstitial prominence with indistinct pulmonary vasculature no definite interlobular septal thickening. No definite findings of pneumothorax or left pleural effusion. Normal mediastinal and cardiac contours. 1. Suspected new right basilar airspace opacity potentially due in part to passive atelectasis given possible overlying trace right pleural effusion. Superimposed pneumonia and aspiration are not excluded. 2. Persistent bibasilar atelectasis and/or scarring. 3. Increased interstitial prominence due to pulmonary vascular congestion and/or central vascular crowding. XR CHEST PORTABLE    Result Date: 7/16/2022  EXAMINATION: ONE XRAY VIEW OF THE CHEST 7/15/2022 11:48 pm COMPARISON: 07/15/2022 HISTORY: ORDERING SYSTEM PROVIDED HISTORY: lung cancer TECHNOLOGIST PROVIDED HISTORY: Reason for exam:->lung cancer Reason for Exam: lung cancer FINDINGS: The lungs are without acute focal process. There is no effusion or pneumothorax. The cardiomediastinal silhouette is without acute process. The osseous structures are without acute process. No acute process.      XR CHEST PORTABLE    Result Date: 7/15/2022  EXAMINATION: ONE XRAY VIEW OF THE CHEST 7/15/2022 7:22 pm COMPARISON: 07/08/2022, 09/13/2021 HISTORY: ORDERING SYSTEM PROVIDED HISTORY: other TECHNOLOGIST PROVIDED HISTORY: Reason for exam:->other Reason for Exam: abnormal labs FINDINGS: The lungs are without acute focal process. There is no effusion or pneumothorax. The cardiomediastinal silhouette is without acute process. The osseous structures are without acute process. No acute process. NM BONE SCAN WHOLE BODY    Result Date: 7/8/2022  EXAMINATION: WHOLE BODY BONE SCAN  7/8/2022 TECHNIQUE: The patient was injected intravenously with 27.5 mCi of 99 mTc MDP and scintigraphy of the entire skeleton was performed approximately three hours later. COMPARISON: 03/18/2022 HISTORY: ORDERING SYSTEM PROVIDED HISTORY: Malignant neoplasm of right main bronchus [facility]) TECHNOLOGIST PROVIDED HISTORY: Order: Bone scan, total body Order Instructions: [facility] along with CT please lower back and hip pain Associated problems: Small cell carcinoma of lung (disorder) * (C34.01) Reason for Exam: staging prostate Ca Relevant Medical/Surgical History: ct today FINDINGS: New focal activity is demonstrated along the superior aspect of the right iliac crest.  Developing activity is seen at the lateral right 11th rib and posterior right 5th rib. Lesions are seen which are more conspicuous within the proximal right femur, anterior right iliac bone, medial left iliac bone, sacrum, lumbar spine, upper thoracic spine, proximal left humerus. Activity at the shoulders, sternoclavicular joints, hips, knees, ankles, feet, likely degenerative. Physiologic activity is seen within the kidneys and urinary bladder. Urinary contamination is seen between the upper thighs. Several new metastatic lesions are seen as compared to prior exam dated 03/18/2022. Additional lesions are more intense as compared to prior which could relate to flare effect if there has been intervening therapy.      CT CHEST ABDOMEN PELVIS W CONTRAST    Result Date: 7/8/2022  EXAMINATION: CT OF THE CHEST, ABDOMEN, AND PELVIS WITH CONTRAST 7/8/2022 8:35 am TECHNIQUE: CT of the chest, abdomen and pelvis was performed with the administration of intravenous contrast. Multiplanar reformatted images are provided for review. Automated exposure control, iterative reconstruction, and/or weight based adjustment of the mA/kV was utilized to reduce the radiation dose to as low as reasonably achievable. COMPARISON: 03/18/2022 HISTORY: ORDERING SYSTEM PROVIDED HISTORY: Malignant neoplasm of right main bronchus [facility]) TECHNOLOGIST PROVIDED HISTORY: Order: CT chest/abdomen/pelvis w/ contrast Order Instructions: STAT Creatinine & BUN prior to CT Scan per facility protocol [facility] Associated problems: Small cell carcinoma of lung (disorder) * (C34.01) Additional Contrast?->Oral STAT Creatinine as needed:->Yes Reason for Exam: Malignant neoplasm of right main bronchus FINDINGS: Chest: Mediastinum: The central airways are patent. There has been interval enlargement of several mediastinal lymph nodes. Index prevascular node measures 1.3 cm on the current exam compared with 9 mm prior. Right paratracheal node measures 1.5 cm on the current exam compared with 1.4 cm prior. Precarinal lymph node measures 1.4 cm on the current exam compared with 0.6 cm prior. AP window node measures 12 mm currently compared with 5 mm prior. There is a new 1.7 cm lymph node within the right infrahilar region. Lungs/pleura: Mild emphysematous changes are present. A few tiny non calcified right-sided pulmonary nodules are unchanged in size, number and appearance. The pleural spaces are clear. Soft Tissues/Bones: Numerous sclerotic lesions scattered throughout the thoracic spine and ribs are stable in size, number and appearance. Abdomen/Pelvis: Organs: There has been interval increase in size and number of numerous hypodense lesions scattered throughout the liver. Index lesion within the left hepatic lobe measures 3.2 cm on the current exam compared with 2.4 cm prior.   Index lesion within the caudate lobe measures 2.4 cm compared with 1.8 cm prior. There has been slight interval enlargement of the left adrenal nodule which measures 1.6 cm on the current exam.  A few tiny bilateral nonobstructing intrarenal calculi are unchanged. The remainder of the GI/Bowel: There is no bowel dilatation, wall thickening or obstruction. Pelvis: The bladder and prostate are unremarkable. Peritoneum/Retroperitoneum: There is no free air or free fluid. There has been interval development of a 2 cm lymph node within the posterior gastrohepatic ligament. Bones/Soft Tissues: This numerous small osteoblastic lesions scattered throughout the lumbosacral spine and pelvis are unchanged in size, number and appearance. 1. Mild interval progression of right hilar/mediastinal favian metastatic disease. 2. Mild interval progression of hepatic and left adrenal metastatic disease. 3. Developing upper abdominal favian metastatic disease. 4. Stable disseminated osteoblastic disease. MRI BRAIN W WO CONTRAST    Result Date: 7/18/2022  EXAMINATION: MRI OF THE BRAIN WITHOUT AND WITH CONTRAST  7/18/2022 9:56 am TECHNIQUE: Multiplanar multisequence MRI of the head/brain was performed without and with the administration of intravenous contrast. COMPARISON: MRI Brain 9/11/2021 and CT head 7/17/2022 HISTORY: ORDERING SYSTEM PROVIDED HISTORY: lesions seen on haed ct--known smll cell cancer TECHNOLOGIST PROVIDED HISTORY: Reason for exam:->lesions seen on head CT--known smll cell cancer Reason for Exam: lesions seen on head CT - known small cell cnacer FINDINGS: INTRACRANIAL STRUCTURES/VENTRICLES:  There is no acute infarct. No mass effect or midline shift. No evidence of an acute intracranial hemorrhage. The ventricles and sulci are normal in size and configuration. The sellar/suprasellar regions appear unremarkable. The normal signal voids within the major intracranial vessels appear maintained.  Multiple tiny enhancing nodules are identified within the periventricular and subcortical white matter of both cerebral hemispheres. The most conspicuous lesion is identified within the left frontal operculum measuring 5 mm. Additional nodules identified within the left cerebral peduncle measuring 5 mm. Small metastasis is suspected within the left side of the vermis. ORBITS: The visualized portion of the orbits demonstrate no acute abnormality. SINUSES: The visualized paranasal sinuses and mastoid air cells demonstrate no acute abnormality. BONES/SOFT TISSUES: The bone marrow signal intensity appears normal. The soft tissues demonstrate no acute abnormality. Multiple new small enhancing nodules within the periventricular and subcortical white matter of both cerebral hemispheres that is suspicious for metastatic disease. The largest lesion is located within the left frontal operculum and within the left cerebral peduncle measuring 5 mm. Additional small suspected metastasis within the left side of the cerebellar vermis.        Problem List  Patient Active Problem List   Diagnosis    Pure hypercholesterolemia    Anxiety    Essential hypertension    Moderate persistent asthma with acute exacerbation    Metastatic cancer (HCC)    Hypokalemia    Acute respiratory failure with hypoxia (HCC)    Abnormal CT of the chest    COPD exacerbation (HCC)    Lung nodules    Tobacco abuse    Small cell lung cancer (HCC)    Ectopic ACTH syndrome (HCC)    Hyperglycemia    Syncope       ASSESSMENT AND PLAN    Relapsed small cell   Was to start third line topotecan after failure of 2 other lines of therapy   Topotecan day 2  Rad onc for brain xrt  Pt wants to go home-  I can give k in the office but not usually more than 40 day  PT seems to think he can take insulin  \will message dr Julia Cummins   I explained code status -I think we will need to work on this -ongoin g  He has a picc-id like him to go home with this     Addendum-opted to not start dexamethasone given sugars and lack of major edema from brain mets

## 2022-07-19 NOTE — PROGRESS NOTES
Pt is alert and oriented x4, resting quietly in bed. Nightly medications and intake tolerated well. No complaints of nausea, vomiting, or pain. No other needs made known at this time. Fall precautions in place. Call light within reach. Will continue to monitor.     Electronically signed by Nicolás Bush RN on 7/19/2022 at 2:29 AM

## 2022-07-19 NOTE — PROGRESS NOTES
Checking on patient Q2H for nutrition needs, hygiene needs, comfort measures, mobility, fall risk interventions, and safe environment. All precautions and interventions in place. Educated patient on use of call light and telephone. Patient verbalizes understanding. Call light/telephone in reach.   Electronically signed by Deangelo Hernandez RN on 7/19/2022 at 7:56 AM

## 2022-07-19 NOTE — CONSULTS
home.    Advance Directives:  Surrogate Decision Maker: Per patient, he would like to name his sister Hamzah Vidal as his POA. Dr. Lukas Ross has placed a consult to Spiritual Services team to assist with POA paperwork. Code status:  Full Code    Case discussed with: patient, floor RN  Thank you for allowing us to participate in the care of this patient. HISTORY     CC: Debility. HPI: The patient is a 45 y.o. male with a past medical history of COPD, lung cancer, hypokalemia, asthma, HLD and HTN who presented to [facility] with low potassium. Patient was admitted with hypokalemia and lung cancer. Palliative Medicine SymptomScreening/ROS:    Review of Systems   Constitutional:  Positive for fatigue. HENT: Negative. Eyes: Negative. Respiratory: Negative. Cardiovascular:  Positive for leg swelling. Gastrointestinal: Negative. Musculoskeletal: Negative. Skin: Negative. Neurological: Negative. Psychiatric/Behavioral: Negative. All other systems reviewed and are negative. A complete 10 count ROS was obtained. Pertinent positives mentioned above in HPI/ROS. All others if not mentioned are negative. Palliative Performance Scale:     [x] 60%  Amb reduced; Sig dz. Can't do hobbies/housework; Intake normal or reduced, Occasional assist; LOC full/confusion   [] 50%  Mainly sit/lie; Extensive disease. Mainly assist, Intake normal or reduced; Occasional assist; LOC full/confusion   [] 40%  Mainly in bed; Extensive disease; Mainly assist; Intake normal or reduced; Occasional assist; LOC full/confusion   [] 30%  Bed bound, Extensive disease; Total care; Intake reduced; LOC full/confusion   [] 20%  Bed bound; Extensive disease; Total care; Intake minimal; Drowsy/coma   [] 10%  Bed bound; Extensive disease;  Total care; Mouth care only; Drowsy/coma   []  0%   Death     Home med list and hospital medications reviewed in chart as of 7/19/2022     EXAM     Vitals:    07/19/22 1212   BP: 133/89   Pulse: (!) 109   Resp: 20   Temp: 97.8 °F (36.6 °C)   SpO2: 94%       Physical Exam  Vitals reviewed. Constitutional:       Appearance: He is ill-appearing. HENT:      Head: Normocephalic and atraumatic. Nose: Nose normal.   Eyes:      Extraocular Movements: Extraocular movements intact. Pupils: Pupils are equal, round, and reactive to light. Cardiovascular:      Rate and Rhythm: Tachycardia present. Pulses: Normal pulses. Pulmonary:      Comments: Breath sounds diminished bilaterally today. Abdominal:      General: Bowel sounds are normal.      Palpations: Abdomen is soft. Musculoskeletal:      Cervical back: Neck supple. Right lower leg: Edema (2+) present. Left lower leg: Edema (2+) present. Skin:     General: Skin is warm and dry. Neurological:      Comments: Oriented x4   Psychiatric:         Behavior: Behavior normal.         Thought Content:  Thought content normal.         Judgment: Judgment normal.          Current labs in the epic chart reviewed as of 7/19/2022   Review of previous notes, admits, labs, radiology and testing relevant to this consult done in this chart today 7/19/2022      Total time: 78 minutes  >50% of time spent counseling patient at bedside or POA/family member if applicable , reviewing information and discussing care, coordinating with care team  Signed By: Electronically signed by NINFA Hanna CNP on 7/19/2022 at 1:16 PM  Palliative Medicine   [phone]    July 19, 2022

## 2022-07-19 NOTE — PROGRESS NOTES
[facility] Internal Medicine Note      Chief Complaint: I feel ok    Subjective/Interval History: This morning the patient was seen and echocardiogram.  He states he is feeling okay, eating and drinking okay. He reports no new problems overnight. MRI of the brain reviewed with the patient, he had discussions with radiation oncology yesterday. Events since the admission/weekend reviewed    Glucose remains high and potassium remains low. No chest pain or shortness breath. No cough or sputum. No nausea, vomiting, diarrhea. No abdominal pain. No dysuria. The remainder of the review of systems is negative. PMH, PSH, FH/SH reviewed and unchanged as documented in the H&P dated 22    Medication list reviewed    Objective:    BP (!) 149/96   Pulse 88   Temp 98.2 °F (36.8 °C) (Oral)   Resp 17   Ht 6' 1\" (1.854 m)   Wt 248 lb 0.3 oz (112.5 kg)   SpO2 96%   BMI 32.72 kg/m²   Temp  Av.9 °F (36.6 °C)  Min: 97.5 °F (36.4 °C)  Max: 98.2 °F (36.8 °C)    RRR  Chest-the chest is clear throughout. No wheezes or rhonchi or crackles noted  Abd-BS+, soft, NTND  Ext- 2+ lower extremity edema noted. Large medial bicep/elbow hematoma noted.     The Following Labs Were Reviewed Today:    Recent Results (from the past 24 hour(s))   Troponin    Collection Time: 22  9:09 AM   Result Value Ref Range    Troponin 0.01 <0.01 ng/mL   POCT Glucose    Collection Time: 22 11:03 AM   Result Value Ref Range    POC Glucose 383 (H) 70 - 99 mg/dl    Performed on ACCU-CHEK    POCT Glucose    Collection Time: 22  3:59 PM   Result Value Ref Range    POC Glucose 301 (H) 70 - 99 mg/dl    Performed on ACCU-CHEK    Potassium    Collection Time: 22  5:43 PM   Result Value Ref Range    Potassium 3.3 (L) 3.5 - 5.1 mmol/L   Troponin    Collection Time: 22  5:43 PM   Result Value Ref Range    Troponin <0.01 <0.01 ng/mL   Potassium    Collection Time: 22  7:45 PM   Result Value Ref Range    Potassium 3.1 (L) 3.5 - 5.1 mmol/L   POCT Glucose    Collection Time: 07/18/22  9:04 PM   Result Value Ref Range    POC Glucose 308 (H) 70 - 99 mg/dl    Performed on ACCU-CHEK    Potassium    Collection Time: 07/19/22  4:54 AM   Result Value Ref Range    Potassium 3.3 (L) 3.5 - 5.1 mmol/L   POCT Glucose    Collection Time: 07/19/22  6:13 AM   Result Value Ref Range    POC Glucose 197 (H) 70 - 99 mg/dl    Performed on ACCU-CHEK        ASSESSMENT/PLAN:      Principal Problem:    Small cell lung cancer-now with brain mets. Radiation oncology to begin radiation and Dr. Jeanmarie Guerra has proceeded with chemotherapy. Unable to discuss DNR this morning as the patient was in echo. Active Problems:    Hypokalemia-check magnesium level again today. Stop IV fluids given his edema and Lasix therapy. Plan to continue with oral supplementation, his potassium is better today. Hyperglycemia-continue adjusting insulin. Ectopic ACTH syndrome -as noted above.   Patient on Aldactone and Lasix and nifedipine to help manage blood pressure and fluid retention    Time > 35 minutes reviewing chart and patient data, examining and interviewing patient, and discussing with nursing staff, family, etc.       Ivette Clement MD, FACP  8:10 AM  7/19/2022

## 2022-07-19 NOTE — PROGRESS NOTES
Arrived to place PICC line in patient withGabriela RN at bedside, pre procedure and allergies reviewed, no issues accessing brachial vein, pt tolerated well, blood return and flushed well, tip verified with 3cg technology. Pt left in stable condition and bed braked and in lowest position. Pt call light within reach. Handoff to RN.

## 2022-07-20 NOTE — PROGRESS NOTES
Patient lying in bed very slouched. This RN suggesting to patient to reposition in bed, patient refusing. Bed sheets are soiled with spots of dry blood, patient refusing to have sheets changed. Will continue to monitor and assess for needs and comfort per unit guidelines.

## 2022-07-20 NOTE — PLAN OF CARE
Problem: Discharge Planning  Goal: Discharge to home or other facility with appropriate resources  Outcome: Progressing  Flowsheets (Taken 7/19/2022 2348)  Discharge to home or other facility with appropriate resources:   Identify barriers to discharge with patient and caregiver   Identify discharge learning needs (meds, wound care, etc)     Problem: Pain  Goal: Verbalizes/displays adequate comfort level or baseline comfort level  Outcome: Progressing  Flowsheets (Taken 7/19/2022 2348)  Verbalizes/displays adequate comfort level or baseline comfort level:   Encourage patient to monitor pain and request assistance   Administer analgesics based on type and severity of pain and evaluate response   Assess pain using appropriate pain scale     Problem: Safety - Adult  Goal: Free from fall injury  Outcome: Progressing  Flowsheets (Taken 7/19/2022 2348)  Free From Fall Injury: Instruct family/caregiver on patient safety     Problem: ABCDS Injury Assessment  Goal: Absence of physical injury  Outcome: Progressing  Flowsheets (Taken 7/19/2022 2348)  Absence of Physical Injury: Implement safety measures based on patient assessment

## 2022-07-20 NOTE — PLAN OF CARE
Problem: Discharge Planning  Goal: Discharge to home or other facility with appropriate resources  7/20/2022 0921 by Nae Matias RN  Outcome: Progressing  Flowsheets (Taken 7/19/2022 2348 by Joshua Arthur RN)  Discharge to home or other facility with appropriate resources:   Identify barriers to discharge with patient and caregiver   Identify discharge learning needs (meds, wound care, etc)  7/19/2022 2348 by Joshua Arthur RN  Outcome: Progressing  Flowsheets (Taken 7/19/2022 2348)  Discharge to home or other facility with appropriate resources:   Identify barriers to discharge with patient and caregiver   Identify discharge learning needs (meds, wound care, etc)     Problem: Pain  Goal: Verbalizes/displays adequate comfort level or baseline comfort level  7/20/2022 0921 by Nae Matias RN  Outcome: Progressing  Flowsheets (Taken 7/19/2022 2348 by Joshua Arthur RN)  Verbalizes/displays adequate comfort level or baseline comfort level:   Encourage patient to monitor pain and request assistance   Administer analgesics based on type and severity of pain and evaluate response   Assess pain using appropriate pain scale  7/19/2022 2348 by Joshua Arthur RN  Outcome: Progressing  Flowsheets (Taken 7/19/2022 2348)  Verbalizes/displays adequate comfort level or baseline comfort level:   Encourage patient to monitor pain and request assistance   Administer analgesics based on type and severity of pain and evaluate response   Assess pain using appropriate pain scale     Problem: Safety - Adult  Goal: Free from fall injury  7/20/2022 0921 by Nae Matias RN  Outcome: Progressing  Flowsheets (Taken 7/19/2022 2348 by Joshua Arthur RN)  Free From Fall Injury: Instruct family/caregiver on patient safety  7/19/2022 2348 by Joshua Arthur RN  Outcome: Progressing  Flowsheets (Taken 7/19/2022 2348)  Free From Fall Injury: Instruct family/caregiver on patient safety     Problem: ABCDS Injury Assessment  Goal: Absence of physical injury  7/20/2022 9802 by Armani Milan RN  Outcome: Progressing  Flowsheets (Taken 7/19/2022 2348 by Carlos Iglesias RN)  Absence of Physical Injury: Implement safety measures based on patient assessment  7/19/2022 2348 by Carlos Iglesias RN  Outcome: Progressing  Flowsheets (Taken 7/19/2022 2348)  Absence of Physical Injury: Implement safety measures based on patient assessment     Problem: Skin/Tissue Integrity  Goal: Absence of new skin breakdown  Description: 1. Monitor for areas of redness and/or skin breakdown  2. Assess vascular access sites hourly  3. Every 4-6 hours minimum:  Change oxygen saturation probe site  4. Every 4-6 hours:  If on nasal continuous positive airway pressure, respiratory therapy assess nares and determine need for appliance change or resting period. Outcome: Progressing  Note: Chad score assessed. Patient able to ambulate and turn self and repositioned patient Q2H and assessed skin. Educated patient on importance of repositioning to prevent skin issues.

## 2022-07-20 NOTE — PROGRESS NOTES
PALLIATIVE MEDICINE PROGRESS NOTE     Patient name:Helder Mackay    MDI:[id number] :1970  Room/Bed:P4I-0926/3105-    LOS: 5 days        ASSESSMENT/RECOMMENDATIONS     45 y.o. male with debility and swelling. Symptom Management:  Debility: Patient with noted metastatic lung cancer to brain. Patient remains mostly independent for ADL's but bilateral lower extremity edema was again noted. Some bilateral lower extremity weakness was also noted during my visit today. Swelling: Patient now taking Lasix 40 mg BID and Spironolactone at 100 mg continues to be taken BID. Goals of Care: Again met the patient at his bedside. Patient was again oriented x 4 today. Patient also again appeared to be decisional today. Again reviewed the patient's current health condition, goals and code status. Patient again indicated he would like to continue with aggressive care at this time. Code status was again reviewed and the patient would like to remain a Full Code. Patient/Family Goals of Care :     - Again met the patient at his bedside. Patient was again oriented x 4 today. Patient also again appeared to be decisional today. Again reviewed the patient's current health condition, goals and code status. Patient again indicated he would like to continue with aggressive care at this time. Code status was again reviewed and the patient would like to remain a Full Code.  - Met patient at his bedside. Patient was oriented x 4 and appeared to be decisional today. Reviewed patient's current health status, goals of care and code status. Patient indicated he is not interested in hospice services at this time. Patient would like to pursue aggressive therapy. Patient also indicated he would like to meet with [facility] post his hospital stay to assist with care in his home. Code status was reviewed and the patient wishes to remain a Full Code. Disposition/Discharge Plan:   An Pupils: Pupils are equal, round, and reactive to light. Cardiovascular:      Rate and Rhythm: Tachycardia present. Pulses: Normal pulses. Pulmonary:      Comments: Breath sounds again clear but diminished today. Abdominal:      General: Bowel sounds are normal.      Palpations: Abdomen is soft. Musculoskeletal:      Cervical back: Neck supple. Right lower leg: Edema (2+) present. Left lower leg: Edema (2+) present. Skin:     General: Skin is warm and dry. Coloration: Skin is pale. Neurological:      Comments: Oriented x 4. Psychiatric:         Mood and Affect: Mood normal.         Thought Content:  Thought content normal.         Judgment: Judgment normal.        Total time: 43 minutes  >50% of time spent counseling patient at bedside or POA/family member if applicable , reviewing information and discussing care, coordinating with care team       Signed By: Electronically signed by NINFA Wyman CNP on 7/20/2022 at 1:58 PM   Palliative Medicine   [phone]    July 20, 2022 02-Oct-2021 11:03

## 2022-07-20 NOTE — PROGRESS NOTES
[facility]  Progress Note      SUBJECTIVE:   He is doing ok   He seems clearer mentally   So far chemo doing ok   Main issue will be counts lowering next week   OBJECTIVE      Medications    Current Facility-Administered Medications: potassium chloride (KLOR-CON) extended release tablet 40 mEq, 40 mEq, Oral, BID  ondansetron (ZOFRAN) 16 mg in dextrose 5 % 50 mL IVPB, 16 mg, IntraVENous, Daily  topotecan (HYCAMTIN) 3.6 mg in dextrose 5 % 50 mL chemo IVPB, 1.5 mg/m2, IntraVENous, Daily  insulin glargine (LANTUS) injection vial 40 Units, 40 Units, SubCUTAneous, Nightly  insulin glargine (LANTUS) injection vial 20 Units, 20 Units, SubCUTAneous, QAM AC  insulin lispro (HUMALOG) injection vial 0-18 Units, 0-18 Units, SubCUTAneous, TID WC  insulin lispro (HUMALOG) injection vial 0-9 Units, 0-9 Units, SubCUTAneous, Nightly  aspirin EC tablet 81 mg, 81 mg, Oral, Daily  atorvastatin (LIPITOR) tablet 40 mg, 40 mg, Oral, Daily  albuterol sulfate HFA (PROVENTIL;VENTOLIN;PROAIR) 108 (90 Base) MCG/ACT inhaler 2 puff, 2 puff, Inhalation, Q4H PRN  mometasone-formoterol (DULERA) 200-5 MCG/ACT inhaler 2 puff, 2 puff, Inhalation, BID  furosemide (LASIX) tablet 40 mg, 40 mg, Oral, Daily  NIFEdipine (PROCARDIA XL) extended release tablet 30 mg, 30 mg, Oral, BID  spironolactone (ALDACTONE) tablet 100 mg, 100 mg, Oral, BID  sodium chloride flush 0.9 % injection 5-40 mL, 5-40 mL, IntraVENous, 2 times per day  sodium chloride flush 0.9 % injection 5-40 mL, 5-40 mL, IntraVENous, PRN  0.9 % sodium chloride infusion, , IntraVENous, PRN  ondansetron (ZOFRAN-ODT) disintegrating tablet 4 mg, 4 mg, Oral, Q8H PRN **OR** ondansetron (ZOFRAN) injection 4 mg, 4 mg, IntraVENous, Q6H PRN  polyethylene glycol (GLYCOLAX) packet 17 g, 17 g, Oral, Daily PRN  acetaminophen (TYLENOL) tablet 650 mg, 650 mg, Oral, Q6H PRN **OR** acetaminophen (TYLENOL) suppository 650 mg, 650 mg, Rectal, Q6H PRN  enoxaparin Sodium (LOVENOX) injection 30 BUN 10  --   --   --  20   CREATININE 0.6*  --   --   --  0.8*    < > = values in this interval not displayed. No results for input(s): AST, ALT, ALB, BILIDIR, BILITOT, ALKPHOS in the last 72 hours. Magnesium:    Lab Results   Component Value Date/Time    MG 2.00 07/20/2022 04:08 AM    MG 1.70 07/19/2022 04:54 AM    MG 2.60 07/16/2022 04:37 AM       Imaging CT HEAD WO CONTRAST    Result Date: 7/17/2022  EXAMINATION: CT OF THE HEAD WITHOUT CONTRAST  7/17/2022 6:43 pm TECHNIQUE: CT of the head was performed without the administration of intravenous contrast. Automated exposure control, iterative reconstruction, and/or weight based adjustment of the mA/kV was utilized to reduce the radiation dose to as low as reasonably achievable. COMPARISON: MRI brain 09/11/2021 HISTORY: ORDERING SYSTEM PROVIDED HISTORY: Unwitnessed fall TECHNOLOGIST PROVIDED HISTORY: Reason for exam:->Unwitnessed fall Has a \"code stroke\" or \"stroke alert\" been called? ->No Reason for Exam: Unwitnessed fall FINDINGS: BRAIN/VENTRICLES: No evidence of acute infarct or acute intracranial hemorrhage. 2 new sub 5 mm subependymal nodules along the lateral margin of the right lateral ventricle. Scattered small parenchymal calcifications including a linear subcortical calcification involving the high posterior right frontal lobe. No hydrocephalus or extra-axial fluid collection. Midline maintained. Basal cisterns patent. ORBITS: The visualized portion of the orbits demonstrate no acute abnormality. SINUSES: The visualized paranasal sinuses and mastoid air cells demonstrate no acute abnormality. SOFT TISSUES/SKULL:  No acute abnormality of the visualized skull or soft tissues. 1. No acute intracranial abnormality. 2. Nonspecific subependymal nodules involving the right lateral ventricle and scattered parenchymal calcifications. MRI of the brain is recommended for further evaluation.      XR CHEST PORTABLE    Result Date: 7/18/2022  EXAMINATION: ONE XRAY VIEW OF THE CHEST 7/18/2022 4:37 am COMPARISON: Chest radiograph 07/15/2022 at 11:47 p.m.; chest, abdomen, and pelvis CT 07/08/2022 HISTORY: ORDERING SYSTEM PROVIDED HISTORY: hypokalemia TECHNOLOGIST PROVIDED HISTORY: Reason for exam:->hypokalemia Reason for Exam: hypokalemia FINDINGS: Decreased lung volumes. Persistent linear opacities in each lung base. Suspected new right basilar airspace opacity with possible overlying trace right pleural effusion. Increased diffuse interstitial prominence with indistinct pulmonary vasculature no definite interlobular septal thickening. No definite findings of pneumothorax or left pleural effusion. Normal mediastinal and cardiac contours. 1. Suspected new right basilar airspace opacity potentially due in part to passive atelectasis given possible overlying trace right pleural effusion. Superimposed pneumonia and aspiration are not excluded. 2. Persistent bibasilar atelectasis and/or scarring. 3. Increased interstitial prominence due to pulmonary vascular congestion and/or central vascular crowding. XR CHEST PORTABLE    Result Date: 7/16/2022  EXAMINATION: ONE XRAY VIEW OF THE CHEST 7/15/2022 11:48 pm COMPARISON: 07/15/2022 HISTORY: ORDERING SYSTEM PROVIDED HISTORY: lung cancer TECHNOLOGIST PROVIDED HISTORY: Reason for exam:->lung cancer Reason for Exam: lung cancer FINDINGS: The lungs are without acute focal process. There is no effusion or pneumothorax. The cardiomediastinal silhouette is without acute process. The osseous structures are without acute process. No acute process. XR CHEST PORTABLE    Result Date: 7/15/2022  EXAMINATION: ONE XRAY VIEW OF THE CHEST 7/15/2022 7:22 pm COMPARISON: 07/08/2022, 09/13/2021 HISTORY: ORDERING SYSTEM PROVIDED HISTORY: other TECHNOLOGIST PROVIDED HISTORY: Reason for exam:->other Reason for Exam: abnormal labs FINDINGS: The lungs are without acute focal process.   There is no effusion or pneumothorax. The cardiomediastinal silhouette is without acute process. The osseous structures are without acute process. No acute process. NM BONE SCAN WHOLE BODY    Result Date: 7/8/2022  EXAMINATION: WHOLE BODY BONE SCAN  7/8/2022 TECHNIQUE: The patient was injected intravenously with 27.5 mCi of 99 mTc MDP and scintigraphy of the entire skeleton was performed approximately three hours later. COMPARISON: 03/18/2022 HISTORY: ORDERING SYSTEM PROVIDED HISTORY: Malignant neoplasm of right main bronchus [facility]) TECHNOLOGIST PROVIDED HISTORY: Order: Bone scan, total body Order Instructions: [facility] along with CT please lower back and hip pain Associated problems: Small cell carcinoma of lung (disorder) * (C34.01) Reason for Exam: staging prostate Ca Relevant Medical/Surgical History: ct today FINDINGS: New focal activity is demonstrated along the superior aspect of the right iliac crest.  Developing activity is seen at the lateral right 11th rib and posterior right 5th rib. Lesions are seen which are more conspicuous within the proximal right femur, anterior right iliac bone, medial left iliac bone, sacrum, lumbar spine, upper thoracic spine, proximal left humerus. Activity at the shoulders, sternoclavicular joints, hips, knees, ankles, feet, likely degenerative. Physiologic activity is seen within the kidneys and urinary bladder. Urinary contamination is seen between the upper thighs. Several new metastatic lesions are seen as compared to prior exam dated 03/18/2022. Additional lesions are more intense as compared to prior which could relate to flare effect if there has been intervening therapy. CT CHEST ABDOMEN PELVIS W CONTRAST    Result Date: 7/8/2022  EXAMINATION: CT OF THE CHEST, ABDOMEN, AND PELVIS WITH CONTRAST 7/8/2022 8:35 am TECHNIQUE: CT of the chest, abdomen and pelvis was performed with the administration of intravenous contrast. Multiplanar reformatted images are provided for review. Automated exposure control, iterative reconstruction, and/or weight based adjustment of the mA/kV was utilized to reduce the radiation dose to as low as reasonably achievable. COMPARISON: 03/18/2022 HISTORY: ORDERING SYSTEM PROVIDED HISTORY: Malignant neoplasm of right main bronchus [facility]) TECHNOLOGIST PROVIDED HISTORY: Order: CT chest/abdomen/pelvis w/ contrast Order Instructions: STAT Creatinine & BUN prior to CT Scan per facility protocol [facility] Associated problems: Small cell carcinoma of lung (disorder) * (C34.01) Additional Contrast?->Oral STAT Creatinine as needed:->Yes Reason for Exam: Malignant neoplasm of right main bronchus FINDINGS: Chest: Mediastinum: The central airways are patent. There has been interval enlargement of several mediastinal lymph nodes. Index prevascular node measures 1.3 cm on the current exam compared with 9 mm prior. Right paratracheal node measures 1.5 cm on the current exam compared with 1.4 cm prior. Precarinal lymph node measures 1.4 cm on the current exam compared with 0.6 cm prior. AP window node measures 12 mm currently compared with 5 mm prior. There is a new 1.7 cm lymph node within the right infrahilar region. Lungs/pleura: Mild emphysematous changes are present. A few tiny non calcified right-sided pulmonary nodules are unchanged in size, number and appearance. The pleural spaces are clear. Soft Tissues/Bones: Numerous sclerotic lesions scattered throughout the thoracic spine and ribs are stable in size, number and appearance. Abdomen/Pelvis: Organs: There has been interval increase in size and number of numerous hypodense lesions scattered throughout the liver. Index lesion within the left hepatic lobe measures 3.2 cm on the current exam compared with 2.4 cm prior. Index lesion within the caudate lobe measures 2.4 cm compared with 1.8 cm prior.   There has been slight interval enlargement of the left adrenal nodule which measures 1.6 cm on the current exam.  A few tiny bilateral nonobstructing intrarenal calculi are unchanged. The remainder of the GI/Bowel: There is no bowel dilatation, wall thickening or obstruction. Pelvis: The bladder and prostate are unremarkable. Peritoneum/Retroperitoneum: There is no free air or free fluid. There has been interval development of a 2 cm lymph node within the posterior gastrohepatic ligament. Bones/Soft Tissues: This numerous small osteoblastic lesions scattered throughout the lumbosacral spine and pelvis are unchanged in size, number and appearance. 1. Mild interval progression of right hilar/mediastinal favian metastatic disease. 2. Mild interval progression of hepatic and left adrenal metastatic disease. 3. Developing upper abdominal favian metastatic disease. 4. Stable disseminated osteoblastic disease. MRI BRAIN W WO CONTRAST    Result Date: 7/18/2022  EXAMINATION: MRI OF THE BRAIN WITHOUT AND WITH CONTRAST  7/18/2022 9:56 am TECHNIQUE: Multiplanar multisequence MRI of the head/brain was performed without and with the administration of intravenous contrast. COMPARISON: MRI Brain 9/11/2021 and CT head 7/17/2022 HISTORY: ORDERING SYSTEM PROVIDED HISTORY: lesions seen on haed ct--known smll cell cancer TECHNOLOGIST PROVIDED HISTORY: Reason for exam:->lesions seen on head CT--known smll cell cancer Reason for Exam: lesions seen on head CT - known small cell cnacer FINDINGS: INTRACRANIAL STRUCTURES/VENTRICLES:  There is no acute infarct. No mass effect or midline shift. No evidence of an acute intracranial hemorrhage. The ventricles and sulci are normal in size and configuration. The sellar/suprasellar regions appear unremarkable. The normal signal voids within the major intracranial vessels appear maintained. Multiple tiny enhancing nodules are identified within the periventricular and subcortical white matter of both cerebral hemispheres.   The most conspicuous lesion is identified within the left frontal operculum measuring 5 mm. Additional nodules identified within the left cerebral peduncle measuring 5 mm. Small metastasis is suspected within the left side of the vermis. ORBITS: The visualized portion of the orbits demonstrate no acute abnormality. SINUSES: The visualized paranasal sinuses and mastoid air cells demonstrate no acute abnormality. BONES/SOFT TISSUES: The bone marrow signal intensity appears normal. The soft tissues demonstrate no acute abnormality. Multiple new small enhancing nodules within the periventricular and subcortical white matter of both cerebral hemispheres that is suspicious for metastatic disease. The largest lesion is located within the left frontal operculum and within the left cerebral peduncle measuring 5 mm. Additional small suspected metastasis within the left side of the cerebellar vermis.        Problem List  Patient Active Problem List   Diagnosis    Pure hypercholesterolemia    Anxiety    Essential hypertension    Moderate persistent asthma with acute exacerbation    Metastatic cancer (HCC)    Hypokalemia    Acute respiratory failure with hypoxia (HCC)    Abnormal CT of the chest    COPD exacerbation (HCC)    Lung nodules    Tobacco abuse    Small cell lung cancer (HCC)    Ectopic ACTH syndrome (HCC)    Hyperglycemia    Syncope       ASSESSMENT AND PLAN    Relapsed small cell   Was to start third line topotecan after failure of 2 other lines of therapy   Topotecan day 3  Rad onc for brain xrt is on board  If he can manage sugar bp potassium without a lot of support he can go home -  I can give up to 40 iv kcl in th eoffice daily   -opted to not start dexamethasone given sugars and lack of major edema from brain mets

## 2022-07-20 NOTE — PROGRESS NOTES
[facility] Internal Medicine Note      Chief Complaint: I am swollen    Subjective/Interval History: This morning the patient is in bed complaining about continued swelling. His edema continues to be a significant problem. He is eating and drinking okay. He is anxious to go home but he realizes today that we need to get him under better control prior to discharge. Discussed with nursing, no new problems overnight. I's/O's not recorded. Weight is up today but this was a bed scale versus standing scale. No chest pain or shortness breath. No cough or sputum. No nausea, vomiting, diarrhea. No abdominal pain. No dysuria. The remainder of the review of systems is negative. PMH, PSH, FH/SH reviewed and unchanged as documented in the H&P dated 22    Medication list reviewed    Objective:    BP (!) 139/96   Pulse (!) 102   Temp 97.8 °F (36.6 °C) (Axillary)   Resp 21   Ht 6' 1\" (1.854 m)   Wt 248 lb 0.3 oz (112.5 kg)   SpO2 98%   BMI 32.72 kg/m²   Temp  Av.8 °F (36.6 °C)  Min: 97.3 °F (36.3 °C)  Max: 98.4 °F (36.9 °C)    RRR  Chest-the chest is clear throughout. No wheezes or rhonchi or crackles noted. Respirations are easy  Abd-BS+, soft, NTND  Ext- 3+ lower extremity edema noted. Edema seems worse today.     The Following Labs Were Reviewed Today:    Recent Results (from the past 24 hour(s))   POCT Glucose    Collection Time: 22 11:19 AM   Result Value Ref Range    POC Glucose 349 (H) 70 - 99 mg/dl    Performed on ACCU-CHEK    Potassium    Collection Time: 22 11:54 AM   Result Value Ref Range    Potassium 4.0 3.5 - 5.1 mmol/L   POCT Glucose    Collection Time: 22  4:40 PM   Result Value Ref Range    POC Glucose 326 (H) 70 - 99 mg/dl    Performed on ACCU-CHEK    POCT Glucose    Collection Time: 22  8:36 PM   Result Value Ref Range    POC Glucose 363 (H) 70 - 99 mg/dl    Performed on ACCU-CHEK    Potassium    Collection Time: 22 11:00 PM   Result Value Ref Range    Potassium 4.1 3.5 - 5.1 mmol/L   Basic Metabolic Panel    Collection Time: 07/20/22  4:08 AM   Result Value Ref Range    Sodium 136 136 - 145 mmol/L    Potassium 4.5 3.5 - 5.1 mmol/L    Chloride 95 (L) 99 - 110 mmol/L    CO2 36 (H) 21 - 32 mmol/L    Anion Gap 5 3 - 16    Glucose 251 (H) 70 - 99 mg/dL    BUN 20 7 - 20 mg/dL    CREATININE 0.8 (L) 0.9 - 1.3 mg/dL    GFR Non-African American >60 >60    GFR African American >60 >60    Calcium 8.0 (L) 8.3 - 10.6 mg/dL   CBC with Auto Differential    Collection Time: 07/20/22  4:08 AM   Result Value Ref Range    WBC 5.2 4.0 - 11.0 K/uL    RBC 2.38 (L) 4.20 - 5.90 M/uL    Hemoglobin 8.7 (L) 13.5 - 17.5 g/dL    Hematocrit 24.9 (L) 40.5 - 52.5 %    .7 (H) 80.0 - 100.0 fL    MCH 36.4 (H) 26.0 - 34.0 pg    MCHC 34.8 31.0 - 36.0 g/dL    RDW 15.8 (H) 12.4 - 15.4 %    Platelets 672 (L) 257 - 450 K/uL    MPV 8.0 5.0 - 10.5 fL    Neutrophils % 92.3 %    Lymphocytes % 4.8 %    Monocytes % 2.3 %    Eosinophils % 0.0 %    Basophils % 0.6 %    Neutrophils Absolute 4.8 1.7 - 7.7 K/uL    Lymphocytes Absolute 0.3 (L) 1.0 - 5.1 K/uL    Monocytes Absolute 0.1 0.0 - 1.3 K/uL    Eosinophils Absolute 0.0 0.0 - 0.6 K/uL    Basophils Absolute 0.0 0.0 - 0.2 K/uL   Magnesium    Collection Time: 07/20/22  4:08 AM   Result Value Ref Range    Magnesium 2.00 1.80 - 2.40 mg/dL   POCT Glucose    Collection Time: 07/20/22  6:35 AM   Result Value Ref Range    POC Glucose 320 (H) 70 - 99 mg/dl    Performed on ACCU-CHEK        ASSESSMENT/PLAN:      Principal Problem:    Small cell lung cancer-now with brain mets. Radiation oncology to begin radiation and Dr. Gabriella Beal has proceeded with chemotherapy. Active Problems:    Hypokalemia-\magnesium is okay today. Potassium is improving. Reduce to 40 mill equivalents once daily. Hyperglycemia-continue adjusting insulin. Lantus dose increased again this morning. Ectopic ACTH syndrome -as noted above.   Patient on Aldactone and Lasix and nifedipine to help manage blood pressure and fluid retention. Lasix increased to 40 mg twice daily, may need to transition to IV Lasix. Disposition-I do not think we are ready for discharge given his increasing edema and continued hyperglycemia. Potassium is under better control. Continue to monitor daily.       Curt Sanchez MD, FACP  10:48 AM  7/20/2022

## 2022-07-20 NOTE — PROGRESS NOTES
Patient's bed was malfunctioning, the alarm was sounding and staff was unable to turn off alarm unless the bed was unplugged from the wall. New bed brought to room. Patient agreeable to trade out beds at this time. Patient assisted to chair with use of stedy. Patient tolerated activity well. New bed functioning properly. Bed alarm on. Bed locked in lowest position. Patient denies further needs at this time. Call light remains within reach.

## 2022-07-21 PROBLEM — I47.29 NSVT (NONSUSTAINED VENTRICULAR TACHYCARDIA): Status: ACTIVE | Noted: 2022-01-01

## 2022-07-21 PROBLEM — E87.70 HYPERVOLEMIA: Status: ACTIVE | Noted: 2022-01-01

## 2022-07-21 NOTE — PROGRESS NOTES
Message sent to MD Kristi Gooden to ensure patient is okay to receive dose of Topotecan today despite intermittent runs of vtach overnight. MD states that dose should be given despite cardiac irregularities. No further orders at this time. Will continue to monitor and assess. The following procedure was ordered per verbal order of Dr.Omar Pratt.  Procedure: Knee Scope, AID ANT Cruciate Repair-[id number]   Autologus  Left  Surgery scheduling requirements include:  Faciltiy: [facility]  Admission Type:  Day surgery  Time Needed:60 min hours  Anesthesia: General  Pre-op Medication: Ancef 2 Grams IV in route to OR  Pre-Op visit: Yes, with pt's PMD  Surgical Assist: na  Special Equipment: Equipment per  preference card  PRE-OP REHAB needed?  No  POST-OP rehab needed?  Yes  Rehab Type: Physical Therapy 2 days post-op (ACL)  [facility]: No   Consent: at facility    June 4th 2019

## 2022-07-21 NOTE — PROGRESS NOTES
[facility]  Progress Note      SUBJECTIVE:   Pt denies nv  He is more with and is speaking better  On high support for electrolytes etc  Day 4 topotecan  Noted runs of vtach -  Given wide spread cancer on third line anything invasive to explore would not be clinically appropriate -  OBJECTIVE      Medications    Current Facility-Administered Medications: furosemide (LASIX) injection 40 mg, 40 mg, IntraVENous, BID  potassium chloride (KLOR-CON) extended release tablet 40 mEq, 40 mEq, Oral, BID  metoprolol tartrate (LOPRESSOR) tablet 25 mg, 25 mg, Oral, BID  insulin glargine (LANTUS) injection vial 30 Units, 30 Units, SubCUTAneous, QAM AC  ondansetron (ZOFRAN) 16 mg in dextrose 5 % 50 mL IVPB, 16 mg, IntraVENous, Daily  topotecan (HYCAMTIN) 3.6 mg in dextrose 5 % 50 mL chemo IVPB, 1.5 mg/m2, IntraVENous, Daily  insulin glargine (LANTUS) injection vial 40 Units, 40 Units, SubCUTAneous, Nightly  insulin lispro (HUMALOG) injection vial 0-18 Units, 0-18 Units, SubCUTAneous, TID WC  insulin lispro (HUMALOG) injection vial 0-9 Units, 0-9 Units, SubCUTAneous, Nightly  aspirin EC tablet 81 mg, 81 mg, Oral, Daily  atorvastatin (LIPITOR) tablet 40 mg, 40 mg, Oral, Daily  albuterol sulfate HFA (PROVENTIL;VENTOLIN;PROAIR) 108 (90 Base) MCG/ACT inhaler 2 puff, 2 puff, Inhalation, Q4H PRN  mometasone-formoterol (DULERA) 200-5 MCG/ACT inhaler 2 puff, 2 puff, Inhalation, BID  NIFEdipine (PROCARDIA XL) extended release tablet 30 mg, 30 mg, Oral, BID  spironolactone (ALDACTONE) tablet 100 mg, 100 mg, Oral, BID  sodium chloride flush 0.9 % injection 5-40 mL, 5-40 mL, IntraVENous, 2 times per day  sodium chloride flush 0.9 % injection 5-40 mL, 5-40 mL, IntraVENous, PRN  0.9 % sodium chloride infusion, , IntraVENous, PRN  ondansetron (ZOFRAN-ODT) disintegrating tablet 4 mg, 4 mg, Oral, Q8H PRN **OR** ondansetron (ZOFRAN) injection 4 mg, 4 mg, IntraVENous, Q6H PRN  polyethylene glycol (GLYCOLAX) packet 17 g, 17 g, Oral, Daily PRN  acetaminophen (TYLENOL) tablet 650 mg, 650 mg, Oral, Q6H PRN **OR** acetaminophen (TYLENOL) suppository 650 mg, 650 mg, Rectal, Q6H PRN  enoxaparin Sodium (LOVENOX) injection 30 mg, 30 mg, SubCUTAneous, BID  glucose chewable tablet 16 g, 4 tablet, Oral, PRN  dextrose bolus 10% 125 mL, 125 mL, IntraVENous, PRN **OR** dextrose bolus 10% 250 mL, 250 mL, IntraVENous, PRN  glucagon (rDNA) injection 1 mg, 1 mg, IntraMUSCular, PRN  dextrose 5 % solution, 100 mL/hr, IntraVENous, PRN  Physical    VITALS:  BP (!) 144/73   Pulse 92   Temp 98.2 °F (36.8 °C) (Oral)   Resp 18   Ht 6' 1\" (1.854 m)   Wt 235 lb 0.2 oz (106.6 kg)   SpO2 99%   BMI 30.84 kg/m²   TEMPERATURE:  Current - Temp: 98.2 °F (36.8 °C); Max - Temp  Av.9 °F (36.6 °C)  Min: 97.5 °F (36.4 °C)  Max: 98.6 °F (37 °C)  PULSE OXIMETRY RANGE: SpO2  Av %  Min: 90 %  Max: 99 %  24HR INTAKE/OUTPUT:    Intake/Output Summary (Last 24 hours) at 2022 1214  Last data filed at 2022 1211  Gross per 24 hour   Intake 2575 ml   Output 1950 ml   Net 625 ml       CONSTITUTIONAL:  awake, alert, cooperative, no apparent distress, HEENT oral pharynx , no scleral icterus  HEMATOLOGIC/LYMPHATICS:  no cervical lymphadenopathy, no supraclavicular lymphadenopathy, n  LUNGS:  HE has no severe crackles   CARDIOVASCULAR:  , regular rate and rhythm, normal S1 and S2, no S3 or S4, and no murmur noted  ABDOMEN:  No scars, normal bowel sounds, soft, non-distended, non-tender, no masses palpated, no hepatosplenomegally  MUSCULOSKELETAL:  There is no redness, warmth, or swelling of the joints.   EXTREMETIES: No clubbing cynosis or edema  NEUROLOGIC:  Awake, alert,seems a little off   SKIN:  no bruising or bleeding      Data      Recent Labs     07/20/22  0408   WBC 5.2   HGB 8.7*   HCT 24.9*   *   .7*        Recent Labs     22  0408 22  2115 22  0430 22  0843     --   --  137   K 4.5 3.9 3.5 3.8   CL 95*  --   -- 7/18/2022 4:37 am COMPARISON: Chest radiograph 07/15/2022 at 11:47 p.m.; chest, abdomen, and pelvis CT 07/08/2022 HISTORY: ORDERING SYSTEM PROVIDED HISTORY: hypokalemia TECHNOLOGIST PROVIDED HISTORY: Reason for exam:->hypokalemia Reason for Exam: hypokalemia FINDINGS: Decreased lung volumes. Persistent linear opacities in each lung base. Suspected new right basilar airspace opacity with possible overlying trace right pleural effusion. Increased diffuse interstitial prominence with indistinct pulmonary vasculature no definite interlobular septal thickening. No definite findings of pneumothorax or left pleural effusion. Normal mediastinal and cardiac contours. 1. Suspected new right basilar airspace opacity potentially due in part to passive atelectasis given possible overlying trace right pleural effusion. Superimposed pneumonia and aspiration are not excluded. 2. Persistent bibasilar atelectasis and/or scarring. 3. Increased interstitial prominence due to pulmonary vascular congestion and/or central vascular crowding. XR CHEST PORTABLE    Result Date: 7/16/2022  EXAMINATION: ONE XRAY VIEW OF THE CHEST 7/15/2022 11:48 pm COMPARISON: 07/15/2022 HISTORY: ORDERING SYSTEM PROVIDED HISTORY: lung cancer TECHNOLOGIST PROVIDED HISTORY: Reason for exam:->lung cancer Reason for Exam: lung cancer FINDINGS: The lungs are without acute focal process. There is no effusion or pneumothorax. The cardiomediastinal silhouette is without acute process. The osseous structures are without acute process. No acute process. XR CHEST PORTABLE    Result Date: 7/15/2022  EXAMINATION: ONE XRAY VIEW OF THE CHEST 7/15/2022 7:22 pm COMPARISON: 07/08/2022, 09/13/2021 HISTORY: ORDERING SYSTEM PROVIDED HISTORY: other TECHNOLOGIST PROVIDED HISTORY: Reason for exam:->other Reason for Exam: abnormal labs FINDINGS: The lungs are without acute focal process. There is no effusion or pneumothorax.  The cardiomediastinal silhouette is without acute process. The osseous structures are without acute process. No acute process. NM BONE SCAN WHOLE BODY    Result Date: 7/8/2022  EXAMINATION: WHOLE BODY BONE SCAN  7/8/2022 TECHNIQUE: The patient was injected intravenously with 27.5 mCi of 99 mTc MDP and scintigraphy of the entire skeleton was performed approximately three hours later. COMPARISON: 03/18/2022 HISTORY: ORDERING SYSTEM PROVIDED HISTORY: Malignant neoplasm of right main bronchus [facility]) TECHNOLOGIST PROVIDED HISTORY: Order: Bone scan, total body Order Instructions: [facility] along with CT please lower back and hip pain Associated problems: Small cell carcinoma of lung (disorder) * (C34.01) Reason for Exam: staging prostate Ca Relevant Medical/Surgical History: ct today FINDINGS: New focal activity is demonstrated along the superior aspect of the right iliac crest.  Developing activity is seen at the lateral right 11th rib and posterior right 5th rib. Lesions are seen which are more conspicuous within the proximal right femur, anterior right iliac bone, medial left iliac bone, sacrum, lumbar spine, upper thoracic spine, proximal left humerus. Activity at the shoulders, sternoclavicular joints, hips, knees, ankles, feet, likely degenerative. Physiologic activity is seen within the kidneys and urinary bladder. Urinary contamination is seen between the upper thighs. Several new metastatic lesions are seen as compared to prior exam dated 03/18/2022. Additional lesions are more intense as compared to prior which could relate to flare effect if there has been intervening therapy. CT CHEST ABDOMEN PELVIS W CONTRAST    Result Date: 7/8/2022  EXAMINATION: CT OF THE CHEST, ABDOMEN, AND PELVIS WITH CONTRAST 7/8/2022 8:35 am TECHNIQUE: CT of the chest, abdomen and pelvis was performed with the administration of intravenous contrast. Multiplanar reformatted images are provided for review.  Automated exposure control, iterative reconstruction, and/or weight based adjustment of the mA/kV was utilized to reduce the radiation dose to as low as reasonably achievable. COMPARISON: 03/18/2022 HISTORY: ORDERING SYSTEM PROVIDED HISTORY: Malignant neoplasm of right main bronchus [facility]) TECHNOLOGIST PROVIDED HISTORY: Order: CT chest/abdomen/pelvis w/ contrast Order Instructions: STAT Creatinine & BUN prior to CT Scan per facility protocol [facility] Associated problems: Small cell carcinoma of lung (disorder) * (C34.01) Additional Contrast?->Oral STAT Creatinine as needed:->Yes Reason for Exam: Malignant neoplasm of right main bronchus FINDINGS: Chest: Mediastinum: The central airways are patent. There has been interval enlargement of several mediastinal lymph nodes. Index prevascular node measures 1.3 cm on the current exam compared with 9 mm prior. Right paratracheal node measures 1.5 cm on the current exam compared with 1.4 cm prior. Precarinal lymph node measures 1.4 cm on the current exam compared with 0.6 cm prior. AP window node measures 12 mm currently compared with 5 mm prior. There is a new 1.7 cm lymph node within the right infrahilar region. Lungs/pleura: Mild emphysematous changes are present. A few tiny non calcified right-sided pulmonary nodules are unchanged in size, number and appearance. The pleural spaces are clear. Soft Tissues/Bones: Numerous sclerotic lesions scattered throughout the thoracic spine and ribs are stable in size, number and appearance. Abdomen/Pelvis: Organs: There has been interval increase in size and number of numerous hypodense lesions scattered throughout the liver. Index lesion within the left hepatic lobe measures 3.2 cm on the current exam compared with 2.4 cm prior. Index lesion within the caudate lobe measures 2.4 cm compared with 1.8 cm prior.   There has been slight interval enlargement of the left adrenal nodule which measures 1.6 cm on the current exam.  A few tiny bilateral nonobstructing intrarenal calculi are unchanged. The remainder of the GI/Bowel: There is no bowel dilatation, wall thickening or obstruction. Pelvis: The bladder and prostate are unremarkable. Peritoneum/Retroperitoneum: There is no free air or free fluid. There has been interval development of a 2 cm lymph node within the posterior gastrohepatic ligament. Bones/Soft Tissues: This numerous small osteoblastic lesions scattered throughout the lumbosacral spine and pelvis are unchanged in size, number and appearance. 1. Mild interval progression of right hilar/mediastinal favian metastatic disease. 2. Mild interval progression of hepatic and left adrenal metastatic disease. 3. Developing upper abdominal favian metastatic disease. 4. Stable disseminated osteoblastic disease. MRI BRAIN W WO CONTRAST    Result Date: 7/18/2022  EXAMINATION: MRI OF THE BRAIN WITHOUT AND WITH CONTRAST  7/18/2022 9:56 am TECHNIQUE: Multiplanar multisequence MRI of the head/brain was performed without and with the administration of intravenous contrast. COMPARISON: MRI Brain 9/11/2021 and CT head 7/17/2022 HISTORY: ORDERING SYSTEM PROVIDED HISTORY: lesions seen on haed ct--known smll cell cancer TECHNOLOGIST PROVIDED HISTORY: Reason for exam:->lesions seen on head CT--known smll cell cancer Reason for Exam: lesions seen on head CT - known small cell cnacer FINDINGS: INTRACRANIAL STRUCTURES/VENTRICLES:  There is no acute infarct. No mass effect or midline shift. No evidence of an acute intracranial hemorrhage. The ventricles and sulci are normal in size and configuration. The sellar/suprasellar regions appear unremarkable. The normal signal voids within the major intracranial vessels appear maintained. Multiple tiny enhancing nodules are identified within the periventricular and subcortical white matter of both cerebral hemispheres. The most conspicuous lesion is identified within the left frontal operculum measuring 5 mm.  Additional nodules identified within the left cerebral peduncle measuring 5 mm. Small metastasis is suspected within the left side of the vermis. ORBITS: The visualized portion of the orbits demonstrate no acute abnormality. SINUSES: The visualized paranasal sinuses and mastoid air cells demonstrate no acute abnormality. BONES/SOFT TISSUES: The bone marrow signal intensity appears normal. The soft tissues demonstrate no acute abnormality. Multiple new small enhancing nodules within the periventricular and subcortical white matter of both cerebral hemispheres that is suspicious for metastatic disease. The largest lesion is located within the left frontal operculum and within the left cerebral peduncle measuring 5 mm. Additional small suspected metastasis within the left side of the cerebellar vermis.        Problem List  Patient Active Problem List   Diagnosis    Pure hypercholesterolemia    Anxiety    Essential hypertension    Moderate persistent asthma with acute exacerbation    Metastatic cancer (HCC)    Hypokalemia    Acute respiratory failure with hypoxia (HCC)    Abnormal CT of the chest    COPD exacerbation (HCC)    Lung nodules    Tobacco abuse    Small cell lung cancer (HCC)    Ectopic ACTH syndrome (HCC)    Hyperglycemia    Syncope    NSVT (nonsustained ventricular tachycardia) (HCC)    Hypervolemia       ASSESSMENT AND PLAN    Relapsed small cell   Was to start third line topotecan after failure of 2 other lines of therapy   Topotecan day 4  Rad onc for brain xrt is on board  Had some runs of v tach-correction of electrolytes is done  I would not recommend any invasive cardiac testing given his widespread cancer /third line tx for small cell his prognosis is too poor for anything interventional -med mgmt /electrolyte support is most appropriate  PTs prognosis is extremely guarded   Iast day of chemo is tomorrow   PT desires full code-I will try to continue to discuss this with him as I do not recommend full code in his setting   Check lft tomorrow   He is high risk for immunotuppression transfusions

## 2022-07-21 NOTE — PROGRESS NOTES
Patient sleeping in bed this morning, respirations even and unlabored. Vitals obtained per unit routine. O2 85% on RA. Patient placed on 4L of O2 via NC to maintain oxygen saturation > 90%. Patient denies SOB/ difficulty breathing. Patient denies further needs at this time. Will continue to monitor and assess.

## 2022-07-21 NOTE — PLAN OF CARE
Problem: Discharge Planning  Goal: Discharge to home or other facility with appropriate resources  7/21/2022 1114 by Saeid Boyd RN  Outcome: Progressing  Flowsheets (Taken 7/21/2022 1114)  Discharge to home or other facility with appropriate resources:   Identify barriers to discharge with patient and caregiver   Arrange for needed discharge resources and transportation as appropriate  Note: No discharge order in place at this time, will continue to update w/ plan of care   7/20/2022 2305 by Kelechi Falcon RN  Outcome: Progressing     Problem: Pain  Goal: Verbalizes/displays adequate comfort level or baseline comfort level  7/21/2022 1114 by Saeid Boyd RN  Outcome: Progressing  Flowsheets (Taken 7/21/2022 1114)  Verbalizes/displays adequate comfort level or baseline comfort level:   Encourage patient to monitor pain and request assistance   Assess pain using appropriate pain scale  7/20/2022 2305 by Kelechi Falcon RN  Outcome: Progressing     Problem: Safety - Adult  Goal: Free from fall injury  7/21/2022 1114 by Saeid Boyd RN  Outcome: Progressing  Flowsheets (Taken 7/21/2022 1114)  Free From Fall Injury: Instruct family/caregiver on patient safety  Note: Patient remains free from falls during this shift. Fall precautions remain in place. Patient educated on the need to implement call light use prior to ambulation. Will continue to monitor and assess. 7/20/2022 2305 by Kelechi Falcon RN  Outcome: Progressing     Problem: ABCDS Injury Assessment  Goal: Absence of physical injury  7/21/2022 1114 by Saeid Boyd RN  Outcome: Progressing  Flowsheets (Taken 7/21/2022 1114)  Absence of Physical Injury: Implement safety measures based on patient assessment  Note: Patient remains free from physical harm during this shift. Will continue to monitor and assess patient.      7/20/2022 2305 by Kelechi Falcon RN  Outcome: Progressing     Problem: Skin/Tissue Integrity  Goal: Absence of new skin breakdown  Description: 1. Monitor for areas of redness and/or skin breakdown  2. Assess vascular access sites hourly  3. Every 4-6 hours minimum:  Change oxygen saturation probe site  4. Every 4-6 hours:  If on nasal continuous positive airway pressure, respiratory therapy assess nares and determine need for appliance change or resting period. 7/21/2022 1114 by Inocencia Smith RN  Outcome: Progressing  Note: Patient skin condition and mucus membrane integrity remain unchanged during this shift. Skin breakdown prevention interventions are in place. Will continue to monitor and assess.      7/20/2022 2305 by Rudy Don RN  Outcome: Progressing

## 2022-07-21 NOTE — PROGRESS NOTES
This RN called [facility] to confirm radiation appointment. Per Cathie at [facility] patient is not scheduled to receive radiation today, but is scheduled for tomorrow at 1400. This RN to update patient regarding plan of care.

## 2022-07-21 NOTE — CONSULTS
Cardiac Electrophysiology Consultation     Date: 7/21/2022  Admit Date:  7/15/2022  Admission Diagnosis: Hypokalemia [E87.6]  Small cell lung cancer ([facility] [C34.90]     Reason for Consultation: wide complex tachycardia  Consult Requesting Physician: Francine Valencia, *       History of Present Illness  Beena Tovar is a 45y.o. year old male with past medical history significant for stage IV small cell lung CA, HTN, HLD, asthma and anxiety who presented to the ED on 7/15/22 with abnormal labs. Noted to be hypokalemic with a K of 2.6 along with hyperglycemia. Potassium has been corrected, 3.8 this morning. He has been noted to have runs of NSVT, tends to be quite often at times with runs as long as 9 beats with one to two sinus beats in between. Pt denies any symptoms of palpitations, chest pain or dyspnea. He has somewhat chronic LE edema and has been getting diuresed. No syncope. EF normal on echocardiogram this admission, no past ischemia eval but troponin has been WNL. Past Medical History:   Diagnosis Date    Anxiety     Asthma     Hyperlipidemia     Hypertension     Small cell lung cancer ([facility] 09/2021    Metastatic with ectopic ACTH production        Past Surgical History:   Procedure Laterality Date    CT NEEDLE BIOPSY LIVER PERCUTANEOUS  9/8/2021    CT NEEDLE BIOPSY LIVER PERCUTANEOUS 9/8/2021 [facility] CT       Current Outpatient Medications   Medication Instructions    albuterol sulfate  (90 Base) MCG/ACT inhaler 2 puffs, Inhalation, EVERY 4 HOURS PRN    aspirin 81 mg, DAILY    atorvastatin (LIPITOR) 40 MG tablet TAKE ONE TABLET BY MOUTH DAILY    blood glucose monitor kit and supplies Test blood sugars two times a day and as needed. blood glucose monitor strips Test blood sugar two times a day and as needed.     Blood Pressure KIT Take BP 3x weekly and PRN    budesonide-formoterol (SYMBICORT) 160-4.5 MCG/ACT AERO 2 puffs, Inhalation, 2 TIMES DAILY    furosemide (LASIX) 40 mg, Oral, Daily    topotecan (HYCAMTIN) chemo IVPB  1.5 mg/m2 IntraVENous Daily    insulin glargine  40 Units SubCUTAneous Nightly    insulin lispro  0-18 Units SubCUTAneous TID WC    insulin lispro  0-9 Units SubCUTAneous Nightly    aspirin  81 mg Oral Daily    atorvastatin  40 mg Oral Daily    mometasone-formoterol  2 puff Inhalation BID    NIFEdipine  30 mg Oral BID    spironolactone  100 mg Oral BID    sodium chloride flush  5-40 mL IntraVENous 2 times per day    enoxaparin  30 mg SubCUTAneous BID      Continuous Infusions:   sodium chloride 25 mL (22 1124)    dextrose       PRN Meds:.albuterol sulfate HFA, sodium chloride flush, sodium chloride, ondansetron **OR** ondansetron, polyethylene glycol, acetaminophen **OR** acetaminophen, glucose, dextrose bolus **OR** dextrose bolus, glucagon (rDNA), dextrose     Physical Examination:  Vitals:    22 1045   BP: (!) 144/73   Pulse: 92   Resp:    Temp:    SpO2: 99%        Intake/Output Summary (Last 24 hours) at 2022 1127  Last data filed at 2022 1029  Gross per 24 hour   Intake 2095 ml   Output 1650 ml   Net 445 ml     In: 0311 [P.O.:2475; I.V.:20]  Out: 1650    Wt Readings from Last 3 Encounters:   22 235 lb 0.2 oz (106.6 kg)   22 248 lb (112.5 kg)   07/15/22 235 lb (106.6 kg)     Temp  Av.9 °F (36.6 °C)  Min: 97.5 °F (36.4 °C)  Max: 98.6 °F (37 °C)  Pulse  Av  Min: 83  Max: 102  BP  Min: 116/67  Max: 144/73  SpO2  Av.2 %  Min: 90 %  Max: 99 %    Telemetry: Sinus rhythm in the 90s. Constitutional: Alert, in no acute distress. Appears stated age. Head: Normocephalic and atraumatic. Eyes: Conjunctivae normal. EOM are normal.   Neck: Neck supple. No lymphadenopathy. No rigidity. No JVD present. Cardiovascular: Normal rate, regular rhythm. No murmurs, rubs or gallops. No S3 or S4.  Pulmonary/Chest: Clear breath sounds bilaterally. No crackles, wheezes or rhonchi. No respiratory accessory muscle use. Abdominal: Soft.  Normal bowel sounds present. No distension, No tenderness. Musculoskeletal: No tenderness. 3+ BLE edema    Lymphadenopathy: Has no cervical adenopathy. Neurological: Alert and oriented. No gross deficits. Skin: Skin is warm and dry. No rash, lesions, ulcerations noted. Psychiatric: No anxiety nor agitation. Labs:  Reviewed. Recent Labs     22  0408 22  2115 22  0430 22  0843     --   --  137   K 4.5 3.9 3.5 3.8   CL 95*  --   --  94*   CO2 36*  --   --  33*   BUN 20  --   --  20   CREATININE 0.8*  --   --  0.7*     Recent Labs     22  0408   WBC 5.2   HGB 8.7*   HCT 24.9*   .7*   *     Lab Results   Component Value Date/Time    TROPONINI <0.01 2022 05:43 PM     No results found for: BNP  Lab Results   Component Value Date/Time    PROTIME 11.5 2021 08:11 AM    PROTIME 11.7 2021 04:18 AM    INR 1.02 2021 08:11 AM    INR 1.03 2021 04:18 AM     Lab Results   Component Value Date/Time    CHOL 317 2022 12:02 PM    HDL 54 2022 12:02 PM    HDL 38 2011 10:11 AM    TRIG 196 2022 12:02 PM       Diagnostic and imaging results reviewed. EC22  SR at 76 BPM.    Echo: 22   Normal LV size and wall motion. EF is   60%. Normal diastolic function. Left atrium is of normal size. The right ventricle is normal in size and function.       Assessment & Plan:    NSVT   - recurrent, longest run was 9 beats over the last 24 hours but with one or two sinus beats in between with several runs at a time   - denies any symptoms during these times   - could be exacerbated by fluid overload so agree with diuresis   - EF 60%   - no recent ischemic work up, ruled out for AMI this admission   - would keep K >4 and Mg >2   - add low dose metoprolol which can be uptitrated if needed    Hypervolemia    - EF 60%   - agree with diuresis as per primary    Small cell lung CA   - with brain and bone mets   - oncology following      Discussed with Dr. Jona Peacock.     NINFA Zarate  [facility], [address] [place] [address], [id number] [place] [address]  Phone: [phone]  Fax: [phone]    Electronically signed by NINFA Hanks - CNP on 7/21/2022 at 11:27 AM

## 2022-07-21 NOTE — PROGRESS NOTES
Patient noted to have several runs of Vtach overnight. STAT labs ordered by MD Abiodun Paredes. Labs collected via L arm PICC by this RN and sent to lab w/o complication. New standing weight obtained per nursing communication order - patient's weight 106.6 kg. New weight entered into chart. Scheduled morning medications administered per orders. See eMAR for documentation. Patient tolerated PO administration whole w/ water w/o complication. Head to toe assessment completed and charted - see flowsheets for documentation. Patient noted to have expiratory wheezes bilaterally to his lungs. +3 pitting edema to the RLE, +2 pitting edema to the LLE, +1 non-pitting edema present to the BUE. Patient's RUE noted to have mild tremors when patient extends the extremity. Patient remains on 4L of O2 via NC to maintain oxygen saturation > 90%. Patient denies physical/emotional needs at this time. Call light, telephone, and bed side table are within reach. Fall precautions in place. Will continue to monitor and assess.

## 2022-07-21 NOTE — PROGRESS NOTES
Patient requesting to complete POA paperwork on Sunday if he remains hospitalized at this time as he would like his sister to be named his POA and she is unable to come to the hospital until Sunday. Spiritual Care consult placed to discuss advanced directives per protocol.

## 2022-07-21 NOTE — PROGRESS NOTES
Patient continues to rest in bed this shift, no acute events or needs at this time. Vital signs remain stable, bed alarm on.

## 2022-07-21 NOTE — PROGRESS NOTES
Pre-medication administration complete. Patient tolerated well, no complications noted. Vitals obtained, noted to be stable at this time. Blood return noted to L arm PICC. Topotecan administration started to L arm PICC at 100 mL/hour. Administration verified w/ second chemotherapy certified RN Janice Nicole. Patient denies questions at this time regarding medication/ effects. This RN to stay w/ patient for first 15 minutes of infusion to ensure no adverse effects/complications occur. Vitals to be checked every 15 minutes during the chemotherapy infusion.

## 2022-07-21 NOTE — PROGRESS NOTES
Premedication Zofran started via L arm PICC. Patient gave verbal consent to receive scheduled Topotecan this afternoon. Signed chemotherapy consent noted in chart from earlier this week for chemo administration. Patient reports having all information regarding chemo and denies questions at this time. Patient denies physical/emotional needs at this time. Call light, telephone, and bed side table are within reach. Fall precautions in place. Will continue to monitor and assess.

## 2022-07-21 NOTE — PROGRESS NOTES
Patient resting in bed upon assessment, A/Ox4 and has no complaints, vital signs stable. Scheduled medications given, potassium draw done by this RN - lab is 3.9. Patient now in bed with eyes closed, PICC to SANDIE saline locked, tele on and NSR, bed alarm on, will continue to monitor.

## 2022-07-21 NOTE — PLAN OF CARE
Problem: Discharge Planning  Goal: Discharge to home or other facility with appropriate resources  7/20/2022 2305 by Jalyn Little RN  Outcome: Progressing  7/20/2022 0921 by Drake Dick RN  Outcome: Adrienne Hayes (Taken 7/19/2022 2348 by Ted Mix, RN)  Discharge to home or other facility with appropriate resources:   Identify barriers to discharge with patient and caregiver   Identify discharge learning needs (meds, wound care, etc)     Problem: Pain  Goal: Verbalizes/displays adequate comfort level or baseline comfort level  7/20/2022 2305 by Jalyn Little RN  Outcome: Progressing  7/20/2022 0921 by Drake Dick RN  Outcome: Progressing  Flowsheets (Taken 7/19/2022 2348 by Ted Mix, RN)  Verbalizes/displays adequate comfort level or baseline comfort level:   Encourage patient to monitor pain and request assistance   Administer analgesics based on type and severity of pain and evaluate response   Assess pain using appropriate pain scale     Problem: Safety - Adult  Goal: Free from fall injury  7/20/2022 2305 by Jalyn Little RN  Outcome: Progressing  7/20/2022 0921 by Drake Dick RN  Outcome: Progressing  [address] (Taken 7/19/2022 2348 by Ted Mix, RN)  Free From Fall Injury: Instruct family/caregiver on patient safety     Problem: ABCDS Injury Assessment  Goal: Absence of physical injury  7/20/2022 2305 by Jalyn Little RN  Outcome: Progressing  7/20/2022 0921 by Drake Dick RN  Outcome: Progressing  Flowsheets (Taken 7/19/2022 2348 by Ted Mix, RN)  Absence of Physical Injury: Implement safety measures based on patient assessment     Problem: Skin/Tissue Integrity  Goal: Absence of new skin breakdown  Description: 1. Monitor for areas of redness and/or skin breakdown  2. Assess vascular access sites hourly  3. Every 4-6 hours minimum:  Change oxygen saturation probe site  4.   Every 4-6 hours:  If on nasal continuous positive airway pressure, respiratory therapy assess nares and determine need for appliance change or resting period. 7/20/2022 2305 by Eleanor Shelton RN  Outcome: Progressing  7/20/2022 0921 by Carlos Booth RN  Outcome: Progressing  Note: Chad score assessed. Patient able to ambulate and turn self and repositioned patient Q2H and assessed skin. Educated patient on importance of repositioning to prevent skin issues.

## 2022-07-21 NOTE — PROGRESS NOTES
Topotecan administration complete. Patient tolerated w/o complication. Vitals obtained, noted to be stable. Blood return noted to L arm PICC. Patient denies needs at this time. Will continue to monitor and assess.

## 2022-07-21 NOTE — PROGRESS NOTES
Patient reports that he has radiation today at 1400, states he would like to wait until after radiation to receive his chemotherapy. This RN agreeable. Infusion deferred until after patient receives radiation this afternoon. Patient denies physical/emotional needs at this time. Call light, telephone, and bed side table are within reach. Fall precautions in place. Will continue to monitor and assess.

## 2022-07-21 NOTE — PROGRESS NOTES
[facility] Internal Medicine Note      Chief Complaint: I am swollen    Subjective/Interval History: This morning the patient is sitting on the side of the bed. He actually looks better than he has earlier this week. States he is eating drinking okay. Denies any problems overnight. Telemetry with wide-complex tachycardia overnight, some nonsustained V. tach it appears. No significant change with twice daily oral Lasix. I's/O's not well recorded. Weight is stable today, bed scale again used. No chest pain or shortness breath. No cough or sputum. No nausea, vomiting, diarrhea. No abdominal pain. No dysuria. The remainder of the review of systems is negative. PMH, PSH, FH/SH reviewed and unchanged as documented in the H&P dated 22    Medication list reviewed    Objective:    /67   Pulse 89   Temp 98.2 °F (36.8 °C) (Oral)   Resp 18   Ht 6' 1\" (1.854 m)   Wt 248 lb 7.3 oz (112.7 kg)   SpO2 94%   BMI 32.60 kg/m²   Temp  Av.9 °F (36.6 °C)  Min: 97.5 °F (36.4 °C)  Max: 98.6 °F (37 °C)    RRR  Chest-the chest remains clear throughout. No wheezes or rhonchi or crackles noted. Respirations are easy  Abd-BS+, soft, NTND  Ext- 3+ lower extremity edema noted. Edema essentially unchanged today.     The Following Labs Were Reviewed Today:    Recent Results (from the past 24 hour(s))   POCT Glucose    Collection Time: 22 11:34 AM   Result Value Ref Range    POC Glucose 367 (H) 70 - 99 mg/dl    Performed on ACCU-CHEK    POCT Glucose    Collection Time: 22  4:13 PM   Result Value Ref Range    POC Glucose 389 (H) 70 - 99 mg/dl    Performed on ACCU-CHEK    POCT Glucose    Collection Time: 22  8:53 PM   Result Value Ref Range    POC Glucose 221 (H) 70 - 99 mg/dl    Performed on ACCU-CHEK    Potassium    Collection Time: 22  9:15 PM   Result Value Ref Range    Potassium 3.9 3.5 - 5.1 mmol/L   Potassium    Collection Time: 22  4:30 AM   Result Value Ref Range Potassium 3.5 3.5 - 5.1 mmol/L   POCT Glucose    Collection Time: 07/21/22  6:18 AM   Result Value Ref Range    POC Glucose 88 70 - 99 mg/dl    Performed on ACCU-CHEK        ASSESSMENT/PLAN:      Principal Problem:    Small cell lung cancer-now with brain mets. Radiation oncology to begin radiation and Dr. Bar Steel has proceeded with chemotherapy. Active Problems:    Wide-complex tachycardia-patient having episodes of ectopic beats and runs. Many of these appear to be narrow complex but this morning in particular had several runs of what appears to be nonsustained V. tach. Potassium needs additional supplementation and magnesium is pending for this morning. Consult cardiology. Fortunately ejection fraction was just performed and his EF was 60%. Hypokalemia-potassium 3.5. Repeat magnesium this morning. Increase potassium supplementation to 40 mEq twice daily given change to IV Lasix. Hyperglycemia-fasting glucose was 88 today. Continue to monitor. Ectopic ACTH syndrome -as noted above. Nifedipine controlling blood pressure. Patient on Aldactone and Lasix. Still with significant edema. Change Lasix to IV 40 mg twice daily. Disposition-patient does look better today. Once his edema is under little better control we can probably discharge. Discussed CODE STATUS with the patient today and he prefers to remain full code at this time.     Emmanuelle Barrientos MD, Shanice Navarro  8:35 AM  7/21/2022

## 2022-07-22 NOTE — PROGRESS NOTES
Patient A&O, resting in bed. C/o fatigue, but denies pain at this time. PICC dressing remains dry and intact with old drainage noted under transparent dressing. Both lumens patent. All medications taken without complaint. See MAR. Standard safety precautions in place. Will monitor.  Electronically signed by Giles Kim RN on 7/22/2022 at 5:44 PM    '

## 2022-07-22 NOTE — PLAN OF CARE
Problem: Discharge Planning  Goal: Discharge to home or other facility with appropriate resources  7/22/2022 1208 by Dmitri Barraza RN  Outcome: Progressing  Flowsheets (Taken 7/22/2022 1208)  Discharge to home or other facility with appropriate resources:   Identify barriers to discharge with patient and caregiver   Identify discharge learning needs (meds, wound care, etc)   Refer to discharge planning if patient needs post-hospital services based on physician order or complex needs related to functional status, cognitive ability or social support system   Arrange for needed discharge resources and transportation as appropriate  7/22/2022 0159 by Susan Mobley RN  Outcome: Progressing  Flowsheets (Taken 7/22/2022 0159)  Discharge to home or other facility with appropriate resources:   Identify barriers to discharge with patient and caregiver   Identify discharge learning needs (meds, wound care, etc)     Problem: Pain  Goal: Verbalizes/displays adequate comfort level or baseline comfort level  7/22/2022 1208 by Dmitri Barraza RN  Outcome: Progressing  Flowsheets (Taken 7/22/2022 1208)  Verbalizes/displays adequate comfort level or baseline comfort level:   Encourage patient to monitor pain and request assistance   Administer analgesics based on type and severity of pain and evaluate response   Consider cultural and social influences on pain and pain management   Assess pain using appropriate pain scale   Implement non-pharmacological measures as appropriate and evaluate response  7/22/2022 0159 by Susan Mobley RN  Outcome: Progressing  Flowsheets (Taken 7/22/2022 0159)  Verbalizes/displays adequate comfort level or baseline comfort level:   Encourage patient to monitor pain and request assistance   Assess pain using appropriate pain scale   Administer analgesics based on type and severity of pain and evaluate response   Implement non-pharmacological measures as appropriate and evaluate response     Problem: Safety - Adult  Goal: Free from fall injury  7/22/2022 1208 by Ania Souza RN  Outcome: Progressing  Flowsheets (Taken 7/22/2022 1208)  Free From Fall Injury: Instruct family/caregiver on patient safety  7/22/2022 0159 by Ernesto Linder RN  Outcome: Progressing  Flowsheets (Taken 7/22/2022 0159)  Free From Fall Injury: Instruct family/caregiver on patient safety     Problem: ABCDS Injury Assessment  Goal: Absence of physical injury  7/22/2022 1208 by Ania Souza RN  Outcome: Progressing  Flowsheets (Taken 7/22/2022 1208)  Absence of Physical Injury: Implement safety measures based on patient assessment  7/22/2022 0159 by Ernesto Linder RN  Outcome: Progressing  Flowsheets (Taken 7/22/2022 0159)  Absence of Physical Injury: Implement safety measures based on patient assessment     Problem: Skin/Tissue Integrity  Goal: Absence of new skin breakdown  Description: 1. Monitor for areas of redness and/or skin breakdown  2. Assess vascular access sites hourly  3. Every 4-6 hours minimum:  Change oxygen saturation probe site  4. Every 4-6 hours:  If on nasal continuous positive airway pressure, respiratory therapy assess nares and determine need for appliance change or resting period. 7/22/2022 1208 by Ania Souza RN  Outcome: Progressing  Note: Patient skin condition and mucus membrane integrity remain unchanged during this shift. Skin breakdown prevention interventions are in place. Will continue to monitor and assess.       7/22/2022 0159 by Ernesto Lnider RN  Outcome: Progressing     Problem: Neurosensory - Adult  Goal: Achieves maximal functionality and self care  7/22/2022 1208 by Ania Souza RN  Outcome: Progressing  Flowsheets (Taken 7/22/2022 1208)  Achieves maximal functionality and self care: Encourage and assist patient to increase activity and self care with guidance from physical therapy/occupational therapy  7/22/2022 0159 by Ernesto Linder RN  Outcome: Progressing  Flowsheets (Taken 7/22/2022 0159)  Achieves maximal functionality and self care:   Monitor swallowing and airway patency with patient fatigue and changes in neurological status   Encourage and assist patient to increase activity and self care with guidance from physical therapy/occupational therapy     Problem: Respiratory - Adult  Goal: Achieves optimal ventilation and oxygenation  7/22/2022 1208 by Luz Johnson RN  Outcome: Progressing  Flowsheets (Taken 7/22/2022 1208)  Achieves optimal ventilation and oxygenation:   Assess for changes in respiratory status   Position to facilitate oxygenation and minimize respiratory effort   Respiratory therapy support as indicated   Assess for changes in mentation and behavior   Oxygen supplementation based on oxygen saturation or arterial blood gases   Assess and instruct to report shortness of breath or any respiratory difficulty  7/22/2022 0159 by Kirsty Garcia RN  Outcome: Progressing  Flowsheets (Taken 7/22/2022 0159)  Achieves optimal ventilation and oxygenation:   Assess for changes in respiratory status   Assess for changes in mentation and behavior   Position to facilitate oxygenation and minimize respiratory effort   Oxygen supplementation based on oxygen saturation or arterial blood gases   Encourage broncho-pulmonary hygiene including cough, deep breathe, incentive spirometry   Assess and instruct to report shortness of breath or any respiratory difficulty   Respiratory therapy support as indicated     Problem: Cardiovascular - Adult  Goal: Absence of cardiac dysrhythmias or at baseline  7/22/2022 1208 by Luz Johnson RN  Outcome: Progressing  Flowsheets (Taken 7/22/2022 1208)  Absence of cardiac dysrhythmias or at baseline: Monitor cardiac rate and rhythm  7/22/2022 0159 by Kirsty Garcia RN  Outcome: Progressing  Flowsheets (Taken 7/22/2022 0159)  Absence of cardiac dysrhythmias or at baseline:   Monitor cardiac rate and rhythm   Assess for signs of decreased cardiac output   Administer antiarrhythmia medication and electrolyte replacement as ordered     Problem: Skin/Tissue Integrity - Adult  Goal: Skin integrity remains intact  7/22/2022 1208 by Giles Kim RN  Outcome: Progressing  Flowsheets (Taken 7/22/2022 1208)  Skin Integrity Remains Intact:   Monitor for areas of redness and/or skin breakdown   Assess vascular access sites hourly  7/22/2022 0159 by Manish Schilling RN  Outcome: Progressing  Flowsheets (Taken 7/22/2022 0159)  Skin Integrity Remains Intact: Monitor for areas of redness and/or skin breakdown     Problem: Musculoskeletal - Adult  Goal: Return mobility to safest level of function  7/22/2022 1208 by Giles Kim RN  Outcome: Progressing  Flowsheets (Taken 7/22/2022 1208)  Return Mobility to Safest Level of Function:   Assess patient stability and activity tolerance for standing, transferring and ambulating with or without assistive devices   Assist with transfers and ambulation using safe patient handling equipment as needed   Obtain physical therapy/occupational therapy consults as needed   Instruct patient/family in ordered activity level  7/22/2022 0159 by Manish Schilling RN  Outcome: Progressing  Flowsheets (Taken 7/22/2022 0159)  Return Mobility to Safest Level of Function:   Assess patient stability and activity tolerance for standing, transferring and ambulating with or without assistive devices   Assist with transfers and ambulation using safe patient handling equipment as needed     Problem: Infection - Adult  Goal: Absence of infection at discharge  7/22/2022 1208 by Giles Kim RN  Outcome: Progressing  Flowsheets (Taken 7/22/2022 1208)  Absence of infection at discharge:   Assess and monitor for signs and symptoms of infection   Monitor lab/diagnostic results   Monitor all insertion sites i.e., indwelling lines, tubes and drains   Administer medications as ordered   Instruct and encourage patient and family to use good hand hygiene technique  7/22/2022 0159 by Raphael Wilson RN  Outcome: Progressing  Flowsheets (Taken 7/22/2022 0159)  Absence of infection at discharge:   Assess and monitor for signs and symptoms of infection   Monitor lab/diagnostic results   Monitor all insertion sites i.e., indwelling lines, tubes and drains     Problem: Metabolic/Fluid and Electrolytes - Adult  Goal: Electrolytes maintained within normal limits  7/22/2022 1208 by Trev Avelar RN  Outcome: Progressing  Flowsheets (Taken 7/22/2022 1208)  Electrolytes maintained within normal limits:   Monitor labs and assess patient for signs and symptoms of electrolyte imbalances   Monitor response to electrolyte replacements, including repeat lab results as appropriate   Instruct patient on fluid and nutrition restrictions as appropriate  7/22/2022 0159 by Raphael Wilson RN  Outcome: Progressing  Flowsheets (Taken 7/22/2022 0159)  Electrolytes maintained within normal limits:   Monitor labs and assess patient for signs and symptoms of electrolyte imbalances   Administer electrolyte replacement as ordered   Monitor response to electrolyte replacements, including repeat lab results as appropriate

## 2022-07-22 NOTE — PROGRESS NOTES
[facility]  Oncology Hematology Care  Progress Note      SUBJECTIVE:   Pt to get last topotecan today   No new complaints   OBJECTIVE      Medications    Current Facility-Administered Medications: furosemide (LASIX) injection 40 mg, 40 mg, IntraVENous, BID  metoprolol tartrate (LOPRESSOR) tablet 25 mg, 25 mg, Oral, BID  potassium chloride (KLOR-CON) extended release tablet 40 mEq, 40 mEq, Oral, TID  insulin glargine (LANTUS) injection vial 30 Units, 30 Units, SubCUTAneous, QAM AC  ondansetron (ZOFRAN) 16 mg in dextrose 5 % 50 mL IVPB, 16 mg, IntraVENous, Daily  topotecan (HYCAMTIN) 3.6 mg in dextrose 5 % 50 mL chemo IVPB, 1.5 mg/m2, IntraVENous, Daily  insulin glargine (LANTUS) injection vial 40 Units, 40 Units, SubCUTAneous, Nightly  insulin lispro (HUMALOG) injection vial 0-18 Units, 0-18 Units, SubCUTAneous, TID WC  insulin lispro (HUMALOG) injection vial 0-9 Units, 0-9 Units, SubCUTAneous, Nightly  aspirin EC tablet 81 mg, 81 mg, Oral, Daily  atorvastatin (LIPITOR) tablet 40 mg, 40 mg, Oral, Daily  albuterol sulfate HFA (PROVENTIL;VENTOLIN;PROAIR) 108 (90 Base) MCG/ACT inhaler 2 puff, 2 puff, Inhalation, Q4H PRN  mometasone-formoterol (DULERA) 200-5 MCG/ACT inhaler 2 puff, 2 puff, Inhalation, BID  NIFEdipine (PROCARDIA XL) extended release tablet 30 mg, 30 mg, Oral, BID  spironolactone (ALDACTONE) tablet 100 mg, 100 mg, Oral, BID  sodium chloride flush 0.9 % injection 5-40 mL, 5-40 mL, IntraVENous, 2 times per day  sodium chloride flush 0.9 % injection 5-40 mL, 5-40 mL, IntraVENous, PRN  0.9 % sodium chloride infusion, , IntraVENous, PRN  ondansetron (ZOFRAN-ODT) disintegrating tablet 4 mg, 4 mg, Oral, Q8H PRN **OR** ondansetron (ZOFRAN) injection 4 mg, 4 mg, IntraVENous, Q6H PRN  polyethylene glycol (GLYCOLAX) packet 17 g, 17 g, Oral, Daily PRN  acetaminophen (TYLENOL) tablet 650 mg, 650 mg, Oral, Q6H PRN **OR** acetaminophen (TYLENOL) suppository 650 mg, 650 mg, Rectal, Q6H PRN  enoxaparin Sodium (LOVENOX) injection 30 mg, 30 mg, SubCUTAneous, BID  glucose chewable tablet 16 g, 4 tablet, Oral, PRN  dextrose bolus 10% 125 mL, 125 mL, IntraVENous, PRN **OR** dextrose bolus 10% 250 mL, 250 mL, IntraVENous, PRN  glucagon (rDNA) injection 1 mg, 1 mg, IntraMUSCular, PRN  dextrose 5 % solution, 100 mL/hr, IntraVENous, PRN  Physical    VITALS:  /84   Pulse 84   Temp 98.4 °F (36.9 °C) (Oral)   Resp 18   Ht 6' 1\" (1.854 m)   Wt 243 lb 13.3 oz (110.6 kg)   SpO2 95%   BMI 31.99 kg/m²   TEMPERATURE:  Current - Temp: 98.4 °F (36.9 °C); Max - Temp  Av.8 °F (36.6 °C)  Min: 97.5 °F (36.4 °C)  Max: 98.4 °F (36.9 °C)  PULSE OXIMETRY RANGE: SpO2  Av.4 %  Min: 90 %  Max: 100 %  24HR INTAKE/OUTPUT:    Intake/Output Summary (Last 24 hours) at 2022 0753  Last data filed at 2022 2325  Gross per 24 hour   Intake 2570 ml   Output 2375 ml   Net 195 ml       CONSTITUTIONAL:  awake, alert, cooperative, no apparent distress, HEENT oral pharynx , no scleral icterus  HEMATOLOGIC/LYMPHATICS:  no cervical lymphadenopathy, no supraclavicular lymphadenopathy, n  LUNGS:  HE has no severe crackles   CARDIOVASCULAR:  , regular rate and rhythm, normal S1 and S2, no S3 or S4, and no murmur noted  ABDOMEN:  No scars, normal bowel sounds, soft, non-distended, non-tender, no masses palpated, no hepatosplenomegally  MUSCULOSKELETAL:  There is no redness, warmth, or swelling of the joints. EXTREMETIES: No clubbing cynosis or edema  NEUROLOGIC:  Awake, alert,seems a little off   SKIN:  no bruising or bleeding      Data      Recent Labs     22  0408   WBC 5.2   HGB 8.7*   HCT 24.9*   *   .7*        Recent Labs     22  0408 22  2115 22  0843 221 22  0429     --  137  --  140   K 4.5   < > 3.8 4.0 3.9   CL 95*  --  94*  --  96*   CO2 36*  --  33*  --  36*   BUN 20  --  20  --  24*   CREATININE 0.8*  --  0.7*  --  0.8*    < > = values in this interval not displayed.      No results for input(s): AST, ALT, ALB, BILIDIR, BILITOT, ALKPHOS in the last 72 hours. Magnesium:    Lab Results   Component Value Date/Time    MG 2.00 07/22/2022 04:29 AM    MG 2.00 07/21/2022 08:43 AM    MG 2.00 07/20/2022 04:08 AM       Imaging CT HEAD WO CONTRAST    Result Date: 7/17/2022  EXAMINATION: CT OF THE HEAD WITHOUT CONTRAST  7/17/2022 6:43 pm TECHNIQUE: CT of the head was performed without the administration of intravenous contrast. Automated exposure control, iterative reconstruction, and/or weight based adjustment of the mA/kV was utilized to reduce the radiation dose to as low as reasonably achievable. COMPARISON: MRI brain 09/11/2021 HISTORY: ORDERING SYSTEM PROVIDED HISTORY: Unwitnessed fall TECHNOLOGIST PROVIDED HISTORY: Reason for exam:->Unwitnessed fall Has a \"code stroke\" or \"stroke alert\" been called? ->No Reason for Exam: Unwitnessed fall FINDINGS: BRAIN/VENTRICLES: No evidence of acute infarct or acute intracranial hemorrhage. 2 new sub 5 mm subependymal nodules along the lateral margin of the right lateral ventricle. Scattered small parenchymal calcifications including a linear subcortical calcification involving the high posterior right frontal lobe. No hydrocephalus or extra-axial fluid collection. Midline maintained. Basal cisterns patent. ORBITS: The visualized portion of the orbits demonstrate no acute abnormality. SINUSES: The visualized paranasal sinuses and mastoid air cells demonstrate no acute abnormality. SOFT TISSUES/SKULL:  No acute abnormality of the visualized skull or soft tissues. 1. No acute intracranial abnormality. 2. Nonspecific subependymal nodules involving the right lateral ventricle and scattered parenchymal calcifications. MRI of the brain is recommended for further evaluation.      XR CHEST PORTABLE    Result Date: 7/18/2022  EXAMINATION: ONE XRAY VIEW OF THE CHEST 7/18/2022 4:37 am COMPARISON: Chest radiograph 07/15/2022 at 11:47 p.m.; chest, abdomen, and pelvis CT 07/08/2022 HISTORY: ORDERING SYSTEM PROVIDED HISTORY: hypokalemia TECHNOLOGIST PROVIDED HISTORY: Reason for exam:->hypokalemia Reason for Exam: hypokalemia FINDINGS: Decreased lung volumes. Persistent linear opacities in each lung base. Suspected new right basilar airspace opacity with possible overlying trace right pleural effusion. Increased diffuse interstitial prominence with indistinct pulmonary vasculature no definite interlobular septal thickening. No definite findings of pneumothorax or left pleural effusion. Normal mediastinal and cardiac contours. 1. Suspected new right basilar airspace opacity potentially due in part to passive atelectasis given possible overlying trace right pleural effusion. Superimposed pneumonia and aspiration are not excluded. 2. Persistent bibasilar atelectasis and/or scarring. 3. Increased interstitial prominence due to pulmonary vascular congestion and/or central vascular crowding. XR CHEST PORTABLE    Result Date: 7/16/2022  EXAMINATION: ONE XRAY VIEW OF THE CHEST 7/15/2022 11:48 pm COMPARISON: 07/15/2022 HISTORY: ORDERING SYSTEM PROVIDED HISTORY: lung cancer TECHNOLOGIST PROVIDED HISTORY: Reason for exam:->lung cancer Reason for Exam: lung cancer FINDINGS: The lungs are without acute focal process. There is no effusion or pneumothorax. The cardiomediastinal silhouette is without acute process. The osseous structures are without acute process. No acute process. XR CHEST PORTABLE    Result Date: 7/15/2022  EXAMINATION: ONE XRAY VIEW OF THE CHEST 7/15/2022 7:22 pm COMPARISON: 07/08/2022, 09/13/2021 HISTORY: ORDERING SYSTEM PROVIDED HISTORY: other TECHNOLOGIST PROVIDED HISTORY: Reason for exam:->other Reason for Exam: abnormal labs FINDINGS: The lungs are without acute focal process. There is no effusion or pneumothorax. The cardiomediastinal silhouette is without acute process. The osseous structures are without acute process.      No acute process. NM BONE SCAN WHOLE BODY    Result Date: 7/8/2022  EXAMINATION: WHOLE BODY BONE SCAN  7/8/2022 TECHNIQUE: The patient was injected intravenously with 27.5 mCi of 99 mTc MDP and scintigraphy of the entire skeleton was performed approximately three hours later. COMPARISON: 03/18/2022 HISTORY: ORDERING SYSTEM PROVIDED HISTORY: Malignant neoplasm of right main bronchus [facility]) TECHNOLOGIST PROVIDED HISTORY: Order: Bone scan, total body Order Instructions: [facility] along with CT please lower back and hip pain Associated problems: Small cell carcinoma of lung (disorder) * (C34.01) Reason for Exam: staging prostate Ca Relevant Medical/Surgical History: ct today FINDINGS: New focal activity is demonstrated along the superior aspect of the right iliac crest.  Developing activity is seen at the lateral right 11th rib and posterior right 5th rib. Lesions are seen which are more conspicuous within the proximal right femur, anterior right iliac bone, medial left iliac bone, sacrum, lumbar spine, upper thoracic spine, proximal left humerus. Activity at the shoulders, sternoclavicular joints, hips, knees, ankles, feet, likely degenerative. Physiologic activity is seen within the kidneys and urinary bladder. Urinary contamination is seen between the upper thighs. Several new metastatic lesions are seen as compared to prior exam dated 03/18/2022. Additional lesions are more intense as compared to prior which could relate to flare effect if there has been intervening therapy. CT CHEST ABDOMEN PELVIS W CONTRAST    Result Date: 7/8/2022  EXAMINATION: CT OF THE CHEST, ABDOMEN, AND PELVIS WITH CONTRAST 7/8/2022 8:35 am TECHNIQUE: CT of the chest, abdomen and pelvis was performed with the administration of intravenous contrast. Multiplanar reformatted images are provided for review.  Automated exposure control, iterative reconstruction, and/or weight based adjustment of the mA/kV was utilized to reduce the radiation dose to as low as reasonably achievable. COMPARISON: 03/18/2022 HISTORY: ORDERING SYSTEM PROVIDED HISTORY: Malignant neoplasm of right main bronchus [facility]) TECHNOLOGIST PROVIDED HISTORY: Order: CT chest/abdomen/pelvis w/ contrast Order Instructions: STAT Creatinine & BUN prior to CT Scan per facility protocol [address] Associated problems: Small cell carcinoma of lung (disorder) * (C34.01) Additional Contrast?->Oral STAT Creatinine as needed:->Yes Reason for Exam: Malignant neoplasm of right main bronchus FINDINGS: Chest: Mediastinum: The central airways are patent. There has been interval enlargement of several mediastinal lymph nodes. Index prevascular node measures 1.3 cm on the current exam compared with 9 mm prior. Right paratracheal node measures 1.5 cm on the current exam compared with 1.4 cm prior. Precarinal lymph node measures 1.4 cm on the current exam compared with 0.6 cm prior. AP window node measures 12 mm currently compared with 5 mm prior. There is a new 1.7 cm lymph node within the right infrahilar region. Lungs/pleura: Mild emphysematous changes are present. A few tiny non calcified right-sided pulmonary nodules are unchanged in size, number and appearance. The pleural spaces are clear. Soft Tissues/Bones: Numerous sclerotic lesions scattered throughout the thoracic spine and ribs are stable in size, number and appearance. Abdomen/Pelvis: Organs: There has been interval increase in size and number of numerous hypodense lesions scattered throughout the liver. Index lesion within the left hepatic lobe measures 3.2 cm on the current exam compared with 2.4 cm prior. Index lesion within the caudate lobe measures 2.4 cm compared with 1.8 cm prior. There has been slight interval enlargement of the left adrenal nodule which measures 1.6 cm on the current exam.  A few tiny bilateral nonobstructing intrarenal calculi are unchanged. The remainder of the GI/Bowel:  There is no bowel dilatation, wall thickening or obstruction. Pelvis: The bladder and prostate are unremarkable. Peritoneum/Retroperitoneum: There is no free air or free fluid. There has been interval development of a 2 cm lymph node within the posterior gastrohepatic ligament. Bones/Soft Tissues: This numerous small osteoblastic lesions scattered throughout the lumbosacral spine and pelvis are unchanged in size, number and appearance. 1. Mild interval progression of right hilar/mediastinal favian metastatic disease. 2. Mild interval progression of hepatic and left adrenal metastatic disease. 3. Developing upper abdominal favian metastatic disease. 4. Stable disseminated osteoblastic disease. MRI BRAIN W WO CONTRAST    Result Date: 7/18/2022  EXAMINATION: MRI OF THE BRAIN WITHOUT AND WITH CONTRAST  7/18/2022 9:56 am TECHNIQUE: Multiplanar multisequence MRI of the head/brain was performed without and with the administration of intravenous contrast. COMPARISON: MRI Brain 9/11/2021 and CT head 7/17/2022 HISTORY: ORDERING SYSTEM PROVIDED HISTORY: lesions seen on haed ct--known smll cell cancer TECHNOLOGIST PROVIDED HISTORY: Reason for exam:->lesions seen on head CT--known smll cell cancer Reason for Exam: lesions seen on head CT - known small cell cnacer FINDINGS: INTRACRANIAL STRUCTURES/VENTRICLES:  There is no acute infarct. No mass effect or midline shift. No evidence of an acute intracranial hemorrhage. The ventricles and sulci are normal in size and configuration. The sellar/suprasellar regions appear unremarkable. The normal signal voids within the major intracranial vessels appear maintained. Multiple tiny enhancing nodules are identified within the periventricular and subcortical white matter of both cerebral hemispheres. The most conspicuous lesion is identified within the left frontal operculum measuring 5 mm. Additional nodules identified within the left cerebral peduncle measuring 5 mm.   Small metastasis is suspected within the left side of the vermis. ORBITS: The visualized portion of the orbits demonstrate no acute abnormality. SINUSES: The visualized paranasal sinuses and mastoid air cells demonstrate no acute abnormality. BONES/SOFT TISSUES: The bone marrow signal intensity appears normal. The soft tissues demonstrate no acute abnormality. Multiple new small enhancing nodules within the periventricular and subcortical white matter of both cerebral hemispheres that is suspicious for metastatic disease. The largest lesion is located within the left frontal operculum and within the left cerebral peduncle measuring 5 mm. Additional small suspected metastasis within the left side of the cerebellar vermis.        Problem List  Patient Active Problem List   Diagnosis    Pure hypercholesterolemia    Anxiety    Essential hypertension    Moderate persistent asthma with acute exacerbation    Metastatic cancer (HCC)    Hypokalemia    Acute respiratory failure with hypoxia (HCC)    Abnormal CT of the chest    COPD exacerbation (HCC)    Lung nodules    Tobacco abuse    Small cell lung cancer (HCC)    Ectopic ACTH syndrome (HCC)    Hyperglycemia    Syncope    NSVT (nonsustained ventricular tachycardia) (HCC)    Hypervolemia       ASSESSMENT AND PLAN    Relapsed small cell   Was to start third line topotecan after failure of 2 other lines of therapy   Topotecan day 5  He may need transfusions next week-will not be surprised   IT seems he may be here through the weekend   If he is I will give some neupogen in anticipation of neutropenia-he will be high risk for this

## 2022-07-22 NOTE — PROGRESS NOTES
Dr. Bhavna Sol made aware of continued V tach beats - pt had 19, then 7, and another 15. Pt asymptomatic at this time. Will continue to monitor.     Electronically signed by Nida Harris RN on 7/21/2022 at 8:53 PM

## 2022-07-22 NOTE — PROGRESS NOTES
Pt is alert and oriented x4, resting quietly in bed. Nightly medications and intake tolerated well. No complaints of nausea, vomiting, or pain. Potassium lab drawn at 2121 and was found to be 4.0. No other needs made known at this time. Fall precautions in place. Call light within reach. Will continue to monitor.     Electronically signed by Kathy Ding RN on 7/22/2022 at 1:59 AM

## 2022-07-22 NOTE — PROGRESS NOTES
S4.  Pulmonary/Chest: Clear breath sounds bilaterally. No crackles, wheezes or rhonchi. No respiratory accessory muscle use. Abdominal: Soft. Normal bowel sounds present. No distension, No tenderness. Musculoskeletal: No tenderness. 3+ BLE edema    Lymphadenopathy: Has no cervical adenopathy. Neurological: Alert and oriented. No gross deficits. Skin: Skin is warm and dry. No rash, lesions, ulcerations noted. Psychiatric: No anxiety or agitation. Labs, diagnostic and imaging results reviewed. Reviewed. Recent Labs     07/20/22  0408 07/20/22 2115 07/21/22  0843 07/21/22 2121 07/22/22  0429     --  137  --  140   K 4.5   < > 3.8 4.0 3.9   CL 95*  --  94*  --  96*   CO2 36*  --  33*  --  36*   BUN 20  --  20  --  24*   CREATININE 0.8*  --  0.7*  --  0.8*    < > = values in this interval not displayed. Recent Labs     07/20/22  0408   WBC 5.2   HGB 8.7*   HCT 24.9*   .7*   *     Lab Results   Component Value Date/Time    TROPONINI <0.01 07/18/2022 05:43 PM     Estimated Creatinine Clearance: 138 mL/min (A) (based on SCr of 0.8 mg/dL (L)).    No results found for: BNP  Lab Results   Component Value Date/Time    PROTIME 11.5 09/11/2021 08:11 AM    PROTIME 11.7 09/03/2021 04:18 AM    INR 1.02 09/11/2021 08:11 AM    INR 1.03 09/03/2021 04:18 AM     Lab Results   Component Value Date/Time    CHOL 317 01/17/2022 12:02 PM    HDL 54 01/17/2022 12:02 PM    HDL 38 12/06/2011 10:11 AM    TRIG 196 01/17/2022 12:02 PM       Scheduled Meds:   [START ON 7/23/2022] filgrastim-aafi  480 mcg SubCUTAneous Daily    furosemide  60 mg IntraVENous BID    metoprolol tartrate  25 mg Oral BID    potassium chloride  40 mEq Oral TID    insulin glargine  30 Units SubCUTAneous QAM AC    ondansetron (ZOFRAN) IVPB  16 mg IntraVENous Daily    topotecan (HYCAMTIN) chemo IVPB  1.5 mg/m2 IntraVENous Daily    insulin glargine  40 Units SubCUTAneous Nightly    insulin lispro  0-18 Units SubCUTAneous TID WC insulin lispro  0-9 Units SubCUTAneous Nightly    aspirin  81 mg Oral Daily    atorvastatin  40 mg Oral Daily    mometasone-formoterol  2 puff Inhalation BID    NIFEdipine  30 mg Oral BID    spironolactone  100 mg Oral BID    sodium chloride flush  5-40 mL IntraVENous 2 times per day    enoxaparin  30 mg SubCUTAneous BID     Continuous Infusions:   sodium chloride 25 mL (22 1124)    dextrose       PRN Meds:albuterol sulfate HFA, sodium chloride flush, sodium chloride, ondansetron **OR** ondansetron, polyethylene glycol, acetaminophen **OR** acetaminophen, glucose, dextrose bolus **OR** dextrose bolus, glucagon (rDNA), dextrose     EC22  SR at 76 BPM.     Echo: 22   Normal LV size and wall motion. EF is   60%. Normal diastolic function. Left atrium is of normal size. The right ventricle is normal in size and function. Assessment and Plan:     NSVT              - recurrent, longest run was 9 beats over the last 24 hours but with one or two sinus beats in between with several runs at a time              - denies any symptoms during these times              - could be exacerbated by fluid overload so agree with diuresis              - EF 60%              - no recent ischemic work up, ruled out for AMI this admission              - would keep K >4 and Mg >2              - continue low dose metoprolol which can be uptitrated if needed     Hypervolemia                          - EF 60%              - agree with diuresis as per primary     Small cell lung CA              - with brain and bone mets              - oncology following    EP issues are stable, will sign off. Please call if concerns.     NINFA Drew  [facility] [address], [address] [place] [address]  Phone: [phone]  Fax: [phone]    Electronically signed by NINFA Plasencia - CNP on 2022 at 8:25 AM

## 2022-07-22 NOTE — PLAN OF CARE
Encourage and assist patient to increase activity and self care with guidance from physical therapy/occupational therapy     Problem: Respiratory - Adult  Goal: Achieves optimal ventilation and oxygenation  Outcome: Progressing  Flowsheets (Taken 7/22/2022 0159)  Achieves optimal ventilation and oxygenation:   Assess for changes in respiratory status   Assess for changes in mentation and behavior   Position to facilitate oxygenation and minimize respiratory effort   Oxygen supplementation based on oxygen saturation or arterial blood gases   Encourage broncho-pulmonary hygiene including cough, deep breathe, incentive spirometry   Assess and instruct to report shortness of breath or any respiratory difficulty   Respiratory therapy support as indicated     Problem: Cardiovascular - Adult  Goal: Absence of cardiac dysrhythmias or at baseline  Outcome: Progressing  Flowsheets (Taken 7/22/2022 0159)  Absence of cardiac dysrhythmias or at baseline:   Monitor cardiac rate and rhythm   Assess for signs of decreased cardiac output   Administer antiarrhythmia medication and electrolyte replacement as ordered     Problem: Skin/Tissue Integrity - Adult  Goal: Skin integrity remains intact  Outcome: Progressing  Flowsheets (Taken 7/22/2022 0159)  Skin Integrity Remains Intact: Monitor for areas of redness and/or skin breakdown     Problem: Musculoskeletal - Adult  Goal: Return mobility to safest level of function  Outcome: Progressing  Flowsheets (Taken 7/22/2022 0159)  Return Mobility to Safest Level of Function:   Assess patient stability and activity tolerance for standing, transferring and ambulating with or without assistive devices   Assist with transfers and ambulation using safe patient handling equipment as needed     Problem: Infection - Adult  Goal: Absence of infection at discharge  Outcome: Progressing  Flowsheets (Taken 7/22/2022 0159)  Absence of infection at discharge:   Assess and monitor for signs and symptoms of infection   Monitor lab/diagnostic results   Monitor all insertion sites i.e., indwelling lines, tubes and drains     Problem: Metabolic/Fluid and Electrolytes - Adult  Goal: Electrolytes maintained within normal limits  Outcome: Progressing  Flowsheets (Taken 7/22/2022 0159)  Electrolytes maintained within normal limits:   Monitor labs and assess patient for signs and symptoms of electrolyte imbalances   Administer electrolyte replacement as ordered   Monitor response to electrolyte replacements, including repeat lab results as appropriate

## 2022-07-22 NOTE — PROGRESS NOTES
225 [facility] Internal Medicine Note      Chief Complaint: I still have a lot of fluid    Subjective/Interval History: This morning the patient is sitting at the side of the bed. He has no new complaints. He states he does not feel like he urinated a large amount yesterday. Eating and drinking well. Moving his bowels without difficulty. Finishing chemotherapy today. I's/O's essentially matched. Weight fairly stable today. No chest pain or shortness breath. No cough or sputum. No nausea, vomiting, diarrhea. No abdominal pain. No dysuria. The remainder of the review of systems is negative. PMH, PSH, FH/SH reviewed and unchanged as documented in the H&P dated 22    Medication list reviewed    Objective:    /84   Pulse 84   Temp 98.4 °F (36.9 °C) (Oral)   Resp 18   Ht 6' 1\" (1.854 m)   Wt 243 lb 13.3 oz (110.6 kg)   SpO2 95%   BMI 31.99 kg/m²   Temp  Av.8 °F (36.6 °C)  Min: 97.5 °F (36.4 °C)  Max: 98.4 °F (36.9 °C)    RRR  Chest-the chest remains clear throughout. No wheezes or rhonchi or crackles noted. Respirations are easy  Abd-BS+, soft, NTND  Ext- 3+ lower extremity edema noted. Edema remains unchanged today.     The Following Labs Were Reviewed Today:    Recent Results (from the past 24 hour(s))   Basic Metabolic Panel    Collection Time: 22  8:43 AM   Result Value Ref Range    Sodium 137 136 - 145 mmol/L    Potassium 3.8 3.5 - 5.1 mmol/L    Chloride 94 (L) 99 - 110 mmol/L    CO2 33 (H) 21 - 32 mmol/L    Anion Gap 10 3 - 16    Glucose 199 (H) 70 - 99 mg/dL    BUN 20 7 - 20 mg/dL    Creatinine 0.7 (L) 0.9 - 1.3 mg/dL    GFR Non-African American >60 >60    GFR African American >60 >60    Calcium 7.9 (L) 8.3 - 10.6 mg/dL   Magnesium    Collection Time: 22  8:43 AM   Result Value Ref Range    Magnesium 2.00 1.80 - 2.40 mg/dL   POCT Glucose    Collection Time: 22 11:17 AM   Result Value Ref Range    POC Glucose 320 (H) 70 - 99 mg/dl    Performed on ACCU-CHEK POCT Glucose    Collection Time: 07/21/22  4:30 PM   Result Value Ref Range    POC Glucose 256 (H) 70 - 99 mg/dl    Performed on ACCU-CHEK    POCT Glucose    Collection Time: 07/21/22  8:42 PM   Result Value Ref Range    POC Glucose 311 (H) 70 - 99 mg/dl    Performed on ACCU-CHEK    Potassium    Collection Time: 07/21/22  9:21 PM   Result Value Ref Range    Potassium 4.0 3.5 - 5.1 mmol/L   Basic Metabolic Panel    Collection Time: 07/22/22  4:29 AM   Result Value Ref Range    Sodium 140 136 - 145 mmol/L    Potassium 3.9 3.5 - 5.1 mmol/L    Chloride 96 (L) 99 - 110 mmol/L    CO2 36 (H) 21 - 32 mmol/L    Anion Gap 8 3 - 16    Glucose 121 (H) 70 - 99 mg/dL    BUN 24 (H) 7 - 20 mg/dL    Creatinine 0.8 (L) 0.9 - 1.3 mg/dL    GFR Non-African American >60 >60    GFR African American >60 >60    Calcium 8.2 (L) 8.3 - 10.6 mg/dL   Magnesium    Collection Time: 07/22/22  4:29 AM   Result Value Ref Range    Magnesium 2.00 1.80 - 2.40 mg/dL   POCT Glucose    Collection Time: 07/22/22  6:32 AM   Result Value Ref Range    POC Glucose 113 (H) 70 - 99 mg/dl    Performed on ACCU-CHEK        ASSESSMENT/PLAN:      Principal Problem:    Small cell lung cancer-now with brain mets. Radiation oncology to begin radiation and Dr. Milan Medeiros has proceeded with chemotherapy. Active Problems:    Wide-complex tachycardia-no further episodes noted on telemetry today. Continue to supplement potassium and monitor magnesium levels. Appreciate cardiology input. Hypokalemia-currently getting potassium 3 times daily. Continue to monitor. Hyperglycemia-fasting glucose 113 today. Continue with sliding scale and current dose of basal insulin. Ectopic ACTH syndrome -as noted above. Nifedipine controlling blood pressure. Patient on Aldactone and Lasix. Still with significant edema. Change Lasix to IV 60 mg twice daily. Disposition-patient does look better today.   Once his edema is under little better control we can probably discharge. Agree with Dr. Bekah Mensah that he will likely be here through the weekend as we continue to diurese him. Discussed CODE STATUS with the patient yesterday and he prefers to remain full code at this time.     Nathan Bowie MD, [address]  7:58 AM  7/22/2022

## 2022-07-22 NOTE — PROGRESS NOTES
Patient in bed, A&O, irritable due to waiting on assistance to get back in bed. Patient required minimal assistance with transfer into bed. VSS. DL PICC flushed, dressing CD and reinforced with tape, both lumens NS locked and capped. Patient denies pain at this time. AM medications taken without incident. Tolerating PO intake. Standard safety precautions in place. Will monitor.  Electronically signed by Mary Dumont RN on 7/22/2022 at [address] AM

## 2022-07-23 NOTE — PROGRESS NOTES
225 [facility] Internal Medicine Note      Chief Complaint: I am feeling better now    Subjective/Interval History:    Patient with a very eventful overnight. Initially the patient developed hypoglycemia and was placed on D5 drip approximately 4:00 in the morning. Subsequently, the patient was noted to be very hypoxic with pulse ox in the 70s and a rapid response was called. CT scan of the chest was done stat revealing a large left upper lobe pneumonia. No pulmonary embolus was noted. Patient was transferred to the fifth floor/PCU. Now on high flow oxygen at 11 L per nasal cannula. Patient is resting more comfortably now stating he is feeling a bit better. Blood sugars remain in the 70s despite D5W infusing at 100 mL/h. Lantus has been placed on hold. Magnesium was low this morning and this is been replaced. No other new problems noted. No chest pain or shortness breath. No cough or sputum. No nausea, vomiting, diarrhea. No abdominal pain. No dysuria. The remainder of the review of systems is negative. PMH, PSH, FH/SH reviewed and unchanged as documented in the H&P dated 22    Medication list reviewed    Objective:    /75   Pulse (!) 102   Temp 97.7 °F (36.5 °C) (Oral)   Resp 23   Ht 6' 1\" (1.854 m)   Wt 250 lb (113.4 kg)   SpO2 91%   BMI 32.80 kg/m²   Temp  Av.8 °F (36.6 °C)  Min: 97.3 °F (36.3 °C)  Max: 98.7 °F (37.1 °C)    Patient appears more acutely ill today, looks like he does not feel well. RRR  Chest-patient's lungs are fairly clear throughout but he does not have a great inspiratory effort. I am unable to hear the pneumonia on exam today. Abd-BS+, soft, NTND  Ext- 4+ lower extremity edema noted. Edema remains unchanged today.     The Following Labs Were Reviewed Today:    Recent Results (from the past 24 hour(s))   POCT Glucose    Collection Time: 22 11:52 AM   Result Value Ref Range    POC Glucose 309 (H) 70 - 99 mg/dl    Performed on ACCU-CHEK    POCT Glucose Collection Time: 07/22/22  4:08 PM   Result Value Ref Range    POC Glucose 242 (H) 70 - 99 mg/dl    Performed on ACCU-CHEK    POCT Glucose    Collection Time: 07/22/22  8:31 PM   Result Value Ref Range    POC Glucose 92 70 - 99 mg/dl    Performed on ACCU-CHEK    Potassium    Collection Time: 07/22/22 10:19 PM   Result Value Ref Range    Potassium 3.4 (L) 3.5 - 5.1 mmol/L   POCT Glucose    Collection Time: 07/23/22  3:24 AM   Result Value Ref Range    POC Glucose 53 (L) 70 - 99 mg/dl    Performed on ACCU-CHEK    POCT Glucose    Collection Time: 07/23/22  3:51 AM   Result Value Ref Range    POC Glucose 94 70 - 99 mg/dl    Performed on ACCU-CHEK    Basic Metabolic Panel    Collection Time: 07/23/22  4:17 AM   Result Value Ref Range    Sodium 140 136 - 145 mmol/L    Potassium 3.7 3.5 - 5.1 mmol/L    Chloride 99 99 - 110 mmol/L    CO2 33 (H) 21 - 32 mmol/L    Anion Gap 8 3 - 16    Glucose 75 70 - 99 mg/dL    BUN 25 (H) 7 - 20 mg/dL    Creatinine 0.9 0.9 - 1.3 mg/dL    GFR Non-African American >60 >60    GFR African American >60 >60    Calcium 7.9 (L) 8.3 - 10.6 mg/dL   Magnesium    Collection Time: 07/23/22  4:17 AM   Result Value Ref Range    Magnesium 1.60 (L) 1.80 - 2.40 mg/dL   POCT Glucose    Collection Time: 07/23/22  4:35 AM   Result Value Ref Range    POC Glucose 74 70 - 99 mg/dl    Performed on ACCU-CHEK    POCT Glucose    Collection Time: 07/23/22  5:24 AM   Result Value Ref Range    POC Glucose 76 70 - 99 mg/dl    Performed on ACCU-CHEK    D-Dimer, Quantitative    Collection Time: 07/23/22  5:40 AM   Result Value Ref Range    D-Dimer, Quant 1.31 (H) 0.00 - 0.60 ug/mL FEU   SPECIMEN REJECTION    Collection Time: 07/23/22  6:16 AM   Result Value Ref Range    Rejected Test vbg     Reason for Rejection see below    POCT Glucose    Collection Time: 07/23/22  7:59 AM   Result Value Ref Range    POC Glucose 72 70 - 99 mg/dl    Performed on ACCU-CHEK      CT scan reviewed.     ASSESSMENT/PLAN:      Principal Problem:    Small cell lung cancer-chemotherapy treatment now has been complicated by an acute pneumonia in his left upper lobe. Active Problems:    Left upper lobe pneumonia with acute respiratory failure with hypoxia-start on Zosyn and Levaquin. Obviously wean oxygen as tolerated. Wide-complex tachycardia-Continue to supplement potassium and monitor magnesium levels. Appreciate cardiology input. Hypokalemia-potassium 3.9 today. Goal to keep K greater than 4. Repeat BMP this afternoon. Hypomagnesemia-supplemented this morning, repeat level at 4 this afternoon and supplement again if needed    Hyperglycemia-scheduled insulin on hold due to hypoglycemia    Ectopic ACTH syndrome -blood pressures running relatively low. Hold nifedipine today. Continue with Aldactone and Lasix. Still with significant edema. I suspect that chemo may have impacted ACTH production and this explains his sudden hypoglycemia. Continue to monitor.       Clara Palacios MD, [facility]  9:01 AM  7/23/2022

## 2022-07-23 NOTE — PROGRESS NOTES
This RN helped patient to side of bed to urinate in urinal. Patient could not pee but felt the urge and pressure in his bladder. Bladder scanned and showed greater than 650mL. MD notified and order for herndon catheter placed. The patient agreed to a herndon and this RN inserted a catheter. Aseptic technique used, patient tolerated well, dark yellow urine return and 875mL emptied.      Electronically signed by Estelle Goodell, RN on 7/23/2022 at 12:51 PM

## 2022-07-23 NOTE — PROGRESS NOTES
Patient moved to 5102 in progressive care unit after rapid response was called at 0528 due to dropping B/P and decreasing O2 sats. Patient went for CT of chest.  Dr. Ruben Pittman was notified of patient's transfer. Patient's family was notified of patient's transfer to the progressive care unit per nursing Supervisor Shruthi Spears. Report called to [facility]. Electronically signed by Reema Torre RN on 7/23/2022 at 6:43 AM      Electronically signed by Reema Torre RN on 7/23/2022 at 6:37 AM

## 2022-07-23 NOTE — PROGRESS NOTES
Nilsa Salgado Brother/Sister [phone]     Attempt to call Annette Hodgson- voicemail left     0703 nilsa called back, update given.  Notified of rapid response and transfer to [facility]

## 2022-07-23 NOTE — PLAN OF CARE
Problem: Discharge Planning  Goal: Discharge to home or other facility with appropriate resources  Outcome: Progressing    Problem: Pain  Goal: Verbalizes/displays adequate comfort level or baseline comfort level  7/23/2022 1020 by Mali Garcia RN  Outcome: Progressing     Problem: Safety - Adult  Goal: Free from fall injury  Outcome: Progressing  Flowsheets (Taken 7/23/2022 1018)  Free From Fall Injury: Instruct family/caregiver on patient safety     Problem: ABCDS Injury Assessment  Goal: Absence of physical injury  Outcome: Progressing  Flowsheets (Taken 7/23/2022 1018)  Absence of Physical Injury: Implement safety measures based on patient assessment     Problem: Skin/Tissue Integrity  Goal: Absence of new skin breakdown  Description: 1. Monitor for areas of redness and/or skin breakdown  2. Assess vascular access sites hourly  3. Every 4-6 hours minimum:  Change oxygen saturation probe site  4. Every 4-6 hours:  If on nasal continuous positive airway pressure, respiratory therapy assess nares and determine need for appliance change or resting period.   Outcome: Progressing     Problem: Neurosensory - Adult  Goal: Achieves maximal functionality and self care  Outcome: Progressing     Problem: Respiratory - Adult  Goal: Achieves optimal ventilation and oxygenation  Outcome: Progressing     Problem: Cardiovascular - Adult  Goal: Absence of cardiac dysrhythmias or at baseline  Outcome: Progressing     Problem: Skin/Tissue Integrity - Adult  Goal: Skin integrity remains intact  Outcome: Progressing  Flowsheets (Taken 7/23/2022 1019)  Skin Integrity Remains Intact:   Monitor for areas of redness and/or skin breakdown   Assess vascular access sites hourly     Problem: Metabolic/Fluid and Electrolytes - Adult  Goal: Electrolytes maintained within normal limits  Outcome: Progressing  Electrolytes maintained within normal limits: Monitor labs and assess patient for signs and symptoms of electrolyte imbalances Discharge to home or other facility with appropriate resources: Identify barriers to discharge with patient and caregiver

## 2022-07-23 NOTE — PLAN OF CARE
Problem: Pain  Goal: Verbalizes/displays adequate comfort level or baseline comfort level  7/23/2022 0137 by Marcella Evans RN  Outcome: Progressing  7/22/2022 1208 by Arun Gagnon RN  Outcome: Progressing  Flowsheets (Taken 7/22/2022 1208)  Verbalizes/displays adequate comfort level or baseline comfort level:   Encourage patient to monitor pain and request assistance   Administer analgesics based on type and severity of pain and evaluate response   Consider cultural and social influences on pain and pain management   Assess pain using appropriate pain scale   Implement non-pharmacological measures as appropriate and evaluate response   No pain reported tonight

## 2022-07-23 NOTE — PROGRESS NOTES
Patient blood glucose level 53 at 0324. Patient able to state his name, the year and where he was. Patient lethargic and would not sit up to drink apple juice. Dextrose 10 -125ml given over 8 minutes. Blood glucose level of 94 obtained at 0351. Dextrose 5% started at 100 ml per hour. Call placed to Dr. Alejandro Bautista notified. IV of Dextrose 5% stopped per MD orders. Order obtained to hold 0700 Lantus.   Electronically signed by Carlton Díaz RN on 7/23/2022 at 4:24 AM

## 2022-07-23 NOTE — PROGRESS NOTES
[facility]  Oncology Hematology Care  Progress Note      SUBJECTIVE:     Overall the patient is doing okay. No nausea vomiting  No chest pain palpitations. Appetite is not that great.  he has swelling in the legs    OBJECTIVE      Medications    Current Facility-Administered Medications: dextrose 10 % infusion, , IntraVENous, Continuous  piperacillin-tazobactam (ZOSYN) 4,500 mg in dextrose 5 % 100 mL IVPB (mini-bag), 4,500 mg, IntraVENous, Once  piperacillin-tazobactam (ZOSYN) 3,375 mg in dextrose 5 % 100 mL IVPB extended infusion (mini-bag), 3,375 mg, IntraVENous, Q8H  levoFLOXacin (LEVAQUIN) 500 MG/100ML infusion 500 mg, 500 mg, IntraVENous, Q24H  filgrastim-aafi (NIVESTYM) injection 480 mcg, 480 mcg, SubCUTAneous, Daily  furosemide (LASIX) injection 60 mg, 60 mg, IntraVENous, BID  metoprolol tartrate (LOPRESSOR) tablet 25 mg, 25 mg, Oral, BID  potassium chloride (KLOR-CON) extended release tablet 40 mEq, 40 mEq, Oral, TID  [Held by provider] insulin glargine (LANTUS) injection vial 30 Units, 30 Units, SubCUTAneous, QAM AC  ondansetron (ZOFRAN) 16 mg in dextrose 5 % 50 mL IVPB, 16 mg, IntraVENous, Daily  topotecan (HYCAMTIN) 3.6 mg in dextrose 5 % 50 mL chemo IVPB, 1.5 mg/m2, IntraVENous, Daily  [Held by provider] insulin glargine (LANTUS) injection vial 40 Units, 40 Units, SubCUTAneous, Nightly  insulin lispro (HUMALOG) injection vial 0-18 Units, 0-18 Units, SubCUTAneous, TID WC  insulin lispro (HUMALOG) injection vial 0-9 Units, 0-9 Units, SubCUTAneous, Nightly  aspirin EC tablet 81 mg, 81 mg, Oral, Daily  atorvastatin (LIPITOR) tablet 40 mg, 40 mg, Oral, Daily  albuterol sulfate HFA (PROVENTIL;VENTOLIN;PROAIR) 108 (90 Base) MCG/ACT inhaler 2 puff, 2 puff, Inhalation, Q4H PRN  mometasone-formoterol (DULERA) 200-5 MCG/ACT inhaler 2 puff, 2 puff, Inhalation, BID  NIFEdipine (PROCARDIA XL) extended release tablet 30 mg, 30 mg, Oral, BID  spironolactone (ALDACTONE) tablet 100 mg, 100 mg, Oral, BID  sodium chloride 0.7*  --  0.8*  --  0.9    < > = values in this interval not displayed. No results for input(s): AST, ALT, ALB, BILIDIR, BILITOT, ALKPHOS in the last 72 hours. Magnesium:    Lab Results   Component Value Date/Time    MG 1.60 07/23/2022 04:17 AM    MG 2.00 07/22/2022 04:29 AM    MG 2.00 07/21/2022 08:43 AM       Imaging CT HEAD WO CONTRAST    Result Date: 7/17/2022  EXAMINATION: CT OF THE HEAD WITHOUT CONTRAST  7/17/2022 6:43 pm TECHNIQUE: CT of the head was performed without the administration of intravenous contrast. Automated exposure control, iterative reconstruction, and/or weight based adjustment of the mA/kV was utilized to reduce the radiation dose to as low as reasonably achievable. COMPARISON: MRI brain 09/11/2021 HISTORY: ORDERING SYSTEM PROVIDED HISTORY: Unwitnessed fall TECHNOLOGIST PROVIDED HISTORY: Reason for exam:->Unwitnessed fall Has a \"code stroke\" or \"stroke alert\" been called? ->No Reason for Exam: Unwitnessed fall FINDINGS: BRAIN/VENTRICLES: No evidence of acute infarct or acute intracranial hemorrhage. 2 new sub 5 mm subependymal nodules along the lateral margin of the right lateral ventricle. Scattered small parenchymal calcifications including a linear subcortical calcification involving the high posterior right frontal lobe. No hydrocephalus or extra-axial fluid collection. Midline maintained. Basal cisterns patent. ORBITS: The visualized portion of the orbits demonstrate no acute abnormality. SINUSES: The visualized paranasal sinuses and mastoid air cells demonstrate no acute abnormality. SOFT TISSUES/SKULL:  No acute abnormality of the visualized skull or soft tissues. 1. No acute intracranial abnormality. 2. Nonspecific subependymal nodules involving the right lateral ventricle and scattered parenchymal calcifications. MRI of the brain is recommended for further evaluation.      XR CHEST PORTABLE    Result Date: 7/18/2022  EXAMINATION: ONE XRAY VIEW OF THE CHEST 7/18/2022 4:37 am COMPARISON: Chest radiograph 07/15/2022 at 11:47 p.m.; chest, abdomen, and pelvis CT 07/08/2022 HISTORY: ORDERING SYSTEM PROVIDED HISTORY: hypokalemia TECHNOLOGIST PROVIDED HISTORY: Reason for exam:->hypokalemia Reason for Exam: hypokalemia FINDINGS: Decreased lung volumes. Persistent linear opacities in each lung base. Suspected new right basilar airspace opacity with possible overlying trace right pleural effusion. Increased diffuse interstitial prominence with indistinct pulmonary vasculature no definite interlobular septal thickening. No definite findings of pneumothorax or left pleural effusion. Normal mediastinal and cardiac contours. 1. Suspected new right basilar airspace opacity potentially due in part to passive atelectasis given possible overlying trace right pleural effusion. Superimposed pneumonia and aspiration are not excluded. 2. Persistent bibasilar atelectasis and/or scarring. 3. Increased interstitial prominence due to pulmonary vascular congestion and/or central vascular crowding. XR CHEST PORTABLE    Result Date: 7/16/2022  EXAMINATION: ONE XRAY VIEW OF THE CHEST 7/15/2022 11:48 pm COMPARISON: 07/15/2022 HISTORY: ORDERING SYSTEM PROVIDED HISTORY: lung cancer TECHNOLOGIST PROVIDED HISTORY: Reason for exam:->lung cancer Reason for Exam: lung cancer FINDINGS: The lungs are without acute focal process. There is no effusion or pneumothorax. The cardiomediastinal silhouette is without acute process. The osseous structures are without acute process. No acute process. XR CHEST PORTABLE    Result Date: 7/15/2022  EXAMINATION: ONE XRAY VIEW OF THE CHEST 7/15/2022 7:22 pm COMPARISON: 07/08/2022, 09/13/2021 HISTORY: ORDERING SYSTEM PROVIDED HISTORY: other TECHNOLOGIST PROVIDED HISTORY: Reason for exam:->other Reason for Exam: abnormal labs FINDINGS: The lungs are without acute focal process. There is no effusion or pneumothorax.  The cardiomediastinal silhouette is without acute process. The osseous structures are without acute process. No acute process. NM BONE SCAN WHOLE BODY    Result Date: 7/8/2022  EXAMINATION: WHOLE BODY BONE SCAN  7/8/2022 TECHNIQUE: The patient was injected intravenously with 27.5 mCi of 99 mTc MDP and scintigraphy of the entire skeleton was performed approximately three hours later. COMPARISON: 03/18/2022 HISTORY: ORDERING SYSTEM PROVIDED HISTORY: Malignant neoplasm of right main bronchus [facility]) TECHNOLOGIST PROVIDED HISTORY: Order: Bone scan, total body Order Instructions: [facility] [address] along with CT please lower back and hip pain Associated problems: Small cell carcinoma of lung (disorder) * (C34.01) Reason for Exam: staging prostate Ca Relevant Medical/Surgical History: ct today FINDINGS: New focal activity is demonstrated along the superior aspect of the right iliac crest.  Developing activity is seen at the lateral right 11th rib and posterior right 5th rib. Lesions are seen which are more conspicuous within the proximal right femur, anterior right iliac bone, medial left iliac bone, sacrum, lumbar spine, upper thoracic spine, proximal left humerus. Activity at the shoulders, sternoclavicular joints, hips, knees, ankles, feet, likely degenerative. Physiologic activity is seen within the kidneys and urinary bladder. Urinary contamination is seen between the upper thighs. Several new metastatic lesions are seen as compared to prior exam dated 03/18/2022. Additional lesions are more intense as compared to prior which could relate to flare effect if there has been intervening therapy. CT CHEST ABDOMEN PELVIS W CONTRAST    Result Date: 7/8/2022  EXAMINATION: CT OF THE CHEST, ABDOMEN, AND PELVIS WITH CONTRAST 7/8/2022 8:35 am TECHNIQUE: CT of the chest, abdomen and pelvis was performed with the administration of intravenous contrast. Multiplanar reformatted images are provided for review.  Automated exposure control, iterative reconstruction, and/or weight based adjustment of the mA/kV was utilized to reduce the radiation dose to as low as reasonably achievable. COMPARISON: 03/18/2022 HISTORY: ORDERING SYSTEM PROVIDED HISTORY: Malignant neoplasm of right main bronchus [facility]) TECHNOLOGIST PROVIDED HISTORY: Order: CT chest/abdomen/pelvis w/ contrast Order Instructions: STAT Creatinine & BUN prior to CT Scan per facility protocol [facility] Associated problems: Small cell carcinoma of lung (disorder) * (C34.01) Additional Contrast?->Oral STAT Creatinine as needed:->Yes Reason for Exam: Malignant neoplasm of right main bronchus FINDINGS: Chest: Mediastinum: The central airways are patent. There has been interval enlargement of several mediastinal lymph nodes. Index prevascular node measures 1.3 cm on the current exam compared with 9 mm prior. Right paratracheal node measures 1.5 cm on the current exam compared with 1.4 cm prior. Precarinal lymph node measures 1.4 cm on the current exam compared with 0.6 cm prior. AP window node measures 12 mm currently compared with 5 mm prior. There is a new 1.7 cm lymph node within the right infrahilar region. Lungs/pleura: Mild emphysematous changes are present. A few tiny non calcified right-sided pulmonary nodules are unchanged in size, number and appearance. The pleural spaces are clear. Soft Tissues/Bones: Numerous sclerotic lesions scattered throughout the thoracic spine and ribs are stable in size, number and appearance. Abdomen/Pelvis: Organs: There has been interval increase in size and number of numerous hypodense lesions scattered throughout the liver. Index lesion within the left hepatic lobe measures 3.2 cm on the current exam compared with 2.4 cm prior. Index lesion within the caudate lobe measures 2.4 cm compared with 1.8 cm prior.   There has been slight interval enlargement of the left adrenal nodule which measures 1.6 cm on the current exam.  A few tiny bilateral nonobstructing intrarenal calculi are unchanged. The remainder of the GI/Bowel: There is no bowel dilatation, wall thickening or obstruction. Pelvis: The bladder and prostate are unremarkable. Peritoneum/Retroperitoneum: There is no free air or free fluid. There has been interval development of a 2 cm lymph node within the posterior gastrohepatic ligament. Bones/Soft Tissues: This numerous small osteoblastic lesions scattered throughout the lumbosacral spine and pelvis are unchanged in size, number and appearance. 1. Mild interval progression of right hilar/mediastinal favian metastatic disease. 2. Mild interval progression of hepatic and left adrenal metastatic disease. 3. Developing upper abdominal favian metastatic disease. 4. Stable disseminated osteoblastic disease. MRI BRAIN W WO CONTRAST    Result Date: 7/18/2022  EXAMINATION: MRI OF THE BRAIN WITHOUT AND WITH CONTRAST  7/18/2022 9:56 am TECHNIQUE: Multiplanar multisequence MRI of the head/brain was performed without and with the administration of intravenous contrast. COMPARISON: MRI Brain 9/11/2021 and CT head 7/17/2022 HISTORY: ORDERING SYSTEM PROVIDED HISTORY: lesions seen on haed ct--known smll cell cancer TECHNOLOGIST PROVIDED HISTORY: Reason for exam:->lesions seen on head CT--known smll cell cancer Reason for Exam: lesions seen on head CT - known small cell cnacer FINDINGS: INTRACRANIAL STRUCTURES/VENTRICLES:  There is no acute infarct. No mass effect or midline shift. No evidence of an acute intracranial hemorrhage. The ventricles and sulci are normal in size and configuration. The sellar/suprasellar regions appear unremarkable. The normal signal voids within the major intracranial vessels appear maintained. Multiple tiny enhancing nodules are identified within the periventricular and subcortical white matter of both cerebral hemispheres. The most conspicuous lesion is identified within the left frontal operculum measuring 5 mm.  Additional nodules identified within the left cerebral peduncle measuring 5 mm. Small metastasis is suspected within the left side of the vermis. ORBITS: The visualized portion of the orbits demonstrate no acute abnormality. SINUSES: The visualized paranasal sinuses and mastoid air cells demonstrate no acute abnormality. BONES/SOFT TISSUES: The bone marrow signal intensity appears normal. The soft tissues demonstrate no acute abnormality. Multiple new small enhancing nodules within the periventricular and subcortical white matter of both cerebral hemispheres that is suspicious for metastatic disease. The largest lesion is located within the left frontal operculum and within the left cerebral peduncle measuring 5 mm. Additional small suspected metastasis within the left side of the cerebellar vermis. Problem List  Patient Active Problem List   Diagnosis    Pure hypercholesterolemia    Anxiety    Essential hypertension    Moderate persistent asthma with acute exacerbation    Metastatic cancer (HCC)    Hypokalemia    Acute respiratory failure with hypoxia (HCC)    Abnormal CT of the chest    COPD exacerbation (HCC)    Lung nodules    Tobacco abuse    Small cell lung cancer ([facility] [address]    Ectopic ACTH syndrome (HCC)    Hyperglycemia    Syncope    NSVT (nonsustained ventricular tachycardia) (HCC)    Hypervolemia       ASSESSMENT AND PLAN      Relapsed small cell   Completed topotecan on 7/22/2020  Filgrastim ordered.     Check CBC in AM Transfuse if hemoglobin is less than 7    Sarahy Merritt MD

## 2022-07-24 NOTE — PROGRESS NOTES
RN paged Alannah Kelly MD at 2045. RN received call back from Armando GONZALEZ at 2000, [place] informed MD of patient's current condition/ sepsis score/ vital signs, asked if MD would still like the Procardia and lopressor given, also informed MD that the last CBC was on 7/20 and the platelets at that point were 101, RN asked if MD would still like the lovenox to be given. Orders received to hold the procardia and lopressor, make sure there is an order for CBC in the AM and okay to give lovenox.    Electronically signed by Jairo Zamora RN on 7/24/2022 at 2:21 AM

## 2022-07-24 NOTE — RT PROTOCOL NOTE
RT Inhaler-Nebulizer Bronchodilator Protocol Note    There is a bronchodilator order in the chart from a provider indicating to follow the RT Bronchodilator Protocol and there is an Initiate RT Inhaler-Nebulizer Bronchodilator Protocol order as well (see protocol at bottom of note). CXR Findings:  XR CHEST PORTABLE    Result Date: 7/24/2022  Multifocal bilateral pneumonia, unchanged. The findings from the last RT Protocol Assessment were as follows:   History Pulmonary Disease: Chronic pulmonary disease  Respiratory Pattern: Use of accessory muscles, prolonged exhalation, or RR 26-30 bpm  Breath Sounds: Inspiratory and expiratory or bilateral wheezing and/or rhonchi  Cough: Weak, productive  Indication for Bronchodilator Therapy: Wheezing associated with pulm disorder, On home bronchodilators  Bronchodilator Assessment Score: 16    Aerosolized bronchodilator medication orders have been revised according to the RT Inhaler-Nebulizer Bronchodilator Protocol below. Respiratory Therapist to perform RT Therapy Protocol Assessment initially then follow the protocol. Repeat RT Therapy Protocol Assessment PRN for score 0-3 or on second treatment, BID, and PRN for scores above 3. No Indications - adjust the frequency to every 6 hours PRN wheezing or bronchospasm, if no treatments needed after 48 hours then discontinue using Per Protocol order mode. If indication present, adjust the RT bronchodilator orders based on the Bronchodilator Assessment Score as indicated below. Use Inhaler orders unless patient has one or more of the following: on home nebulizer, not able to hold breath for 10 seconds, is not alert and oriented, cannot activate and use MDI correctly, or respiratory rate 25 breaths per minute or more, then use the equivalent nebulizer order(s) with same Frequency and PRN reasons based on the score.   If a patient is on this medication at home then do not decrease Frequency below that used at home.    0-3 - enter or revise RT bronchodilator order(s) to equivalent RT Bronchodilator order with Frequency of every 4 hours PRN for wheezing or increased work of breathing using Per Protocol order mode. 4-6 - enter or revise RT Bronchodilator order(s) to two equivalent RT bronchodilator orders with one order with BID Frequency and one order with Frequency of every 4 hours PRN wheezing or increased work of breathing using Per Protocol order mode. 7-10 - enter or revise RT Bronchodilator order(s) to two equivalent RT bronchodilator orders with one order with TID Frequency and one order with Frequency of every 4 hours PRN wheezing or increased work of breathing using Per Protocol order mode. 11-13 - enter or revise RT Bronchodilator order(s) to one equivalent RT bronchodilator order with QID Frequency and an Albuterol order with Frequency of every 4 hours PRN wheezing or increased work of breathing using Per Protocol order mode. Greater than 13 - enter or revise RT Bronchodilator order(s) to one equivalent RT bronchodilator order with every 4 hours Frequency and an Albuterol order with Frequency of every 2 hours PRN wheezing or increased work of breathing using Per Protocol order mode. RT to enter RT Home Evaluation for COPD & MDI Assessment order using Per Protocol order mode.     Electronically signed by Joseluis Saavedra RCP on 7/24/2022 at 12:01 PM

## 2022-07-24 NOTE — FLOWSHEET NOTE
07/24/22 1136   Encounter Summary   Encounter Overview/Reason  Advance Care Planning   Service Provided For: Patient and family together   Referral/Consult From: Nurse   Support System Family members   Last Encounter  07/24/22  (JF completed AD and POA.)   Complexity of Encounter Moderate   Begin Time 1030   End Time  1100   Total Time Calculated 30 min   Encounter    Type Follow up   Advance Care Planning   Type Completed AD/ACP document(s)   Assessment/Intervention/Outcome   Assessment Calm;Coping   Intervention Active listening;Explored Coping Skills/Resources   Outcome Comfort;Coping     Louis Sanders

## 2022-07-24 NOTE — PROGRESS NOTES
Patient moved from the 5th floor to ICU for higher level of care. Decision to intubate. Patient intubated with a 7.5 ETT 24 at the lip by the MD. Patient bronched after intubation. No complications during either procedures.   ACVC/16/450/100%/+5    Electronically signed by Zunilda Newman on 7/24/2022 at 3:01 PM

## 2022-07-24 NOTE — PLAN OF CARE
Problem: Discharge Planning  Goal: Discharge to home or other facility with appropriate resources  7/23/2022 2305 by Audrey Pena RN  Outcome: Progressing  Flowsheets (Taken 7/23/2022 1937)  Discharge to home or other facility with appropriate resources: Identify barriers to discharge with patient and caregiver     Problem: Pain  Goal: Verbalizes/displays adequate comfort level or baseline comfort level  7/23/2022 2305 by Audrey Pena RN  Outcome: Progressing  Flowsheets (Taken 7/23/2022 1937)  Verbalizes/displays adequate comfort level or baseline comfort level:   Encourage patient to monitor pain and request assistance   Assess pain using appropriate pain scale   Administer analgesics based on type and severity of pain and evaluate response   Implement non-pharmacological measures as appropriate and evaluate response     Problem: Safety - Adult  Goal: Free from fall injury  7/23/2022 2305 by Audrey Pena RN  Outcome: Progressing  Note: Patient assessed for fall risk; fall precautions initiated. Patient instructed about safety devices. Environment kept free of clutter and adequate lighting provided. Bed locked and in lowest position. Call light within reach. Will continue to monitor. Problem: ABCDS Injury Assessment  Goal: Absence of physical injury  7/23/2022 2305 by Audrey Pena RN  Outcome: Progressing  Flowsheets  Taken 7/23/2022 2305  Absence of Physical Injury: Implement safety measures based on patient assessment  Taken 7/23/2022 2304  Absence of Physical Injury: Implement safety measures based on patient assessment     Problem: Skin/Tissue Integrity  Goal: Absence of new skin breakdown  Description: 1. Monitor for areas of redness and/or skin breakdown  2. Assess vascular access sites hourly  3. Every 4-6 hours minimum:  Change oxygen saturation probe site  4.   Every 4-6 hours:  If on nasal continuous positive airway pressure, respiratory therapy assess nares and determine need for appliance change or resting period. 7/23/2022 2305 by Mike Rendon RN  Outcome: Progressing  Note: Skin assessment completed every shift. Pt assessed for incontinence, appropriate barrier cream applied prn. Pt encouraged to turn/rotate every 2 hours. Assistance provided if pt unable to do so themselves.          Problem: Neurosensory - Adult  Goal: Achieves maximal functionality and self care  7/23/2022 2305 by Mike Rendon RN  Outcome: Progressing  Flowsheets (Taken 7/23/2022 2305)  Achieves maximal functionality and self care:   Monitor swallowing and airway patency with patient fatigue and changes in neurological status   Encourage and assist patient to increase activity and self care with guidance from physical therapy/occupational therapy     Problem: Respiratory - Adult  Goal: Achieves optimal ventilation and oxygenation  7/23/2022 2305 by Mike Rendon RN  Outcome: Progressing  Flowsheets (Taken 7/23/2022 2305)  Achieves optimal ventilation and oxygenation:   Assess for changes in respiratory status   Assess for changes in mentation and behavior   Position to facilitate oxygenation and minimize respiratory effort   Oxygen supplementation based on oxygen saturation or arterial blood gases   Encourage broncho-pulmonary hygiene including cough, deep breathe, incentive spirometry   Assess and instruct to report shortness of breath or any respiratory difficulty     Problem: Cardiovascular - Adult  Goal: Absence of cardiac dysrhythmias or at baseline  7/23/2022 2305 by Mike Rendon RN  Outcome: Progressing  Flowsheets (Taken 7/23/2022 1937)  Absence of cardiac dysrhythmias or at baseline:   Monitor cardiac rate and rhythm   Assess for signs of decreased cardiac output   Administer antiarrhythmia medication and electrolyte replacement as ordered     Problem: Skin/Tissue Integrity - Adult  Goal: Skin integrity remains intact  7/23/2022 2305 by Mike Rendon RN  Outcome: Progressing  Flowsheets  Taken 7/23/2022 2304  Skin Integrity Remains Intact: Monitor for areas of redness and/or skin breakdown  Taken 7/23/2022 1937  Skin Integrity Remains Intact: Monitor for areas of redness and/or skin breakdown     Problem: Musculoskeletal - Adult  Goal: Return mobility to safest level of function  7/23/2022 2305 by Kalpana Plasencia RN  Outcome: Progressing  Flowsheets (Taken 7/23/2022 1937)  Return Mobility to Safest Level of Function:   Assess patient stability and activity tolerance for standing, transferring and ambulating with or without assistive devices   Assist with transfers and ambulation using safe patient handling equipment as needed     Problem: Infection - Adult  Goal: Absence of infection at discharge  7/23/2022 2305 by Kalpana Plasencia RN  Outcome: Progressing  Flowsheets (Taken 7/23/2022 1937)  Absence of infection at discharge:   Assess and monitor for signs and symptoms of infection   Monitor lab/diagnostic results   Monitor all insertion sites i.e., indwelling lines, tubes and drains   Administer medications as ordered   Instruct and encourage patient and family to use good hand hygiene technique     Problem: Metabolic/Fluid and Electrolytes - Adult  Goal: Electrolytes maintained within normal limits  7/23/2022 2305 by Kalpana Plasencia RN  Outcome: Progressing  Flowsheets (Taken 7/23/2022 1937)  Electrolytes maintained within normal limits:   Monitor labs and assess patient for signs and symptoms of electrolyte imbalances   Administer electrolyte replacement as ordered

## 2022-07-24 NOTE — CONSULTS
REASON FOR CONSULTATION/CC: Pneumonia, hypoxia      Consult at request of Ketty Gray, *     PCP: Luis Hernandez MD      Chief Complaint   Patient presents with    Discuss Labs     Pt states that he has CA and  states is on a medication that decreases his K level states an NP in his PCP's office to have lab work, was notified to come here for IV KCL       HISTORY OF PRESENT ILLNESS: Zunilda Harrison is a 45y.o. year old male with a history of small cell lung cancer who presents with worsening shortness of breath, CT concerning for left upper lobe consolidation. Symptoms have been ongoing for greater than 24 hours progressive in nature. While admitted patient started on empiric antibiotics but oxygen requirement has worsened. Also noted to have new pancytopenia this morning concern for possible sepsis      Past Medical History:   Diagnosis Date    Anxiety     Asthma     Hyperlipidemia     Hypertension     Small cell lung cancer ([address] 09/2021    Metastatic with ectopic ACTH production         Past Surgical History:   Procedure Laterality Date    CT NEEDLE BIOPSY LIVER PERCUTANEOUS  9/8/2021    CT NEEDLE BIOPSY LIVER PERCUTANEOUS 9/8/2021 [facility] CT       Family Hx  family history includes COPD in his maternal grandmother; Heart Attack in his father; Heart Attack (age of onset: 39) in his sister; Montey Curtis in his mother; Stomach Cancer in his brother. Social Hx   reports that he quit smoking about 10 months ago. His smoking use included cigarettes. He has a 30.00 pack-year smoking history.  He has never used smokeless tobacco.    Scheduled Meds:   magnesium sulfate  2,000 mg IntraVENous Once    albuterol sulfate HFA  2 puff Inhalation Q4H    diphenhydrAMINE  25 mg Oral Once    acetaminophen  650 mg Oral Once    piperacillin-tazobactam  3,375 mg IntraVENous Q8H    levofloxacin  500 mg IntraVENous Q24H    filgrastim-aafi  480 mcg SubCUTAneous Daily    [Held by provider] furosemide  60 mg IntraVENous BID    [Held by provider] metoprolol tartrate  25 mg Oral BID    [Held by provider] potassium chloride  40 mEq Oral TID    [Held by provider] insulin glargine  30 Units SubCUTAneous QAM AC    ondansetron (ZOFRAN) IVPB  16 mg IntraVENous Daily    [Held by provider] insulin glargine  40 Units SubCUTAneous Nightly    insulin lispro  0-18 Units SubCUTAneous TID WC    insulin lispro  0-9 Units SubCUTAneous Nightly    [Held by provider] aspirin  81 mg Oral Daily    atorvastatin  40 mg Oral Daily    mometasone-formoterol  2 puff Inhalation BID    [Held by provider] NIFEdipine  30 mg Oral BID    [Held by provider] spironolactone  100 mg Oral BID    sodium chloride flush  5-40 mL IntraVENous 2 times per day    enoxaparin  30 mg SubCUTAneous BID       Continuous Infusions:   sodium chloride      sodium chloride      sodium chloride Stopped (07/22/22 1523)    dextrose 100 mL/hr (07/23/22 0333)       PRN Meds:  busPIRone, sodium chloride, sodium chloride, albuterol sulfate HFA, sodium chloride flush, sodium chloride, ondansetron **OR** ondansetron, polyethylene glycol, acetaminophen **OR** acetaminophen, glucose, dextrose bolus **OR** dextrose bolus, glucagon (rDNA), dextrose    ALLERGIES:  Patient has No Known Allergies. REVIEW OF SYSTEMS:  Constitutional: Negative for fever  HENT: Negative for sore throat  Eyes: Negative for redness   Respiratory: Negative for dyspnea, cough  Cardiovascular: Negative for chest pain  Gastrointestinal: Negative for vomiting, diarrhea   Genitourinary: Negative for hematuria   Musculoskeletal: Negative for arthralgias   Skin: Negative for rash  Neurological: Negative for syncope  Hematological: Negative for adenopathy  Psychiatric/Behavorial: Negative for anxiety    Objective:   PHYSICAL EXAM:  Blood pressure 124/68, pulse (!) 117, temperature 98 °F (36.7 °C), temperature source Oral, resp.  rate 28, height 6' 1\" (1.854 m), weight 256 lb 6.3 oz (116.3 kg), SpO2 92 %.'  Gen:  No acute distress. Eyes: PERRL. Anicteric sclera. No conjunctival injection. ENT: No discharge. Posterior oropharynx clear. External appearance of ears and nose normal.  Neck: Trachea midline. No mass   Resp:  No crackles. No wheezes. No rhonchi. No dullness on percussion. CV: Regular rate. Regular rhythm. No murmur or rub. No edema. GI: Soft, Non-tender. Non-distended. +BS  Skin: Warm, dry, w/o erythema. Lymph: No cervical or supraclavicular LAD. M/S: No cyanosis. No clubbing. Neuro:  CN 2-12 tested, no focal neurologic deficit. Moves all extremities  Psych: Awake and alert, Oriented x 3. Judgement and insight appropriate. Mood stable. Data Reviewed:   LABS:  CBC:   Recent Labs     07/24/22  0847 07/24/22  1133   WBC 0.0* 0.1*   HGB 5.1* 4.9*   HCT 14.7* 14.4*   .9* 106.7*   PLT 11* 10*     BMP:   Recent Labs     07/23/22  0417 07/23/22  1545 07/24/22  0555    136 135*   K 3.7 4.2 4.4   CL 99 94* 93*   CO2 33* 30 26   BUN 25* 24* 28*   CREATININE 0.9 1.1 1.5*     LIVER PROFILE: No results for input(s): AST, ALT, LIPASE, BILIDIR, BILITOT, ALKPHOS in the last 72 hours. Invalid input(s): AMYLASE,  ALB  PT/INR:   Recent Labs     07/24/22  1134   PROTIME 17.0*   INR 1.39*     APTT:   Recent Labs     07/24/22  1134   APTT 29.3     UA:No results for input(s): NITRITE, COLORU, PHUR, LABCAST, WBCUA, RBCUA, MUCUS, TRICHOMONAS, YEAST, BACTERIA, CLARITYU, SPECGRAV, LEUKOCYTESUR, UROBILINOGEN, BILIRUBINUR, BLOODU, GLUCOSEU, AMORPHOUS in the last 72 hours. Invalid input(s): KETONESU  No results for input(s): PHART, [id number], PO2ART in the last 72 hours. Radiology Review:  Pertinent images / reports were reviewed as a part of this visit. Chest CT images reviewed personally by me, interpretation as follows:  -Diffuse background centrilobular emphysema, consolidated partially fibrotic changes in the lingula      ECHO 7/15/22  Conclusions      Summary   Normal LV size and wall motion. EF is   60%. Normal diastolic function. Left atrium is of normal size. The right ventricle is normal in size and function. Assessment/Plan:     Acute hypoxemic respiratory failure with SPO2 less than 90% on room air  -Emergently intubated due to severe hypoxemia despite her percent nonrebreather  -Full vent support, LPV, repeat ABG  -X-ray  -Wean supplemental oxygen to goal saturation of >90%    Extensive stage small cell lung cancer  -Follows with [facility]  -Palliative care consult    Pancytopenia-new  -Rechecking labs  -If true is poor prognosis, likely sepsis induced    Left upper lobe pneumonia  -Difficult to discern consolidation versus previous malignancy and changes from radiation  -But patient does have elevated Pro-Gerald and leukopenia so agree with empiric coverage  -[facility] with BAL of the left upper lobe    Wheezing  -Duo nebs Dulera  -IV Solu-Medrol    Pepcid  Peridex  Lovenox    Due to the immediate potential for life-threatening deterioration due to hypoxemic respiratory failure, I spent 38 minutes providing critical care. This time is excluding time spent performing procedures. This note was transcribed using [id number] [employer]. Please disregard any translational errors.     Thank you for the consult    [address]  Pulmonary, Sleep and Critical Care Medicine

## 2022-07-24 NOTE — PROCEDURES
Bronchoscopy Procedure Note    Date of Operation: 7/24/2022    Surgeon: Miracle Jaimes MD    Anesthesia: Propofol and fentanyl infusions    Operation: Flexible fiberoptic bronchoscopy, diagnostic / therapeutic    Estimated Blood Loss: Minimal    Complications: None    Indications and History:  The patient is a 45 y.o. male with small cell lung cancer hypoxemic respiratory failure. Performed emergently for refractory hypoxemia. Description of Procedure: The patient was  identified as 45 y.o.  and the procedure verified as Flexible Fiberoptic Bronchoscopy. A Time Out was held and the above information confirmed. the patient was positioned supine and the bronchoscope was passed through the ETT . The scope was then passed into the trachea. Careful inspection of the tracheal lumen was accomplished. The scope was sequentially passed into all airways.        Endobronchial findings:    Trachea: Normal mucosa  Lin: Normal mucosa  Right main bronchus: Normal mucosa  Right upper lobe bronchus including anterior, apical and posterior until the fourth generation bronchi: Normal mucosa  Right intermedius bronchus: Normal mucosa  Right middle lobe: Normal mucosa, partial collapse  Right lower lobe superior segment uptill 3rd generation bronchi: Normal mucosa  Left main bronchus: Normal mucosa  Left upper lobe including Lingula, anterior segment, and apico-posterior segment through the 4th generation bronchi: Normal mucosa  Left lower lobe basilar and superior segments uptill 4th generation bronchi: Normal mucosa    Specimens:    BAL of the left upper lobe with 20 mL in and ~20 mL out      The Patient remains in the ICU in critical condition        Miracle Jaimes MD

## 2022-07-24 NOTE — PROGRESS NOTES
Patient with increasing oxygen requirement. Case discussed with Dr. Jere Savage. Plan to transfer the patient to the ICU. Hold off on ABG given profound thrombocytopenia.   Electronically signed by Amelia Christie MD on 7/24/2022 at 1:18 PM

## 2022-07-24 NOTE — PROCEDURES
Kee Gilmore is a 45 y.o. male patient. 1. Hypokalemia      Past Medical History:   Diagnosis Date    Anxiety     Asthma     Hyperlipidemia     Hypertension     Small cell lung cancer ([address] 09/2021    Metastatic with ectopic ACTH production     Blood pressure 110/61, pulse (!) 126, temperature 98.9 °F (37.2 °C), temperature source Axillary, resp. rate 16, height 6' 1\" (1.854 m), weight 256 lb 6.3 oz (116.3 kg), SpO2 94 %. Intubation    Date/Time: 7/24/2022 3:09 PM  Performed by: Aniceto Sorenson MD  Authorized by: Aniceto Sorenson MD   Consent: The procedure was performed in an emergent situation. Risks and benefits: risks, benefits and alternatives were discussed  Consent given by: patient  Patient understanding: patient states understanding of the procedure being performed  Patient consent: the patient's understanding of the procedure matches consent given  Procedure consent: procedure consent matches procedure scheduled  Test results: test results available and properly labeled  Imaging studies: imaging studies available  Required items: required blood products, implants, devices, and special equipment available  Patient identity confirmed: verbally with patient and arm band  Time out: Immediately prior to procedure a \"time out\" was called to verify the correct patient, procedure, equipment, support staff and site/side marked as required.   Indications: respiratory distress, respiratory failure, hypoxemia and airway protection  Intubation method: fiberoptic oral  Patient status: paralyzed (RSI)  Preoxygenation: BVM  Pretreatment medications: midazolam  Sedatives: etomidate  Paralytic: rocuronium  Tube size: 7.5 mm  Tube type: cuffed  Number of attempts: 1  Cricoid pressure: no  Cords visualized: yes  Post-procedure assessment: chest rise and CO2 detector  Breath sounds: equal and absent over the epigastrium  Cuff inflated: yes  ETT to lip: 24 cm  Tube secured with: ETT nowak  Chest x-ray interpreted by me.  Chest x-ray findings: endotracheal tube in appropriate position  Patient tolerance: patient tolerated the procedure well with no immediate complications  Comments: EBL less than 50 cc.   Placement also confirmed with fiberoptic bronchoscope         Mar Del Real MD  7/24/2022

## 2022-07-24 NOTE — PROGRESS NOTES
[facility]  Oncology Hematology Care  Progress Note      SUBJECTIVE:   Late entry   Events noted. Feels weak. Has  shortness of breath.   No nausea or mouth sores  Appetite is not that great  Has swelling in the legs    OBJECTIVE      Medications    Current Facility-Administered Medications: busPIRone (BUSPAR) tablet 5 mg, 5 mg, Oral, TID PRN  0.9 % sodium chloride infusion, , IntraVENous, PRN  0.9 % sodium chloride infusion, , IntraVENous, PRN  diphenhydrAMINE (BENADRYL) tablet 25 mg, 25 mg, Oral, Once  acetaminophen (TYLENOL) tablet 650 mg, 650 mg, Oral, Once  ipratropium-albuterol (DUONEB) nebulizer solution 1 ampule, 1 ampule, Inhalation, Q4H PRN  propofol 1000 MG/100ML injection, , ,   ipratropium-albuterol (DUONEB) nebulizer solution 1 ampule, 1 ampule, Inhalation, 6 times per day  methylPREDNISolone sodium (SOLU-MEDROL) injection 40 mg, 40 mg, IntraVENous, Daily  chlorhexidine (PERIDEX) 0.12 % solution 15 mL, 15 mL, Mouth/Throat, BID  famotidine (PEPCID) 20 mg in sodium chloride (PF) 10 mL injection, 20 mg, IntraVENous, BID  fentaNYL (SUBLIMAZE) 1,000 mcg in sodium chloride 0.9% 100 mL infusion,  mcg/hr, IntraVENous, Continuous  propofol injection, 5-50 mcg/kg/min, IntraVENous, Continuous  piperacillin-tazobactam (ZOSYN) 3,375 mg in dextrose 5 % 100 mL IVPB extended infusion (mini-bag), 3,375 mg, IntraVENous, Q8H  levoFLOXacin (LEVAQUIN) 500 MG/100ML infusion 500 mg, 500 mg, IntraVENous, Q24H  filgrastim-aafi (NIVESTYM) injection 480 mcg, 480 mcg, SubCUTAneous, Daily  [Held by provider] furosemide (LASIX) injection 60 mg, 60 mg, IntraVENous, BID  [Held by provider] metoprolol tartrate (LOPRESSOR) tablet 25 mg, 25 mg, Oral, BID  [Held by provider] potassium chloride (KLOR-CON) extended release tablet 40 mEq, 40 mEq, Oral, TID  [Held by provider] insulin glargine (LANTUS) injection vial 30 Units, 30 Units, SubCUTAneous, QAM AC  ondansetron (ZOFRAN) 16 mg in dextrose 5 % 50 mL IVPB, 16 mg, IntraVENous, Daily  [Held by provider] insulin glargine (LANTUS) injection vial 40 Units, 40 Units, SubCUTAneous, Nightly  insulin lispro (HUMALOG) injection vial 0-18 Units, 0-18 Units, SubCUTAneous, TID WC  insulin lispro (HUMALOG) injection vial 0-9 Units, 0-9 Units, SubCUTAneous, Nightly  [Held by provider] aspirin EC tablet 81 mg, 81 mg, Oral, Daily  atorvastatin (LIPITOR) tablet 40 mg, 40 mg, Oral, Daily  albuterol sulfate HFA (PROVENTIL;VENTOLIN;PROAIR) 108 (90 Base) MCG/ACT inhaler 2 puff, 2 puff, Inhalation, Q4H PRN  mometasone-formoterol (DULERA) 200-5 MCG/ACT inhaler 2 puff, 2 puff, Inhalation, BID  [Held by provider] NIFEdipine (PROCARDIA XL) extended release tablet 30 mg, 30 mg, Oral, BID  [Held by provider] spironolactone (ALDACTONE) tablet 100 mg, 100 mg, Oral, BID  sodium chloride flush 0.9 % injection 5-40 mL, 5-40 mL, IntraVENous, 2 times per day  sodium chloride flush 0.9 % injection 5-40 mL, 5-40 mL, IntraVENous, PRN  0.9 % sodium chloride infusion, , IntraVENous, PRN  ondansetron (ZOFRAN-ODT) disintegrating tablet 4 mg, 4 mg, Oral, Q8H PRN **OR** ondansetron (ZOFRAN) injection 4 mg, 4 mg, IntraVENous, Q6H PRN  polyethylene glycol (GLYCOLAX) packet 17 g, 17 g, Oral, Daily PRN  acetaminophen (TYLENOL) tablet 650 mg, 650 mg, Oral, Q6H PRN **OR** acetaminophen (TYLENOL) suppository 650 mg, 650 mg, Rectal, Q6H PRN  enoxaparin Sodium (LOVENOX) injection 30 mg, 30 mg, SubCUTAneous, BID  glucose chewable tablet 16 g, 4 tablet, Oral, PRN  dextrose bolus 10% 125 mL, 125 mL, IntraVENous, PRN **OR** dextrose bolus 10% 250 mL, 250 mL, IntraVENous, PRN  glucagon (rDNA) injection 1 mg, 1 mg, IntraMUSCular, PRN  dextrose 5 % solution, 100 mL/hr, IntraVENous, PRN  Physical    VITALS:  /61   Pulse (!) 123   Temp 98.9 °F (37.2 °C) (Axillary)   Resp 16   Ht 6' 1\" (1.854 m)   Wt 256 lb 6.3 oz (116.3 kg)   SpO2 95%   BMI 33.64 kg/m²   TEMPERATURE:  Current - Temp: 98.9 °F (37.2 °C);  Max - Temp  Av.5 °F (36.9 °C) Min: 97.8 °F (36.6 °C)  Max: 99.3 °F (37.4 °C)  PULSE OXIMETRY RANGE: SpO2  Av.4 %  Min: 89 %  Max: 100 %  24HR INTAKE/OUTPUT:    Intake/Output Summary (Last 24 hours) at 2022 1518  Last data filed at 2022 0549  Gross per 24 hour   Intake 2689.5 ml   Output 1800 ml   Net 889.5 ml         Conscious alert and oriented. Pallor is present. No mucositis  Neck is supple. Few basal crackles were heard. CVS S1-S2 normal.  Abdomen is mildly distended. Extremities 2+ edema noted. Data      Recent Labs     22  0847 22  1133   WBC 0.0* 0.1*   HGB 5.1* 4.9*   HCT 14.7* 14.4*   PLT 11* 10*   .9* 106.7*          Recent Labs     22  0417 22  1545 22  0555    136 135*   K 3.7 4.2 4.4   CL 99 94* 93*   CO2 33* 30 26   BUN 25* 24* 28*   CREATININE 0.9 1.1 1.5*       No results for input(s): AST, ALT, ALB, BILIDIR, BILITOT, ALKPHOS in the last 72 hours. Magnesium:    Lab Results   Component Value Date/Time    MG 1.90 2022 05:55 AM    MG 2.00 2022 03:45 PM    MG 1.60 2022 04:17 AM       Imaging CT HEAD WO CONTRAST    Result Date: 2022  EXAMINATION: CT OF THE HEAD WITHOUT CONTRAST  2022 6:43 pm TECHNIQUE: CT of the head was performed without the administration of intravenous contrast. Automated exposure control, iterative reconstruction, and/or weight based adjustment of the mA/kV was utilized to reduce the radiation dose to as low as reasonably achievable. COMPARISON: MRI brain 2021 HISTORY: ORDERING SYSTEM PROVIDED HISTORY: Unwitnessed fall TECHNOLOGIST PROVIDED HISTORY: Reason for exam:->Unwitnessed fall Has a \"code stroke\" or \"stroke alert\" been called? ->No Reason for Exam: Unwitnessed fall FINDINGS: BRAIN/VENTRICLES: No evidence of acute infarct or acute intracranial hemorrhage. 2 new sub 5 mm subependymal nodules along the lateral margin of the right lateral ventricle.   Scattered small parenchymal calcifications including a linear subcortical calcification involving the high posterior right frontal lobe. No hydrocephalus or extra-axial fluid collection. Midline maintained. Basal cisterns patent. ORBITS: The visualized portion of the orbits demonstrate no acute abnormality. SINUSES: The visualized paranasal sinuses and mastoid air cells demonstrate no acute abnormality. SOFT TISSUES/SKULL:  No acute abnormality of the visualized skull or soft tissues. 1. No acute intracranial abnormality. 2. Nonspecific subependymal nodules involving the right lateral ventricle and scattered parenchymal calcifications. MRI of the brain is recommended for further evaluation. XR CHEST PORTABLE    Result Date: 7/18/2022  EXAMINATION: ONE XRAY VIEW OF THE CHEST 7/18/2022 4:37 am COMPARISON: Chest radiograph 07/15/2022 at 11:47 p.m.; chest, abdomen, and pelvis CT 07/08/2022 HISTORY: ORDERING SYSTEM PROVIDED HISTORY: hypokalemia TECHNOLOGIST PROVIDED HISTORY: Reason for exam:->hypokalemia Reason for Exam: hypokalemia FINDINGS: Decreased lung volumes. Persistent linear opacities in each lung base. Suspected new right basilar airspace opacity with possible overlying trace right pleural effusion. Increased diffuse interstitial prominence with indistinct pulmonary vasculature no definite interlobular septal thickening. No definite findings of pneumothorax or left pleural effusion. Normal mediastinal and cardiac contours. 1. Suspected new right basilar airspace opacity potentially due in part to passive atelectasis given possible overlying trace right pleural effusion. Superimposed pneumonia and aspiration are not excluded. 2. Persistent bibasilar atelectasis and/or scarring. 3. Increased interstitial prominence due to pulmonary vascular congestion and/or central vascular crowding.      XR CHEST PORTABLE    Result Date: 7/16/2022  EXAMINATION: ONE XRAY VIEW OF THE CHEST 7/15/2022 11:48 pm COMPARISON: 07/15/2022 HISTORY: ORDERING SYSTEM PROVIDED HISTORY: lung cancer TECHNOLOGIST PROVIDED HISTORY: Reason for exam:->lung cancer Reason for Exam: lung cancer FINDINGS: The lungs are without acute focal process. There is no effusion or pneumothorax. The cardiomediastinal silhouette is without acute process. The osseous structures are without acute process. No acute process. XR CHEST PORTABLE    Result Date: 7/15/2022  EXAMINATION: ONE XRAY VIEW OF THE CHEST 7/15/2022 7:22 pm COMPARISON: 07/08/2022, 09/13/2021 HISTORY: ORDERING SYSTEM PROVIDED HISTORY: other TECHNOLOGIST PROVIDED HISTORY: Reason for exam:->other Reason for Exam: abnormal labs FINDINGS: The lungs are without acute focal process. There is no effusion or pneumothorax. The cardiomediastinal silhouette is without acute process. The osseous structures are without acute process. No acute process. NM BONE SCAN WHOLE BODY    Result Date: 7/8/2022  EXAMINATION: WHOLE BODY BONE SCAN  7/8/2022 TECHNIQUE: The patient was injected intravenously with 27.5 mCi of 99 mTc MDP and scintigraphy of the entire skeleton was performed approximately three hours later. COMPARISON: 03/18/2022 HISTORY: ORDERING SYSTEM PROVIDED HISTORY: Malignant neoplasm of right main bronchus [facility]) TECHNOLOGIST PROVIDED HISTORY: Order: Bone scan, total body Order Instructions: [facility] along with CT please lower back and hip pain Associated problems: Small cell carcinoma of lung (disorder) * (C34.01) Reason for Exam: staging prostate Ca Relevant Medical/Surgical History: ct today FINDINGS: New focal activity is demonstrated along the superior aspect of the right iliac crest.  Developing activity is seen at the lateral right 11th rib and posterior right 5th rib. Lesions are seen which are more conspicuous within the proximal right femur, anterior right iliac bone, medial left iliac bone, sacrum, lumbar spine, upper thoracic spine, proximal left humerus.  Activity at the shoulders, sternoclavicular joints, hips, knees, ankles, feet, likely degenerative. Physiologic activity is seen within the kidneys and urinary bladder. Urinary contamination is seen between the upper thighs. Several new metastatic lesions are seen as compared to prior exam dated 03/18/2022. Additional lesions are more intense as compared to prior which could relate to flare effect if there has been intervening therapy. CT CHEST ABDOMEN PELVIS W CONTRAST    Result Date: 7/8/2022  EXAMINATION: CT OF THE CHEST, ABDOMEN, AND PELVIS WITH CONTRAST 7/8/2022 8:35 am TECHNIQUE: CT of the chest, abdomen and pelvis was performed with the administration of intravenous contrast. Multiplanar reformatted images are provided for review. Automated exposure control, iterative reconstruction, and/or weight based adjustment of the mA/kV was utilized to reduce the radiation dose to as low as reasonably achievable. COMPARISON: 03/18/2022 HISTORY: ORDERING SYSTEM PROVIDED HISTORY: Malignant neoplasm of right main bronchus [facility]) TECHNOLOGIST PROVIDED HISTORY: Order: CT chest/abdomen/pelvis w/ contrast Order Instructions: STAT Creatinine & BUN prior to CT Scan per facility protocol [facility] Associated problems: Small cell carcinoma of lung (disorder) * (C34.01) Additional Contrast?->Oral STAT Creatinine as needed:->Yes Reason for Exam: Malignant neoplasm of right main bronchus FINDINGS: Chest: Mediastinum: The central airways are patent. There has been interval enlargement of several mediastinal lymph nodes. Index prevascular node measures 1.3 cm on the current exam compared with 9 mm prior. Right paratracheal node measures 1.5 cm on the current exam compared with 1.4 cm prior. Precarinal lymph node measures 1.4 cm on the current exam compared with 0.6 cm prior. AP window node measures 12 mm currently compared with 5 mm prior. There is a new 1.7 cm lymph node within the right infrahilar region. Lungs/pleura: Mild emphysematous changes are present.   A few tiny non calcified right-sided pulmonary nodules are unchanged in size, number and appearance. The pleural spaces are clear. Soft Tissues/Bones: Numerous sclerotic lesions scattered throughout the thoracic spine and ribs are stable in size, number and appearance. Abdomen/Pelvis: Organs: There has been interval increase in size and number of numerous hypodense lesions scattered throughout the liver. Index lesion within the left hepatic lobe measures 3.2 cm on the current exam compared with 2.4 cm prior. Index lesion within the caudate lobe measures 2.4 cm compared with 1.8 cm prior. There has been slight interval enlargement of the left adrenal nodule which measures 1.6 cm on the current exam.  A few tiny bilateral nonobstructing intrarenal calculi are unchanged. The remainder of the GI/Bowel: There is no bowel dilatation, wall thickening or obstruction. Pelvis: The bladder and prostate are unremarkable. Peritoneum/Retroperitoneum: There is no free air or free fluid. There has been interval development of a 2 cm lymph node within the posterior gastrohepatic ligament. Bones/Soft Tissues: This numerous small osteoblastic lesions scattered throughout the lumbosacral spine and pelvis are unchanged in size, number and appearance. 1. Mild interval progression of right hilar/mediastinal favian metastatic disease. 2. Mild interval progression of hepatic and left adrenal metastatic disease. 3. Developing upper abdominal favian metastatic disease. 4. Stable disseminated osteoblastic disease.      MRI BRAIN W WO CONTRAST    Result Date: 7/18/2022  EXAMINATION: MRI OF THE BRAIN WITHOUT AND WITH CONTRAST  7/18/2022 9:56 am TECHNIQUE: Multiplanar multisequence MRI of the head/brain was performed without and with the administration of intravenous contrast. COMPARISON: MRI Brain 9/11/2021 and CT head 7/17/2022 HISTORY: ORDERING SYSTEM PROVIDED HISTORY: lesions seen on haed ct--known smll cell cancer TECHNOLOGIST PROVIDED HISTORY: Reason for exam:->lesions seen on head CT--known smll cell cancer Reason for Exam: lesions seen on head CT - known small cell cnacer FINDINGS: INTRACRANIAL STRUCTURES/VENTRICLES:  There is no acute infarct. No mass effect or midline shift. No evidence of an acute intracranial hemorrhage. The ventricles and sulci are normal in size and configuration. The sellar/suprasellar regions appear unremarkable. The normal signal voids within the major intracranial vessels appear maintained. Multiple tiny enhancing nodules are identified within the periventricular and subcortical white matter of both cerebral hemispheres. The most conspicuous lesion is identified within the left frontal operculum measuring 5 mm. Additional nodules identified within the left cerebral peduncle measuring 5 mm. Small metastasis is suspected within the left side of the vermis. ORBITS: The visualized portion of the orbits demonstrate no acute abnormality. SINUSES: The visualized paranasal sinuses and mastoid air cells demonstrate no acute abnormality. BONES/SOFT TISSUES: The bone marrow signal intensity appears normal. The soft tissues demonstrate no acute abnormality. Multiple new small enhancing nodules within the periventricular and subcortical white matter of both cerebral hemispheres that is suspicious for metastatic disease. The largest lesion is located within the left frontal operculum and within the left cerebral peduncle measuring 5 mm. Additional small suspected metastasis within the left side of the cerebellar vermis.        Problem List  Patient Active Problem List   Diagnosis    Pure hypercholesterolemia    Anxiety    Essential hypertension    Moderate persistent asthma with acute exacerbation    Metastatic cancer (HCC)    Hypokalemia    Acute respiratory failure with hypoxia (HCC)    Abnormal CT of the chest    COPD exacerbation (HCC)    Lung nodules    Tobacco abuse    Small cell lung cancer ([facility] [address]    Ectopic ACTH syndrome ([facility] [address]    Hyperglycemia    Syncope    NSVT (nonsustained ventricular tachycardia) (HCC)    Hypervolemia       ASSESSMENT AND PLAN      Relapsed small cell   Completed topotecan on 7/22/2020  Filgrastim     Pancytopenia:    -Severe  -Due to chemo  -On filgrastim  -Will order 2 units of PRBCs  -We will order 1 unit of platelets. Respiratory failure    Events noted  Patient transferred to ICU, had bronchoscopy.   On levofloxacin and Jose Calero MD

## 2022-07-24 NOTE — PROGRESS NOTES
All critical lab results reported to Najma Phipps MD. New orders placed.     Electronically signed by Luci Smith RN on 7/24/2022 at 12:13 PM

## 2022-07-24 NOTE — PROGRESS NOTES
[facility] Internal Medicine Note      Chief Complaint: I just want to give my sister a hug and then everything will be ok    Subjective/Interval History:    Patient sitting up in bed today stating he is feeling a little better. One of his sisters is at the bedside and is waiting for his other sister to come. He states that after 10:00 today, once he can give his sister thor, everything will be fine. Patient does seem a little confused today but states he is feeling all right. Oxygen requirement has increased to 15 L. Weight is up substantially from yesterday but not certain that this bed scale weight is accurate. I/O's 900 mL positive since yesterday. Lab is drawn CBC several times and the results are very abnormal compared to yesterday. Hematology has been notified and the CBC will be drawn 1 more time. Renal function is a little worse today. Blood sugar is up dramatically this morning compared to last night, his D10 has been held. No chest pain or shortness breath. No cough or sputum. No nausea, vomiting, diarrhea. No abdominal pain. No dysuria. The remainder of the review of systems is negative. PMH, PSH, FH/SH reviewed and unchanged as documented in the H&P dated 22    Medication list reviewed    Objective:    /68   Pulse (!) 108   Temp 98 °F (36.7 °C) (Oral)   Resp 26   Ht 6' 1\" (1.854 m)   Wt 256 lb 6.3 oz (116.3 kg)   SpO2 (!) 89%   BMI 33.64 kg/m²   Temp  Av.3 °F (36.8 °C)  Min: 97.8 °F (36.6 °C)  Max: 99.3 °F (37.4 °C)    Patient seems a bit confused today. RRR  Chest-patient's lungs are fairly clear throughout but he does not have a great inspiratory effort. I am still unable to hear his pneumonia on lung exam.  Abd-BS+, soft, NTND  Ext- 3-4+ lower extremity edema noted. Edema remains unchanged today.     The Following Labs Were Reviewed Today:    Recent Results (from the past 24 hour(s))   POCT Glucose    Collection Time: 22 11:37 AM   Result Value Ref Range    POC Glucose 103 (H) 70 - 99 mg/dl    Performed on ACCU-CHEK    Basic Metabolic Panel    Collection Time: 07/23/22  3:45 PM   Result Value Ref Range    Sodium 136 136 - 145 mmol/L    Potassium 4.2 3.5 - 5.1 mmol/L    Chloride 94 (L) 99 - 110 mmol/L    CO2 30 21 - 32 mmol/L    Anion Gap 12 3 - 16    Glucose 122 (H) 70 - 99 mg/dL    BUN 24 (H) 7 - 20 mg/dL    Creatinine 1.1 0.9 - 1.3 mg/dL    GFR Non-African American >60 >60    GFR African American >60 >60    Calcium 7.5 (L) 8.3 - 10.6 mg/dL   Magnesium    Collection Time: 07/23/22  3:45 PM   Result Value Ref Range    Magnesium 2.00 1.80 - 2.40 mg/dL   POCT Glucose    Collection Time: 07/23/22  4:43 PM   Result Value Ref Range    POC Glucose 138 (H) 70 - 99 mg/dl    Performed on ACCU-CHEK    POCT Glucose    Collection Time: 07/23/22  8:45 PM   Result Value Ref Range    POC Glucose 130 (H) 70 - 99 mg/dl    Performed on ACCU-CHEK    Magnesium    Collection Time: 07/24/22  5:55 AM   Result Value Ref Range    Magnesium 1.90 1.80 - 2.40 mg/dL   Basic Metabolic Panel    Collection Time: 07/24/22  5:55 AM   Result Value Ref Range    Sodium 135 (L) 136 - 145 mmol/L    Potassium 4.4 3.5 - 5.1 mmol/L    Chloride 93 (L) 99 - 110 mmol/L    CO2 26 21 - 32 mmol/L    Anion Gap 16 3 - 16    Glucose 240 (H) 70 - 99 mg/dL    BUN 28 (H) 7 - 20 mg/dL    Creatinine 1.5 (H) 0.9 - 1.3 mg/dL    GFR Non- 49 (A) >60    GFR  59 (A) >60    Calcium 7.7 (L) 8.3 - 10.6 mg/dL   SPECIMEN REJECTION    Collection Time: 07/24/22  7:01 AM   Result Value Ref Range    Rejected Test cbcwd     Reason for Rejection see below    POCT Glucose    Collection Time: 07/24/22  7:36 AM   Result Value Ref Range    POC Glucose 350 (H) 70 - 99 mg/dl    Performed on ACCU-CHEK    SPECIMEN REJECTION    Collection Time: 07/24/22  7:53 AM   Result Value Ref Range    Rejected Test CBCWD     Reason for Rejection see below    CBC with Auto Differential    Collection Time: 07/24/22 8:47 AM   Result Value Ref Range    RBC 1.41 (L) 4.20 - 5.90 M/uL    Hemoglobin 5.1 (LL) 13.5 - 17.5 g/dL    Hematocrit 14.7 (LL) 40.5 - 52.5 %    .9 (H) 80.0 - 100.0 fL    MCH 35.9 (H) 26.0 - 34.0 pg    MCHC 34.6 31.0 - 36.0 g/dL    RDW 14.8 12.4 - 15.4 %    Platelets 11 (LL) 421 - 450 K/uL    MPV 8.1 5.0 - 10.5 fL   POCT Glucose    Collection Time: 07/24/22  9:31 AM   Result Value Ref Range    POC Glucose 326 (H) 70 - 99 mg/dl    Performed on ACCU-[facility]      CT scan reviewed. ASSESSMENT/PLAN:      Principal Problem:    Small cell lung cancer-chemotherapy treatment now has been complicated by an acute pneumonia in his left upper lobe. CBC very abnormal today. Awaiting repeat again. Active Problems:    Left upper lobe pneumonia with acute respiratory failure with hypoxia-day #2 of Zosyn and Levaquin. Oxygen requirement increased. Repeat chest x-ray today and consult pulmonary. Wide-complex tachycardia-none further on telemetry today. Continue to supplement potassium and monitor magnesium levels as needed. Appreciate cardiology input. Hypokalemia-potassium better today. Plan to repeat again at 4 PM.    Hypomagnesemia-magnesium a little low today. Supplement again. Hyperglycemia-patient once again hyperglycemic. Stop D10 and use sliding scale only for now. Restart basal insulin if needed. Ectopic ACTH syndrome -blood pressures continuing to run relatively low. Holding nifedipine. Renal function is worse today. Given relatively low blood pressures we will hold Lasix and Aldactone and repeat renal function this afternoon. Certainly still quite volume overloaded.       Nathan Bowie MD, FACP  10:42 AM  7/24/2022

## 2022-07-25 NOTE — PROGRESS NOTES
Comprehensive Nutrition Assessment    Type and Reason for Visit:  Reassess, Consult (TF ordering and management)    Nutrition Recommendations/Plan:   Start TF. Recommend starting Vital HP @ 10 ml/hr +6 Proteinex (adjusted for propofol). Monitor for changes in propofol dose to adjust TF, currently 34.9 mL/hr. Malnutrition Assessment:  Malnutrition Status:  No malnutrition (07/22/22 1502)      Nutrition Assessment:    Follow-up/TF order and management. Pt intubated 7/24 d/t severe hypoxemia. Sedated with Fent and propyfol 34.9 ml/hr providing 921 kcal daily. No pressors. NG in place. Pt with EDGARDO renal function declining. Will start TF. Nutrition Related Findings:    BM 7/24. +2 BLE and BUE edema; Wound Type: None       Current Nutrition Intake & Therapies:    Average Meal Intake: NPO  Average Supplements Intake: None Ordered  No diet orders on file  Current Tube Feeding (TF) Recommendations:   Goal TF & Flush Orders Provides: Vital HP @ 10 mL/hr +6 Proteinex (adjusted for propyfol dose) provides 824 kcal/day, 173 g PRO, 617 mlTV, 167 ml free water    Anthropometric Measures:  Height: 6' 1.25\" (186.1 cm)  Ideal Body Weight (IBW): 186 lbs (85 kg)    Admission Body Weight: 238 lb (108 kg)  Current Body Weight: 239 lb (108.4 kg), 128.5 % IBW. Weight Source: Bed Scale  Current BMI (kg/m2): 31.3                          BMI Categories: Obese Class 1 (BMI 30.0-34. 9)    Estimated Daily Nutrient Needs:  Energy Requirements Based On: Kcal/kg  Weight Used for Energy Requirements: Current  Energy (kcal/day): 1199 - 1526 kcal/day (11 - 14 kcal/kg ABW)  Weight Used for Protein Requirements: Ideal  Protein (g/day): 168 g/day (2 g/kg IBW)     Fluid (ml/day): Per provider    Nutrition Diagnosis:   Inadequate oral intake related to impaired respiratory function as evidenced by intubation    Nutrition Interventions:   Food and/or Nutrient Delivery: Start Tube Feeding  Nutrition Education/Counseling: Education not indicated  Coordination of Nutrition Care: Continue to monitor while inpatient       Goals:     Goals: Tolerate nutrition support at goal rate, Initiate nutrition support, within 2 days       Nutrition Monitoring and Evaluation:   Behavioral-Environmental Outcomes: None Identified  Food/Nutrient Intake Outcomes: Enteral Nutrition Intake/Tolerance  Physical Signs/Symptoms Outcomes: Biochemical Data, Nutrition Focused Physical Findings, Skin, Weight, Fluid Status or Edema    Discharge Planning:     Too soon to determine     [address]: [phone]

## 2022-07-25 NOTE — CONSULTS
Nutrition Note    See previous RD note, consult completed    Electronically signed by Jamaal Carrasco RD, LD on 7/25/22 at 9:49 AM EDT    Contact: [phone]

## 2022-07-25 NOTE — PROGRESS NOTES
Pulmonary Progress Note    Date of Admission: 7/15/2022   LOS: 10 days       CC:  Chief Complaint   Patient presents with    Discuss Labs     Pt states that he has CA and  states is on a medication that decreases his K level states an NP in his PCP's office to have lab work, was notified to come here for IV KCL        Subjective:  sedated    ROS:   Unable to assess        Assessment:          Plan: This note may have been transcribed using [id number] [employer]. Please disregard any translational errors. Hospital Day: 10     Mechanical Vent  Increased peep to 10  Hypoxemia appears to be out of proportion to chest X-ray finding  CTPA negative for PE  Echo normal.  Ordered limited echo to assess bubble / shunting     DM   Lantus   Sliding scale insulin       Volume status / EDGARDO   10L positive   Cr increasing. Golden in place  Lasix      Pancytopenia   Hem / onc following       Pneumonia  Bronchoscopy completed. Levaquin / Zosyn    Wheezing   Duo nebs  Dulera   Solumedrol        Prophylaxis  - GI - pepcid    - DVT - scd with low plt  - VAP - peridex  - C. Diff -    - Nasal Decolonization -      Nutrition  - No diet orders on file  -   Intake/Output Summary (Last 24 hours) at 7/25/2022 0841  Last data filed at 7/25/2022 0823  Gross per 24 hour   Intake 2840.51 ml   Output 1322 ml   Net 1518.51 ml       Mobility   sedated    Access  Arterial      PICC      PICC Double Lumen 07/19/22 Left Brachial (Active)   Central Line Being Utilized Yes 07/25/22 0600   Criteria for Appropriate Use Irritant/vesicant medication 07/25/22 0600   Site Assessment Clean, dry & intact 07/25/22 0600   Phlebitis Assessment No symptoms 07/25/22 0600   Infiltration Assessment 0 07/25/22 0600   Extremity Circumference (cm) 33 cm 07/25/22 0600   External Catheter Length (cm) 0 cm 07/25/22 0600   Proximal Lumen Color/Status Red; Infusing 07/25/22 0600   Distal Lumen Color/Status Purple; Infusing 07/25/22 0600   Line Care Connections checked and tightened 07/25/22 0600   Alcohol Cap Used Yes 07/25/22 0600   Date of Last Dressing Change 07/25/22 07/25/22 0600   Dressing Type Transparent; Antimicrobial;Securing device 07/25/22 0600   Dressing Status New dressing applied;Clean, dry & intact 07/25/22 0600   Dressing Intervention New 07/25/22 0600   Number of days: 6        CVC                  I spent 45 minutes of critical care time with this patient excluding any procedures. Data:        PHYSICAL EXAM:   Blood pressure (!) 119/58, pulse (!) 116, temperature 97.9 °F (36.6 °C), temperature source Axillary, resp. rate 25, height 6' 1\" (1.854 m), weight 239 lb 13.8 oz (108.8 kg), SpO2 (!) 84 %.'  Body mass index is 31.47 kg/m². Gen: No distress. ENT:   Resp: No accessory muscle use. No crackles. No wheezes. No rhonchi. CV: Regular rate. Regular rhythm. No murmur or rub. No edema. Skin: Warm, dry, normal texture and turgor. No nodule on exposed extremities. M/S: No cyanosis. No clubbing. No joint deformity.   Psych: sedated      Medications:    Scheduled Meds:   insulin lispro  0-16 Units SubCUTAneous Q4H    calcium gluconate-NaCl  1,000 mg IntraVENous Once    [START ON 7/26/2022] famotidine (PEPCID) injection  20 mg IntraVENous Daily    levofloxacin  750 mg IntraVENous Q48H    ipratropium-albuterol  1 ampule Inhalation 6 times per day    methylPREDNISolone  40 mg IntraVENous Daily    chlorhexidine  15 mL Mouth/Throat BID    piperacillin-tazobactam  3,375 mg IntraVENous Q8H    filgrastim-aafi  480 mcg SubCUTAneous Daily    [Held by provider] furosemide  60 mg IntraVENous BID    [Held by provider] metoprolol tartrate  25 mg Oral BID    [Held by provider] potassium chloride  40 mEq Oral TID    insulin glargine  30 Units SubCUTAneous QAM AC    [Held by provider] insulin glargine  40 Units SubCUTAneous Nightly    [Held by provider] aspirin  81 mg Oral Daily    atorvastatin  40 mg Oral Daily    mometasone-formoterol  2 puff Inhalation BID [Held by provider] NIFEdipine  30 mg Oral BID    [Held by provider] spironolactone  100 mg Oral BID    sodium chloride flush  5-40 mL IntraVENous 2 times per day    [Held by provider] enoxaparin  30 mg SubCUTAneous BID       Continuous Infusions:   sodium chloride      fentaNYL 75 mcg/hr (07/25/22 0609)    propofol 50 mcg/kg/min (07/25/22 0609)    sodium chloride Stopped (07/22/22 1523)    dextrose 100 mL/hr (07/23/22 0333)       PRN Meds:  sodium chloride, busPIRone, ipratropium-albuterol, albuterol sulfate HFA, sodium chloride flush, sodium chloride, ondansetron **OR** ondansetron, polyethylene glycol, acetaminophen **OR** acetaminophen, glucose, dextrose bolus **OR** dextrose bolus, glucagon (rDNA), dextrose    Labs reviewed:  CBC:   Recent Labs     07/24/22  1133 07/25/22  0221 07/25/22  0424   WBC 0.1* 0.0* 0.0*   HGB 4.9* 6.3* 6.3*   HCT 14.4* 15.6* 17.4*   .7* 98.4 98.2   PLT 10* 13* 11*     BMP:   Recent Labs     07/24/22  0555 07/24/22  1538 07/25/22  0424   * 137 137   K 4.4 4.3 5.1   CL 93* 96* 96*   CO2 26 26 28   BUN 28* 33* 44*   CREATININE 1.5* 1.6* 2.0*     LIVER PROFILE: No results for input(s): AST, ALT, LIPASE, BILIDIR, BILITOT, ALKPHOS in the last 72 hours. Invalid input(s): AMYLASE,  ALB  PT/INR:   Recent Labs     07/24/22  1134   PROTIME 17.0*   INR 1.39*     APTT:   Recent Labs     07/24/22  1134   APTT 29.3     UA:No results for input(s): NITRITE, COLORU, PHUR, LABCAST, WBCUA, RBCUA, MUCUS, TRICHOMONAS, YEAST, BACTERIA, CLARITYU, SPECGRAV, LEUKOCYTESUR, UROBILINOGEN, BILIRUBINUR, BLOODU, GLUCOSEU, AMORPHOUS in the last 72 hours. Invalid input(s): Elizabeth Mere  No results for input(s): PH, PCO2, PO2 in the last 72 hours. Cx:      Films:  Radiology Review:  Pertinent images / reports were reviewed as a part of this visit. CT Chest w/ contrast: No results found for this or any previous visit.       CT Chest w/o contrast: No results found for this or any previous visit.      CTPA: Results for orders placed during the hospital encounter of 07/15/22    CT CHEST PULMONARY EMBOLISM W CONTRAST    Narrative  EXAMINATION:  CTA OF THE CHEST 7/23/2022 5:55 am    TECHNIQUE:  CTA of the chest was performed after the administration of intravenous  contrast.  Multiplanar reformatted images are provided for review. MIP  images are provided for review. Automated exposure control, iterative  reconstruction, and/or weight based adjustment of the mA/kV was utilized to  reduce the radiation dose to as low as reasonably achievable. COMPARISON:  07/08/2022    HISTORY:  ORDERING SYSTEM PROVIDED HISTORY: Increased work of breathing, increased O2  demand  TECHNOLOGIST PROVIDED HISTORY:  Reason for exam:->Increased work of breathing, increased O2 demand  Reason for Exam: Increased work of breathing, increased O2 demand    FINDINGS:  Pulmonary Arteries: There is in homogeneous opacification of the central  pulmonary arterial system. There are no discrete discernible intraluminal  filling defects. The main pulmonary artery is normal in caliber. There is  no evidence of right ventricular strain. Mediastinum: Small mediastinal and right infrahilar lymph nodes are unchanged  in size and appearance. .  The heart and pericardium demonstrate no acute  abnormality. There is no acute abnormality of the thoracic aorta. There is  moderate diffuse circumferential wall thickening of the esophagus. Lungs/pleura: Moderate emphysematous changes are present along with segmental  consolidation within the posterior aspect of the left upper lobe. There are  small bilateral pleural effusions with minimal bibasilar compressive  atelectasis. Noncalcified 5 mm nodule within the central aspect of the right  upper lobe is unchanged from prior. Upper Abdomen: Numerous faint hypodense lesions throughout liver are  unchanged in size and appearance as is a small left adrenal nodule.     Soft Tissues/Bones: Osteoblastic lesions scattered throughout the thoracic  spine and ribs are unchanged in size, number and appearance. Impression  1. Negative for pulmonary embolus. 2. Advanced emphysema with left upper lobe pneumonia. 3. Mild CHF. 4. Stable metastatic disease. CXR PA/LAT: Results for orders placed during the hospital encounter of 09/02/21    XR CHEST (2 VW)    Narrative  EXAMINATION:  TWO XRAY VIEWS OF THE CHEST    9/2/2021 8:48 am    COMPARISON:  None. HISTORY:  ORDERING SYSTEM PROVIDED HISTORY: WEEMS (dyspnea on exertion)  TECHNOLOGIST PROVIDED HISTORY:  Reason for Exam: WEEMS, asthma  Acuity: Unknown  Type of Exam: Initial    FINDINGS:  Clear lungs. No pleural effusion or pneumothorax. Cardiomediastinal  silhouette is unremarkable. Degenerative changes of the visualized osseous  structures. Impression  Clear lungs. CXR portable: Results for orders placed during the hospital encounter of 07/15/22    XR CHEST PORTABLE    Narrative  EXAMINATION:  ONE XRAY VIEW OF THE CHEST    7/24/2022 2:55 pm    COMPARISON:  07/24/2022 chest radiograph, 07/23/2022 CT chest    HISTORY:  ORDERING SYSTEM PROVIDED HISTORY: post intubation and NG placement  TECHNOLOGIST PROVIDED HISTORY:  Reason for exam:->post intubation and NG placement    FINDINGS:  Endotracheal tube tip overlies the midthoracic trachea measuring 2.4 cm from  the vincent. Enteric tube tip and side port overlie the stomach. Left upper  extremity PICC tip overlies the right atrium. The cardiomediastinal  silhouette is unchanged. Significantly increased central predominant left  sided airspace opacities. Opacities of the right peripheral lung base are  not significantly changed. Trace bilateral pleural effusions. No  pneumothorax. Right apical pleural-parenchymal thickening. Osseous  structures are unchanged. Impression  1.   Significantly increased left-sided central predominant airspace opacities  which may represent asymmetric edema and/or an infectious process. 2.  Trace bilateral pleural effusions. 3.  Appropriate positioning of the endotracheal and enteric tubes. This note was transcribed using [id number]. Please disregard any translational errors.       Alannah Parkinson [phone]

## 2022-07-25 NOTE — PROGRESS NOTES
Patient to CT scan and back.  On transport ventilator no complications     Electronically signed by Yonathan Speak on 7/25/2022 at 5:40 PM

## 2022-07-25 NOTE — PROGRESS NOTES
[facility] Internal Medicine Note      Chief Complaint: Intubated and sedated in the ICU    Subjective/Interval History:    Patient remains intubated in the ICU. On full ventilator support. Sedated with propofol and fentanyl. Glucose remains high. Renal function is worsening. Events of yesterday reviewed. Case discussed with nursing at the bedside. Review of systems unobtainable due to intubation/sedation. PMH, PSH, FH/SH reviewed and unchanged as documented in the H&P dated 22    Medication list reviewed    Objective:    /66   Pulse (!) 102   Temp 97.9 °F (36.6 °C) (Axillary)   Resp 25   Ht 6' 1\" (1.854 m)   Wt 239 lb 13.8 oz (108.8 kg)   SpO2 98%   BMI 31.47 kg/m²   Temp  Av.5 °F (36.9 °C)  Min: 97.8 °F (36.6 °C)  Max: 99 °F (37.2 °C)    Intubated  RRR  Chest-Anterior lungs are fairly clear throughout  Abd-BS+, soft, NTND  Ext- 3-4+ lower extremity edema noted. Edema remains unchanged today.     The Following Labs Were Reviewed Today:    Recent Results (from the past 24 hour(s))   SPECIMEN REJECTION    Collection Time: 22  7:01 AM   Result Value Ref Range    Rejected Test cbcwd     Reason for Rejection see below    POCT Glucose    Collection Time: 22  7:36 AM   Result Value Ref Range    POC Glucose 350 (H) 70 - 99 mg/dl    Performed on ACCU-CHEK    SPECIMEN REJECTION    Collection Time: 22  7:53 AM   Result Value Ref Range    Rejected Test CBCWD     Reason for Rejection see below    CBC with Auto Differential    Collection Time: 22  8:47 AM   Result Value Ref Range    WBC 0.0 (LL) 4.0 - 11.0 K/uL    RBC 1.41 (L) 4.20 - 5.90 M/uL    Hemoglobin 5.1 (LL) 13.5 - 17.5 g/dL    Hematocrit 14.7 (LL) 40.5 - 52.5 %    .9 (H) 80.0 - 100.0 fL    MCH 35.9 (H) 26.0 - 34.0 pg    MCHC 34.6 31.0 - 36.0 g/dL    RDW 14.8 12.4 - 15.4 %    Platelets 11 (LL) 442 - 450 K/uL    MPV 8.1 5.0 - 10.5 fL    PLATELET SLIDE REVIEW Decreased     SLIDE REVIEW see below     Path Consult No     RBC Morphology Normal    POCT Glucose    Collection Time: 07/24/22  9:31 AM   Result Value Ref Range    POC Glucose 326 (H) 70 - 99 mg/dl    Performed on ACCU-CHEK    CBC with Auto Differential    Collection Time: 07/24/22 11:33 AM   Result Value Ref Range    WBC 0.1 (LL) 4.0 - 11.0 K/uL    RBC 1.35 (L) 4.20 - 5.90 M/uL    Hemoglobin 4.9 (LL) 13.5 - 17.5 g/dL    Hematocrit 14.4 (LL) 40.5 - 52.5 %    .7 (H) 80.0 - 100.0 fL    MCH 36.4 (H) 26.0 - 34.0 pg    MCHC 34.1 31.0 - 36.0 g/dL    RDW 15.4 12.4 - 15.4 %    Platelets 10 (LL) 767 - 450 K/uL    MPV 8.3 5.0 - 10.5 fL    PLATELET SLIDE REVIEW Decreased     SLIDE REVIEW see below     Path Consult No     RBC Morphology Normal    APTT    Collection Time: 07/24/22 11:34 AM   Result Value Ref Range    aPTT 29.3 23.0 - 34.3 sec   Protime-INR    Collection Time: 07/24/22 11:34 AM   Result Value Ref Range    Protime 17.0 (H) 11.7 - 14.5 sec    INR 1.39 (H) 0.87 - 1.14   POCT Glucose    Collection Time: 07/24/22 11:35 AM   Result Value Ref Range    POC Glucose 309 (H) 70 - 99 mg/dl    Performed on ACCU-CHEK    TYPE AND SCREEN    Collection Time: 07/24/22 12:21 PM   Result Value Ref Range    ABO/Rh O POS     Antibody Screen NEG    PREPARE RBC (CROSSMATCH), 2 Units    Collection Time: 07/24/22 12:21 PM   Result Value Ref Range    Product Code Blood Bank [id number]     Description Blood Bank Red Blood Cells, Leuko-reduced     Unit Number [id number]     Dispense Status Blood Bank transfused     Product Code Blood Bank [id number]     Description Blood Bank Red Blood Cells, Leuko-reduced     Unit Number [id number]     Dispense Status Blood Bank transfused    PREPARE PLATELETS, 1 Product    Collection Time: 07/24/22 12:21 PM   Result Value Ref Range    Product Code Blood Bank [id number]     Description Blood Bank      Unit Number [id number]     Dispense Status Blood Bank transfused    PREPARE RBC (CROSSMATCH), 1 Units    Collection Time: 07/24/22 12:21 PM   Result Value Ref Range    Product Code Blood Bank [id number]     Description Blood Bank Red Blood Cells, Leuko-reduced     Unit Number [id number]     Dispense Status Blood Bank transfused    PREPARE PLATELETS, 1 Product    Collection Time: 07/24/22 12:21 PM   Result Value Ref Range    Product Code Blood Bank [id number]     Description Blood Bank      Unit Number [id number]     Dispense Status Blood Bank selected    Cell Count with Differential, BAL Fluid    Collection Time: 07/24/22  3:07 PM   Result Value Ref Range    Color, Lavage Pink Colorless    Appearance BAL (Lavage) Hazy     Clot Evaluation BAL see below     WBC/EPI Cells Bal 21 /cumm    RBC, BAL 10 /cumm    Volume, Lavage 15.0 mL   Blood Gas, Arterial    Collection Time: 07/24/22  3:11 PM   Result Value Ref Range    pH, Arterial 7.343 (L) 7.350 - 7.450    pCO2, Arterial 52.7 (H) 35.0 - 45.0 mmHg    pO2, Arterial 95.7 75.0 - 108.0 mmHg    HCO3, Arterial 28.6 21.0 - 29.0 mmol/L    Base Excess, Arterial 2.7 -3.0 - 3.0 mmol/L    Hemoglobin, Art, Extended 5.4 (L) 13.5 - 17.5 g/dL    O2 Sat, Arterial 97.0 >92 %    Carboxyhgb, Arterial 1.4 0.0 - 1.5 %    Methemoglobin, Arterial 1.0 <1.5 %    TCO2, Arterial 30.2 Not Established mmol/L    O2 Therapy See comment    Basic Metabolic Panel    Collection Time: 07/24/22  3:38 PM   Result Value Ref Range    Sodium 137 136 - 145 mmol/L    Potassium 4.3 3.5 - 5.1 mmol/L    Chloride 96 (L) 99 - 110 mmol/L    CO2 26 21 - 32 mmol/L    Anion Gap 15 3 - 16    Glucose 219 (H) 70 - 99 mg/dL    BUN 33 (H) 7 - 20 mg/dL    Creatinine 1.6 (H) 0.9 - 1.3 mg/dL    GFR Non-African American 45 (A) >60    GFR  55 (A) >60    Calcium 8.1 (L) 8.3 - 10.6 mg/dL   Respiratory Panel, Molecular, with COVID-19 (Restricted: peds pts or suitable admitted adults)    Collection Time: 07/24/22  3:38 PM    Specimen: Nasopharyngeal   Result Value Ref Range    Respiratory Panel PCR       Respiratory Pathogens Panel PCR Result: Not Detected  See additional report for complete Respiratory Pathogens Panel     Respiratory Panel Film Array Report    Collection Time: 07/24/22  3:38 PM   Result Value Ref Range    Report SEE IMAGE    POCT Glucose    Collection Time: 07/24/22  5:43 PM   Result Value Ref Range    POC Glucose 267 (H) 70 - 99 mg/dl    Performed on ACCU-CHEK    POCT Glucose    Collection Time: 07/24/22  8:13 PM   Result Value Ref Range    POC Glucose 261 (H) 70 - 99 mg/dl    Performed on ACCU-CHEK    POCT Glucose    Collection Time: 07/24/22 11:48 PM   Result Value Ref Range    POC Glucose 263 (H) 70 - 99 mg/dl    Performed on ACCU-CHEK    CBC with Auto Differential    Collection Time: 07/25/22  2:21 AM   Result Value Ref Range    WBC 0.0 (LL) 4.0 - 11.0 K/uL    RBC 1.59 (L) 4.20 - 5.90 M/uL    Hemoglobin 6.3 (LL) 13.5 - 17.5 g/dL    Hematocrit 15.6 (LL) 40.5 - 52.5 %    MCV 98.4 80.0 - 100.0 fL    MCH 39.7 (H) 26.0 - 34.0 pg    MCHC 40.4 (H) 31.0 - 36.0 g/dL    RDW 16.8 (H) 12.4 - 15.4 %    Platelets 13 (LL) 145 - 450 K/uL    MPV 7.7 5.0 - 10.5 fL   POCT Glucose    Collection Time: 07/25/22  4:14 AM   Result Value Ref Range    POC Glucose 333 (H) 70 - 99 mg/dl    Performed on ACCU-CHEK    Magnesium    Collection Time: 07/25/22  4:24 AM   Result Value Ref Range    Magnesium 2.50 (H) 1.80 - 2.40 mg/dL   Basic Metabolic Panel    Collection Time: 07/25/22  4:24 AM   Result Value Ref Range    Sodium 137 136 - 145 mmol/L    Potassium 5.1 3.5 - 5.1 mmol/L    Chloride 96 (L) 99 - 110 mmol/L    CO2 28 21 - 32 mmol/L    Anion Gap 13 3 - 16    Glucose 302 (H) 70 - 99 mg/dL    BUN 44 (H) 7 - 20 mg/dL    Creatinine 2.0 (H) 0.9 - 1.3 mg/dL    GFR Non-African American 35 (A) >60    GFR  42 (A) >60    Calcium 7.4 (L) 8.3 - 10.6 mg/dL   CBC with Auto Differential    Collection Time: 07/25/22  4:24 AM   Result Value Ref Range    WBC 0.0 (LL) 4.0 - 11.0 K/uL    RBC 1.77 (L) 4.20 - 5.90 M/uL    Hemoglobin 6.3 (LL) 13.5 - 17.5 g/dL Hematocrit 17.4 (LL) 40.5 - 52.5 %    MCV 98.2 80.0 - 100.0 fL    MCH 35.7 (H) 26.0 - 34.0 pg    MCHC 36.3 (H) 31.0 - 36.0 g/dL    RDW 17.1 (H) 12.4 - 15.4 %    Platelets 11 (LL) 241 - 450 K/uL    MPV 7.5 5.0 - 10.5 fL     CT scan reviewed. ASSESSMENT/PLAN:      Principal Problem:    Small cell lung cancer-chemotherapy treatment now has been complicated by an acute pneumonia in his left upper lobe. CBC remains very abnormal.    Active Problems:    Left upper lobe pneumonia with acute respiratory failure with hypoxia-intubation day #2, day #3 of Zosyn and Levaquin. Pancytopenia-transfusing packed red blood cells and platelets again today. EDGARDO-worsening renal functions today. IV fluids on hold due to generalized volume overload. Diuretics on hold as well. Golden catheter is in place. Urine output continues to be okay. Hypokalemia-potassium now high. Supplements on hold. Hypomagnesemia-magnesium high today. Continue to monitor. Hyperglycemia-continue sliding scale and restart low-dose Lantus. Ectopic ACTH syndrome -Lasix remains on hold since yesterday. Blood pressure remains relatively low. Potassium worsening with acute kidney injury.       Lena Denise MD, FACP  6:51 AM  7/25/2022

## 2022-07-25 NOTE — PROGRESS NOTES
[facility]  Oncology Hematology Care  Progress Note      SUBJECTIVE:   UNfortunately there was a decline-=  However this is not all surprising given his disease status-he did not want to stop tx nor change code status-this was discussed heavily prior to his decline  HE told his family a week on the vent -no more   HIs counts are low from chemo -this is also not surprising     OBJECTIVE      Medications    Current Facility-Administered Medications: 0.9 % sodium chloride infusion, , IntraVENous, PRN  busPIRone (BUSPAR) tablet 5 mg, 5 mg, Oral, TID PRN  0.9 % sodium chloride infusion, , IntraVENous, PRN  0.9 % sodium chloride infusion, , IntraVENous, PRN  ipratropium-albuterol (DUONEB) nebulizer solution 1 ampule, 1 ampule, Inhalation, Q4H PRN  ipratropium-albuterol (DUONEB) nebulizer solution 1 ampule, 1 ampule, Inhalation, 6 times per day  methylPREDNISolone sodium (SOLU-MEDROL) injection 40 mg, 40 mg, IntraVENous, Daily  chlorhexidine (PERIDEX) 0.12 % solution 15 mL, 15 mL, Mouth/Throat, BID  famotidine (PEPCID) 20 mg in sodium chloride (PF) 10 mL injection, 20 mg, IntraVENous, BID  fentaNYL (SUBLIMAZE) 1,000 mcg in sodium chloride 0.9% 100 mL infusion,  mcg/hr, IntraVENous, Continuous  propofol injection, 5-50 mcg/kg/min, IntraVENous, Continuous  piperacillin-tazobactam (ZOSYN) 3,375 mg in dextrose 5 % 100 mL IVPB extended infusion (mini-bag), 3,375 mg, IntraVENous, Q8H  levoFLOXacin (LEVAQUIN) 500 MG/100ML infusion 500 mg, 500 mg, IntraVENous, Q24H  filgrastim-aafi (NIVESTYM) injection 480 mcg, 480 mcg, SubCUTAneous, Daily  [Held by provider] furosemide (LASIX) injection 60 mg, 60 mg, IntraVENous, BID  [Held by provider] metoprolol tartrate (LOPRESSOR) tablet 25 mg, 25 mg, Oral, BID  [Held by provider] potassium chloride (KLOR-CON) extended release tablet 40 mEq, 40 mEq, Oral, TID  [Held by provider] insulin glargine (LANTUS) injection vial 30 Units, 30 Units, SubCUTAneous, QAM AC  [Held by provider] insulin glargine (LANTUS) injection vial 40 Units, 40 Units, SubCUTAneous, Nightly  insulin lispro (HUMALOG) injection vial 0-18 Units, 0-18 Units, SubCUTAneous, TID WC  insulin lispro (HUMALOG) injection vial 0-9 Units, 0-9 Units, SubCUTAneous, Nightly  [Held by provider] aspirin EC tablet 81 mg, 81 mg, Oral, Daily  atorvastatin (LIPITOR) tablet 40 mg, 40 mg, Oral, Daily  albuterol sulfate HFA (PROVENTIL;VENTOLIN;PROAIR) 108 (90 Base) MCG/ACT inhaler 2 puff, 2 puff, Inhalation, Q4H PRN  mometasone-formoterol (DULERA) 200-5 MCG/ACT inhaler 2 puff, 2 puff, Inhalation, BID  [Held by provider] NIFEdipine (PROCARDIA XL) extended release tablet 30 mg, 30 mg, Oral, BID  [Held by provider] spironolactone (ALDACTONE) tablet 100 mg, 100 mg, Oral, BID  sodium chloride flush 0.9 % injection 5-40 mL, 5-40 mL, IntraVENous, 2 times per day  sodium chloride flush 0.9 % injection 5-40 mL, 5-40 mL, IntraVENous, PRN  0.9 % sodium chloride infusion, , IntraVENous, PRN  ondansetron (ZOFRAN-ODT) disintegrating tablet 4 mg, 4 mg, Oral, Q8H PRN **OR** ondansetron (ZOFRAN) injection 4 mg, 4 mg, IntraVENous, Q6H PRN  polyethylene glycol (GLYCOLAX) packet 17 g, 17 g, Oral, Daily PRN  acetaminophen (TYLENOL) tablet 650 mg, 650 mg, Oral, Q6H PRN **OR** acetaminophen (TYLENOL) suppository 650 mg, 650 mg, Rectal, Q6H PRN  enoxaparin Sodium (LOVENOX) injection 30 mg, 30 mg, SubCUTAneous, BID  glucose chewable tablet 16 g, 4 tablet, Oral, PRN  dextrose bolus 10% 125 mL, 125 mL, IntraVENous, PRN **OR** dextrose bolus 10% 250 mL, 250 mL, IntraVENous, PRN  glucagon (rDNA) injection 1 mg, 1 mg, IntraMUSCular, PRN  dextrose 5 % solution, 100 mL/hr, IntraVENous, PRN  Physical    VITALS:  /66   Pulse (!) 101   Temp 97.9 °F (36.6 °C) (Axillary)   Resp 22   Ht 6' 1\" (1.854 m)   Wt 239 lb 13.8 oz (108.8 kg)   SpO2 98%   BMI 31.47 kg/m²   TEMPERATURE:  Current - Temp: 97.9 °F (36.6 °C);  Max - Temp  Av.5 °F (36.9 °C)  Min: 97.8 °F (36.6 °C)  Max: 99 °F (37.2 °C)  PULSE OXIMETRY RANGE: SpO2  Av.8 %  Min: 90 %  Max: 100 %  24HR INTAKE/OUTPUT:    Intake/Output Summary (Last 24 hours) at 2022 0739  Last data filed at 2022 0650  Gross per 24 hour   Intake 2840.51 ml   Output 1172 ml   Net 1668.51 ml       CONSTITUTIONAL:intubated   HEMATOLOGIC/LYMPHATICS:  no cervical lymphadenopathy, no supraclavicular lymphadenopathy, n  LUNGS:  scattered rhonchi   CARDIOVASCULAR:  , regular rate and rhythm, normal S1 and S2, no S3 or S4, and no murmur noted  ABDOMEN:  soft   MUSCULOSKELETAL:  There is no redness, warmth, or swelling of the joints. EXTREMETIES: edema present       Data      Recent Labs     22  1133 22  0221 22  0424   WBC 0.1* 0.0* 0.0*   HGB 4.9* 6.3* 6.3*   HCT 14.4* 15.6* 17.4*   PLT 10* 13* 11*   .7* 98.4 98.2        Recent Labs     22  0555 22  1538 22  0424   * 137 137   K 4.4 4.3 5.1   CL 93* 96* 96*   CO2 26 26 28   BUN 28* 33* 44*   CREATININE 1.5* 1.6* 2.0*     No results for input(s): AST, ALT, ALB, BILIDIR, BILITOT, ALKPHOS in the last 72 hours. Magnesium:    Lab Results   Component Value Date/Time    MG 2.50 2022 04:24 AM    MG 1.90 2022 05:55 AM    MG 2.00 2022 03:45 PM       Imaging CT HEAD WO CONTRAST    Result Date: 2022  EXAMINATION: CT OF THE HEAD WITHOUT CONTRAST  2022 6:43 pm TECHNIQUE: CT of the head was performed without the administration of intravenous contrast. Automated exposure control, iterative reconstruction, and/or weight based adjustment of the mA/kV was utilized to reduce the radiation dose to as low as reasonably achievable. COMPARISON: MRI brain 2021 HISTORY: ORDERING SYSTEM PROVIDED HISTORY: Unwitnessed fall TECHNOLOGIST PROVIDED HISTORY: Reason for exam:->Unwitnessed fall Has a \"code stroke\" or \"stroke alert\" been called? ->No Reason for Exam: Unwitnessed fall FINDINGS: BRAIN/VENTRICLES: No evidence of acute infarct or acute intracranial hemorrhage. 2 new sub 5 mm subependymal nodules along the lateral margin of the right lateral ventricle. Scattered small parenchymal calcifications including a linear subcortical calcification involving the high posterior right frontal lobe. No hydrocephalus or extra-axial fluid collection. Midline maintained. Basal cisterns patent. ORBITS: The visualized portion of the orbits demonstrate no acute abnormality. SINUSES: The visualized paranasal sinuses and mastoid air cells demonstrate no acute abnormality. SOFT TISSUES/SKULL:  No acute abnormality of the visualized skull or soft tissues. 1. No acute intracranial abnormality. 2. Nonspecific subependymal nodules involving the right lateral ventricle and scattered parenchymal calcifications. MRI of the brain is recommended for further evaluation. XR CHEST PORTABLE    Result Date: 7/18/2022  EXAMINATION: ONE XRAY VIEW OF THE CHEST 7/18/2022 4:37 am COMPARISON: Chest radiograph 07/15/2022 at 11:47 p.m.; chest, abdomen, and pelvis CT 07/08/2022 HISTORY: ORDERING SYSTEM PROVIDED HISTORY: hypokalemia TECHNOLOGIST PROVIDED HISTORY: Reason for exam:->hypokalemia Reason for Exam: hypokalemia FINDINGS: Decreased lung volumes. Persistent linear opacities in each lung base. Suspected new right basilar airspace opacity with possible overlying trace right pleural effusion. Increased diffuse interstitial prominence with indistinct pulmonary vasculature no definite interlobular septal thickening. No definite findings of pneumothorax or left pleural effusion. Normal mediastinal and cardiac contours. 1. Suspected new right basilar airspace opacity potentially due in part to passive atelectasis given possible overlying trace right pleural effusion. Superimposed pneumonia and aspiration are not excluded. 2. Persistent bibasilar atelectasis and/or scarring.  3. Increased interstitial prominence due to pulmonary vascular congestion and/or central vascular crowding. XR CHEST PORTABLE    Result Date: 7/16/2022  EXAMINATION: ONE XRAY VIEW OF THE CHEST 7/15/2022 11:48 pm COMPARISON: 07/15/2022 HISTORY: ORDERING SYSTEM PROVIDED HISTORY: lung cancer TECHNOLOGIST PROVIDED HISTORY: Reason for exam:->lung cancer Reason for Exam: lung cancer FINDINGS: The lungs are without acute focal process. There is no effusion or pneumothorax. The cardiomediastinal silhouette is without acute process. The osseous structures are without acute process. No acute process. XR CHEST PORTABLE    Result Date: 7/15/2022  EXAMINATION: ONE XRAY VIEW OF THE CHEST 7/15/2022 7:22 pm COMPARISON: 07/08/2022, 09/13/2021 HISTORY: ORDERING SYSTEM PROVIDED HISTORY: other TECHNOLOGIST PROVIDED HISTORY: Reason for exam:->other Reason for Exam: abnormal labs FINDINGS: The lungs are without acute focal process. There is no effusion or pneumothorax. The cardiomediastinal silhouette is without acute process. The osseous structures are without acute process. No acute process. NM BONE SCAN WHOLE BODY    Result Date: 7/8/2022  EXAMINATION: WHOLE BODY BONE SCAN  7/8/2022 TECHNIQUE: The patient was injected intravenously with 27.5 mCi of 99 mTc MDP and scintigraphy of the entire skeleton was performed approximately three hours later. COMPARISON: 03/18/2022 HISTORY: ORDERING SYSTEM PROVIDED HISTORY: Malignant neoplasm of right main bronchus [facility]) TECHNOLOGIST PROVIDED HISTORY: Order: Bone scan, total body Order Instructions: [facility] along with CT please lower back and hip pain Associated problems: Small cell carcinoma of lung (disorder) * (C34.01) Reason for Exam: staging prostate Ca Relevant Medical/Surgical History: ct today FINDINGS: New focal activity is demonstrated along the superior aspect of the right iliac crest.  Developing activity is seen at the lateral right 11th rib and posterior right 5th rib.  Lesions are seen which are more conspicuous within the proximal right femur, anterior right iliac bone, medial left iliac bone, sacrum, lumbar spine, upper thoracic spine, proximal left humerus. Activity at the shoulders, sternoclavicular joints, hips, knees, ankles, feet, likely degenerative. Physiologic activity is seen within the kidneys and urinary bladder. Urinary contamination is seen between the upper thighs. Several new metastatic lesions are seen as compared to prior exam dated 03/18/2022. Additional lesions are more intense as compared to prior which could relate to flare effect if there has been intervening therapy. CT CHEST ABDOMEN PELVIS W CONTRAST    Result Date: 7/8/2022  EXAMINATION: CT OF THE CHEST, ABDOMEN, AND PELVIS WITH CONTRAST 7/8/2022 8:35 am TECHNIQUE: CT of the chest, abdomen and pelvis was performed with the administration of intravenous contrast. Multiplanar reformatted images are provided for review. Automated exposure control, iterative reconstruction, and/or weight based adjustment of the mA/kV was utilized to reduce the radiation dose to as low as reasonably achievable. COMPARISON: 03/18/2022 HISTORY: ORDERING SYSTEM PROVIDED HISTORY: Malignant neoplasm of right main bronchus [facility]) TECHNOLOGIST PROVIDED HISTORY: Order: CT chest/abdomen/pelvis w/ contrast Order Instructions: STAT Creatinine & BUN prior to CT Scan per facility protocol [facility] Associated problems: Small cell carcinoma of lung (disorder) * (C34.01) Additional Contrast?->Oral STAT Creatinine as needed:->Yes Reason for Exam: Malignant neoplasm of right main bronchus FINDINGS: Chest: Mediastinum: The central airways are patent. There has been interval enlargement of several mediastinal lymph nodes. Index prevascular node measures 1.3 cm on the current exam compared with 9 mm prior. Right paratracheal node measures 1.5 cm on the current exam compared with 1.4 cm prior. Precarinal lymph node measures 1.4 cm on the current exam compared with 0.6 cm prior.   AP window node measures 12 mm currently compared with 5 mm prior. There is a new 1.7 cm lymph node within the right infrahilar region. Lungs/pleura: Mild emphysematous changes are present. A few tiny non calcified right-sided pulmonary nodules are unchanged in size, number and appearance. The pleural spaces are clear. Soft Tissues/Bones: Numerous sclerotic lesions scattered throughout the thoracic spine and ribs are stable in size, number and appearance. Abdomen/Pelvis: Organs: There has been interval increase in size and number of numerous hypodense lesions scattered throughout the liver. Index lesion within the left hepatic lobe measures 3.2 cm on the current exam compared with 2.4 cm prior. Index lesion within the caudate lobe measures 2.4 cm compared with 1.8 cm prior. There has been slight interval enlargement of the left adrenal nodule which measures 1.6 cm on the current exam.  A few tiny bilateral nonobstructing intrarenal calculi are unchanged. The remainder of the GI/Bowel: There is no bowel dilatation, wall thickening or obstruction. Pelvis: The bladder and prostate are unremarkable. Peritoneum/Retroperitoneum: There is no free air or free fluid. There has been interval development of a 2 cm lymph node within the posterior gastrohepatic ligament. Bones/Soft Tissues: This numerous small osteoblastic lesions scattered throughout the lumbosacral spine and pelvis are unchanged in size, number and appearance. 1. Mild interval progression of right hilar/mediastinal favian metastatic disease. 2. Mild interval progression of hepatic and left adrenal metastatic disease. 3. Developing upper abdominal favian metastatic disease. 4. Stable disseminated osteoblastic disease.      MRI BRAIN W WO CONTRAST    Result Date: 7/18/2022  EXAMINATION: MRI OF THE BRAIN WITHOUT AND WITH CONTRAST  7/18/2022 9:56 am TECHNIQUE: Multiplanar multisequence MRI of the head/brain was performed without and with the administration of intravenous contrast. COMPARISON: MRI Brain 9/11/2021 and CT head 7/17/2022 HISTORY: ORDERING SYSTEM PROVIDED HISTORY: lesions seen on haed ct--known smll cell cancer TECHNOLOGIST PROVIDED HISTORY: Reason for exam:->lesions seen on head CT--known smll cell cancer Reason for Exam: lesions seen on head CT - known small cell cnacer FINDINGS: INTRACRANIAL STRUCTURES/VENTRICLES:  There is no acute infarct. No mass effect or midline shift. No evidence of an acute intracranial hemorrhage. The ventricles and sulci are normal in size and configuration. The sellar/suprasellar regions appear unremarkable. The normal signal voids within the major intracranial vessels appear maintained. Multiple tiny enhancing nodules are identified within the periventricular and subcortical white matter of both cerebral hemispheres. The most conspicuous lesion is identified within the left frontal operculum measuring 5 mm. Additional nodules identified within the left cerebral peduncle measuring 5 mm. Small metastasis is suspected within the left side of the vermis. ORBITS: The visualized portion of the orbits demonstrate no acute abnormality. SINUSES: The visualized paranasal sinuses and mastoid air cells demonstrate no acute abnormality. BONES/SOFT TISSUES: The bone marrow signal intensity appears normal. The soft tissues demonstrate no acute abnormality. Multiple new small enhancing nodules within the periventricular and subcortical white matter of both cerebral hemispheres that is suspicious for metastatic disease. The largest lesion is located within the left frontal operculum and within the left cerebral peduncle measuring 5 mm. Additional small suspected metastasis within the left side of the cerebellar vermis.        Problem List  Patient Active Problem List   Diagnosis    Pure hypercholesterolemia    Anxiety    Essential hypertension    Moderate persistent asthma with acute exacerbation    Metastatic cancer (HCC)    Hypokalemia    Acute respiratory failure with hypoxia (HCC)    Abnormal CT of the chest    COPD exacerbation (HCC)    Lung nodules    Tobacco abuse    Small cell lung cancer (HCC)    Ectopic ACTH syndrome (HCC)    Hyperglycemia    Syncope    NSVT (nonsustained ventricular tachycardia) (HCC)    Hypervolemia       ASSESSMENT AND PLAN    Relapsed small cell   Was to start third line topotecan after failure of 2 other lines of therapy   Topotecan completed    Chemo pancytopenia  Unfortunately his prognosis is grim   HE wanted to try tx and unfortunately but not surprisingly has declined  SUpportive transfusions   Granix will be offered    HE told his family no more than a week on the vent-thus no trach etc  With his immune system this may not even occur     Tried to make him dnr and he declined this     ELectrolytes/sugar on supportive measures

## 2022-07-25 NOTE — PROGRESS NOTES
Yony obtained and sent to lab.     Electronically signed by Patricia Srivastava on 7/25/2022 at 8:12 AM

## 2022-07-25 NOTE — CONSULTS
[facility]  Palliative Care   Progress Note    NAME:  [id number] RECORD NUMBER:  [id number]  AGE: 45 y.o. GENDER: male  : 1970  TODAY'S DATE:  2022    Subjective: Patient currently on vent, sedated. Objective:    Vitals:    22 1100   BP: 108/66   Pulse: (!) 112   Resp: 21   Temp:    SpO2: 94%     Lab Results   Component Value Date    WBC 0.0 (LL) 2022    HGB 6.1 (LL) 2022    HCT 17.2 (LL) 2022    MCV 96.0 2022    PLT 29 (L) 2022     Lab Results   Component Value Date    CREATININE 2.0 (H) 2022    BUN 44 (H) 2022     2022    K 5.1 2022    CL 96 (L) 2022    CO2 28 2022     Lab Results   Component Value Date     (H) 2022    AST 89 (H) 2022    ALKPHOS 339 (H) 2022    BILITOT 1.6 (H) 2022       Plan: Patient was intubated over weekend to see if pneumonia will improve. I have spoken with patients sister/HPOA Ms. Margaux Arevalo she understands her brother disease process and is hoping he will get off ventilator. Her understanding would be that after a week her brother wants to come off vent. All questions answered support given. Code Status: Full Code  Discharge Environment:  [] Hospice Consult Agency:  [] Inpatient Hospice    [] Home with  [facility]   [] ECF with Hospice  [] ECF skilled care with Hospice to follow   [] Other:    Teaching Time:  0hours  30 min     I will continue to follow Mr. Wetzel's care as needed. Thank you for allowing me to participate in the care of Mr. Damaris Nolan .      Electronically signed by Zeeshan Davila RN, BSN,CHPN on 2022 at 1:16 PM  [address] [place] [address]  Office: [phone]

## 2022-07-25 NOTE — CONSULTS
[address] [place] [address] Nephrology   [facility]  [phone]  Nephrology Consult Note          Patient ID: Stiven Monreal  Referring/ Physician: Amelia Short, *      HPI/Summary:   Stiven Monreal is being seen by nephrology for EDGARDO. This is a 75-year-old man with past medical history significant for hypertension hyperlipidemia small cell lung cancer that has metastasized with ectopic ACTH production. Patient was initially admitted with hypokalemia and hyperglycemia. He has been getting chemotherapy but was having increasing weakness. His most lung cancer has metastasized to the liver. On 7/23/2022 patient had a rapid response for hypotension and worsening hypoxia. Patient is now intubated and sedated in the ICU. He is on 100% FiO2 PEEP of 10, has been settings and not been able to be weaned. Blood pressure 129/61  He is not on any pressors  100% FiO2 and PEEP of 10  Heart rate 114, has been afebrile. Weight on admission 237, today's weight was 239 on a bed scale. According to his eyes and nose he is +11 L for admission  He made yet 1.1 L of urine yesterday but is now making only 150    Labs reviewed  Sodium 137 potassium 5.1 bicarb 28 BUN 44 creatinine 2 magnesium 2.5 blood sugar 372 White blood cell count 0 no differential hemoglobin 6.3 platelets 11    Plan:   Hemodynamic mediated ATN in the setting of worsening hypoxia and hypotension, had IV contrast as well during around the same time. He is volume overloaded, positive fluid balance  - resume lasix 60 mg IV BID. - holding aldactone for now. - will monitor potassium closely with resuming lasix. He is positive 11 L for admission, worsening hypoxia, unable to wean vent settings  Check BNP   Check urinalysis. Prognosis is very guarded. Acute kidney injury  This is likely hemodynamic mediated injury in the setting of hypotension and hypoxia.   He also had contrast for CTPA on 723 PE was negative for PE  Baseline creatinine less than 1  Had a rapid response with hypoxia hypotension on 723 and has worsening creatinine since then. His creatinine is 2 at the time of consult  Lasix been held the past 2 days  Urine studies. Chronic hypokalemia. On 40 meq KCL TID, now held. With worsening renal failure becoming hyperkalemic  Aldactone is held right now   Ectopic ACTH production from small cell lung cancer    Small cell lung cancer with mets  Last dose of chemotherapy topotecan on 7/21  Profound pancytopenia. Acute on chronic hypoxic respiratory failure  Intubated on max vent settings  EF preserved last check 60%  CTPA did not show any PE. Chest x-ray shows significantly increased left-sided central predominant airspace opacity may represent asymmetric edema or infectious process, trace bilateral pleural effusions appropriate positioning of endotracheal tube noted. He is on some antibiotics Zosyn and [address] [facility] would like to thank you for the opportunity to serve this patient. Please call with any questions or concerns. Olinda Ruelas MD  [facility] Nephrology  [facility]  [place], [address]  Fax: [phone]  Office: [phone]         CC/Reason for consult:   Reason for consult: EDGARDO  Chief Complaint   Patient presents with    Discuss Labs     Pt states that he has CA and  states is on a medication that decreases his K level states an NP in his PCP's office to have lab work, was notified to come here for IV KCL           Review of Systems:   Unable to obtain due to patient condition    PMH/SH/FH:    Medical Hx: reviewed and updated as appropriate  Past Medical History:   Diagnosis Date    Anxiety     Asthma     Hyperlipidemia     Hypertension     Small cell lung cancer ([facility] [address] 09/2021    Metastatic with ectopic ACTH production         Surgical Hx: reviewed and updated as appropriate   has a past surgical history that includes CT NEEDLE BIOPSY LIVER PERCUTANEOUS (9/8/2021).      Social Hx: reviewed and updated as appropriate  Social History     Tobacco Use    Smoking status: Former     Packs/day: 1.00     Years: 30.00     Pack years: 30.00     Types: Cigarettes     Quit date: 2021     Years since quittin.8    Smokeless tobacco: Never   Substance Use Topics    Alcohol use: No     Alcohol/week: 0.0 standard drinks        Family hx: reviewed and updated as appropriate  family history includes COPD in his maternal grandmother; Heart Attack in his father; Heart Attack (age of onset: 39) in his sister; Dene Ganser in his mother; Stomach Cancer in his brother. Medications:   insulin lispro, 0-16 Units, Q4H  calcium gluconate-NaCl, 1,000 mg, Once  [START ON 2022] famotidine (PEPCID) injection, 20 mg, Daily  levofloxacin, 750 mg, Q48H  ipratropium-albuterol, 1 ampule, 6 times per day  methylPREDNISolone, 40 mg, Daily  chlorhexidine, 15 mL, BID  piperacillin-tazobactam, 3,375 mg, Q8H  filgrastim-aafi, 480 mcg, Daily  [Held by provider] furosemide, 60 mg, BID  [Held by provider] metoprolol tartrate, 25 mg, BID  [Held by provider] potassium chloride, 40 mEq, TID  insulin glargine, 30 Units, QAM AC  [Held by provider] insulin glargine, 40 Units, Nightly  [Held by provider] aspirin, 81 mg, Daily  atorvastatin, 40 mg, Daily  mometasone-formoterol, 2 puff, BID  [Held by provider] NIFEdipine, 30 mg, BID  [Held by provider] spironolactone, 100 mg, BID  sodium chloride flush, 5-40 mL, 2 times per day  [Held by provider] enoxaparin, 30 mg, BID       Patient has no known allergies.     Allergies:   No Known Allergies      Physical Exam/Objective:   Vitals:    22 0850   BP: 129/61   Pulse: (!) 109   Resp: 23   Temp: 98 °F (36.7 °C)   SpO2: 96%       Intake/Output Summary (Last 24 hours) at 2022 0923  Last data filed at 2022 0850  Gross per 24 hour   Intake 3297.51 ml   Output 1322 ml   Net 1975.51 ml         General appearance: Intubated and sedated but awakens to stimulus  HEENT: no icterus, EOM intact, trachea midline. Neck : no masses, appears symmetrical and +JVD appreciated. Respiratory: Respiratory effort normal, bilateral equal chest rise. Diminished, crackling  Cardiovascular: Ausculation shows RRR and pitting edema in the dependent areas  Abdomen: abdomen is soft, non distended, no masses, no pain with palpation. Musculoskeletal:  no joint swelling, no deformity, strength grossly normal  Skin: no rashes, no induration, no tightening, no jaundice  Neuro:   Follows commands, moves all extremities spontaneously       Data:   CBC:   Recent Labs     07/24/22  1133 07/25/22  0221 07/25/22  0424   WBC 0.1* 0.0* 0.0*   HGB 4.9* 6.3* 6.3*   HCT 14.4* 15.6* 17.4*   PLT 10* 13* 11*     BMP:    Recent Labs     07/23/22  1545 07/24/22  0555 07/24/22  1538 07/25/22  0424    135* 137 137   K 4.2 4.4 4.3 5.1   CL 94* 93* 96* 96*   CO2 30 26 26 28   BUN 24* 28* 33* 44*   CREATININE 1.1 1.5* 1.6* 2.0*   GLUCOSE 122* 240* 219* 302*   MG 2.00 1.90  --  2.50*

## 2022-07-26 NOTE — PROGRESS NOTES
Met with family. The plan on withdrawing care tomorrow at 10 AM.  Change CODE STATUS to DNR at this time. All questions answered.

## 2022-07-26 NOTE — PROGRESS NOTES
Spoke with sister Chet Champion to update her on brothers blood pressure dropping. He is also less responsive on the ventilator, despite propofol being turned down. If he continues to further decline we will update her and she can come up, if not we will still plan on tomorrow am withdrawal of care.

## 2022-07-26 NOTE — PROGRESS NOTES
Plan to withdrawal care tomorrow morning. Sisters are waiting on one more sister to come in prior to withdrawal.  Family's  will be for last rights. Code status changed, Kaye Jackman spoke with family.

## 2022-07-26 NOTE — PROGRESS NOTES
[facility]                                               Progress note    Summary:   Nisa Giang is being seen by nephrology for EDGARDO. Admitted with SOB. This is a 57-year-old man with past medical history significant for hypertension hyperlipidemia small cell lung cancer that has metastasized with ectopic ACTH production. Interval History  Now has left psoas hematoma. Dense consolidation in left lung base , pneumonia favored. New bilateral pleural effusions   Now in atrial fibrillation with RVR and on diltiazem. He remains intubated. Sating 93% on 100 % fio2    BP 97/55  UO only 1246 cc   cc  + 683 cc past day   + 11 L for admission. Labs reviewed  Sodium 137 potassium 4.9 bicarb 26 BUN 59 creatinine 2.8 from 2.9 last night  Magnesium 2.8 glucose 209 calcium 7.1  Albumin 3.2   Currently on lasix 60 mg BID. Solumedrol 40 mg daily     Plan:   - worsening renal function. Likely hemodynamic mediated ATN  - positive fluid balance, edema , max vent support. - increase lasix to 60 mg TID to avoid accumulating fluid balance. - would not be a candidate for dialysis given his metastatic malignancy and poor prognosis       Olinda Ruelas MD  [facility]  Office: [phone]    Assessment:   Acute kidney injury  This is likely hemodynamic mediated injury in the setting of hypotension and hypoxia. He also had contrast for CTPA on 723 PE was negative for PE  Baseline creatinine less than 1  Had a rapid response with hypoxia hypotension on 723 and has worsening creatinine since then. His creatinine is 2 at the time of consult  Lasix been held the past 2 days  Urine studies. Chronic hypokalemia. On 40 meq KCL TID, now held. With worsening renal failure becoming hyperkalemic  Aldactone is held right now   Ectopic ACTH production from small cell lung cancer    Small cell lung cancer with mets  Last dose of chemotherapy topotecan on 7/21  Profound pancytopenia.      Acute on chronic hypoxic respiratory failure  Intubated on max vent settings  EF preserved last check 60%  CTPA did not show any PE. Chest x-ray shows significantly increased left-sided central predominant airspace opacity may represent asymmetric edema or infectious process, trace bilateral pleural effusions appropriate positioning of endotracheal tube noted. He is on some antibiotics Zosyn and Levaquin        ROS:   Unable to obtain       PMH:   Past medical history, surgical history, social history, family history are reviewed and updated as appropriate. Reviewed current medication list.   Allergies reviewed and updated as needed. PE:   Vitals:    07/26/22 0600   BP: (!) 97/55   Pulse: (!) 101   Resp: 15   Temp:    SpO2: 93%       General appearance: intubated and sedate  HEENT: EOM intact, no icterus. Trachea is midline. Neck : No masses, appears symmetrical, + JVD  Respiratory: Respiratory effort appears normal, bilateral equal chest rise, + crackles. Cardiovascular: Ausculation shows RRR + edema  Abdomen: No visible mass or tenderness, non distended. Musculoskeletal:  Joints with no swelling or deformity. Skin:no rashes, ulcers, induration, no jaundice.    Neuro: sedated      Lab Results   Component Value Date    CREATININE 2.8 (H) 07/26/2022    BUN 59 (H) 07/26/2022     07/26/2022    K 4.9 07/26/2022    CL 95 (L) 07/26/2022    CO2 26 07/26/2022      Lab Results   Component Value Date    WBC 0.0 (LL) 07/26/2022    HGB 7.6 (L) 07/26/2022    HCT 20.9 (LL) 07/26/2022    MCV 97.8 07/26/2022    PLT 15 (LL) 07/26/2022     Lab Results   Component Value Date    CALCIUM 7.1 (L) 07/26/2022    PHOS 2.7 09/20/2021

## 2022-07-26 NOTE — PROGRESS NOTES
Pulmonary Progress Note    Date of Admission: 7/15/2022   LOS: 11 days       CC:  Chief Complaint   Patient presents with    Discuss Labs     Pt states that he has CA and  states is on a medication that decreases his K level states an NP in his PCP's office to have lab work, was notified to come here for IV KCL        Subjective:  CT completed last night. Psoas hematoma expanded. ROS:   Unable to assess        Assessment:          Plan: This note may have been transcribed using [id number] [employer] Please disregard any translational errors. Hospital Day: 11     Mechanical Vent  Increased peep to 10  Continues to have severe hypoxemia   Echo normal.   No shunting     DM   Lantus, adjust    Sliding scale insulin       Volume status / EDGARDO   11 L positive   Worsening. Poor HD candidated      Pancytopenia   Hem / onc following   Transfused    Hematoma  Seen on CT chest.        Pneumonia  Bronchoscopy completed. Levaquin / Zosyn    Wheezing   Duo nebs  Dulera   Solumedrol        Prophylaxis  - GI - pepcid    - DVT - scd with low plt  - VAP - peridex  - C. Diff -    - Nasal Decolonization -      Nutrition  - ADULT TUBE FEEDING; Nasogastric; Peptide Based High Protein; Continuous; 10; No; 30; Q 4 hours; Protein;  Bolus 6 protienex daily.  -   Intake/Output Summary (Last 24 hours) at 7/26/2022 0908  Last data filed at 7/26/2022 0800  Gross per 24 hour   Intake 1947.29 ml   Output 1576 ml   Net 371.29 ml         Mobility   sedated    Access  Arterial      PICC      PICC Double Lumen 07/19/22 Left Brachial (Active)   Central Line Being Utilized Yes 07/25/22 0600   Criteria for Appropriate Use Irritant/vesicant medication 07/25/22 0600   Site Assessment Clean, dry & intact 07/25/22 0600   Phlebitis Assessment No symptoms 07/25/22 0600   Infiltration Assessment 0 07/25/22 0600   Extremity Circumference (cm) 33 cm 07/25/22 0600   External Catheter Length (cm) 0 cm 07/25/22 0600   Proximal Lumen Color/Status Red; Infusing 07/25/22 0600   Distal Lumen Color/Status Purple; Infusing 07/25/22 0600   Line Care Connections checked and tightened 07/25/22 0600   Alcohol Cap Used Yes 07/25/22 0600   Date of Last Dressing Change 07/25/22 07/25/22 0600   Dressing Type Transparent; Antimicrobial;Securing device 07/25/22 0600   Dressing Status New dressing applied;Clean, dry & intact 07/25/22 0600   Dressing Intervention New 07/25/22 0600   Number of days: 6        CVC                  I spent 31  minutes of critical care time with this patient excluding any procedures. Data:        PHYSICAL EXAM:   Blood pressure (!) 103/58, pulse (!) 102, temperature 98.1 °F (36.7 °C), temperature source Axillary, resp. rate 15, height 6' 1.25\" (1.861 m), weight 241 lb 10 oz (109.6 kg), SpO2 96 %.'  Body mass index is 31.66 kg/m². Gen: ++  distress. ENT:   Resp: No accessory muscle use. No crackles. No wheezes. No rhonchi. CV: Regular rate. Regular rhythm. No murmur or rub. No edema. Skin: Warm, dry, normal texture and turgor. No nodule on exposed extremities. M/S: No cyanosis. No clubbing. No joint deformity.   Psych: sedated      Medications:    Scheduled Meds:   [START ON 7/27/2022] levofloxacin  500 mg IntraVENous Q48H    piperacillin-tazobactam  3,375 mg IntraVENous Q12H    furosemide  60 mg IntraVENous TID    calcium gluconate-NaCl  1,000 mg IntraVENous Once    insulin lispro  0-16 Units SubCUTAneous Q4H    famotidine (PEPCID) injection  20 mg IntraVENous Daily    insulin glargine  30 Units SubCUTAneous QAM AC    ipratropium-albuterol  1 ampule Inhalation 6 times per day    methylPREDNISolone  40 mg IntraVENous Daily    chlorhexidine  15 mL Mouth/Throat BID    filgrastim-aafi  480 mcg SubCUTAneous Daily    [Held by provider] metoprolol tartrate  25 mg Oral BID    [Held by provider] potassium chloride  40 mEq Oral TID    atorvastatin  40 mg Oral Daily    mometasone-formoterol  2 puff Inhalation BID    [Held by provider] NIFEdipine  30 mg Oral BID    [Held by provider] spironolactone  100 mg Oral BID    sodium chloride flush  5-40 mL IntraVENous 2 times per day       Continuous Infusions:   dilTIAZem 5 mg/hr (07/26/22 0606)    fentaNYL 175 mcg/hr (07/26/22 0606)    propofol 50 mcg/kg/min (07/26/22 0648)    sodium chloride 5 mL/hr at 07/26/22 0606    dextrose 100 mL/hr (07/23/22 0333)       PRN Meds:  perflutren lipid microspheres, metoprolol, busPIRone, ipratropium-albuterol, albuterol sulfate HFA, sodium chloride flush, sodium chloride, ondansetron **OR** ondansetron, polyethylene glycol, acetaminophen **OR** acetaminophen, glucose, dextrose bolus **OR** dextrose bolus, glucagon (rDNA), dextrose    Labs reviewed:  CBC:   Recent Labs     07/25/22  0424 07/25/22  1020 07/25/22  1905 07/26/22  0400   WBC 0.0* 0.0*  --  0.0*   HGB 6.3* 6.1* 7.2* 7.6*   HCT 17.4* 17.2* 18.0* 20.9*   MCV 98.2 96.0  --  97.8   PLT 11* 29*  --  15*       BMP:   Recent Labs     07/25/22  1430 07/25/22  2134 07/26/22  0400    139 137   K 4.3 4.9 4.9   CL 94* 97* 95*   CO2 25 26 26   BUN 42* 54* 59*   CREATININE 2.3* 2.9* 2.8*       LIVER PROFILE:   Recent Labs     07/26/22  0400   AST 40*   ALT 68*   BILIDIR 0.9*   BILITOT 1.1*   ALKPHOS 135*     PT/INR:   Recent Labs     07/24/22  1134   PROTIME 17.0*   INR 1.39*       APTT:   Recent Labs     07/24/22  1134   APTT 29.3       UA:No results for input(s): NITRITE, COLORU, PHUR, LABCAST, WBCUA, RBCUA, MUCUS, TRICHOMONAS, YEAST, BACTERIA, CLARITYU, SPECGRAV, LEUKOCYTESUR, UROBILINOGEN, BILIRUBINUR, BLOODU, GLUCOSEU, AMORPHOUS in the last 72 hours. Invalid input(s): Sharri Kirk  No results for input(s): PH, PCO2, PO2 in the last 72 hours. Cx:      Films: This note was transcribed using [id number] [employer]. Please disregard any translational errors.       Alannah Parkinson Pulmonary, [facility] and [facility] [phone]

## 2022-07-26 NOTE — PROGRESS NOTES
[address] [facility] Internal Medicine Note      Chief Complaint: Intubated and sedated in the ICU    Subjective/Interval History:    Patient remains intubated and sedated. A. fib with RVR overnight, now on a Cardizem drip. Rate is better controlled now. Patient with evolving left flank hematoma over the last 24-48 hours. CT scan of abdomen and pelvis last night shows a large psoas hematoma. Patient remains on propofol and fentanyl for sedation. Renal function is worsening. Urine output marginal overnight. Case discussed with nursing at the bedside. Review of systems unobtainable due to intubation/sedation. PMH, PSH, FH/SH reviewed and unchanged as documented in the H&P dated 22    Medication list reviewed    Objective:    BP (!) 97/55   Pulse (!) 101   Temp 98.6 °F (37 °C) (Axillary)   Resp 15   Ht 6' 1.25\" (1.861 m)   Wt 239 lb 13.8 oz (108.8 kg)   SpO2 93%   BMI 31.43 kg/m²   Temp  Av.5 °F (36.9 °C)  Min: 97.9 °F (36.6 °C)  Max: 99.1 °F (37.3 °C)    Intubated  CV-regularly irregular. Heart rate 100-1 10 currently. Labs reviewed. Chest-Anterior lungs coarse ventilated breaths. Unable to assess posterior breath sounds currently  Abd-BS+, soft, NTND  Ext- 3-4+ lower extremity edema noted. Edema remains unchanged today.     The Following Labs Were Reviewed Today:    Recent Results (from the past 24 hour(s))   Blood Gas, Arterial    Collection Time: 22  8:10 AM   Result Value Ref Range    pH, Arterial 7.476 (H) 7.350 - 7.450    pCO2, Arterial 38.0 35.0 - 45.0 mmHg    pO2, Arterial 45.1 (L) 75.0 - 108.0 mmHg    HCO3, Arterial 28.0 21.0 - 29.0 mmol/L    Base Excess, Arterial 4.2 (H) -3.0 - 3.0 mmol/L    Hemoglobin, Art, Extended 7.3 (L) 13.5 - 17.5 g/dL    O2 Sat, Arterial 83.1 (L) >92 %    Carboxyhgb, Arterial 1.5 0.0 - 1.5 %    Methemoglobin, Arterial 0.8 <1.5 %    TCO2, Arterial 29.2 Not Established mmol/L    O2 Therapy Unknown    POCT Glucose    Collection Time: 22  9:23 AM Result Value Ref Range    POC Glucose 372 (H) 70 - 99 mg/dl    Performed on ACCU-CHEK    CBC with Auto Differential    Collection Time: 07/25/22 10:20 AM   Result Value Ref Range    WBC 0.0 (LL) 4.0 - 11.0 K/uL    RBC 1.79 (L) 4.20 - 5.90 M/uL    Hemoglobin 6.1 (LL) 13.5 - 17.5 g/dL    Hematocrit 17.2 (LL) 40.5 - 52.5 %    MCV 96.0 80.0 - 100.0 fL    MCH 34.0 26.0 - 34.0 pg    MCHC 35.4 31.0 - 36.0 g/dL    RDW 16.9 (H) 12.4 - 15.4 %    Platelets 29 (L) 920 - 450 K/uL    MPV 8.4 5.0 - 10.5 fL   POCT Glucose    Collection Time: 07/25/22 11:40 AM   Result Value Ref Range    POC Glucose 389 (H) 70 - 99 mg/dl    Performed on ACCU-CHEK    Basic Metabolic Panel    Collection Time: 07/25/22  2:30 PM   Result Value Ref Range    Sodium 136 136 - 145 mmol/L    Potassium 4.3 3.5 - 5.1 mmol/L    Chloride 94 (L) 99 - 110 mmol/L    CO2 25 21 - 32 mmol/L    Anion Gap 17 (H) 3 - 16    Glucose 330 (H) 70 - 99 mg/dL    BUN 42 (H) 7 - 20 mg/dL    Creatinine 2.3 (H) 0.9 - 1.3 mg/dL    GFR Non-African American 30 (A) >60    GFR  36 (A) >60    Calcium 7.4 (L) 8.3 - 10.6 mg/dL   POCT Glucose    Collection Time: 07/25/22  3:57 PM   Result Value Ref Range    POC Glucose 332 (H) 70 - 99 mg/dl    Performed on ACCU-CHEK    Hemoglobin and Hematocrit    Collection Time: 07/25/22  7:05 PM   Result Value Ref Range    Hemoglobin 7.2 (L) 13.5 - 17.5 g/dL    Hematocrit 18.0 (LL) 40.5 - 52.5 %   POCT Glucose    Collection Time: 07/25/22  8:08 PM   Result Value Ref Range    POC Glucose 286 (H) 70 - 99 mg/dl    Performed on ACCU-CHEK    Basic Metabolic Panel    Collection Time: 07/25/22  9:34 PM   Result Value Ref Range    Sodium 139 136 - 145 mmol/L    Potassium 4.9 3.5 - 5.1 mmol/L    Chloride 97 (L) 99 - 110 mmol/L    CO2 26 21 - 32 mmol/L    Anion Gap 16 3 - 16    Glucose 265 (H) 70 - 99 mg/dL    BUN 54 (H) 7 - 20 mg/dL    Creatinine 2.9 (H) 0.9 - 1.3 mg/dL    GFR Non-African American 23 (A) >60    GFR  28 (A) >60 Calcium 7.7 (L) 8.3 - 10.6 mg/dL   POCT Glucose    Collection Time: 07/26/22 12:37 AM   Result Value Ref Range    POC Glucose 265 (H) 70 - 99 mg/dl    Performed on ACCU-CHEK    Magnesium    Collection Time: 07/26/22  4:00 AM   Result Value Ref Range    Magnesium 2.80 (H) 1.80 - 2.40 mg/dL   CBC with Auto Differential    Collection Time: 07/26/22  4:00 AM   Result Value Ref Range    WBC 0.0 (LL) 4.0 - 11.0 K/uL    RBC 2.14 (L) 4.20 - 5.90 M/uL    Hemoglobin 7.6 (L) 13.5 - 17.5 g/dL    Hematocrit 20.9 (LL) 40.5 - 52.5 %    MCV 97.8 80.0 - 100.0 fL    MCH 35.3 (H) 26.0 - 34.0 pg    MCHC 36.1 (H) 31.0 - 36.0 g/dL    RDW 16.8 (H) 12.4 - 15.4 %    Platelets 15 (LL) 549 - 450 K/uL    MPV 7.7 5.0 - 10.5 fL   Basic Metabolic Panel    Collection Time: 07/26/22  4:00 AM   Result Value Ref Range    Sodium 137 136 - 145 mmol/L    Potassium 4.9 3.5 - 5.1 mmol/L    Chloride 95 (L) 99 - 110 mmol/L    CO2 26 21 - 32 mmol/L    Anion Gap 16 3 - 16    Glucose 209 (H) 70 - 99 mg/dL    BUN 59 (H) 7 - 20 mg/dL    Creatinine 2.8 (H) 0.9 - 1.3 mg/dL    GFR Non-African American 24 (A) >60    GFR  29 (A) >60    Calcium 7.1 (L) 8.3 - 10.6 mg/dL   POCT Glucose    Collection Time: 07/26/22  4:00 AM   Result Value Ref Range    POC Glucose 265 (H) 70 - 99 mg/dl    Performed on ACCU-CHEK      CT scan reviewed. ASSESSMENT/PLAN:      Principal Problem:    Small cell lung cancer-chemotherapy treatment now has been complicated by an acute pneumonia in his left upper lobe. CBC remains very abnormal.    Active Problems:    Left upper lobe pneumonia with acute respiratory failure with hypoxia-intubation day #3, day #4 of Zosyn and Levaquin. Continues with full ventilator support. Pancytopenia/psoas hematoma-hemoglobin is better. Apparent spontaneous psoas bleed due to thrombocytopenia. Continue to monitor CBC. EDGARDO-worsening renal function again today. Appreciate nephrology input.     Hypokalemia-potassium high normal currently. Continue to monitor. Hypomagnesemia-magnesium high today. Continue to monitor. Hyperglycemia-continue sliding scale and currently on Lantus 30 units daily. Continue sliding scale and adjust Lantus moving forward as needed    Ectopic ACTH syndrome -Lasix restarted yesterday by nephrology. Patient continues to have evolving multiorgan failure. Prognosis seems worse as each day passes.     Time > 35 minutes reviewing chart and patient data, examining and interviewing patient, and discussing with nursing staff, family, etc.       Jose Marshall MD, FACP  6:50 AM  7/26/2022

## 2022-07-26 NOTE — PROGRESS NOTES
[facility]  Oncology Hematology Care  Progress Note      SUBJECTIVE:   PT now has a hematoma as well noted  HE remains intubated   COUnts are all low       OBJECTIVE      Medications    Current Facility-Administered Medications: [COMPLETED] dilTIAZem injection 10 mg, 10 mg, IntraVENous, Once **FOLLOWED BY** dilTIAZem 125 mg in dextrose 5 % 125 mL infusion, 2.5-15 mg/hr, IntraVENous, Continuous  [START ON 7/27/2022] levoFLOXacin (LEVAQUIN) 500 MG/100ML infusion 500 mg, 500 mg, IntraVENous, Q48H  piperacillin-tazobactam (ZOSYN) 3,375 mg in dextrose 5 % 100 mL IVPB extended infusion (mini-bag), 3,375 mg, IntraVENous, Q12H  furosemide (LASIX) injection 60 mg, 60 mg, IntraVENous, TID  calcium gluconate 1000 mg in sodium chloride 50 mL, 1,000 mg, IntraVENous, Once  0.9 % sodium chloride infusion, , IntraVENous, PRN  insulin lispro (HUMALOG) injection vial 0-16 Units, 0-16 Units, SubCUTAneous, Q4H  famotidine (PEPCID) 20 mg in sodium chloride (PF) 10 mL injection, 20 mg, IntraVENous, Daily  fentaNYL (SUBLIMAZE) 1,000 mcg in sodium chloride 0.9% 100 mL infusion,  mcg/hr, IntraVENous, Continuous  perflutren lipid microspheres (DEFINITY) injection 1.65 mg, 1.5 mL, IntraVENous, ONCE PRN  insulin glargine (LANTUS) injection vial 30 Units, 30 Units, SubCUTAneous, QAM AC  metoprolol (LOPRESSOR) injection 5 mg, 5 mg, IntraVENous, Q6H PRN  busPIRone (BUSPAR) tablet 5 mg, 5 mg, Oral, TID PRN  ipratropium-albuterol (DUONEB) nebulizer solution 1 ampule, 1 ampule, Inhalation, Q4H PRN  ipratropium-albuterol (DUONEB) nebulizer solution 1 ampule, 1 ampule, Inhalation, 6 times per day  methylPREDNISolone sodium (SOLU-MEDROL) injection 40 mg, 40 mg, IntraVENous, Daily  chlorhexidine (PERIDEX) 0.12 % solution 15 mL, 15 mL, Mouth/Throat, BID  propofol injection, 5-50 mcg/kg/min, IntraVENous, Continuous  filgrastim-aafi (NIVESTYM) injection 480 mcg, 480 mcg, SubCUTAneous, Daily  [Held by provider] metoprolol tartrate (LOPRESSOR) tablet 25 mg, 25 mg, Oral, BID  [Held by provider] potassium chloride (KLOR-CON) extended release tablet 40 mEq, 40 mEq, Oral, TID  atorvastatin (LIPITOR) tablet 40 mg, 40 mg, Oral, Daily  albuterol sulfate HFA (PROVENTIL;VENTOLIN;PROAIR) 108 (90 Base) MCG/ACT inhaler 2 puff, 2 puff, Inhalation, Q4H PRN  mometasone-formoterol (DULERA) 200-5 MCG/ACT inhaler 2 puff, 2 puff, Inhalation, BID  [Held by provider] NIFEdipine (PROCARDIA XL) extended release tablet 30 mg, 30 mg, Oral, BID  [Held by provider] spironolactone (ALDACTONE) tablet 100 mg, 100 mg, Oral, BID  sodium chloride flush 0.9 % injection 5-40 mL, 5-40 mL, IntraVENous, 2 times per day  sodium chloride flush 0.9 % injection 5-40 mL, 5-40 mL, IntraVENous, PRN  0.9 % sodium chloride infusion, , IntraVENous, PRN  ondansetron (ZOFRAN-ODT) disintegrating tablet 4 mg, 4 mg, Oral, Q8H PRN **OR** ondansetron (ZOFRAN) injection 4 mg, 4 mg, IntraVENous, Q6H PRN  polyethylene glycol (GLYCOLAX) packet 17 g, 17 g, Oral, Daily PRN  acetaminophen (TYLENOL) tablet 650 mg, 650 mg, Oral, Q6H PRN **OR** acetaminophen (TYLENOL) suppository 650 mg, 650 mg, Rectal, Q6H PRN  glucose chewable tablet 16 g, 4 tablet, Oral, PRN  dextrose bolus 10% 125 mL, 125 mL, IntraVENous, PRN **OR** dextrose bolus 10% 250 mL, 250 mL, IntraVENous, PRN  glucagon (rDNA) injection 1 mg, 1 mg, IntraMUSCular, PRN  dextrose 5 % solution, 100 mL/hr, IntraVENous, PRN  Physical    VITALS:  BP (!) 103/58   Pulse (!) 102   Temp 98.1 °F (36.7 °C) (Axillary)   Resp 15   Ht 6' 1.25\" (1.861 m)   Wt 241 lb 10 oz (109.6 kg)   SpO2 96%   BMI 31.66 kg/m²   TEMPERATURE:  Current - Temp: 98.1 °F (36.7 °C);  Max - Temp  Av.6 °F (37 °C)  Min: 98 °F (36.7 °C)  Max: 99.1 °F (37.3 °C)  PULSE OXIMETRY RANGE: SpO2  Av.3 %  Min: 90 %  Max: 100 %  24HR INTAKE/OUTPUT:    Intake/Output Summary (Last 24 hours) at 2022 0844  Last data filed at 2022 0800  Gross per 24 hour   Intake 2404.29 ml   Output 1576 ml   Net 828.29 ml       CONSTITUTIONAL:intubated   HEMATOLOGIC/LYMPHATICS:  no cervical lymphadenopathy, no supraclavicular lymphadenopathy, n  LUNGS:  scattered rhonchi   CARDIOVASCULAR:  , regular rate and rhythm, normal S1 and S2, no S3 or S4, and no murmur noted  ABDOMEN:  soft  pt was on his back -could not see bruising /hematoma   MUSCULOSKELETAL:  There is no redness, warmth, or swelling of the joints. EXTREMETIES: edema present       Data      Recent Labs     07/25/22  0424 07/25/22  1020 07/25/22  1905 07/26/22  0400   WBC 0.0* 0.0*  --  0.0*   HGB 6.3* 6.1* 7.2* 7.6*   HCT 17.4* 17.2* 18.0* 20.9*   PLT 11* 29*  --  15*   MCV 98.2 96.0  --  97.8        Recent Labs     07/25/22  1430 07/25/22  2134 07/26/22  0400    139 137   K 4.3 4.9 4.9   CL 94* 97* 95*   CO2 25 26 26   BUN 42* 54* 59*   CREATININE 2.3* 2.9* 2.8*     Recent Labs     07/26/22  0400   AST 40*   ALT 68*   BILIDIR 0.9*   BILITOT 1.1*   ALKPHOS 135*         Magnesium:    Lab Results   Component Value Date/Time    MG 2.80 07/26/2022 04:00 AM    MG 2.50 07/25/2022 04:24 AM    MG 1.90 07/24/2022 05:55 AM       Imaging CT HEAD WO CONTRAST    Result Date: 7/17/2022  EXAMINATION: CT OF THE HEAD WITHOUT CONTRAST  7/17/2022 6:43 pm TECHNIQUE: CT of the head was performed without the administration of intravenous contrast. Automated exposure control, iterative reconstruction, and/or weight based adjustment of the mA/kV was utilized to reduce the radiation dose to as low as reasonably achievable. COMPARISON: MRI brain 09/11/2021 HISTORY: ORDERING SYSTEM PROVIDED HISTORY: Unwitnessed fall TECHNOLOGIST PROVIDED HISTORY: Reason for exam:->Unwitnessed fall Has a \"code stroke\" or \"stroke alert\" been called? ->No Reason for Exam: Unwitnessed fall FINDINGS: BRAIN/VENTRICLES: No evidence of acute infarct or acute intracranial hemorrhage. 2 new sub 5 mm subependymal nodules along the lateral margin of the right lateral ventricle.   Scattered small parenchymal calcifications including a linear subcortical calcification involving the high posterior right frontal lobe. No hydrocephalus or extra-axial fluid collection. Midline maintained. Basal cisterns patent. ORBITS: The visualized portion of the orbits demonstrate no acute abnormality. SINUSES: The visualized paranasal sinuses and mastoid air cells demonstrate no acute abnormality. SOFT TISSUES/SKULL:  No acute abnormality of the visualized skull or soft tissues. 1. No acute intracranial abnormality. 2. Nonspecific subependymal nodules involving the right lateral ventricle and scattered parenchymal calcifications. MRI of the brain is recommended for further evaluation. XR CHEST PORTABLE    Result Date: 7/18/2022  EXAMINATION: ONE XRAY VIEW OF THE CHEST 7/18/2022 4:37 am COMPARISON: Chest radiograph 07/15/2022 at 11:47 p.m.; chest, abdomen, and pelvis CT 07/08/2022 HISTORY: ORDERING SYSTEM PROVIDED HISTORY: hypokalemia TECHNOLOGIST PROVIDED HISTORY: Reason for exam:->hypokalemia Reason for Exam: hypokalemia FINDINGS: Decreased lung volumes. Persistent linear opacities in each lung base. Suspected new right basilar airspace opacity with possible overlying trace right pleural effusion. Increased diffuse interstitial prominence with indistinct pulmonary vasculature no definite interlobular septal thickening. No definite findings of pneumothorax or left pleural effusion. Normal mediastinal and cardiac contours. 1. Suspected new right basilar airspace opacity potentially due in part to passive atelectasis given possible overlying trace right pleural effusion. Superimposed pneumonia and aspiration are not excluded. 2. Persistent bibasilar atelectasis and/or scarring. 3. Increased interstitial prominence due to pulmonary vascular congestion and/or central vascular crowding.      XR CHEST PORTABLE    Result Date: 7/16/2022  EXAMINATION: ONE XRAY VIEW OF THE CHEST 7/15/2022 11:48 pm COMPARISON: 07/15/2022 HISTORY: ORDERING SYSTEM PROVIDED HISTORY: lung cancer TECHNOLOGIST PROVIDED HISTORY: Reason for exam:->lung cancer Reason for Exam: lung cancer FINDINGS: The lungs are without acute focal process. There is no effusion or pneumothorax. The cardiomediastinal silhouette is without acute process. The osseous structures are without acute process. No acute process. XR CHEST PORTABLE    Result Date: 7/15/2022  EXAMINATION: ONE XRAY VIEW OF THE CHEST 7/15/2022 7:22 pm COMPARISON: 07/08/2022, 09/13/2021 HISTORY: ORDERING SYSTEM PROVIDED HISTORY: other TECHNOLOGIST PROVIDED HISTORY: Reason for exam:->other Reason for Exam: abnormal labs FINDINGS: The lungs are without acute focal process. There is no effusion or pneumothorax. The cardiomediastinal silhouette is without acute process. The osseous structures are without acute process. No acute process. NM BONE SCAN WHOLE BODY    Result Date: 7/8/2022  EXAMINATION: WHOLE BODY BONE SCAN  7/8/2022 TECHNIQUE: The patient was injected intravenously with 27.5 mCi of 99 mTc MDP and scintigraphy of the entire skeleton was performed approximately three hours later. COMPARISON: 03/18/2022 HISTORY: ORDERING SYSTEM PROVIDED HISTORY: Malignant neoplasm of right main bronchus [facility]) TECHNOLOGIST PROVIDED HISTORY: Order: Bone scan, total body Order Instructions: [facility] along with CT please lower back and hip pain Associated problems: Small cell carcinoma of lung (disorder) * (C34.01) Reason for Exam: staging prostate Ca Relevant Medical/Surgical History: ct today FINDINGS: New focal activity is demonstrated along the superior aspect of the right iliac crest.  Developing activity is seen at the lateral right 11th rib and posterior right 5th rib. Lesions are seen which are more conspicuous within the proximal right femur, anterior right iliac bone, medial left iliac bone, sacrum, lumbar spine, upper thoracic spine, proximal left humerus.  Activity at the shoulders, sternoclavicular joints, hips, knees, ankles, feet, likely degenerative. Physiologic activity is seen within the kidneys and urinary bladder. Urinary contamination is seen between the upper thighs. Several new metastatic lesions are seen as compared to prior exam dated 03/18/2022. Additional lesions are more intense as compared to prior which could relate to flare effect if there has been intervening therapy. CT CHEST ABDOMEN PELVIS W CONTRAST    Result Date: 7/8/2022  EXAMINATION: CT OF THE CHEST, ABDOMEN, AND PELVIS WITH CONTRAST 7/8/2022 8:35 am TECHNIQUE: CT of the chest, abdomen and pelvis was performed with the administration of intravenous contrast. Multiplanar reformatted images are provided for review. Automated exposure control, iterative reconstruction, and/or weight based adjustment of the mA/kV was utilized to reduce the radiation dose to as low as reasonably achievable. COMPARISON: 03/18/2022 HISTORY: ORDERING SYSTEM PROVIDED HISTORY: Malignant neoplasm of right main bronchus [facility]) TECHNOLOGIST PROVIDED HISTORY: Order: CT chest/abdomen/pelvis w/ contrast Order Instructions: STAT Creatinine & BUN prior to CT Scan per facility protocol [facility] Associated problems: Small cell carcinoma of lung (disorder) * (C34.01) Additional Contrast?->Oral STAT Creatinine as needed:->Yes Reason for Exam: Malignant neoplasm of right main bronchus FINDINGS: Chest: Mediastinum: The central airways are patent. There has been interval enlargement of several mediastinal lymph nodes. Index prevascular node measures 1.3 cm on the current exam compared with 9 mm prior. Right paratracheal node measures 1.5 cm on the current exam compared with 1.4 cm prior. Precarinal lymph node measures 1.4 cm on the current exam compared with 0.6 cm prior. AP window node measures 12 mm currently compared with 5 mm prior. There is a new 1.7 cm lymph node within the right infrahilar region. Lungs/pleura: Mild emphysematous changes are present.

## 2022-07-27 LAB
EKG ATRIAL RATE: 67 BPM
EKG DIAGNOSIS: NORMAL
EKG Q-T INTERVAL: 326 MS
EKG QRS DURATION: 82 MS
EKG QTC CALCULATION (BAZETT): 511 MS
EKG R AXIS: 68 DEGREES
EKG T AXIS: 53 DEGREES
EKG VENTRICULAR RATE: 148 BPM

## 2022-07-27 NOTE — PROGRESS NOTES
Bedside report received and patient assessment completed. Minimally responsive on vent. Sedation weaned from 175 mcg/hr fentanyl to 25 mcg/hr, and 50mcg/kg/min propofol to 10 mcg, remains very poorly responsive. Overbreathing vent, otherwise no response to pain in any extremity, and no cough/ gag reflex. SBP sustaining 60's. Sats maintaining 86-87% on PEEP 10 and FiO2 100%. Lung sounds rhonchorous bilaterally but no sputum obtained per ET suction. TF remains off and NGT clamped, residual checked and remained high with greenish brown sludgy thick output. NGT placed to LWS at this time for immediate return of 700ml output. No UOP per herndon catheter. Extremities edematous, dusky and ecchymotic. Repeat labs sent, and results and patient status update called to pulmonology- awaiting response.

## 2022-07-27 NOTE — PROGRESS NOTES
2149-Call placed to pt's sister, Geneva Gurrola, to notify of continuing decline in status and recommended to come to bedside. 2151-Pt suddenly became bradycardic from 100 to 50's    2152-Pt asystole at this time, absence of heart tones, neuro response or spontaneous respirations. Call back received from pulmonology, notified of status. Call placed to hospitalist to come evaluate. Call placed to Dr. Castillo [place] office, awaiting callback.  paged to come to bedside. Family en route.

## 2022-07-27 NOTE — DEATH NOTES
Death Pronouncement Note  Patient's Name: Carlos Bolanos   Patient's YOB: 1970  MRN Number: [id number]    Admitting Provider: Lauro Augustine MD  Attending Provider: Lauro Augustine, *    Patient was examined and the following were absent: Pulses, Blood Pressure, and Respiratory effort    I declared the patient dead on 07/26/2022 at 2152    Preliminary Cause of Death: multiorgan failure, malignancy, acute resp failure    Electronically signed by Adonay Fitzgerald DO on 7/26/22 at 10:30 PM EDT

## 2022-07-27 NOTE — FLOWSHEET NOTE
Pt's family appear to be coping with the death of pt and are supporting one another at this time. Family said they don't have any other spiritual needs. 07/26/22 2329   Encounter Summary   Encounter Overview/Reason  Grief, Loss, and Adjustments   Service Provided For: Family   Referral/Consult From: Nurse   Support System Family members   Last Encounter  07/26/22  (support and prayer CL)   Complexity of Encounter High   Begin Time 2235   End Time  2331   Total Time Calculated 56 min   Grief, Loss, and Adjustments   Type Death;Grief and loss   Assessment/Intervention/Outcome   Assessment Calm;Coping;Sad;Tearful   Intervention Discussed death, afterlife; Discussed belief system/Caodaism practices/wili; Active listening;Grief Care;Explored/Affirmed feelings, thoughts, concerns;Prayer (assurance of)/[place];Read/Provided Scripture   Outcome Coping;Engaged in conversation;Comfort;Expressed Gratitude;Grieving

## 2022-08-21 NOTE — DISCHARGE SUMMARY
[address] [address]                               DISCHARGE SUMMARY    PATIENT NAME: Jasson Ramírez                   :        1970  MED REC NO:   [id number]                          ROOM:       2114  ACCOUNT NO:   [de-identified]                           ADMIT DATE: 07/15/2022  PROVIDER:     Sharmin Sutton MD                  DISCHARGE DATE:  2022    REASON FOR ADMISSION:  Small cell lung cancer with hypokalemia and  hyperglycemia. 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 The patient was diagnosed  within the last year and had been receiving chemotherapy. Unfortunately, the patient presented back to the outpatient office with  increasing blood sugars and was found to have uncontrolled hyperglycemia  and hypokalemia. The patient was sent to the emergency room for further  evaluation and treatment and the patient was admitted. The patient was  seen in consultation by Oncology and his potassium was replaced and his  glucose was addressed. 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 The patient was seen in consultation again by Dr. Dhruv Nielsen in the  Oncology Service. He was hydrated, his glucose was addressed and his  potassium was replaced. The patient had an MRI of his brain showing  brain mets and he did start with brain radiation during this  hospitalization as well. Medications were adjusted to address his  hypertension caused by this syndrome as well. Given his ectopic ACTH,  the patient also had severe peripheral edema. On the morning of  2022, the patient was noted to have hypoglycemia that had occurred  overnight with blood sugars dropping down into the 70s.   In addition to  this, the patient's oxygen requirement dramatically increased. Chest  x-ray revealed a left upper lobe pneumonia and the patient was started  on Zosyn and Levaquin. Over the course of the next 24 hours, the  patient continued to have worsening in his oxygen requirement and was  now up to 15 L of oxygen and his shortness of breath was worsening. The  patient was a little bit confused, but he was asking to see his sister  stating that once his sister would be here, he would give her a hug and  then everything would be okay. That afternoon, the patient was  transferred to the Intensive Care Unit given his continued acute  respiratory decline. Additionally, the patient was noted to have severe  pancytopenia with a white blood cell count of 0, hemoglobin of 5.1, and  platelet count of 11 on the morning of 07/25/2022. The patient  continued to have worsening metabolic function. His creatinine and BUN  declined. He developed acute renal failure with a creatinine that was  now up to 2.3. His severe pancytopenia continued and the prognosis  became even worse. Family did agree at this point to withdrawal of care  and the patient passed away on 07/27/2022.         Onur Ann MD    D: 08/21/2022 11:25:36       T: 08/21/2022 11:28:07     [email or web address]  Job#: [id number]     Doc#: [id number]    CC: